# Patient Record
Sex: FEMALE | Race: WHITE | NOT HISPANIC OR LATINO | Employment: UNEMPLOYED | URBAN - NONMETROPOLITAN AREA
[De-identification: names, ages, dates, MRNs, and addresses within clinical notes are randomized per-mention and may not be internally consistent; named-entity substitution may affect disease eponyms.]

---

## 2017-01-04 ENCOUNTER — OFFICE VISIT (OUTPATIENT)
Dept: PSYCHIATRY | Facility: CLINIC | Age: 47
End: 2017-01-04

## 2017-01-04 VITALS
WEIGHT: 168.6 LBS | DIASTOLIC BLOOD PRESSURE: 90 MMHG | HEART RATE: 95 BPM | BODY MASS INDEX: 33.99 KG/M2 | SYSTOLIC BLOOD PRESSURE: 146 MMHG | HEIGHT: 59 IN

## 2017-01-04 DIAGNOSIS — I10 ESSENTIAL HYPERTENSION: ICD-10-CM

## 2017-01-04 DIAGNOSIS — E07.9 DISEASE OF THYROID GLAND: ICD-10-CM

## 2017-01-04 DIAGNOSIS — I25.10 ASCVD (ARTERIOSCLEROTIC CARDIOVASCULAR DISEASE): ICD-10-CM

## 2017-01-04 DIAGNOSIS — I25.10 ARTERIOSCLEROTIC CARDIOVASCULAR DISEASE (ASCVD): ICD-10-CM

## 2017-01-04 DIAGNOSIS — F31.81 BIPOLAR II DISORDER (HCC): Primary | ICD-10-CM

## 2017-01-04 PROCEDURE — 99213 OFFICE O/P EST LOW 20 MIN: CPT | Performed by: NURSE PRACTITIONER

## 2017-01-04 RX ORDER — SERTRALINE HYDROCHLORIDE 25 MG/1
25 TABLET, FILM COATED ORAL DAILY
Qty: 30 TABLET | Refills: 0 | Status: SHIPPED | OUTPATIENT
Start: 2017-01-04 | End: 2017-03-06 | Stop reason: SDUPTHER

## 2017-01-04 RX ORDER — LITHIUM CARBONATE 300 MG/1
300 CAPSULE ORAL 2 TIMES DAILY WITH MEALS
Qty: 60 CAPSULE | Refills: 1 | Status: SHIPPED | OUTPATIENT
Start: 2017-01-04 | End: 2017-03-06 | Stop reason: SDUPTHER

## 2017-01-04 NOTE — PROGRESS NOTES
"Subjective   Tammi Levine is a 46 y.o. female who is here today for medication management follow up.    Chief Complaint::  It's been a bad couple weeks.    HPI:  History of Present Illness   patient presents today for follow-up.  Patient reports that her mood has been improved her irritability and depression have improved.  She adamantly denies any hopelessness or thoughts of self-harm.  She continues to struggle with interpersonal conflict with her father who is demeaning and dependent.  Patient denies any medication related side effects she denies any signs and symptoms of flaquito or hypomania.The patient reports depressive symptoms including depressed mood, crying spells, hypersomnia, low energy, difficulty concentrating and psychomotor agitation, and have caused impairment in important areas of functioning.  Patient has reported improved mental health symptoms.  Patient did have un resolving nausea with lexapro and had to stop.  Will trial on zoloft and aviod celexa due to cardiac issues.    The patient reports symptoms stable with treatment for the past 1 month(s).   The following portions of the patient's history were reviewed and updated as appropriate: allergies, current medications, past family history, past medical history, past social history, past surgical history and problem list.    Review of Systems  Patient denies any recent medical illnesses.  No acute cardiopulmonary symptoms evident.  No acute gastrointestinal complaints.  Patient's appetite and intake are adequate.  Patient's current weight compared with last weight.    Objective   Physical Exam  Blood pressure 146/90, pulse 95, height 59\" (149.9 cm), weight 168 lb 9.6 oz (76.5 kg).    No Known Allergies    Current Medications:   Current Outpatient Prescriptions   Medication Sig Dispense Refill   • ACCU-CHEK ONESIMO PLUS test strip      • ACCU-CHEK SOFTCLIX LANCETS lancets      • aspirin 81 MG EC tablet Take 81 mg by mouth daily.     • Blood " Glucose Monitoring Suppl (ACCU-CHEK ONESIMO PLUS) W/DEVICE kit      • Empagliflozin (JARDIANCE) 25 MG tablet Take 25 mg by mouth daily.     • escitalopram (LEXAPRO) 10 MG tablet Take 1 tablet by mouth Daily. Take 1/2 tablet po daily for 1 week then increase to 1 tablet daily 30 tablet 1   • Exenatide ER (BYDUREON) 2 MG pen-injector Inject 2 mg under the skin 1 (one) time per week.     • insulin glargine (LANTUS) 100 UNIT/ML injection Inject 80 Units under the skin daily.     • levothyroxine (SYNTHROID, LEVOTHROID) 50 MCG tablet Take 50 mcg by mouth daily.     • lisinopril (PRINIVIL,ZESTRIL) 20 MG tablet Take 20 mg by mouth daily.     • lisinopril (PRINIVIL,ZESTRIL) 40 MG tablet      • lithium carbonate 300 MG capsule Take 1 capsule by mouth 2 (Two) Times a Day With Meals. Patient to begin dose at 300mg po daily for 3 days and then increase to twice daily 30 capsule 0   • simvastatin (ZOCOR) 80 MG tablet Take 80 mg by mouth every night.       No current facility-administered medications for this visit.        Mental Status Exam:   Hygiene:   fair  Cooperation:  Cooperative  Eye Contact:  Good  Psychomotor Behavior:  Appropriate  Affect:  Full range  Hopelessness: Denies  Speech:  Normal  Thought Process:  Goal directed and Linear  Thought Content:  Normal  Suicidal:  None  Homicidal:  None  Hallucinations:  None  Delusion:  None  Memory:  Intact  Orientation:  Person, Place, Time and Situation  Reliability:  good  Insight:  Good  Judgement:  Good  Impulse Control:  Good  Physical/Medical Issues:  Yes dm    Assessment/Plan   Diagnoses and all orders for this visit:    Bipolar II disorder    Arteriosclerotic cardiovascular disease (ASCVD), s/p MI in 2012, clinically stable.     ASCVD (arteriosclerotic cardiovascular disease)    Disease of thyroid gland    Essential hypertension  patient will be continued on lithium and trailed on Zoloft low dose.  She was cautioned and counseled regarding signs and symptoms of flaquito or  hypomania potentially inherent to anti-depressant use.  Patient was praised for positive choices.  Patient was to continue psychotherapy and self-care activities she has been utilizing the CALM for assistance with mindfulness and meditation.  We discussed risks, benefits, and side effects of the above medication and the patient was agreeable with the plan.    Return in about 2 months (around 3/4/2017) for Recheck.  The patient was instructed to call clinic as needed or go to ER if in crisis.  Patient was instructed to immediately call 911 or come to the nearest emergency room for thoughts to harm self or others.

## 2017-01-04 NOTE — MR AVS SNAPSHOT
Tammi ZEHRA Abner   1/4/2017 5:00 PM   Office Visit    Dept Phone:  [phone]   Encounter #:  [id number]    Provider:  MARISELA Escobar   Department:  [facility]AVIOR[facility]                Your Full Care Plan              Your Updated Medication List          This list is accurate as of: 1/4/17  5:20 PM.  Always use your most recent med list.                ACCU-CHEK ONESIMO PLUS test strip   Generic drug:  glucose blood       ACCU-CHEK ONESIMO PLUS W/DEVICE kit       ACCU-CHEK SOFTCLIX LANCETS lancets       aspirin 81 MG EC tablet       BYDUREON 2 MG pen-injector   Generic drug:  Exenatide ER       escitalopram 10 MG tablet   Commonly known as:  LEXAPRO   Take 1 tablet by mouth Daily. Take 1/2 tablet po daily for 1 week then increase to 1 tablet daily       insulin glargine 100 UNIT/ML injection   Commonly known as:  LANTUS       JARDIANCE 25 MG tablet   Generic drug:  Empagliflozin       levothyroxine 50 MCG tablet   Commonly known as:  SYNTHROID, LEVOTHROID       * lisinopril 20 MG tablet   Commonly known as:  PRINIVIL,ZESTRIL       * lisinopril 40 MG tablet   Commonly known as:  PRINIVIL,ZESTRIL       lithium carbonate 300 MG capsule   Take 1 capsule by mouth 2 (Two) Times a Day With Meals. Patient to begin dose at 300mg po daily for 3 days and then increase to twice daily       simvastatin 80 MG tablet   Commonly known as:  ZOCOR       * Notice:  This list has 2 medication(s) that are the same as other medications prescribed for you. Read the directions carefully, and ask your doctor or other care provider to review them with you.            You Were Diagnosed With        Codes Comments    Bipolar II disorder    -  Primary ICD-10-CM: F31.81  ICD-9-CM: 296.89     Arteriosclerotic cardiovascular disease (ASCVD)     ICD-10-CM: I25.10  ICD-9-CM: 429.2, 440.9     ASCVD (arteriosclerotic cardiovascular disease)     ICD-10-CM: I25.10  ICD-9-CM: 429.2, 440.9     "Disease of thyroid gland     ICD-10-CM: E07.9  ICD-9-CM: 246.9     Essential hypertension     ICD-10-CM: I10  ICD-9-CM: 401.9       Instructions     None    Patient Instructions History      Upcoming Appointments     Visit Type Date Time Department    MEDICINE CHECK 1/4/2017  5:00 PM [facility]    FOLLOW UP 1/13/2017 11:30 AM [facility]    FOLLOW UP 2/7/2017  1:30 PM [facility]    MEDICINE CHECK 3/6/2017  1:30 PM [facility]    FOLLOW UP 3/27/2017 12:40 PM [facility]      MyChart Signup     Our records indicate that you have declined [facility] [employer] signup. If you would like to sign up for [employer], please email [email or web address] or call [phone] to obtain an activation code.             Other Info from Your Visit           Your Appointments     Jan 13, 2017 11:30 AM EST   Follow Up with Vidal Ortiz LCSW   [facility] BEHAVIORAL HEALTH (--)    [address] [id number] [phone]           Arrive 15 minutes prior to appointment.            Feb 07, 2017  1:30 PM EST   Follow Up with Uday Killian MD   [facility] UROLOGY (--)    [address]  [facility] [address] [phone]           Arrive 15 minutes prior to appointment.            Mar 06, 2017  1:30 PM EST   Medicine Check with MARISELA Escobar   [facility] (--)    [address] [address]   [phone]            Mar 27, 2017 12:40 PM EDT   Follow Up with Jhonny De La Rosa MD   [facility] CARDIOLOGY (--)    [address]  [facility] [address] [phone]           Arrive 15 minutes prior to appointment.              Allergies     No Known Allergies      Vital Signs     Blood Pressure Pulse Height Weight Body Mass Index Smoking Status    146/90 95 59\" (149.9 cm) 168 lb 9.6 oz (76.5 kg) 34.05 kg/m2 Never Smoker      Problems and Diagnoses Noted     " Arteriosclerotic cardiovascular disease (ASCVD)    ASCVD (arteriosclerotic cardiovascular disease)    Disease of thyroid gland    High blood pressure    Bipolar II disorder    -  Primary

## 2017-01-23 DIAGNOSIS — D35.02 ADRENAL ADENOMA, LEFT: Primary | ICD-10-CM

## 2017-02-07 ENCOUNTER — APPOINTMENT (OUTPATIENT)
Dept: CT IMAGING | Facility: HOSPITAL | Age: 47
End: 2017-02-07
Attending: UROLOGY

## 2017-02-24 ENCOUNTER — OFFICE VISIT (OUTPATIENT)
Dept: PSYCHIATRY | Facility: CLINIC | Age: 47
End: 2017-02-24

## 2017-02-24 DIAGNOSIS — F43.21 GRIEF REACTION: ICD-10-CM

## 2017-02-24 DIAGNOSIS — F31.81 BIPOLAR II DISORDER (HCC): Primary | ICD-10-CM

## 2017-02-24 PROCEDURE — 90832 PSYTX W PT 30 MINUTES: CPT | Performed by: SOCIAL WORKER

## 2017-02-27 NOTE — PROGRESS NOTES
Tammi Levine : 1970  MULTIDISCIPLINARY PROGRESS NOTE      Date of Service: 2017   Time In: 12:02 PM  Time Out: 12:30 PM    DATA: Patient met 1:1 with Vidal Ortiz LCSW, LCADC for scheduled individual outpatient psychotherapy session at [facility].  The patient reports her father passed away only a few weeks ago and states she and her mother are struggling with his death.  Patient states although she and her father often had a contentious relationship she is really struggling with his passing and feels that living in the home she shared with him is a constant memory which causes a consistent reminder of his death.  Patient also discusses her feelings of resentment toward some extended family members and members of the community who did not attend her father's  which she feels was disrespectful to him.  Patient reports she is worried about her mother and feels she will struggle to adjust to life without her father.    HPI: Patient reports she continues to struggle with depressed mood, anhedonia, periods of hopelessness, low self-esteem, difficulty concentrating, feeling on edge, feeling overwhelmed, increased heart rate, avoidance of social situations, and sense of impending doom. She rates current symptoms of depression at 5 on a scale of 1-10 with 10 being the most severe.  She also reports she is struggling with grief as related to the death of her father and rates current symptoms of grief at 6 on a scale of 1-10 with 10 being the most severe.  She reports she continues to adhere to medication regimen as prescribed with no side effects.  Patient adamantly and convincingly denies suicidal ideation or perceptual disturbance.  She vehemently denies any substance use.    CLNICAL MANEUVERING/INTERVENTION: Assisted patient in processing above session content; acknowledged and normalized patient’s thoughts, feelings, and concerns.  Allow patient to discuss/process her  feelings concerning the recent death of her father and validated her feelings.  Discussed the stages of grief and provided the patient with a handout describing the stages.  Also assisted the patient in identifying specific steps she can take to decrease her feelings of distress including her and her mother planning a short trip to get away from everything and avoid constant reminders of her father and to put themselves in a place where they can feel luis carlos and happiness.  Reminded the patient it is normal to feel these emotions following the loss of a loved one and encouraged her to remind herself this will eventually facilitate her processing and working through her emotions.  Provided unconditional positive regard in a safe, supportive environment.    Allowed patient to freely discuss issues without interruption or judgment. Provided safe, confidential environment to facilitate the development of positive therapeutic relationship and encourage open, honest communication. Assisted patient in identifying increased risk factors which would indicate the need for higher level of care including thoughts to harm self or others, self-harming behavior, and encouraged patient to contact this office, call 911, or present to the nearest emergency room should any of these events occur. Discussed crisis intervention services and means to access.     ASSESSMENT: Patient presents for session on time, clean and casually dressed with depressed mood and sad affect.  Patient presents walking on a cane.  The patient continues to present with tremulousness in her hands throughout the session.  No evidence of intoxication, withdrawal, or perceptual disturbance. Thought process is linear and logical.  Thought content normal.  Speech is clear and coherent. Patient is oriented to person, place, and time. Attention and concentration fair. Insight and judgment fair. Patient reports no current suicidal or homicidal ideation.  Patient appears  cooperative and agreeable to treatment and appears to continue to develop rapport and maintain therapeutic alliance. No evidence of symptoms of flaquito or hypomania. Patient does not appear to be malingering.  Patient continues to struggle with depression which is currently exacerbated by the recent passing of her father.  As a result, she would likely be at increased risk for decompensation without ongoing treatment.    Diagnosis:   F31.81 Bipolar II Disorder  F43.20 Grief Reaction    PLAN: Patient will continue in individual outpatient psychotherapy sessions every 3-4 weeks at [facility] in [place], [place]. She will continue in pharmacotherapy with her primary care physician and will adhere to medication regimen as prescribed and report any side effects. Patient will contact this office, call 911 or present to the nearest emergency room should suicidal ideations occur. Provide Cognitive Behavioral Therapy and Solution Focused Therapy to improve functioning, maintain stability, and avoid decompensation and the need for higher level of care.                                                                                        Vidal Ortiz, KAYLEIGH, [facility]

## 2017-03-06 ENCOUNTER — LAB (OUTPATIENT)
Dept: LAB | Facility: HOSPITAL | Age: 47
End: 2017-03-06

## 2017-03-06 ENCOUNTER — OFFICE VISIT (OUTPATIENT)
Dept: PSYCHIATRY | Facility: CLINIC | Age: 47
End: 2017-03-06

## 2017-03-06 VITALS
DIASTOLIC BLOOD PRESSURE: 73 MMHG | SYSTOLIC BLOOD PRESSURE: 128 MMHG | BODY MASS INDEX: 33.06 KG/M2 | HEIGHT: 59 IN | WEIGHT: 164 LBS | HEART RATE: 81 BPM

## 2017-03-06 DIAGNOSIS — I10 ESSENTIAL HYPERTENSION: ICD-10-CM

## 2017-03-06 DIAGNOSIS — F31.81 BIPOLAR II DISORDER (HCC): Primary | ICD-10-CM

## 2017-03-06 DIAGNOSIS — I25.10 ARTERIOSCLEROTIC CARDIOVASCULAR DISEASE (ASCVD): ICD-10-CM

## 2017-03-06 DIAGNOSIS — F31.81 BIPOLAR II DISORDER (HCC): ICD-10-CM

## 2017-03-06 DIAGNOSIS — E07.9 DISEASE OF THYROID GLAND: ICD-10-CM

## 2017-03-06 LAB
ANION GAP SERPL CALCULATED.3IONS-SCNC: 3.9 MMOL/L (ref 3.6–11.2)
BASOPHILS # BLD AUTO: 0.03 10*3/MM3 (ref 0–0.3)
BASOPHILS NFR BLD AUTO: 0.3 % (ref 0–2)
BUN BLD-MCNC: 20 MG/DL (ref 7–21)
BUN/CREAT SERPL: 23.3 (ref 7–25)
CALCIUM SPEC-SCNC: 9.7 MG/DL (ref 7.7–10)
CHLORIDE SERPL-SCNC: 106 MMOL/L (ref 99–112)
CO2 SERPL-SCNC: 23.1 MMOL/L (ref 24.3–31.9)
CREAT BLD-MCNC: 0.86 MG/DL (ref 0.43–1.29)
DEPRECATED RDW RBC AUTO: 39.4 FL (ref 37–54)
EOSINOPHIL # BLD AUTO: 0.12 10*3/MM3 (ref 0–0.7)
EOSINOPHIL NFR BLD AUTO: 1.3 % (ref 0–5)
ERYTHROCYTE [DISTWIDTH] IN BLOOD BY AUTOMATED COUNT: 12.8 % (ref 11.5–14.5)
GFR SERPL CREATININE-BSD FRML MDRD: 71 ML/MIN/1.73
GLUCOSE BLD-MCNC: 226 MG/DL (ref 70–110)
HCT VFR BLD AUTO: 31.1 % (ref 37–47)
HGB BLD-MCNC: 10.5 G/DL (ref 12–16)
IMM GRANULOCYTES # BLD: 0.02 10*3/MM3 (ref 0–0.03)
IMM GRANULOCYTES NFR BLD: 0.2 % (ref 0–0.5)
LITHIUM SERPL-SCNC: 0.9 MMOL/L (ref 0.6–1.2)
LYMPHOCYTES # BLD AUTO: 2.63 10*3/MM3 (ref 1–3)
LYMPHOCYTES NFR BLD AUTO: 28.4 % (ref 21–51)
MCH RBC QN AUTO: 29.3 PG (ref 27–33)
MCHC RBC AUTO-ENTMCNC: 33.8 G/DL (ref 33–37)
MCV RBC AUTO: 86.9 FL (ref 80–94)
MONOCYTES # BLD AUTO: 0.49 10*3/MM3 (ref 0.1–0.9)
MONOCYTES NFR BLD AUTO: 5.3 % (ref 0–10)
NEUTROPHILS # BLD AUTO: 5.96 10*3/MM3 (ref 1.4–6.5)
NEUTROPHILS NFR BLD AUTO: 64.5 % (ref 30–70)
OSMOLALITY SERPL CALC.SUM OF ELEC: 276.1 MOSM/KG (ref 273–305)
PLATELET # BLD AUTO: 306 10*3/MM3 (ref 130–400)
PMV BLD AUTO: 9.8 FL (ref 6–10)
POTASSIUM BLD-SCNC: 5.2 MMOL/L (ref 3.5–5.3)
RBC # BLD AUTO: 3.58 10*6/MM3 (ref 4.2–5.4)
SODIUM BLD-SCNC: 133 MMOL/L (ref 135–153)
T4 FREE SERPL-MCNC: 1.09 NG/DL (ref 0.89–1.76)
TSH SERPL DL<=0.05 MIU/L-ACNC: 1.22 MIU/ML (ref 0.55–4.78)
WBC NRBC COR # BLD: 9.25 10*3/MM3 (ref 4.5–12.5)

## 2017-03-06 PROCEDURE — 84443 ASSAY THYROID STIM HORMONE: CPT | Performed by: NURSE PRACTITIONER

## 2017-03-06 PROCEDURE — 84439 ASSAY OF FREE THYROXINE: CPT | Performed by: NURSE PRACTITIONER

## 2017-03-06 PROCEDURE — 99213 OFFICE O/P EST LOW 20 MIN: CPT | Performed by: NURSE PRACTITIONER

## 2017-03-06 PROCEDURE — 80178 ASSAY OF LITHIUM: CPT | Performed by: NURSE PRACTITIONER

## 2017-03-06 PROCEDURE — 85025 COMPLETE CBC W/AUTO DIFF WBC: CPT | Performed by: NURSE PRACTITIONER

## 2017-03-06 PROCEDURE — 36415 COLL VENOUS BLD VENIPUNCTURE: CPT | Performed by: NURSE PRACTITIONER

## 2017-03-06 PROCEDURE — 80048 BASIC METABOLIC PNL TOTAL CA: CPT | Performed by: NURSE PRACTITIONER

## 2017-03-06 RX ORDER — LITHIUM CARBONATE 300 MG/1
300 CAPSULE ORAL 2 TIMES DAILY WITH MEALS
Qty: 60 CAPSULE | Refills: 1 | Status: SHIPPED | OUTPATIENT
Start: 2017-03-06 | End: 2017-06-05 | Stop reason: SDUPTHER

## 2017-03-06 RX ORDER — SERTRALINE HYDROCHLORIDE 25 MG/1
25 TABLET, FILM COATED ORAL DAILY
Qty: 30 TABLET | Refills: 1 | Status: SHIPPED | OUTPATIENT
Start: 2017-03-06 | End: 2017-06-05 | Stop reason: SDUPTHER

## 2017-03-06 NOTE — PROGRESS NOTES
"Subjective   Tammi Levine is a 46 y.o. female who is here today for medication management follow up.    Chief Complaint: 'we lost dad last month'.    HPI: History of Present Illness Patient presents for follow up regarding depression anxiety.  She has lost her father.  She is dealing with it'.  She is continuing to grieve his loss.  She reports some difficulty with sleep initial and intermittent insomnia.  She is continuing to pursue disability due to medical psychiatric issues.  She has recently obtained an .  Patient was provided ongoing encouragement to continue to try.  She does appear to be unable to complete requirements of job.  She has severe difficulty with neuropathy secondary to diabetes.  She attempts to manage her dm.  She denies any medication side effects.  She rates her depression at 'not really' also is managing her anxiety fairly well.      She denies any severe mood liability, denies any thoughts to harm self or others.  She denies any difficulty with psychotic symptoms.      The following portions of the patient's history were reviewed and updated as appropriate: allergies, current medications, past family history, past medical history, past social history, past surgical history and problem list.    Review of Systems  Patient denies any recent medical illnesses.  No acute cardiopulmonary symptoms evident.  No acute gastrointestinal complaints.  Patient's appetite and intake are adequate.  Patient's current weight compared with last weight.    Objective   Physical Exam  Blood pressure 128/73, pulse 81, height 59\" (149.9 cm), weight 164 lb (74.4 kg).    No Known Allergies    Current Medications:   Current Outpatient Prescriptions   Medication Sig Dispense Refill   • ACCU-CHEK ONESIMO PLUS test strip      • ACCU-CHEK SOFTCLIX LANCETS lancets      • aspirin 81 MG EC tablet Take 81 mg by mouth daily.     • Blood Glucose Monitoring Suppl (ACCU-CHEK ONESIMO PLUS) W/DEVICE kit      • " Canagliflozin (INVOKANA) 100 MG tablet Take 100 mg by mouth Daily.     • Exenatide ER (BYDUREON) 2 MG pen-injector Inject 2 mg under the skin 1 (one) time per week.     • insulin glargine (LANTUS) 100 UNIT/ML injection Inject 80 Units under the skin daily.     • levothyroxine (SYNTHROID, LEVOTHROID) 50 MCG tablet Take 50 mcg by mouth daily.     • lisinopril (PRINIVIL,ZESTRIL) 40 MG tablet      • lithium carbonate 300 MG capsule Take 1 capsule by mouth 2 (Two) Times a Day With Meals. 60 capsule 1   • sertraline (ZOLOFT) 25 MG tablet Take 1 tablet by mouth Daily. 30 tablet 0   • simvastatin (ZOCOR) 80 MG tablet Take 80 mg by mouth every night.       No current facility-administered medications for this visit.        Mental Status Exam:   Hygiene:   fair  Cooperation:  Cooperative  Eye Contact:  Good  Psychomotor Behavior:  Appropriate  Affect:  Full range  Hopelessness: Denies  Speech:  Normal  Thought Process:  Goal directed and Linear  Thought Content:  Mood congurent  Suicidal:  None  Homicidal:  None  Hallucinations:  None  Delusion:  None  Memory:  Intact  Orientation:  Person, Place, Time and Situation  Reliability:  fair  Insight:  Fair  Judgement:  Fair  Impulse Control:  Fair  Physical/Medical Issues:  Yes DM, hypothryoid,heart -CAD.     Assessment/Plan   Diagnoses and all orders for this visit:    Bipolar II disorder  -     Lithium Level; Future  -     CBC & Differential; Future  -     Basic Metabolic Panel; Future  -     TSH; Future  -     T4, Free; Future    Arteriosclerotic cardiovascular disease (ASCVD), s/p MI in 2012, clinically stable.     Essential hypertension    Disease of thyroid gland    Other orders  -     lithium carbonate 300 MG capsule; Take 1 capsule by mouth 2 (Two) Times a Day With Meals.  -     sertraline (ZOLOFT) 25 MG tablet; Take 1 tablet by mouth Daily.        ·   We will continue medications and therapy.  She was provided encouragement and support.        We discussed risks,  benefits, and side effects of the above medication and the patient was agreeable with the plan.    Return in about 2 months (around 5/6/2017).  The patient was instructed to call clinic as needed or go to ER if in crisis.  Patient was instructed to immediately call 911 or come to the nearest emergency room for thoughts to harm self or others.

## 2017-03-07 ENCOUNTER — TELEPHONE (OUTPATIENT)
Dept: PSYCHIATRY | Facility: CLINIC | Age: 47
End: 2017-03-07

## 2017-03-07 NOTE — TELEPHONE ENCOUNTER
"----- Message from Wen Strauss sent at 3/6/2017  2:43 PM EST -----  Regarding: Cancellation of Order # [id number]  Order number [id number] for the procedure CREATINE [[id number]] has   been canceled by Wen Strauss [[id number]]. This procedure was   ordered by you on Nov 17, 2016 for the patient Tammi Levine   [[id number]]. The reason for cancellation was \"Wrong Test\".  "

## 2017-03-07 NOTE — TELEPHONE ENCOUNTER
Patient was notified of result.  She was instructed to call her PCP.  She states she would do that today.

## 2017-03-13 RX ORDER — LISINOPRIL 40 MG/1
TABLET ORAL
Qty: 30 TABLET | Refills: 0 | Status: SHIPPED | OUTPATIENT
Start: 2017-03-13 | End: 2017-03-27 | Stop reason: SDUPTHER

## 2017-03-17 ENCOUNTER — OFFICE VISIT (OUTPATIENT)
Dept: PSYCHIATRY | Facility: CLINIC | Age: 47
End: 2017-03-17

## 2017-03-17 DIAGNOSIS — F43.21 GRIEF REACTION: ICD-10-CM

## 2017-03-17 DIAGNOSIS — F31.81 BIPOLAR II DISORDER (HCC): Primary | ICD-10-CM

## 2017-03-17 PROCEDURE — 90832 PSYTX W PT 30 MINUTES: CPT | Performed by: SOCIAL WORKER

## 2017-03-17 NOTE — PROGRESS NOTES
Tammi Levine : 1970  MULTIDISCIPLINARY PROGRESS NOTE      Date of Service: 3/17/2017   Time In: 11:25 AM  Time Out: 12:00 PM     DATA: Patient met 1:1 with Vidal Ortiz LCSW, LCADC for scheduled individual outpatient psychotherapy session at [facility] or follow-up.  The patient reports she continues to live at home with her mother and states although they both continue to miss the patient's father very much, she feels they are both beginning to come to terms with his passing.  The patient reports her and her mother are planning a trip to [place] in the coming weeks to stay in a condominium owned by a family member.  She reports she is looking forward to this trip and states she plans to spend a great deal of time by the pool.    HPI: Patient reports she feels she is coping with her father's death more appropriately but states she continues to struggle with depressed mood, anhedonia, periods of hopelessness, low self-esteem, difficulty concentrating, irritability, tendency to lash out at others, feeling on edge, feeling overwhelmed, increased heart rate, avoidance of social situations, and sense of impending doom. She rates current symptoms of depression at 5 on a scale of 1-10 with 10 being the most severe.  She reports she continues to miss her father but states she has been unable to stop obsessively looking at pictures and other things that remind her of him.  She reports she continues to adhere to medication regimen as prescribed with no side effects.  Patient adamantly and convincingly denies suicidal ideation or perceptual disturbance.  She vehemently denies any substance use.    CLNICAL MANEUVERING/INTERVENTION: Assisted patient in processing above session content; acknowledged and normalized patient’s thoughts, feelings, and concerns.  Allow the patient to continue to discuss her feelings concerning her father's recent passing and validated her feelings.   Also praised and acknowledge the patient's significant process in moving through the grieving process.  Acknowledged the patient's plans for hurting her mother to get away for a few days and encouraged her to follow through.  Assisted the patient in identifying and verbalizing coping skill she is found to be effective in reducing the severity and frequency of symptoms of mood instability including positive self talk, relaxation techniques including deep breathing exercises, and finding enjoyable activities she can engage in on a regular basis to feel her idle time and reduce social isolation.  Also encourage the patient to continue to focus on healthy skills of daily living including eating a healthy diet, getting regular physical activity as tolerated, and getting adequate sleep.  Provided unconditional positive regard in a safe, supportive environment.    Allowed patient to freely discuss issues without interruption or judgment. Provided safe, confidential environment to facilitate the development of positive therapeutic relationship and encourage open, honest communication. Assisted patient in identifying increased risk factors which would indicate the need for higher level of care including thoughts to harm self or others, self-harming behavior, and encouraged patient to contact this office, call 911, or present to the nearest emergency room should any of these events occur. Discussed crisis intervention services and means to access.     ASSESSMENT: Patient presents for session on time, clean and casually dressed with depressed mood and sad affect.  Patient presents walking on a cane.  The patient continues to present with tremulousness in her hands throughout the session.  No evidence of intoxication, withdrawal, or perceptual disturbance. Thought process is linear and logical.  Thought content normal.  Speech is clear and coherent. Patient is oriented to person, place, and time. Attention and concentration  fair. Insight and judgment fair. Patient reports no current suicidal or homicidal ideation.  Patient appears cooperative and agreeable to treatment and appears to continue to develop rapport and maintain therapeutic alliance. No evidence of symptoms of flaquito or hypomania. Patient does not appear to be malingering.  Patient appears to be moving through the grieving process appropriately concerning her father's recent passing but continues to struggle with significant mood instability which causes significant impairment in important areas of functioning.  As a result, she would likely be at increased risk for decompensation without ongoing treatment.    PLAN: Patient will continue in individual outpatient psychotherapy sessions every 3-4 weeks at [facility] in [place], [place]. She will continue in pharmacotherapy with her primary care physician and will adhere to medication regimen as prescribed and report any side effects. Patient will contact this office, call 911 or present to the nearest emergency room should suicidal ideations occur. Provide Cognitive Behavioral Therapy and Solution Focused Therapy to improve functioning, maintain stability, and avoid decompensation and the need for higher level of care.                                                                                        Vidal Ortiz, KAYLEIGH, [facility] Hospital[facility]

## 2017-03-27 ENCOUNTER — OFFICE VISIT (OUTPATIENT)
Dept: CARDIOLOGY | Facility: CLINIC | Age: 47
End: 2017-03-27

## 2017-03-27 VITALS
HEART RATE: 80 BPM | WEIGHT: 167.4 LBS | SYSTOLIC BLOOD PRESSURE: 108 MMHG | BODY MASS INDEX: 33.75 KG/M2 | DIASTOLIC BLOOD PRESSURE: 62 MMHG | HEIGHT: 59 IN

## 2017-03-27 DIAGNOSIS — I10 ESSENTIAL HYPERTENSION: ICD-10-CM

## 2017-03-27 DIAGNOSIS — I25.10 ARTERIOSCLEROTIC CARDIOVASCULAR DISEASE (ASCVD): Primary | ICD-10-CM

## 2017-03-27 PROCEDURE — 99213 OFFICE O/P EST LOW 20 MIN: CPT | Performed by: INTERNAL MEDICINE

## 2017-03-27 PROCEDURE — 93000 ELECTROCARDIOGRAM COMPLETE: CPT | Performed by: INTERNAL MEDICINE

## 2017-03-27 RX ORDER — INSULIN GLARGINE 100 [IU]/ML
20 INJECTION, SOLUTION SUBCUTANEOUS 2 TIMES DAILY
COMMUNITY
End: 2019-07-05 | Stop reason: ALTCHOICE

## 2017-03-27 RX ORDER — LISINOPRIL 40 MG/1
40 TABLET ORAL DAILY
Qty: 30 TABLET | Refills: 6 | Status: SHIPPED | OUTPATIENT
Start: 2017-03-27 | End: 2018-03-13 | Stop reason: SDUPTHER

## 2017-03-27 NOTE — PROGRESS NOTES
Jess Hanley, MARISELA  Tammi Levine  1970  03/27/2017    Patient Active Problem List   Diagnosis   • Arteriosclerotic cardiovascular disease (ASCVD), s/p MI in 2012, clinically stable.    • Type 2 diabetes mellitus   • Essential hypertension   • Dyslipidemia   • Sleep apnea   • Myocardial infarction   • Migraine headache   • Abnormal CT scan   • ASCVD (arteriosclerotic cardiovascular disease)   • Hypertension, well controlled   • Disease of thyroid gland       Dear Jess Hanley, APRN:    Subjective     Tammi Levine is a 47 y.o. female with the problems as listed above, presents for follow up of ASCVD.      History of Present Illness: Ms. Levine is a 47-year-old  female with history of known ASCVD, status post myocardial infarction 2012, type 2 diabetes mellitus and hypertension, is here for regular cardiology follow-up.  Patient states she is doing good.  She denies chest pain, palpitations, dizziness or syncope.  She denies any significant dyspnea, PND or orthopnea.    No Known Allergies:      Current Outpatient Prescriptions:   •  aspirin 81 MG EC tablet, Take 81 mg by mouth daily., Disp: , Rfl:   •  Canagliflozin (INVOKANA) 100 MG tablet, Take 100 mg by mouth Daily., Disp: , Rfl:   •  Insulin Glargine (BASAGLAR KWIKPEN) 100 UNIT/ML injection pen, Inject  under the skin., Disp: , Rfl:   •  levothyroxine (SYNTHROID, LEVOTHROID) 50 MCG tablet, Take 50 mcg by mouth daily., Disp: , Rfl:   •  lisinopril (PRINIVIL,ZESTRIL) 40 MG tablet, TAKE 1 TABLET BY MOUTH DAILY FOR BLOOD PRESSURE & TO PROTECT YOURKIDNEYS, Disp: 30 tablet, Rfl: 0  •  lithium carbonate 300 MG capsule, Take 1 capsule by mouth 2 (Two) Times a Day With Meals., Disp: 60 capsule, Rfl: 1  •  sertraline (ZOLOFT) 25 MG tablet, Take 1 tablet by mouth Daily., Disp: 30 tablet, Rfl: 1  •  simvastatin (ZOCOR) 80 MG tablet, Take 80 mg by mouth every night., Disp: , Rfl:   •  ACCU-CHEK ONESIMO PLUS test strip, , Disp: , Rfl:   •  ACCU-CHEK  "SOFTCLIX LANCETS lancets, , Disp: , Rfl:   •  Blood Glucose Monitoring Suppl (ACCU-CHEK ONESIMO PLUS) W/DEVICE kit, , Disp: , Rfl:   •  Exenatide ER (BYDUREON) 2 MG pen-injector, Inject 2 mg under the skin 1 (one) time per week., Disp: , Rfl:   •  insulin glargine (LANTUS) 100 UNIT/ML injection, Inject 80 Units under the skin daily., Disp: , Rfl:       The following portions of the patient's history were reviewed and updated as appropriate: allergies, current medications, past family history, past medical history, past social history, past surgical history and problem list.    Social History   Substance Use Topics   • Smoking status: Never Smoker   • Smokeless tobacco: Never Used   • Alcohol use No       Review of Systems   Cardiovascular: Negative for chest pain, leg swelling, palpitations and syncope.   Respiratory: Negative for shortness of breath.    Neurological: Negative for dizziness.       Objective   Vitals:    03/27/17 1256   BP: 108/62   BP Location: Right arm   Patient Position: Sitting   Cuff Size: Adult   Pulse: 80   Weight: 167 lb 6.4 oz (75.9 kg)   Height: 59\" (149.9 cm)     Body mass index is 33.81 kg/(m^2).        Physical Exam   Constitutional: She is oriented to person, place, and time. She appears well-developed and well-nourished.   HENT:   Head: Normocephalic.   Eyes: Conjunctivae and EOM are normal.   Neck: Normal range of motion. Neck supple. No JVD present. No tracheal deviation present. No thyromegaly present.   Cardiovascular: Normal rate and regular rhythm.  Exam reveals no gallop and no friction rub.    No murmur heard.  Pulmonary/Chest: Breath sounds normal. No respiratory distress. She has no wheezes. She has no rales.   Abdominal: Soft. Bowel sounds are normal. She exhibits no mass. There is no tenderness.   Musculoskeletal: She exhibits no edema.   Neurological: She is alert and oriented to person, place, and time. No cranial nerve deficit.   Skin: Skin is warm and dry.   Psychiatric: " She has a normal mood and affect.       Lab Results   Component Value Date     (L) 03/06/2017    K 5.2 03/06/2017     03/06/2017    CO2 23.1 (L) 03/06/2017    BUN 20 03/06/2017    CREATININE 0.86 03/06/2017    GLUCOSE 226 (H) 03/06/2017    CALCIUM 9.7 03/06/2017    AST 18 10/31/2016    ALT 18 10/31/2016    ALKPHOS 94 10/31/2016    LABIL2 1.4 (L) 10/31/2016     No results found for: CKTOTAL  Lab Results   Component Value Date    WBC 9.25 03/06/2017    HGB 10.5 (L) 03/06/2017    HCT 31.1 (L) 03/06/2017     03/06/2017     No results found for: INR  No results found for: MG  Lab Results   Component Value Date    TSH 1.223 03/06/2017      Lab Results   Component Value Date    BNP 36 09/16/2015       ECG 12 Lead  Date/Time: 3/27/2017 12:48 PM  Performed by: JHONNY RODRIGUEZ  Authorized by: JHONNY RODRIGUEZ   Rhythm: sinus rhythm  Conduction: conduction normal  ST Segments: ST segments normal  T Waves: T waves normal  Other: no other findings  Clinical impression: normal ECG              Assessment/Plan      1. Arteriosclerotic cardiovascular disease (ASCVD), s/p MI in 2012, clinically stable.      2. Essential hypertension, controlled.           Recommendations:     Continue current medications.  Return in about 6 months (around 9/27/2017).    As always, I appreciate very much the opportunity to participate in the cardiovascular care of your patients.      With Best Regards,    Jhonny Rodriguez MD, [facility]    Dragon disclaimer:  Much of this encounter note is an electronic transcription/translation of spoken language to printed text. The electronic translation of spoken language may permit erroneous, or at times, nonsensical words or phrases to be inadvertently transcribed; Although I have reviewed the note for such errors, some may still        Scribed for Jhonny Rodriguez MD by Mere Santos CMA 3/27/2017  1:38 PM     I, Jhonny Rodriguez MD, personally performed the services described in this  documentation as scribed by the above named individual in my presence, and it is both accurate and complete.  3/27/2017  1:38 PM

## 2017-04-14 ENCOUNTER — OFFICE VISIT (OUTPATIENT)
Dept: PSYCHIATRY | Facility: CLINIC | Age: 47
End: 2017-04-14

## 2017-04-14 DIAGNOSIS — F43.21 GRIEF REACTION: ICD-10-CM

## 2017-04-14 DIAGNOSIS — F31.81 BIPOLAR II DISORDER (HCC): Primary | ICD-10-CM

## 2017-04-14 PROCEDURE — 90832 PSYTX W PT 30 MINUTES: CPT | Performed by: SOCIAL WORKER

## 2017-04-14 NOTE — PROGRESS NOTES
Tammi Levine : 1970  MULTIDISCIPLINARY PROGRESS NOTE      Date of Service: 2017   Time In: 10:57 AM  Time Out: 11:30 AM     DATA: Patient met 1:1 with Vidal Ortiz LCSW, LCADC for scheduled individual outpatient psychotherapy session at [facility] or follow-up.  Patient reports she, her mother, and 2 other family members went on a brief getaway to [place] and states she feels it was a good relief from being home where they primarily think about the recent death of the patient's father.  The patient states she continues to think about her father on a regular basis but states she has been crying less over the past week.    HPI: Patient reports  she continues to struggle with depressed mood, anhedonia, periods of hopelessness, low self-esteem, difficulty concentrating, irritability, tendency to lash out at others, feeling on edge, feeling overwhelmed, increased heart rate, avoidance of social situations, and sense of impending doom. She rates current symptoms of depression at 5 on a scale of 1-10 with 10 being the most severe.  The patient states she continues to miss her father but reports she is crying less frequently and has been unable to reduce the amount of time she spends looking at pictures of him.  She reports she continues to adhere to medication regimen as prescribed with no side effects.  Patient adamantly and convincingly denies suicidal ideation or perceptual disturbance.  She vehemently denies any substance use.    CLNICAL MANEUVERING/INTERVENTION: Assisted patient in processing above session content; acknowledged and normalized patient’s thoughts, feelings, and concerns.  Allowed the patient to continue to discuss/vent her feelings concerning the recent death of her father and validated her feelings.  Continue to provide education concerning the grieving process and encouraged the patient to consider there is no way to move through this process without feeling  the pain.  Assisted patient in identifying and verbalizing progress she has made over the past few weeks including spending less time obsessively looking at pictures of her father and his belongings, spending progressively more time focused on other things, and decreased crying spells.  Utilize motivational interviewing techniques to assist the patient in recognizing and acknowledging her resiliency and reassured her she will continue to move through the grieving process.  Also encouraged the patient to continue to look for enjoyable activities she can engage in a regular basis.  Provided unconditional positive regard in a safe, supportive environment.    Allowed patient to freely discuss issues without interruption or judgment. Provided safe, confidential environment to facilitate the development of positive therapeutic relationship and encourage open, honest communication. Assisted patient in identifying increased risk factors which would indicate the need for higher level of care including thoughts to harm self or others, self-harming behavior, and encouraged patient to contact this office, call 911, or present to the nearest emergency room should any of these events occur. Discussed crisis intervention services and means to access.     ASSESSMENT: Patient presents for session on time, clean and casually dressed with depressed mood and sad affect.  Patient presents walking on a cane.  The patient continues to present with tremulousness in her hands throughout the session and struggles to drink from a water bottle.  No evidence of intoxication, withdrawal, or perceptual disturbance. Thought process is linear and logical.  Thought content normal.  Speech is clear and coherent. Patient is oriented to person, place, and time. Attention and concentration fair. Insight and judgment fair. Patient reports no current suicidal or homicidal ideation.  Patient appears cooperative and agreeable to treatment and appears to  continue to develop rapport and maintain therapeutic relationship. No evidence of symptoms of flaquito or hypomania. Patient does not appear to be malingering.  The patient continues to struggle with recent death of her father but appears to be appropriately moving through the grieving process.  However, she continues to struggle with significant depression which was likely exacerbated by the sense of uncertainty left by the passing of her father.  As a result, she would likely be at increased risk for decompensation without ongoing treatment.    PLAN: Patient will continue in individual outpatient psychotherapy sessions every 3-4 weeks at [facility] in [place], [place]. She will continue in pharmacotherapy with her primary care physician and will adhere to medication regimen as prescribed and report any side effects. Patient will contact this office, call 911 or present to the nearest emergency room should suicidal ideations occur. Provide Cognitive Behavioral Therapy and Solution Focused Therapy to improve functioning, maintain stability, and avoid decompensation and the need for higher level of care.                                                                                        Vidal Ortiz, KAYLEIGH, [facility] AdministrationDC

## 2017-05-05 ENCOUNTER — OFFICE VISIT (OUTPATIENT)
Dept: PSYCHIATRY | Facility: CLINIC | Age: 47
End: 2017-05-05

## 2017-05-05 DIAGNOSIS — F31.81 BIPOLAR II DISORDER (HCC): Primary | ICD-10-CM

## 2017-05-05 PROCEDURE — 90832 PSYTX W PT 30 MINUTES: CPT | Performed by: SOCIAL WORKER

## 2017-06-05 ENCOUNTER — APPOINTMENT (OUTPATIENT)
Dept: LAB | Facility: HOSPITAL | Age: 47
End: 2017-06-05

## 2017-06-05 ENCOUNTER — TRANSCRIBE ORDERS (OUTPATIENT)
Dept: ADMINISTRATIVE | Facility: HOSPITAL | Age: 47
End: 2017-06-05

## 2017-06-05 ENCOUNTER — OFFICE VISIT (OUTPATIENT)
Dept: PSYCHIATRY | Facility: CLINIC | Age: 47
End: 2017-06-05

## 2017-06-05 ENCOUNTER — LAB (OUTPATIENT)
Dept: LAB | Facility: HOSPITAL | Age: 47
End: 2017-06-05

## 2017-06-05 VITALS
SYSTOLIC BLOOD PRESSURE: 120 MMHG | WEIGHT: 166 LBS | DIASTOLIC BLOOD PRESSURE: 78 MMHG | HEIGHT: 59 IN | BODY MASS INDEX: 33.47 KG/M2 | HEART RATE: 91 BPM

## 2017-06-05 DIAGNOSIS — F31.81 BIPOLAR II DISORDER (HCC): Primary | ICD-10-CM

## 2017-06-05 DIAGNOSIS — E11.9 DIABETES MELLITUS WITHOUT COMPLICATION (HCC): ICD-10-CM

## 2017-06-05 DIAGNOSIS — E11.9 DIABETES MELLITUS WITHOUT COMPLICATION (HCC): Primary | ICD-10-CM

## 2017-06-05 DIAGNOSIS — I25.10 ARTERIOSCLEROTIC CARDIOVASCULAR DISEASE (ASCVD): ICD-10-CM

## 2017-06-05 DIAGNOSIS — F43.21 GRIEF REACTION: ICD-10-CM

## 2017-06-05 LAB
ALBUMIN UR-MCNC: 29.1 MG/L
ANION GAP SERPL CALCULATED.3IONS-SCNC: 3.6 MMOL/L (ref 3.6–11.2)
BUN BLD-MCNC: 24 MG/DL (ref 7–21)
BUN/CREAT SERPL: 23.5 (ref 7–25)
CALCIUM SPEC-SCNC: 10 MG/DL (ref 7.7–10)
CHLORIDE SERPL-SCNC: 108 MMOL/L (ref 99–112)
CHOLEST SERPL-MCNC: 196 MG/DL (ref 0–200)
CO2 SERPL-SCNC: 24.4 MMOL/L (ref 24.3–31.9)
CREAT BLD-MCNC: 1.02 MG/DL (ref 0.43–1.29)
CREAT UR-MCNC: 155.7 MG/DL
GFR SERPL CREATININE-BSD FRML MDRD: 58 ML/MIN/1.73
GLUCOSE BLD-MCNC: 208 MG/DL (ref 70–110)
HDLC SERPL-MCNC: 42 MG/DL (ref 60–100)
LDLC SERPL CALC-MCNC: 117 MG/DL (ref 0–100)
LDLC/HDLC SERPL: 2.78 {RATIO}
LITHIUM SERPL-SCNC: 0.5 MMOL/L (ref 0.6–1.2)
MICROALBUMIN/CREAT UR: 18.7 MG/G
OSMOLALITY SERPL CALC.SUM OF ELEC: 282.1 MOSM/KG (ref 273–305)
POTASSIUM BLD-SCNC: 5 MMOL/L (ref 3.5–5.3)
SODIUM BLD-SCNC: 136 MMOL/L (ref 135–153)
TRIGL SERPL-MCNC: 186 MG/DL (ref 0–150)
TSH SERPL DL<=0.05 MIU/L-ACNC: 1.41 MIU/ML (ref 0.55–4.78)
VLDLC SERPL-MCNC: 37.2 MG/DL

## 2017-06-05 PROCEDURE — 82570 ASSAY OF URINE CREATININE: CPT | Performed by: INTERNAL MEDICINE

## 2017-06-05 PROCEDURE — 84443 ASSAY THYROID STIM HORMONE: CPT | Performed by: INTERNAL MEDICINE

## 2017-06-05 PROCEDURE — 36415 COLL VENOUS BLD VENIPUNCTURE: CPT

## 2017-06-05 PROCEDURE — 80061 LIPID PANEL: CPT | Performed by: NURSE PRACTITIONER

## 2017-06-05 PROCEDURE — 99213 OFFICE O/P EST LOW 20 MIN: CPT | Performed by: NURSE PRACTITIONER

## 2017-06-05 PROCEDURE — 80048 BASIC METABOLIC PNL TOTAL CA: CPT | Performed by: NURSE PRACTITIONER

## 2017-06-05 PROCEDURE — 82043 UR ALBUMIN QUANTITATIVE: CPT | Performed by: INTERNAL MEDICINE

## 2017-06-05 PROCEDURE — 80178 ASSAY OF LITHIUM: CPT | Performed by: NURSE PRACTITIONER

## 2017-06-05 RX ORDER — LITHIUM CARBONATE 300 MG/1
300 CAPSULE ORAL 2 TIMES DAILY WITH MEALS
Qty: 60 CAPSULE | Refills: 1 | Status: SHIPPED | OUTPATIENT
Start: 2017-06-05 | End: 2017-10-02 | Stop reason: SDUPTHER

## 2017-06-05 RX ORDER — SERTRALINE HYDROCHLORIDE 25 MG/1
25 TABLET, FILM COATED ORAL DAILY
Qty: 30 TABLET | Refills: 1 | Status: SHIPPED | OUTPATIENT
Start: 2017-06-05 | End: 2017-09-25 | Stop reason: ALTCHOICE

## 2017-06-05 NOTE — PROGRESS NOTES
"Subjective   Tammi Levine is a 47 y.o. female who is here today for medication management follow up.    Chief Complaint: 'we lost dad last month'.    HPI: History of Present Illness Patient presents for follow up regarding depression anxiety.  She has lost her father.  She is dealing with it'.  She is continuing to grieve his loss.  She reports some difficulty with sleep initial and intermittent insomnia.  She is having some difficulty with mental exam for social security.   She does appear to be unable to complete requirements of job.  She denies any medication side effects.  She rates her depression 5/10 with 10 being worst.  She also is managing her anxiety fairly well.      She denies any severe mood liability, denies any thoughts to harm self or others.  She denies any difficulty with psychotic symptoms.      The following portions of the patient's history were reviewed and updated as appropriate: allergies, current medications, past family history, past medical history, past social history, past surgical history and problem list.    Review of Systems  Patient denies any recent medical illnesses.  No acute cardiopulmonary symptoms evident.  No acute gastrointestinal complaints.  Patient's appetite and intake are adequate.  Patient's current weight compared with last weight.    Objective   Physical Exam  Blood pressure 120/78, pulse 91, height 59\" (149.9 cm), weight 166 lb (75.3 kg).    No Known Allergies    Current Medications:   Current Outpatient Prescriptions   Medication Sig Dispense Refill   • ACCU-CHEK ONESIMO PLUS test strip      • ACCU-CHEK SOFTCLIX LANCETS lancets      • aspirin 81 MG EC tablet Take 81 mg by mouth daily.     • Blood Glucose Monitoring Suppl (ACCU-CHEK ONESIMO PLUS) W/DEVICE kit      • Canagliflozin (INVOKANA) 100 MG tablet Take 100 mg by mouth Daily.     • Exenatide ER (BYDUREON) 2 MG pen-injector Inject 2 mg under the skin 1 (one) time per week.     • Insulin Glargine (BASAGLAR " KWIKPEN) 100 UNIT/ML injection pen Inject  under the skin.     • insulin glargine (LANTUS) 100 UNIT/ML injection Inject 80 Units under the skin daily.     • levothyroxine (SYNTHROID, LEVOTHROID) 50 MCG tablet Take 50 mcg by mouth daily.     • lisinopril (PRINIVIL,ZESTRIL) 40 MG tablet Take 1 tablet by mouth Daily. 30 tablet 6   • lithium carbonate 300 MG capsule Take 1 capsule by mouth 2 (Two) Times a Day With Meals. 60 capsule 1   • sertraline (ZOLOFT) 25 MG tablet Take 1 tablet by mouth Daily. 30 tablet 1   • simvastatin (ZOCOR) 80 MG tablet Take 80 mg by mouth every night.       No current facility-administered medications for this visit.        Mental Status Exam:   Hygiene:   fair  Cooperation:  Cooperative  Eye Contact:  Good  Psychomotor Behavior:  Appropriate  Affect:  Full range  Hopelessness: Denies  Speech:  Normal  Thought Process:  Goal directed and Linear  Thought Content:  Mood congurent  Suicidal:  None  Homicidal:  None  Hallucinations:  None  Delusion:  None  Memory:  Intact  Orientation:  Person, Place, Time and Situation  Reliability:  fair  Insight:  Fair  Judgement:  Fair  Impulse Control:  Fair  Physical/Medical Issues:  Yes DM, hypothryoid,heart -CAD.     Assessment/Plan   Diagnoses and all orders for this visit:    Bipolar II disorder  -     Basic Metabolic Panel  -     Lithium Level    Grief reaction  -     Basic Metabolic Panel  -     Lithium Level    Arteriosclerotic cardiovascular disease (ASCVD), s/p MI in 2012, clinically stable.   -     Basic Metabolic Panel  -     Lithium Level    Other orders  -     lithium carbonate 300 MG capsule; Take 1 capsule by mouth 2 (Two) Times a Day With Meals.  -     sertraline (ZOLOFT) 25 MG tablet; Take 1 tablet by mouth Daily.      We will continue medications and therapy.  She was provided encouragement and support.        We discussed risks, benefits, and side effects of the above medication and the patient was agreeable with the plan.    Return in  about 3 months (around 9/5/2017).  The patient was instructed to call clinic as needed or go to ER if in crisis.  Patient was instructed to immediately call 911 or come to the nearest emergency room for thoughts to harm self or others.

## 2017-06-16 ENCOUNTER — OFFICE VISIT (OUTPATIENT)
Dept: PSYCHIATRY | Facility: CLINIC | Age: 47
End: 2017-06-16

## 2017-06-16 DIAGNOSIS — Z63.4 BEREAVEMENT: ICD-10-CM

## 2017-06-16 DIAGNOSIS — F31.81 BIPOLAR II DISORDER (HCC): Primary | ICD-10-CM

## 2017-06-16 PROCEDURE — 90832 PSYTX W PT 30 MINUTES: CPT | Performed by: SOCIAL WORKER

## 2017-06-16 SDOH — SOCIAL STABILITY - SOCIAL INSECURITY: DISSAPEARANCE AND DEATH OF FAMILY MEMBER: Z63.4

## 2017-06-16 NOTE — PROGRESS NOTES
"Tammi Levine : 1970  MULTIDISCIPLINARY PROGRESS NOTE      Date of Service: 2017  Time In: 9:45 AM  Time Out: 10:15 AM     DATA: Patient met 1:1 with Vidal Ortiz LCSW, LCADC for scheduled individual outpatient psychotherapy session at [facility] or follow-up.  Patient reports she continues to live in the family home with her mother and states she spends most of her time at home.  She also discusses an experience with a psychiatric appointment which was arranged through the [employer] and states she felt very belittled and states it was almost a hostile environment.  The patient continues to discuss her resentment that she has not been able to get her disability benefits although she has severe physical and psychiatric limitations.    HPI: Patient  continues to struggle with depressed mood, anhedonia, periods of hopelessness, low self-esteem, difficulty concentrating, irritability, decreased appetite, tendency to lash out at others, feeling on edge, feeling overwhelmed, increased heart rate, avoidance of social situations, and sense of impending doom. She rates current symptoms of depression at 5 on a scale of 1-10 with 10 being the most severe.  However, she states for the past several days she has struggled with elevated mood, irritability, tendency to lash out at others, hypersensitivity, and racing thoughts.  Patient also reports she continues to struggle with grief concerning the death of her father and states \"some days are better than others\".  Patient states she seems to struggle more at night once it's dark and everything is quiet and states she cries many nights.  She reports she continues to adhere to medication regimen as prescribed with no side effects.  Patient adamantly and convincingly denies suicidal ideation or perceptual disturbance.  She vehemently denies any substance use.    CLNICAL MANEUVERING/INTERVENTION: Assisted patient in " processing above session content; acknowledged and normalized patient’s thoughts, feelings, and concerns.  Allowed the patient to discuss/vent her feelings and frustrations concerning her recent appointment and encouraged her to consider this clinician's goal was not to develop a therapeutic relationship.  Also continued to assist the patient in processing her feelings of grief concerning the death of her father and reminded her this is a normal reaction to such a loss.  Assisted the patient in identifying and acknowledging positive coping skills she can utilize to decrease the severity and frequency of symptoms including positive self talk, relaxation techniques, deep breathing exercises, and finding enjoyable activities she can engage in regular basis to decrease her idle time and social isolation.  Also encouraged the patient to continue to find activities she can participate in with her mother which will be beneficial for both of their grieving processes.  Provided unconditional positive regard in a safe, supportive environment.    Allowed patient to freely discuss issues without interruption or judgment. Provided safe, confidential environment to facilitate the development of positive therapeutic relationship and encourage open, honest communication. Assisted patient in identifying increased risk factors which would indicate the need for higher level of care including thoughts to harm self or others, self-harming behavior, and encouraged patient to contact this office, call 911, or present to the nearest emergency room should any of these events occur. Discussed crisis intervention services and means to access.     ASSESSMENT: Patient presents for session on time, clean and casually dressed with depressed mood and tearful affect.  Patient presents walking on a cane.  The patient continues to present with visible tremulousness in her hands throughout the session.  No evidence of intoxication, withdrawal, or  perceptual disturbance. Thought process is linear and logical.  Thought content normal.  Speech is clear and coherent. Patient is oriented to person, place, and time. Attention and concentration fair. Insight and judgment fair. Patient reports no current suicidal or homicidal ideation.  Patient appears cooperative and agreeable to treatment and appears to continue to develop rapport and maintain therapeutic alliance. No evidence of symptoms of flaquito or hypomania. Patient does not appear to be malingering.  Patient continues to struggle with mood instability and appears to be in the depressive phase at this time.  Patient also continues to struggle with navigating the early stages of grief which continues to encompass a great deal of her life.  As a result, the patient would likely be at increased risk for decompensation without ongoing treatment.    PLAN: Patient will continue in individual outpatient psychotherapy sessions every 3-4 weeks at [facility] in [place], [place]. She will continue in pharmacotherapy with her primary care physician and will adhere to medication regimen as prescribed and report any side effects. Patient will contact this office, call 911 or present to the nearest emergency room should suicidal ideations occur. Provide Cognitive Behavioral Therapy and Solution Focused Therapy to improve functioning, maintain stability, and avoid decompensation and the need for higher level of care.                                                                                        Vidal Ortiz, KAYLEIGH, [facility]

## 2017-06-28 ENCOUNTER — TRANSCRIBE ORDERS (OUTPATIENT)
Dept: ADMINISTRATIVE | Facility: HOSPITAL | Age: 47
End: 2017-06-28

## 2017-06-28 DIAGNOSIS — Z12.31 VISIT FOR SCREENING MAMMOGRAM: Primary | ICD-10-CM

## 2017-07-07 ENCOUNTER — APPOINTMENT (OUTPATIENT)
Dept: MAMMOGRAPHY | Facility: HOSPITAL | Age: 47
End: 2017-07-07

## 2017-07-07 ENCOUNTER — OFFICE VISIT (OUTPATIENT)
Dept: PSYCHIATRY | Facility: CLINIC | Age: 47
End: 2017-07-07

## 2017-07-07 DIAGNOSIS — F31.81 BIPOLAR II DISORDER (HCC): Primary | ICD-10-CM

## 2017-07-07 DIAGNOSIS — Z63.4 BEREAVEMENT: ICD-10-CM

## 2017-07-07 PROCEDURE — 90834 PSYTX W PT 45 MINUTES: CPT | Performed by: SOCIAL WORKER

## 2017-07-07 SDOH — SOCIAL STABILITY - SOCIAL INSECURITY: DISSAPEARANCE AND DEATH OF FAMILY MEMBER: Z63.4

## 2017-07-10 NOTE — PROGRESS NOTES
Tammi Levine : 1970  MULTIDISCIPLINARY PROGRESS NOTE      Date of Service: 2017  Time In: 9:10 AM  Time Out: 9:50 AM    DATA: Patient met 1:1 with Vidal Ortiz LCSW, LCADC for scheduled individual outpatient psychotherapy session at [facility] or follow-up.  Patient reports she continues to live with her mother in their family home in [facility] [place] [facility].  Patient reports she and her brother continues to struggle with recent passing of the patient's father and states they continue to rely on each other for support.     HPI:  Patient reports an incident where she encountered a friend who had recently lost their child and states she appeared to be much more emotional concerning the loss than the patient and her mother.  As a result, the patient reports she ask her mother if they were not grieving enough for her father.  The patient reports this caused her to feel somewhat guilty and states she has struggled with this since the encounter.  Patient reports she continues to struggle with depressed mood, anhedonia, periods of hopelessness, low self-esteem, difficulty concentrating, irritability, tendency to lash out at others, feeling on edge, feeling overwhelmed, increased heart rate, avoidance of social situations, and sense of impending doom.  She also reports she continues to struggle with attempting to get disability benefits and states various family members routinely make admits she shouldn't have a job.  She rates current symptoms of depression at 5/6 on a scale of 1-10 with 10 being the most severe.  Patient rates current grief at a 5 on a scale of 1-10 with 10 being the most severe.   She reports she continues to adhere to medication regimen as prescribed with no side effects.  Patient adamantly and convincingly denies suicidal ideation or perceptual disturbance.  She vehemently denies any substance use.    CLNICAL MANEUVERING/INTERVENTION: Assisted patient in processing above  session content; acknowledged and normalized patient’s thoughts, feelings, and concerns.  Allowed the patient to discuss/vent her feelings concerning the incident with the family friend and her resulting feelings of guilt concerning grieving over the death of her father and validated her feelings.  However, challenged the patient to consider everyone grieves in their own way and each relationship carries a specific level of attachment which results in varied levels of grief reaction.  Encouraged the patient to remind herself that grieving appropriately and moving on does not somehow dishonor her father's memory.  Also discussed the importance of actively seeking enjoyable activities the patient can engage in a regular basis to decrease idle time, social isolation, and rumination on her father's death.  Also challenged the patient to continue to decrease the time she spends looking at pictures from various sources including social media.  Provided unconditional positive regard in a safe, supportive environment.    Allowed patient to freely discuss issues without interruption or judgment. Provided safe, confidential environment to facilitate the development of positive therapeutic relationship and encourage open, honest communication. Assisted patient in identifying increased risk factors which would indicate the need for higher level of care including thoughts to harm self or others, self-harming behavior, and encouraged patient to contact this office, call 911, or present to the nearest emergency room should any of these events occur. Discussed crisis intervention services and means to access.     ASSESSMENT: Patient presents for session on time, clean and casually dressed with depressed mood and sad affect.  Patient presents walking on a cane.  The patient continues to present with tremulousness in her hands throughout the session.  No evidence of intoxication, withdrawal, or perceptual disturbance. Thought process  is linear and logical.  Thought content normal.  Speech is clear and coherent. Patient is oriented to person, place, and time. Attention and concentration fair. Insight and judgment fair. Patient reports no current suicidal or homicidal ideation.  Patient appears cooperative and agreeable to treatment and appears to continue to develop rapport and maintain therapeutic alliance. No evidence of symptoms of flaquito or hypomania. Patient does not appear to be malingering.  The patient continues to struggle with grief concerning her father's death which appears to be exacerbated by her ongoing mood instability.  The totality of the patient's symptoms continue to cause significant impairment important areas of functioning.  As a result, she would likely be at increased risk for decompensation without ongoing treatment.    PLAN: Patient will continue in individual outpatient psychotherapy sessions every 3-4 weeks at [facility] in [place], [place]. She will continue in pharmacotherapy with her primary care physician and will adhere to medication regimen as prescribed and report any side effects. Patient will contact this office, call 911 or present to the nearest emergency room should suicidal ideations occur. Provide Cognitive Behavioral Therapy and Solution Focused Therapy to improve functioning, maintain stability, and avoid decompensation and the need for higher level of care.                                                                                        Vidal Ortiz, KAYLEIGH, [facility]

## 2017-07-14 ENCOUNTER — HOSPITAL ENCOUNTER (OUTPATIENT)
Dept: MAMMOGRAPHY | Facility: HOSPITAL | Age: 47
Discharge: HOME | End: 2017-07-14
Admitting: NURSE PRACTITIONER

## 2017-07-14 DIAGNOSIS — Z12.31 VISIT FOR SCREENING MAMMOGRAM: ICD-10-CM

## 2017-07-14 PROCEDURE — 77063 BREAST TOMOSYNTHESIS BI: CPT

## 2017-07-14 PROCEDURE — 77067 SCR MAMMO BI INCL CAD: CPT | Performed by: RADIOLOGY

## 2017-07-14 PROCEDURE — G0202 SCR MAMMO BI INCL CAD: HCPCS

## 2017-07-14 PROCEDURE — 77063 BREAST TOMOSYNTHESIS BI: CPT | Performed by: RADIOLOGY

## 2017-08-18 ENCOUNTER — OFFICE VISIT (OUTPATIENT)
Dept: PSYCHIATRY | Facility: CLINIC | Age: 47
End: 2017-08-18

## 2017-08-18 DIAGNOSIS — F31.81 BIPOLAR II DISORDER (HCC): Primary | ICD-10-CM

## 2017-08-18 PROCEDURE — 90834 PSYTX W PT 45 MINUTES: CPT | Performed by: SOCIAL WORKER

## 2017-08-18 NOTE — TREATMENT PLAN
Multi-Disciplinary Problems (from Behavioral Health Treatment Plan)    Active Problems     Problem: Depression (Priority: --)  (Start Date: 08/18/17) (Resolve Date: --)    Problem Details:  The patient self-scales this problem as a 5 with 10 being the worst.         Goal Start Date End Date    Patient will demonstrate the ability to initiate new constructive life skills outside of sessions on a consistent basis. 08/18/17 --    Goal Details:  Progress toward goal:  The patient self-scales their progress related to this goal as a 6 with 10 being the worst.         Goal Intervention Frequency Start Date End Date    Assist patient in setting attainable activities of daily living goals. Q Month 08/18/17 08/18/18    Intervention Details:  Will keep all appointments and follow recommendations.  Patient will continue to see activity she can engage in regular basis to decrease idle time and social isolation.  Patient also focus on medication compliance.  The patient will eat a healthy diet, get regular physical activity, and get adequate sleep.  The patient will also moderate the time she spends following the news as it tends to cause increased stress and worry.       Goal Intervention Frequency Start Date End Date    Provide education about depression Q Month 08/18/17 08/18/18    Intervention Details:  Duration of treatment until until remission of symptoms.         Goal Intervention Frequency Start Date End Date    Assist patient in developing healthy coping strategies. Q Month 08/18/17 08/18/18    Intervention Details:  Duration of treatment until remission of symptoms.               Problem: Ineffective Coping (Priority: --)  (Start Date: 08/18/17) (Resolve Date: --)    Problem Details:  The patient self-scales this problem as a 4 with 10 being the worst.          Goal Start Date End Date    Patient will demonstrate the ability to initiate new constructive life skills consistently. 08/18/17 --    Goal Details:  Progress  toward goal:  The patient self-scales their progress related to this goal as a 5 with 10 being the worst.            Goal Intervention Frequency Start Date End Date    Assist patient in identifying healthy coping behaviors. Q Month 08/18/17 08/18/18    Intervention Details:  Duration of treatment until remission of symptoms.                             I have discussed and reviewed this treatment plan with the patient.  It has been printed for signatures.

## 2017-08-18 NOTE — PROGRESS NOTES
PROGRESS NOTE  Data:  Tammi Levine is a 47 y.o. female who met 1:1 with Vidal Ortiz, KAYLEIGH,SATISH for regularly scheduled psychotherapy session at [facility] for follow up of bipolar II disorder with depression being primary on 8/18/2017 from 9:00 AM to 9:40 AM.  The patient presents for session walking on a cane and require significant time to walk back to the office.  Patient also continues to struggle with tremulousness which is evident as she takes a drink of water from a water bottle.  Patient reports she continues to live in the home with her mother and states they are both doing better with the recent death of the patient's father.  Patient does reports she became very upset recently when her uncle, her father's brother, told her and her mother her father did not go to [facility].  The patient states she became very angry and states she does not plan to maintain contact with this and several other family members.  Patient also reports she is upset at the events of recent days including the President's apparent support for white supremacists.     HPI: The patient appears to continue to maintain her baseline.  However, she continues to struggle with periods of mood lability including depressed mood, irritability, anhedonia, anergia, feeling hopeless, periods of becoming easily agitated, tendency to lash out at others, feeling on edge, and ongoing periods of social isolation.  The patient rates current symptoms of mood instability at a 5 on a scale of 1-10 with 10 being most severe.  The patient also continues to struggle with effective coping mechanisms and rates ineffective coping at 4 on a scale of 1-10 with 10 being most severe.  Patient reports she feels she is processing her father's death relatively well and states that time she spends thinking about him has decreased significantly.  She also reports fewer crying spells.  The patient reports she continues to adhere to medication  regimen as prescribed.  The patient adamantly convincingly denies suicidal ideation and vehemently denies any substance use.      Clinical Maneuvering/Intervention:  Assisted patient in processing above session content; acknowledged and normalized patient’s thoughts, feelings, and concerns.  Allowed the patient to discuss/vent her feelings and frustrations concerning her uncle statements and validated her feelings.  Also discussed the concept of things we can control and things we cannot control and encourage the patient to remind herself just because someone says something doesn't make a trigger.  Also utilized cognitive behavioral therapy along with motivational interviewing techniques to assist the patient in recognizing her tendency to become agitated easily.  Also discussed and internal versus an external locus of control and challenged the patient to remind herself she has the ability to choose how much weight she gives the statements of others.  So discussed healthy skills of daily living and encourage the patient to eat healthy diet, regular physical activity, getting adequate sleep, and actively seek enjoyed activities she can engage in a regular basis to decrease idle time and social isolation.  Also encouraged the patient to continue to keep appointments with other providers and follow recommendations.    Allowed patient to freely discuss issues without interruption or judgment. Provided safe, confidential environment to facilitate the development of positive therapeutic relationship and encourage open, honest communication. Assisted patient in identifying risk factors which would indicate the need for higher level of care including thoughts to harm self or others and/or self-harming behavior and encouraged patient to contact this office, call 911, or present to the nearest emergency room should any of these events occur. Discussed crisis intervention services and means to access.  Patient adamantly and  convincingly denies current suicidal or homicidal ideation or perceptual disturbance.    Assessment     Diagnoses and all orders for this visit:    Bipolar II disorder               Mental Status Exam  Hygiene:  good  Dress:  casual  Attitude:  Cooperative  Motor Activity:  Slow  Speech:  Normal  Mood:  dysthymic  Affect:  anxious  Thought Processes:  Goal directed and Linear  Thought Content:  normal  Suicidal Thoughts:  denies  Homicidal Thoughts:  denies  Crisis Safety Plan: yes, to come to the emergency room.  Hallucinations:  denies    Patient's Support Network Includes:  mother and [employer] family    Progress toward goal: Not at goal    Functional assessment: The patient's symptomology continues to cause significant impairment in important areas of functioning as evidenced by ongoing difficulty in interpersonal and social domains.  As a result, the patient can be really cathected to continue to benefit from treatment and would likely be at increased risk for decompensation otherwise.    Prognosis: Fair with ongoing treatment    Plan         Patient will continue in individual outpatient psychotherapy session at [facility] every 3-4 weeks.  Patient will also continue and pharmacotherapy as scheduled at the [facility] and adhere to medication regimen as prescribed and report any side effects.  Patient will contact this office, call 911 or present to the nearest emergency room should suicidal or homicidal ideations occur. Provide Cognitive Behavioral Therapy and Solution Focused Therapy along with motivational interviewing techniques to improve functioning, maintain stability, and avoid decompensation and the need for higher level of care.          Return in about 4 weeks (around 09/15/2017) for Next scheduled follow up.    Vidal Ortiz, KAYLEIGH,[facility]

## 2017-09-22 ENCOUNTER — OFFICE VISIT (OUTPATIENT)
Dept: PSYCHIATRY | Facility: CLINIC | Age: 47
End: 2017-09-22

## 2017-09-22 DIAGNOSIS — F31.81 BIPOLAR II DISORDER (HCC): Primary | ICD-10-CM

## 2017-09-22 PROCEDURE — 90834 PSYTX W PT 45 MINUTES: CPT | Performed by: SOCIAL WORKER

## 2017-09-22 NOTE — PROGRESS NOTES
"Date of Service: September 22, 2017  Time In: 11:30 AM  Time Out: 12:10 PM      PROGRESS NOTE  Data:  Tammi Levine is a 47 y.o. female who met 1:1 with Vidal Ortiz LCSW, LCADC  for regularly scheduled individual outpatient psychotherapy session at [facility] for follow-up of bipolar IIdisorder.  Patient reports she continues to live in the home with her mother and states she feels she is doing significantly better with processing the recent death of her father.  However, the patient reports she continues to struggle with interpersonal relationships and states she became very angry when someone makes derogatory remarks about her medical issues.  In fact, she states one of her cousins insinuated she was \"faking\" her issues.  The patient also reports she is dreading the upcoming Thanksgiving holiday if she is forced to be around people who she does not like and who she feels are braggards.     HPI: Patient reports she feels she is relatively stable.  However, she reports ongoing periods of depressed mood, irritability, tendency to lash out at others, anhedonia, anergia, area of hopelessness, and ongoing social isolation.  Patient also reports she continues to struggle with interpersonal relationships.  The patient rates current symptoms at 4 on a scale of 1-10 with 10 being most severe.  Patient reports she continues to adhere to medication regimen as prescribed.  The patient adamantly and convincingly denies suicidal ideation and vehemently denies any substance use.      Clinical Maneuvering/Intervention:  Assisted patient in processing above session content; acknowledged and normalized patient’s thoughts, feelings, and concerns.  Utilize complex reflections to assist the patient in recognizing she has successfully navigated stressful interpersonal relationships in the past, specifically at family gatherings.  Also validated the patient's belief that she should avoid situations that " cause increased stress wherever possible.  Utilize cognitive behavioral therapy to assist the patient in recognizing she will likely not agree with everyone must work toward being able to not allow it to cause her to be upset.  Also challenged the patient to remind herself just because someone says something doesn't make it occur.  Continue to encourage patient to actively seek enjoyable activities she can engage in regular basis to decrease idle time and social isolation.  It unconditional positive regard in a safe, supportive environment.    Allowed patient to freely discuss issues without interruption or judgment. Provided safe, confidential environment to facilitate the development of positive therapeutic relationship and encourage open, honest communication. Assisted patient in identifying risk factors which would indicate the need for higher level of care including thoughts to harm self or others and/or self-harming behavior and encouraged patient to contact this office, call 911, or present to the nearest emergency room should any of these events occur. Discussed crisis intervention services and means to access.  Patient adamantly and convincingly denies current suicidal or homicidal ideation or perceptual disturbance.    Assessment    Patient appears to be maintaining relative stability as compared to her baseline.  However, she continues to struggle with mood instability which continues to cause significant impairment in her life.  As a result, she can be reasonably expected to continue to benefit from treatment and would likely be at increased risk for decompensation otherwise.    Diagnoses and all orders for this visit:    Bipolar II disorder               Mental Status Exam  Hygiene:  good  Dress:  casual  Attitude:  Cooperative  Motor Activity:  Appropriate  Speech:  Normal  Mood:  anxious  Affect:  calm and pleasant  Thought Processes:  Goal directed  Thought Content:  normal  Suicidal Thoughts:   denies  Homicidal Thoughts:  denies  Crisis Safety Plan: yes, to come to the emergency room.  Hallucinations:  denies    Patient's Support Network Includes:  mother    Progress toward goal: Not at goal    Functional Status: Moderate impairment     Prognosis: Fair with Ongoing Treatment     Plan         Patient will continue in individual outpatient psychotherapy sessions at [facility] every 3-4 weeks and pharmacotherapy as scheduled with MARISELA Madsen at the [facility].  Patient will adhere to medication regimen as prescribed and report any side effects. Patient will contact this office, call 911 or present to the nearest emergency room should suicidal or homicidal ideations occur. Provide Cognitive Behavioral Therapy and Solution Focused Therapy to improve functioning, maintain stability, and avoid decompensation and the need for higher level of care.          Return in about 4 weeks (around 10/20/2017) for Next scheduled follow up.    Vidal Ortiz LCSW, SATISH     This document signed by Vidal Ortiz LCSW, SATISH September 22, 2017 1:42 PM

## 2017-09-25 ENCOUNTER — OFFICE VISIT (OUTPATIENT)
Dept: CARDIOLOGY | Facility: CLINIC | Age: 47
End: 2017-09-25

## 2017-09-25 VITALS
WEIGHT: 173.8 LBS | BODY MASS INDEX: 35.04 KG/M2 | SYSTOLIC BLOOD PRESSURE: 121 MMHG | HEART RATE: 66 BPM | HEIGHT: 59 IN | DIASTOLIC BLOOD PRESSURE: 70 MMHG

## 2017-09-25 DIAGNOSIS — I25.10 ARTERIOSCLEROTIC CARDIOVASCULAR DISEASE (ASCVD): Primary | ICD-10-CM

## 2017-09-25 DIAGNOSIS — I10 ESSENTIAL HYPERTENSION: ICD-10-CM

## 2017-09-25 DIAGNOSIS — Z79.4 TYPE 2 DIABETES MELLITUS WITH DIABETIC PERIPHERAL ANGIOPATHY WITHOUT GANGRENE, WITH LONG-TERM CURRENT USE OF INSULIN (HCC): ICD-10-CM

## 2017-09-25 DIAGNOSIS — E78.5 DYSLIPIDEMIA: ICD-10-CM

## 2017-09-25 DIAGNOSIS — E11.51 TYPE 2 DIABETES MELLITUS WITH DIABETIC PERIPHERAL ANGIOPATHY WITHOUT GANGRENE, WITH LONG-TERM CURRENT USE OF INSULIN (HCC): ICD-10-CM

## 2017-09-25 PROCEDURE — 93000 ELECTROCARDIOGRAM COMPLETE: CPT | Performed by: INTERNAL MEDICINE

## 2017-09-25 PROCEDURE — 99213 OFFICE O/P EST LOW 20 MIN: CPT | Performed by: INTERNAL MEDICINE

## 2017-09-25 RX ORDER — ATORVASTATIN CALCIUM 80 MG/1
80 TABLET, FILM COATED ORAL NIGHTLY
COMMUNITY
End: 2018-03-22 | Stop reason: SDUPTHER

## 2017-09-25 NOTE — PROGRESS NOTES
Jess Hanley, MARISELA  Tammi Levine  1970  09/25/2017    Patient Active Problem List   Diagnosis   • Arteriosclerotic cardiovascular disease (ASCVD), s/p MI in 2012, clinically stable.    • Type 2 diabetes mellitus   • Essential hypertension   • Dyslipidemia   • Sleep apnea   • Myocardial infarction   • Migraine headache   • Abnormal CT scan   • ASCVD (arteriosclerotic cardiovascular disease)   • Hypertension, well controlled   • Disease of thyroid gland       Dear Jess Hnaley, APRN:    Subjective     Tammi Levine is a 47 y.o. female with the problems as listed above, presents for follow-up of ASCVD      History of Present Illness:Ms. Levine is a 47-year-old  female with history of known ASCVD, status post myocardial infarction 2012, type 2 diabetes mellitus and hypertension, is here for regular cardiology follow-up.  Patient states she is doing good.  She denies chest pain, palpitations, dizziness or syncope.  She denies any significant dyspnea, PND or orthopnea.  She complains of pain in her left foot with numbness for which she sees Dr. Watts from [place].    No Known Allergies:      Current Outpatient Prescriptions:   •  ACCU-CHEK ONESIMO PLUS test strip, , Disp: , Rfl:   •  ACCU-CHEK SOFTCLIX LANCETS lancets, , Disp: , Rfl:   •  aspirin 81 MG EC tablet, Take 81 mg by mouth daily., Disp: , Rfl:   •  atorvastatin (LIPITOR) 80 MG tablet, Take 80 mg by mouth Every Night., Disp: , Rfl:   •  Blood Glucose Monitoring Suppl (ACCU-CHEK ONESIMO PLUS) W/DEVICE kit, , Disp: , Rfl:   •  Canagliflozin (INVOKANA) 100 MG tablet, Take 100 mg by mouth Daily., Disp: , Rfl:   •  Insulin Glargine (BASAGLAR KWIKPEN) 100 UNIT/ML injection pen, Inject  under the skin., Disp: , Rfl:   •  insulin glargine (LANTUS) 100 UNIT/ML injection, Inject 80 Units under the skin daily., Disp: , Rfl:   •  levothyroxine (SYNTHROID, LEVOTHROID) 50 MCG tablet, Take 50 mcg by mouth daily., Disp: , Rfl:   •  lisinopril  "(PRINIVIL,ZESTRIL) 40 MG tablet, Take 1 tablet by mouth Daily., Disp: 30 tablet, Rfl: 6  •  lithium carbonate 300 MG capsule, Take 1 capsule by mouth 2 (Two) Times a Day With Meals., Disp: 60 capsule, Rfl: 1  •  Exenatide ER (BYDUREON) 2 MG pen-injector, Inject 2 mg under the skin 1 (one) time per week., Disp: , Rfl:       The following portions of the patient's history were reviewed and updated as appropriate: allergies, current medications, past family history, past medical history, past social history, past surgical history and problem list.    Social History   Substance Use Topics   • Smoking status: Never Smoker   • Smokeless tobacco: Never Used   • Alcohol use No       Review of Systems   Constitution: Negative for chills and fever.   HENT: Negative for nosebleeds and sore throat.    Cardiovascular: Positive for leg swelling. Negative for chest pain, palpitations and syncope.   Respiratory: Positive for shortness of breath. Negative for cough, hemoptysis and wheezing.    Gastrointestinal: Negative for abdominal pain, hematemesis, hematochezia, melena, nausea and vomiting.   Genitourinary: Negative for dysuria and hematuria.   Neurological: Negative for dizziness and headaches.       Objective   Vitals:    09/25/17 1326   BP: 121/70   BP Location: Right arm   Patient Position: Sitting   Cuff Size: Adult   Pulse: 66   Weight: 173 lb 12.8 oz (78.8 kg)   Height: 59\" (149.9 cm)     Body mass index is 35.1 kg/(m^2).        Physical Exam   Constitutional: She is oriented to person, place, and time. She appears well-developed and well-nourished.   HENT:   Head: Normocephalic.   Eyes: Conjunctivae and EOM are normal.   Neck: Normal range of motion. Neck supple. No JVD present. No tracheal deviation present. No thyromegaly present.   Cardiovascular: Normal rate, regular rhythm, S1 normal and S2 normal.  Exam reveals no gallop, no S3, no S4 and no friction rub.    No murmur heard.  Pulmonary/Chest: Breath sounds normal. " No respiratory distress. She has no wheezes. She has no rales.   Abdominal: Soft. Bowel sounds are normal. She exhibits no mass. There is no tenderness.   Musculoskeletal: She exhibits no edema.   Has a foot brace on the left foot and ankle.   Neurological: She is alert and oriented to person, place, and time. No cranial nerve deficit.   Skin: Skin is warm and dry.   Psychiatric: She has a normal mood and affect.       Lab Results   Component Value Date     06/05/2017    K 5.0 06/05/2017     06/05/2017    CO2 24.4 06/05/2017    BUN 24 (H) 06/05/2017    CREATININE 1.02 06/05/2017    GLUCOSE 208 (H) 06/05/2017    CALCIUM 10.0 06/05/2017    AST 18 10/31/2016    ALT 18 10/31/2016    ALKPHOS 94 10/31/2016    LABIL2 1.4 (L) 10/31/2016     No results found for: CKTOTAL  Lab Results   Component Value Date    WBC 9.25 03/06/2017    HGB 10.5 (L) 03/06/2017    HCT 31.1 (L) 03/06/2017     03/06/2017     No results found for: INR  No results found for: MG  Lab Results   Component Value Date    TSH 1.413 06/05/2017    TRIG 186 (H) 06/05/2017    HDL 42 (L) 06/05/2017      Lab Results   Component Value Date    BNP 36 09/16/2015     Echo   No results found for: ECHOEFEST    ECG 12 Lead  Date/Time: 9/25/2017 1:28 PM  Performed by: LEXII RODRIGUEZ  Authorized by: LEXII RODRIGUEZ   Rhythm: sinus rhythm  Conduction: conduction normal  ST Segments: ST segments normal  T Waves: T waves normal  Other: no other findings  Clinical impression: normal ECG            Assessment/Plan    Diagnosis Plan   1. Arteriosclerotic cardiovascular disease (ASCVD), s/p MI in 2012, clinically stable.      2. Essential hypertension     3. Type 2 diabetes mellitus with diabetic peripheral angiopathy without gangrene, with long-term current use of insulin     4. Dyslipidemia          Recommendations:    1. Continue with aspirin, atorvastatin and lisinopril.  2. Follow-up in 6-7 months.    Return in about 6 months (around 3/25/2018) for or  sooner if needed.    As always, I appreciate very much the opportunity to participate in the cardiovascular care of your patients.      With Best Regards,    Jhonny De La Rosa MD, FACC    Dragon disclaimer:  Much of this encounter note is an electronic transcription/translation of spoken language to printed text. The electronic translation of spoken language may permit erroneous, or at times, nonsensical words or phrases to be inadvertently transcribed; Although I have reviewed the note for such errors, some may still exist.

## 2017-10-02 ENCOUNTER — OFFICE VISIT (OUTPATIENT)
Dept: PSYCHIATRY | Facility: CLINIC | Age: 47
End: 2017-10-02

## 2017-10-02 ENCOUNTER — LAB (OUTPATIENT)
Dept: LAB | Facility: HOSPITAL | Age: 47
End: 2017-10-02

## 2017-10-02 VITALS
SYSTOLIC BLOOD PRESSURE: 107 MMHG | HEART RATE: 58 BPM | HEIGHT: 59 IN | BODY MASS INDEX: 34.88 KG/M2 | DIASTOLIC BLOOD PRESSURE: 62 MMHG | WEIGHT: 173 LBS

## 2017-10-02 DIAGNOSIS — I25.10 ASCVD (ARTERIOSCLEROTIC CARDIOVASCULAR DISEASE): ICD-10-CM

## 2017-10-02 DIAGNOSIS — I25.10 ARTERIOSCLEROTIC CARDIOVASCULAR DISEASE (ASCVD): ICD-10-CM

## 2017-10-02 DIAGNOSIS — I10 ESSENTIAL HYPERTENSION: ICD-10-CM

## 2017-10-02 DIAGNOSIS — F31.81 BIPOLAR II DISORDER (HCC): Primary | ICD-10-CM

## 2017-10-02 DIAGNOSIS — F31.81 BIPOLAR II DISORDER (HCC): ICD-10-CM

## 2017-10-02 DIAGNOSIS — E07.9 DISEASE OF THYROID GLAND: ICD-10-CM

## 2017-10-02 LAB
ANION GAP SERPL CALCULATED.3IONS-SCNC: 4.5 MMOL/L (ref 3.6–11.2)
BUN BLD-MCNC: 19 MG/DL (ref 7–21)
BUN/CREAT SERPL: 21.3 (ref 7–25)
CALCIUM SPEC-SCNC: 9.5 MG/DL (ref 7.7–10)
CHLORIDE SERPL-SCNC: 107 MMOL/L (ref 99–112)
CO2 SERPL-SCNC: 25.5 MMOL/L (ref 24.3–31.9)
CREAT BLD-MCNC: 0.89 MG/DL (ref 0.43–1.29)
GFR SERPL CREATININE-BSD FRML MDRD: 68 ML/MIN/1.73
GLUCOSE BLD-MCNC: 132 MG/DL (ref 70–110)
LITHIUM SERPL-SCNC: 1.1 MMOL/L (ref 0.6–1.2)
OSMOLALITY SERPL CALC.SUM OF ELEC: 277.9 MOSM/KG (ref 273–305)
POTASSIUM BLD-SCNC: 5 MMOL/L (ref 3.5–5.3)
SODIUM BLD-SCNC: 137 MMOL/L (ref 135–153)
TSH SERPL DL<=0.05 MIU/L-ACNC: 1.16 MIU/ML (ref 0.55–4.78)

## 2017-10-02 PROCEDURE — 84443 ASSAY THYROID STIM HORMONE: CPT | Performed by: NURSE PRACTITIONER

## 2017-10-02 PROCEDURE — 80048 BASIC METABOLIC PNL TOTAL CA: CPT | Performed by: NURSE PRACTITIONER

## 2017-10-02 PROCEDURE — 99213 OFFICE O/P EST LOW 20 MIN: CPT | Performed by: NURSE PRACTITIONER

## 2017-10-02 PROCEDURE — 80178 ASSAY OF LITHIUM: CPT | Performed by: NURSE PRACTITIONER

## 2017-10-02 PROCEDURE — 36415 COLL VENOUS BLD VENIPUNCTURE: CPT

## 2017-10-02 RX ORDER — INSULIN LISPRO 100 [IU]/ML
INJECTION, SUSPENSION SUBCUTANEOUS
COMMUNITY
Start: 2017-08-24 | End: 2018-03-13 | Stop reason: ALTCHOICE

## 2017-10-02 RX ORDER — LITHIUM CARBONATE 300 MG/1
300 CAPSULE ORAL 2 TIMES DAILY WITH MEALS
Qty: 60 CAPSULE | Refills: 1 | Status: SHIPPED | OUTPATIENT
Start: 2017-10-02 | End: 2017-12-26 | Stop reason: SDUPTHER

## 2017-10-02 NOTE — PROGRESS NOTES
"Subjective   Tammi Levine is a 47 y.o. female who is here today for medication management follow up.    Chief Complaint: 'I'm doing ok\"..    HPI: History of Present Illness Patient presents for follow up regarding depression anxiety.   She reports some difficulty with sleep initial and intermittent insomnia. She has some difficulty with poor sleep and hearing things.  She does appear to be unable to complete requirements of job.  She denies any medication side effects.  She rates her depression 4/10 with 10 being worst.  She also is having panic attacks especially.   She denies any severe mood liability, denies any thoughts to harm self or others.  She denies any difficulty with psychotic symptoms.  Has gone off zoloft but wishes to resume.      The following portions of the patient's history were reviewed and updated as appropriate: allergies, current medications, past family history, past medical history, past social history, past surgical history and problem list.    Review of Systems  Patient denies any recent medical illnesses.  No acute cardiopulmonary symptoms evident.  No acute gastrointestinal complaints.  Patient's appetite and intake are adequate.  Patient's current weight compared with last weight.    Objective   Physical Exam  Blood pressure 107/62, pulse 58, height 59\" (149.9 cm), weight 173 lb (78.5 kg).    No Known Allergies    Current Medications:   Current Outpatient Prescriptions   Medication Sig Dispense Refill   • ACCU-CHEK ONESIMO PLUS test strip      • ACCU-CHEK SOFTCLIX LANCETS lancets      • aspirin 81 MG EC tablet Take 81 mg by mouth daily.     • atorvastatin (LIPITOR) 80 MG tablet Take 80 mg by mouth Every Night.     • Blood Glucose Monitoring Suppl (ACCU-CHEK ONESIMO PLUS) W/DEVICE kit      • Canagliflozin (INVOKANA) 100 MG tablet Take 100 mg by mouth Daily.     • Exenatide ER (BYDUREON) 2 MG pen-injector Inject 2 mg under the skin 1 (one) time per week.     • HUMALOG MIX 75/25 KWIKPEN " (75-25) 100 UNIT/ML suspension pen-injector      • Insulin Glargine (BASAGLAR KWIKPEN) 100 UNIT/ML injection pen Inject  under the skin.     • levothyroxine (SYNTHROID, LEVOTHROID) 50 MCG tablet Take 50 mcg by mouth daily.     • lisinopril (PRINIVIL,ZESTRIL) 40 MG tablet Take 1 tablet by mouth Daily. 30 tablet 6   • lithium carbonate 300 MG capsule Take 1 capsule by mouth 2 (Two) Times a Day With Meals. 60 capsule 1     No current facility-administered medications for this visit.        Mental Status Exam:   Hygiene:   fair  Cooperation:  Cooperative  Eye Contact:  Good  Psychomotor Behavior:  Appropriate  Affect:  Full range  Hopelessness: Denies  Speech:  Normal  Thought Process:  Goal directed and Linear  Thought Content:  Mood congurent  Suicidal:  None  Homicidal:  None  Hallucinations:  None  Delusion:  None  Memory:  Intact  Orientation:  Person, Place, Time and Situation  Reliability:  fair  Insight:  Fair  Judgement:  Fair  Impulse Control:  Fair  Physical/Medical Issues:  Yes DM, hypothryoid,heart -CAD.     Assessment/Plan   Diagnoses and all orders for this visit:    Bipolar II disorder  -     Lithium Level; Future  -     Basic Metabolic Panel; Future  -     TSH; Future    Arteriosclerotic cardiovascular disease (ASCVD), s/p MI in 2012, clinically stable.     Essential hypertension    Disease of thyroid gland    ASCVD (arteriosclerotic cardiovascular disease)    Other orders  -     lithium carbonate 300 MG capsule; Take 1 capsule by mouth 2 (Two) Times a Day With Meals.  -     sertraline (ZOLOFT) 50 MG tablet; Take 1 tablet by mouth Daily.        We will continue medications and therapy.  She was provided encouragement and support.        We discussed risks, benefits, and side effects of the above medication and the patient was agreeable with the plan.    Return in about 2 months (around 12/2/2017) for continue therapy.  The patient was instructed to call clinic as needed or go to ER if in crisis.   Patient was instructed to immediately call 911 or come to the nearest emergency room for thoughts to harm self or others.

## 2017-12-01 ENCOUNTER — OFFICE VISIT (OUTPATIENT)
Dept: PSYCHIATRY | Facility: CLINIC | Age: 47
End: 2017-12-01

## 2017-12-01 DIAGNOSIS — F31.81 BIPOLAR II DISORDER (HCC): Primary | ICD-10-CM

## 2017-12-01 PROCEDURE — 90834 PSYTX W PT 45 MINUTES: CPT | Performed by: SOCIAL WORKER

## 2017-12-01 NOTE — PROGRESS NOTES
"Date of Service: December 1, 2017  Time In: 11:40 AM  Time Out: 12:25 PM      PROGRESS NOTE  Data:  Tammi Levine is a 47 y.o. female who met with the undersigned for a regularly scheduled individual outpatient psychotherapy session at  [facility] for follow-up mood instability.  Patient reports she continues to live in the home with her mother and states family had an enjoyable Thanksgiving.  Patient reports she is looking forward to [place] but states the holidays have been difficult as these are the first holidays they have spent without her father.  Patient reports she is becoming increasingly frustrated with the disability application program.     HPI: Patient reports she feels she is doing \"okay\" but reports she continues to struggle with periods of depressed mood, anhedonia, anergia, irritability, difficulty concentrating, feelings of hopelessness, and ongoing social isolation.  Patient also reports she has periods of feeling agitated with a tendency to lash out at others.  Patient rates current symptoms at 5 on a scale of 1-10 with 10 being most severe.  Patient reports she continues to adhere to medication regimen as prescribed.  The patient adamantly and convincingly denies suicidal ideation and vehemently denies any substance use.      Clinical Maneuvering/Intervention:  Assisted patient in processing above session content; acknowledged and normalized patient’s thoughts, feelings, and concerns.  Utilized motivational interviewing techniques including complex reflexes to assist the patient in recognizing and acknowledging she coped with the recent Thanksgiving holiday relatively well.  Also utilize cognitive behavioral therapy to assist the patient in developing appropriate coping mechanisms to decrease severity and frequency of symptoms including positive self talk, relaxation techniques, and strongly urged her to continue to actively seek enjoyable activities she can engage " in a regular basis to decrease idle time and social isolation and serve as a thought blocking technique.  Provided unconditional positive regard in a safe, supportive environment.    Completed quarterly treatment reviewed on this date.    Allowed patient to freely discuss issues without interruption or judgment. Provided safe, confidential environment to facilitate the development of positive therapeutic relationship and encourage open, honest communication. Assisted patient in identifying risk factors which would indicate the need for higher level of care including thoughts to harm self or others and/or self-harming behavior and encouraged patient to contact this office, call 911, or present to the nearest emergency room should any of these events occur. Discussed crisis intervention services and means to access.  Patient adamantly and convincingly denies current suicidal or homicidal ideation or perceptual disturbance.    Assessment    Patient appears to maintain relative stability as compared to her baseline.  However, she continues to struggle with mood instability which continues to cause impairment in important areas of functioning including social, interpersonal, and vocational domains.  As a result, she can be reasonably expected to continue to benefit from treatment and would likely be at increased risk for decompensation otherwise.    Diagnoses and all orders for this visit:    Bipolar II disorder               Mental Status Exam  Hygiene:  good  Dress:  casual  Attitude:  Cooperative  Motor Activity:  Slow  Speech:  Normal  Mood:  dysthymic  Affect:  calm and pleasant  Thought Processes:  Goal directed  Thought Content:  normal  Suicidal Thoughts:  denies  Homicidal Thoughts:  denies  Crisis Safety Plan: yes, to come to the emergency room.  Hallucinations:  denies    Patient's Support Network Includes:  mother and extended family    Progress toward goal: Not at goal    Functional Status: Moderate  impairment     Prognosis: Fair with Ongoing Treatment     Plan         Patient will continue in individual outpatient psychotherapy sessions every 3-4 weeks [facility] and pharmacotherapy as scheduled with MARISELA Madsen.  Patient will adhere to medication regimen as prescribed and report any side effects. Patient will contact this office, call 911 or present to the nearest emergency room should suicidal or homicidal ideations occur. Provide Cognitive Behavioral Therapy and Integrative Therapy to improve functioning, maintain stability, and avoid decompensation and the need for higher level of care.          Return in about 4 weeks (around 12/29/2017) for Next scheduled follow up.      This document signed by Vidal Ortiz LCSW, December 1, 2017 1:52 PM

## 2017-12-01 NOTE — TREATMENT PLAN
Multi-Disciplinary Problems (from Behavioral Health Treatment Plan)    Active Problems     Problem:  Patient Care Overview (Adult) (Priority: --)  (Start Date: 12/01/17) (Resolve Date: --)    Problem Details:  The patient continues to struggle with mood instability with depression meeting primary including depressed mood, anhedonia, anergia, irritability, periodic feelings of hopelessness, and ongoing social isolation.  Patient also continues to struggle with periods of agitation and a tendency to lash out at others.  Patient rates current symptoms at a 5 on a scale of 1-10 with 10 being most severe.       Goal Start Date End Date    Plan of Care Review 12/01/17 --    Goal Details:  Patient will continue in individual outpatient psychotherapy sessions every 3-4 weeks [facility] and pharmacotherapy as scheduled at the [facility].  The patient will adhere to medication regimen as prescribed.  We will continue to utilize cognitive behavioral therapy, solution focused therapy, and motivational interviewing techniques to assist the patient in developing and utilizing appropriate coping mechanisms to decrease severity and frequency of symptoms.  We will continue to provide safe, confidential environment to facilitate the maintenance of positive therapeutic relationship and encourage open, honest communication.  All this to improve functioning, maintain stability, and avoid higher level of care.                          I have discussed and reviewed this treatment plan with the patient.  It has been printed for signatures.       This document signed by Vidal Ortiz LCSW, SATISH December 1, 2017 1:51 PM

## 2017-12-26 ENCOUNTER — OFFICE VISIT (OUTPATIENT)
Dept: PSYCHIATRY | Facility: CLINIC | Age: 47
End: 2017-12-26

## 2017-12-26 VITALS
BODY MASS INDEX: 35.28 KG/M2 | HEIGHT: 59 IN | DIASTOLIC BLOOD PRESSURE: 69 MMHG | SYSTOLIC BLOOD PRESSURE: 123 MMHG | HEART RATE: 82 BPM | WEIGHT: 175 LBS

## 2017-12-26 DIAGNOSIS — I25.10 ARTERIOSCLEROTIC CARDIOVASCULAR DISEASE (ASCVD): ICD-10-CM

## 2017-12-26 DIAGNOSIS — I10 ESSENTIAL HYPERTENSION: ICD-10-CM

## 2017-12-26 DIAGNOSIS — F31.81 BIPOLAR II DISORDER (HCC): Primary | ICD-10-CM

## 2017-12-26 PROCEDURE — 99213 OFFICE O/P EST LOW 20 MIN: CPT | Performed by: NURSE PRACTITIONER

## 2017-12-26 RX ORDER — LITHIUM CARBONATE 300 MG/1
300 CAPSULE ORAL 2 TIMES DAILY WITH MEALS
Qty: 60 CAPSULE | Refills: 1 | Status: SHIPPED | OUTPATIENT
Start: 2017-12-26 | End: 2018-03-06 | Stop reason: SDUPTHER

## 2017-12-26 NOTE — PROGRESS NOTES
"Subjective   Tammi Levine is a 47 y.o. female who is here today for medication management follow up.    Chief Complaint: 'I'm doing ok\"..    HPI: History of Present Illness Patient presents for follow up regarding depression anxiety.   She reports some wosening of nightmares.  She continues to have issues with grief and increasing problems with grief.  She is having particular family members ignore her and have abndoning her.   She has some difficulty with poor sleep and hearing things.   She rates her depression 4/10 with 10 being worst.  She also is having panic attacks especially.   She denies any severe mood liability, denies any thoughts to harm self or others.  She denies any difficulty with psychotic symptoms.  Has gone off zoloft but wishes to resume.      The following portions of the patient's history were reviewed and updated as appropriate: allergies, current medications, past family history, past medical history, past social history, past surgical history and problem list.    Review of Systems  Patient denies any recent medical illnesses.  No acute cardiopulmonary symptoms evident.  No acute gastrointestinal complaints.  Patient's appetite and intake are adequate.  Patient's current weight compared with last weight.    Objective   Physical Exam  Blood pressure 123/69, pulse 82, height 149.9 cm (59\"), weight 79.4 kg (175 lb).    No Known Allergies    Current Medications:   Current Outpatient Prescriptions   Medication Sig Dispense Refill   • ACCU-CHEK ONESIMO PLUS test strip      • ACCU-CHEK SOFTCLIX LANCETS lancets      • aspirin 81 MG EC tablet Take 81 mg by mouth daily.     • atorvastatin (LIPITOR) 80 MG tablet Take 80 mg by mouth Every Night.     • Blood Glucose Monitoring Suppl (ACCU-CHEK OENSIMO PLUS) W/DEVICE kit      • Canagliflozin (INVOKANA) 100 MG tablet Take 100 mg by mouth Daily.     • Exenatide ER (BYDUREON) 2 MG pen-injector Inject 2 mg under the skin 1 (one) time per week.     • HUMALOG " MIX 75/25 KWIKPEN (75-25) 100 UNIT/ML suspension pen-injector      • Insulin Glargine (BASAGLAR KWIKPEN) 100 UNIT/ML injection pen Inject  under the skin.     • levothyroxine (SYNTHROID, LEVOTHROID) 50 MCG tablet Take 50 mcg by mouth daily.     • lisinopril (PRINIVIL,ZESTRIL) 40 MG tablet Take 1 tablet by mouth Daily. 30 tablet 6   • lithium carbonate 300 MG capsule Take 1 capsule by mouth 2 (Two) Times a Day With Meals. 60 capsule 1   • sertraline (ZOLOFT) 50 MG tablet Take 1 tablet by mouth Daily. 30 tablet 1     No current facility-administered medications for this visit.        Mental Status Exam:   Hygiene:   fair  Cooperation:  Cooperative  Eye Contact:  Good  Psychomotor Behavior:  Appropriate  Affect:  Full range  Hopelessness: Denies  Speech:  Normal  Thought Process:  Goal directed and Linear  Thought Content:  Mood congurent  Suicidal:  None  Homicidal:  None  Hallucinations:  None  Delusion:  None  Memory:  Intact  Orientation:  Person, Place, Time and Situation  Reliability:  fair  Insight:  Fair  Judgement:  Fair  Impulse Control:  Fair  Physical/Medical Issues:  Yes DM, hypothryoid,heart -CAD.     Assessment/Plan   Diagnoses and all orders for this visit:    Bipolar II disorder  -     TSH  -     T4, Free  -     Basic Metabolic Panel  -     Lithium Level    Arteriosclerotic cardiovascular disease (ASCVD), s/p MI in 2012, clinically stable.     Essential hypertension    Other orders  -     lithium carbonate 300 MG capsule; Take 1 capsule by mouth 2 (Two) Times a Day With Meals.  -     sertraline (ZOLOFT) 50 MG tablet; Take 1 tablet by mouth Daily.        We will continue medications and therapy.  She was provided encouragement and support.        We discussed risks, benefits, and side effects of the above medication and the patient was agreeable with the plan.    Return in about 2 months (around 2/26/2018).  The patient was instructed to call clinic as needed or go to ER if in crisis.  Patient was  instructed to immediately call 911 or come to the nearest emergency room for thoughts to harm self or others.

## 2018-01-05 ENCOUNTER — OFFICE VISIT (OUTPATIENT)
Dept: PSYCHIATRY | Facility: CLINIC | Age: 48
End: 2018-01-05

## 2018-01-05 DIAGNOSIS — F43.21 GRIEF REACTION: ICD-10-CM

## 2018-01-05 DIAGNOSIS — F31.81 BIPOLAR II DISORDER (HCC): Primary | ICD-10-CM

## 2018-01-05 PROCEDURE — 90837 PSYTX W PT 60 MINUTES: CPT | Performed by: SOCIAL WORKER

## 2018-01-08 ENCOUNTER — DOCUMENTATION (OUTPATIENT)
Dept: PSYCHIATRY | Facility: CLINIC | Age: 48
End: 2018-01-08

## 2018-01-26 ENCOUNTER — OFFICE VISIT (OUTPATIENT)
Dept: PSYCHIATRY | Facility: CLINIC | Age: 48
End: 2018-01-26

## 2018-01-26 ENCOUNTER — APPOINTMENT (OUTPATIENT)
Dept: LAB | Facility: HOSPITAL | Age: 48
End: 2018-01-26

## 2018-01-26 DIAGNOSIS — F31.81 BIPOLAR II DISORDER (HCC): Primary | ICD-10-CM

## 2018-01-26 DIAGNOSIS — F43.21 GRIEF REACTION: ICD-10-CM

## 2018-01-26 LAB
ANION GAP SERPL CALCULATED.3IONS-SCNC: 3.4 MMOL/L (ref 3.6–11.2)
BUN BLD-MCNC: 27 MG/DL (ref 7–21)
BUN/CREAT SERPL: 25.2 (ref 7–25)
CALCIUM SPEC-SCNC: 9.4 MG/DL (ref 7.7–10)
CHLORIDE SERPL-SCNC: 107 MMOL/L (ref 99–112)
CO2 SERPL-SCNC: 25.6 MMOL/L (ref 24.3–31.9)
CREAT BLD-MCNC: 1.07 MG/DL (ref 0.43–1.29)
GFR SERPL CREATININE-BSD FRML MDRD: 55 ML/MIN/1.73
GLUCOSE BLD-MCNC: 184 MG/DL (ref 70–110)
LITHIUM SERPL-SCNC: 0.6 MMOL/L (ref 0.6–1.2)
OSMOLALITY SERPL CALC.SUM OF ELEC: 281.8 MOSM/KG (ref 273–305)
POTASSIUM BLD-SCNC: 5.2 MMOL/L (ref 3.5–5.3)
SODIUM BLD-SCNC: 136 MMOL/L (ref 135–153)
T4 FREE SERPL-MCNC: 1.13 NG/DL (ref 0.89–1.76)
TSH SERPL DL<=0.05 MIU/L-ACNC: 1.53 MIU/ML (ref 0.55–4.78)

## 2018-01-26 PROCEDURE — 80178 ASSAY OF LITHIUM: CPT | Performed by: NURSE PRACTITIONER

## 2018-01-26 PROCEDURE — 84439 ASSAY OF FREE THYROXINE: CPT | Performed by: NURSE PRACTITIONER

## 2018-01-26 PROCEDURE — 90832 PSYTX W PT 30 MINUTES: CPT | Performed by: SOCIAL WORKER

## 2018-01-26 PROCEDURE — 36415 COLL VENOUS BLD VENIPUNCTURE: CPT | Performed by: NURSE PRACTITIONER

## 2018-01-26 PROCEDURE — 80048 BASIC METABOLIC PNL TOTAL CA: CPT | Performed by: NURSE PRACTITIONER

## 2018-01-26 PROCEDURE — 84443 ASSAY THYROID STIM HORMONE: CPT | Performed by: NURSE PRACTITIONER

## 2018-01-26 NOTE — PROGRESS NOTES
Date of Service: January 26, 2018  Time In: 11:30 AM  Time Out: 12:00 PM      PROGRESS NOTE  Data:  Tammi Levine is a 47 y.o. female who met with the undersigned for a regularly scheduled individual outpatient psychotherapy session at  [facility] for follow-up of bipolar and grief reaction.     HPI: Patient reports past few days been difficult as her father's birthday was last week.  She reports this is the first birthday they have spent without him.  She reports she continues to struggle with periods of rumination about her father, along in to see him, and distinct sense of grief.  She also reports she continues to struggle with periods of mood instability including depression and hypomania.  Patient reports she is frustrated at this time as she and her mother got into an argument yesterday.  Patient continues to struggle with depressed mood, anhedonia, anergia, feeling hopeless, social isolation, irritability, tendency to lash out at others, and ongoing mood swings.  Patient rates current symptoms at 5 on a scale of 1-10 with 10 being most severe.  Patient reports she continues to adhere to medication regimen as prescribed.  Patient adamantly and convincingly denies suicidal ideation vehemently denies any substance use.      Clinical Maneuvering/Intervention:  Assisted patient in processing above session content; acknowledged and normalized patient’s thoughts, feelings, and concerns.  Allowed the patient to discuss/process her feelings concerning her father's death in a safe, supportive environment.  Also encouraged the patient to remind herself this is the normal grieving process.  Assisted her in identifying steps she can take to celebrate her father's life and to focus on the positive.  Also utilized cognitive behavioral therapy to assist patient in developing and utilizing appropriate coping mechanisms to decrease mood instability including positive self talk, FROM anger provoking  situations, reminding herself of the negative ramifications of lashing out at others, and strongly encouraged her to continue to seek enjoyable activities she can engage in a regular basis.  Provided unconditional positive regard in a safe, supportive environment.    Allowed patient to freely discuss issues without interruption or judgment. Provided safe, confidential environment to facilitate the development of positive therapeutic relationship and encourage open, honest communication. Assisted patient in identifying risk factors which would indicate the need for higher level of care including thoughts to harm self or others and/or self-harming behavior and encouraged patient to contact this office, call 911, or present to the nearest emergency room should any of these events occur. Discussed crisis intervention services and means to access.  Patient adamantly and convincingly denies current suicidal or homicidal ideation or perceptual disturbance.    Assessment    Patient continues to struggle with grief concerning the death of her father.  She also continues to struggle with mood instability which continues to cause permanent important areas of functioning.  As result, the patient to be reasonably expected to continue to benefit from treatment and likely be at increased risk for decompensation of her life.    Diagnoses and all orders for this visit:    Bipolar II disorder    Grief reaction               Mental Status Exam  Hygiene:  good  Dress:  casual  Attitude:  Cooperative  Motor Activity:  Restless  Speech:  Rapid  Mood:  labile  Affect:  calm and pleasant  Thought Processes:  Goal directed  Thought Content:  normal  Suicidal Thoughts:  denies  Homicidal Thoughts:  denies  Crisis Safety Plan: yes, to come to the emergency room.  Hallucinations:  denies    Patient's Support Network Includes:  mother    Progress toward goal: Not at goal    Functional Status: Moderate impairment     Prognosis: Fair with Ongoing  Treatment     Plan         Patient will continue in individual outpatient psychotherapy sessions every 3 weeks at [facility] and pharmacotherapy as scheduled at the [facility].  Patient will adhere to medication regimen as prescribed and report any side effects. Patient will contact this office, call 911 or present to the nearest emergency room should suicidal or homicidal ideations occur. Provide Cognitive Behavioral Therapy and Integrative Therapy to improve functioning, maintain stability, and avoid decompensation and the need for higher level of care.          Return in about 3 weeks (around 02/16/2018) for Next scheduled follow up.      This document signed by Vidal Ortiz LCSW, SATISH January 26, 2018 1:15 PM

## 2018-03-06 ENCOUNTER — OFFICE VISIT (OUTPATIENT)
Dept: PSYCHIATRY | Facility: CLINIC | Age: 48
End: 2018-03-06

## 2018-03-06 VITALS
HEART RATE: 86 BPM | SYSTOLIC BLOOD PRESSURE: 128 MMHG | HEIGHT: 59 IN | BODY MASS INDEX: 35.28 KG/M2 | DIASTOLIC BLOOD PRESSURE: 77 MMHG | WEIGHT: 175 LBS

## 2018-03-06 DIAGNOSIS — I25.10 ARTERIOSCLEROTIC CARDIOVASCULAR DISEASE (ASCVD): ICD-10-CM

## 2018-03-06 DIAGNOSIS — F31.81 BIPOLAR II DISORDER (HCC): Primary | ICD-10-CM

## 2018-03-06 DIAGNOSIS — F43.21 GRIEF REACTION: ICD-10-CM

## 2018-03-06 PROCEDURE — 99214 OFFICE O/P EST MOD 30 MIN: CPT | Performed by: NURSE PRACTITIONER

## 2018-03-06 RX ORDER — LITHIUM CARBONATE 300 MG/1
300 CAPSULE ORAL 2 TIMES DAILY WITH MEALS
Qty: 60 CAPSULE | Refills: 1 | Status: SHIPPED | OUTPATIENT
Start: 2018-03-06 | End: 2018-05-14 | Stop reason: SDUPTHER

## 2018-03-06 RX ORDER — SERTRALINE HYDROCHLORIDE 100 MG/1
100 TABLET, FILM COATED ORAL DAILY
Qty: 30 TABLET | Refills: 1 | Status: SHIPPED | OUTPATIENT
Start: 2018-03-06 | End: 2018-03-13 | Stop reason: ALTCHOICE

## 2018-03-06 RX ORDER — AMOXICILLIN AND CLAVULANATE POTASSIUM 875; 125 MG/1; MG/1
TABLET, FILM COATED ORAL
COMMUNITY
Start: 2018-02-26 | End: 2018-03-13

## 2018-03-06 NOTE — PROGRESS NOTES
"Subjective   Tammi Levine is a 47 y.o. female who is here today for medication management follow up.    Chief Complaint: 'I'm doing ok\"..    HPI: History of Present Illness Patient presents for follow up regarding depression anxiety.   She reports some wosening of nightmares.  She continues to have issues with grief and increasing problems with grief.  She is having withdrawal and isolation.  Her friends and family members ignore her and have abndoning her.   She has some difficulty with poor sleep and hearing things.   She rates her depression 8/10 with 10 being worst.  She reports that she is still involved with [employer], denies any thoughts to harm self or others.  She denies any difficulty with psychotic symptoms.   She has multiple symptoms of bipolar inlcuding periods of hypomania and depression.   Patient has had distinct periods of elevated mood, increased activity and energy lasting up to 2 weeks.  She has had periods of  talkative, decreased need for sleep, and increased self esteem.She reports sleep is adequate.  She reports appetite has been less.     The following portions of the patient's history were reviewed and updated as appropriate: allergies, current medications, past family history, past medical history, past social history, past surgical history and problem list.    Review of Systems  Patient denies any recent medical illnesses.  No acute cardiopulmonary symptoms evident.  No acute gastrointestinal complaints.  Patient's appetite and intake are adequate.  Patient's current weight compared with last weight.    Objective   Physical Exam  Blood pressure 128/77, pulse 86, height 149.9 cm (59\"), weight 79.4 kg (175 lb).    No Known Allergies    Current Medications:   Current Outpatient Prescriptions   Medication Sig Dispense Refill   • ACCU-CHEK ONESIMO PLUS test strip      • ACCU-CHEK SOFTCLIX LANCETS lancets      • aspirin 81 MG EC tablet Take 81 mg by mouth daily.     • atorvastatin (LIPITOR) 80 " MG tablet Take 80 mg by mouth Every Night.     • Blood Glucose Monitoring Suppl (ACCU-CHEK ONESIMO PLUS) W/DEVICE kit      • Canagliflozin (INVOKANA) 100 MG tablet Take 100 mg by mouth Daily.     • Exenatide ER (BYDUREON) 2 MG pen-injector Inject 2 mg under the skin 1 (one) time per week.     • HUMALOG MIX 75/25 KWIKPEN (75-25) 100 UNIT/ML suspension pen-injector      • Insulin Glargine (BASAGLAR KWIKPEN) 100 UNIT/ML injection pen Inject  under the skin.     • levothyroxine (SYNTHROID, LEVOTHROID) 50 MCG tablet Take 50 mcg by mouth daily.     • lisinopril (PRINIVIL,ZESTRIL) 40 MG tablet Take 1 tablet by mouth Daily. 30 tablet 6   • lithium carbonate 300 MG capsule Take 1 capsule by mouth 2 (Two) Times a Day With Meals. 60 capsule 1   • sertraline (ZOLOFT) 50 MG tablet Take 1 tablet by mouth Daily. 30 tablet 1     No current facility-administered medications for this visit.        Mental Status Exam:   Hygiene:   fair  Cooperation:  Cooperative  Eye Contact:  Good  Psychomotor Behavior:  Appropriate  Affect:  Full range  Hopelessness: Denies  Speech:  Normal  Thought Process:  Goal directed and Linear  Thought Content:  Mood congurent  Suicidal:  None  Homicidal:  None  Hallucinations:  None  Delusion:  None  Memory:  Intact  Orientation:  Person, Place, Time and Situation  Reliability:  fair  Insight:  Fair  Judgement:  Fair  Impulse Control:  Fair  Physical/Medical Issues:  Yes DM, hypothryoid,heart -CAD.     Assessment/Plan   Diagnoses and all orders for this visit:    Bipolar II disorder    Grief reaction    Arteriosclerotic cardiovascular disease (ASCVD), s/p MI in 2012, clinically stable.     Other orders  -     sertraline (ZOLOFT) 100 MG tablet; Take 1 tablet by mouth Daily.  -     lithium carbonate 300 MG capsule; Take 1 capsule by mouth 2 (Two) Times a Day With Meals.      We will continue medications and therapy.  She was provided encouragement and support.      Patient had an extended session with  psychotherapy to encourage positive coping skills patient was instructed to get out of the house and interact with friends or [employer] members at least twice a week.  She was encouraged to begin a singles group at her [employer] to increase her socialization.  We discussed risks, benefits, and side effects of the above medication and the patient was agreeable with the plan.    Return in about 6 weeks (around 4/17/2018).  The patient was instructed to call clinic as needed or go to ER if in crisis.  Patient was instructed to immediately call 911 or come to the nearest emergency room for thoughts to harm self or others.

## 2018-03-09 ENCOUNTER — OFFICE VISIT (OUTPATIENT)
Dept: PSYCHIATRY | Facility: CLINIC | Age: 48
End: 2018-03-09

## 2018-03-09 DIAGNOSIS — F31.81 BIPOLAR II DISORDER (HCC): Primary | ICD-10-CM

## 2018-03-09 PROCEDURE — 90834 PSYTX W PT 45 MINUTES: CPT | Performed by: SOCIAL WORKER

## 2018-03-09 NOTE — PROGRESS NOTES
"Date of Service: March 9, 2018  Time In: 11:40 AM  Time Out: 12:20 PM      PROGRESS NOTE  Data:  Tammi Levine is a 47 y.o. female who met with the undersigned for a regularly scheduled individual outpatient psychotherapy session at  [facility] for follow-up of bipolar IIdisorder.     HPI: Patient reports she continues to spend the vast majority of her time at home and states she continues to struggle with \"being afraid of people\".  Patient reports her mood continues to be \"up and down\" and reports she has periods of depression interspersed with periods of hypomania as evidence by racing thoughts, becoming easily irritated, being more talkative, increased energy, and lashing out at others.  Patient also reports she continues to be sad concerning the passing of her father but states she feels she is coping appropriately.  Patient rates current symptoms of mood instability at a 4 on a scale of 1-10 with 10 being most severe.  Patient reports symptoms of hypomania are not as frequent as symptoms of depression.  Patient reports she struggles with depressed mood, anhedonia, anergia, feeling hopeless, and ongoing social isolation.  Patient reports she continues to adhere to medication regimen as prescribed.  Patient adamantly and convincingly denies suicidal ideation vehemently denies any substance use.  Patient also denies any perceptual disturbance or psychosis.      Clinical Maneuvering/Intervention:  Assisted patient in processing above session content; acknowledged and normalized patient’s thoughts, feelings, and concerns.  Utilized motivational interviewing techniques including complex reflections to discuss social skills as any other skill which requires practice.  Assisted the patient in identifying her fear of others and was able to roland agreement from the patient her fears and concerns are largely faulty and irrational.  Discussed and demonstrated challenging faulty, irrational " "thoughts with factual counter arguments.  Strongly urged the patient accurately see enjoyable activities she can engage in a regular basis.  Also validated the patient's frustration with the fact her mother appears to discourage her from being socially active and encouraged her to have a jade conversation with her mother.  Also discussed the importance of communication and any didactic relationship and discussed the use of \"I\" statements reflective listening.  Provided unconditional positive regard in a safe, supportive environment.    Allowed patient to freely discuss issues without interruption or judgment. Provided safe, confidential environment to facilitate the development of positive therapeutic relationship and encourage open, honest communication. Assisted patient in identifying risk factors which would indicate the need for higher level of care including thoughts to harm self or others and/or self-harming behavior and encouraged patient to contact this office, call 911, or present to the nearest emergency room should any of these events occur. Discussed crisis intervention services and means to access.  Patient adamantly and convincingly denies current suicidal or homicidal ideation or perceptual disturbance.    Assessment    Patient continues to struggle with mood instability and appears to primarily struggle with depression but also has more infrequent periods of hypomania.  The patient's symptoms continue to be exacerbated by ongoing significant social isolation.  The patient's symptoms continue to cause impairment in important areas of functioning.  As a result, the patient to be reasonably expected to continue to benefit from treatment and would likely be at increased risk for decompensation otherwise.    Diagnoses and all orders for this visit:    Bipolar II disorder               Mental Status Exam  Hygiene:  good  Dress:  casual  Attitude:  Cooperative  Motor Activity:  Appropriate  Speech:  " Normal  Mood:  depressed  Affect:  calm and pleasant  Thought Processes:  Goal directed  Thought Content:  normal  Suicidal Thoughts:  denies  Homicidal Thoughts:  denies  Crisis Safety Plan: yes, to come to the emergency room.  Hallucinations:  denies    Patient's Support Network Includes:  mother and extended family    Progress toward goal: Not at goal    Functional Status: Moderate impairment     Prognosis: Fair with Ongoing Treatment     Plan         Patient will continue in individual outpatient psychotherapy session in 3 weeks at [facility] and pharmacotherapy as scheduled with MARISELA Madsen at the [facility].  Patient will adhere to medication regimen as prescribed and report any side effects. Patient will contact this office, call 911 or present to the nearest emergency room should suicidal or homicidal ideations occur. Provide Cognitive Behavioral Therapy and Integrative Therapy to improve functioning, maintain stability, and avoid decompensation and the need for higher level of care.          Return in about 3 weeks (around 03/30/2018) for Next scheduled follow up.      This document signed by Vidal Ortiz LCSW, SATISH March 9, 2018 2:01 PM

## 2018-03-13 ENCOUNTER — OFFICE VISIT (OUTPATIENT)
Dept: CARDIOLOGY | Facility: CLINIC | Age: 48
End: 2018-03-13

## 2018-03-13 VITALS
BODY MASS INDEX: 32.47 KG/M2 | RESPIRATION RATE: 16 BRPM | DIASTOLIC BLOOD PRESSURE: 71 MMHG | HEART RATE: 83 BPM | HEIGHT: 61 IN | WEIGHT: 172 LBS | SYSTOLIC BLOOD PRESSURE: 111 MMHG

## 2018-03-13 DIAGNOSIS — E78.5 DYSLIPIDEMIA: ICD-10-CM

## 2018-03-13 DIAGNOSIS — I10 ESSENTIAL HYPERTENSION: ICD-10-CM

## 2018-03-13 DIAGNOSIS — I25.10 ARTERIOSCLEROTIC CARDIOVASCULAR DISEASE (ASCVD): Primary | ICD-10-CM

## 2018-03-13 PROCEDURE — 93000 ELECTROCARDIOGRAM COMPLETE: CPT | Performed by: NURSE PRACTITIONER

## 2018-03-13 PROCEDURE — 99213 OFFICE O/P EST LOW 20 MIN: CPT | Performed by: NURSE PRACTITIONER

## 2018-03-13 RX ORDER — LISINOPRIL 40 MG/1
40 TABLET ORAL DAILY
Qty: 30 TABLET | Refills: 6 | Status: SHIPPED | OUTPATIENT
Start: 2018-03-13 | End: 2020-07-21 | Stop reason: DRUGHIGH

## 2018-03-13 NOTE — PROGRESS NOTES
"Subjective     Chief Complaint: Coronary Artery Disease and Hypertension    History of Present Illness   Tammi Levine is a 47 y.o. female who presents for follow-up of arteriosclerotic cardiovascular disease, status post MI in 2012, essential hypertension, dyslipidemia, and type 2 diabetes.  At today's visit Mrs. Levine denies complaint of chest pain, palpitations and shortness of breath.  She denies lower extremity swelling, syncope or near syncope.  She reports that she is compliant with medications.      Current Outpatient Prescriptions:   •  aspirin 81 MG EC tablet, Take 81 mg by mouth daily., Disp: , Rfl:   •  atorvastatin (LIPITOR) 80 MG tablet, Take 80 mg by mouth Every Night., Disp: , Rfl:   •  Canagliflozin (INVOKANA) 100 MG tablet, Take 100 mg by mouth Daily., Disp: , Rfl:   •  Insulin Glargine (BASAGLAR KWIKPEN) 100 UNIT/ML injection pen, Inject 20 Units under the skin 2 (Two) Times a Day., Disp: , Rfl:   •  levothyroxine (SYNTHROID, LEVOTHROID) 50 MCG tablet, Take 50 mcg by mouth daily., Disp: , Rfl:   •  lisinopril (PRINIVIL,ZESTRIL) 40 MG tablet, Take 1 tablet by mouth Daily., Disp: 30 tablet, Rfl: 6  •  lithium carbonate 300 MG capsule, Take 1 capsule by mouth 2 (Two) Times a Day With Meals., Disp: 60 capsule, Rfl: 1     The following portions of the patient's history were reviewed and updated as appropriate: allergies, current medications, past family history, past medical history, past social history, past surgical history and problem list.    Review of Systems   Constitutional: Negative for fatigue.   Respiratory: Negative for shortness of breath.    Cardiovascular: Negative for chest pain, palpitations and leg swelling.   Gastrointestinal: Negative for blood in stool.   Neurological: Negative for dizziness, syncope, weakness and light-headedness.   Hematological: Does not bruise/bleed easily.       Objective     /71   Pulse 83   Resp 16   Ht 154.9 cm (61\")   Wt 78 kg (172 lb)   BMI " 32.50 kg/m²     Physical Exam   Constitutional: She appears well-developed and well-nourished.   HENT:   Head: Normocephalic and atraumatic.   Eyes: Pupils are equal, round, and reactive to light.   Neck: No JVD present.   Cardiovascular: Normal rate, regular rhythm and intact distal pulses.  Exam reveals no gallop and no friction rub.    No murmur heard.  Pt has brace on left foot, pulses were not assessed   Pulmonary/Chest: Effort normal and breath sounds normal. No respiratory distress. She has no wheezes. She has no rales.   Abdominal: Soft. She exhibits no mass. There is no tenderness. No hernia.   Skin: Skin is warm and dry.   Psychiatric: She has a normal mood and affect.   Vitals reviewed.        ECG 12 Lead  Date/Time: 3/13/2018 10:36 AM  Performed by: JUSTINE HALL  Authorized by: JUSTINE HALL   Comments: ECG 12 Lead with sinus rhythm with normal intervals and durations.  Ventricular rate of 80 bpm.  Negative deflection of P wave in V1 consider possible left ventricular hypertrophy.  QTc 404              Assessment/Plan       Tammi was seen today for coronary artery disease and hypertension.    Diagnoses and all orders for this visit:    Arteriosclerotic cardiovascular disease (ASCVD), s/p MI in 2012, clinically stable.      Stable   Antiplatelets: Aspirin   Ace: Lisinopril 40 mg daily   Statin: Atorvastatin 80 mg daily    Essential hypertension     Stable   Today's blood pressure shows good control   Continue lisinopril as prescribed    Dyslipidemia     Patient does not recall having labs drawn at PCP office   Order placed for lipid panel and comprehensive metabolic panel          Return to clinic in 6 months      MARISELA Vila

## 2018-03-20 ENCOUNTER — LAB (OUTPATIENT)
Dept: LAB | Facility: HOSPITAL | Age: 48
End: 2018-03-20

## 2018-03-20 DIAGNOSIS — F31.81 BIPOLAR II DISORDER (HCC): ICD-10-CM

## 2018-03-20 DIAGNOSIS — I25.10 ARTERIOSCLEROTIC CARDIOVASCULAR DISEASE (ASCVD): ICD-10-CM

## 2018-03-20 DIAGNOSIS — E78.5 DYSLIPIDEMIA: ICD-10-CM

## 2018-03-20 LAB
ALBUMIN SERPL-MCNC: 4.4 G/DL (ref 3.5–5)
ALBUMIN/GLOB SERPL: 1.4 G/DL (ref 1.5–2.5)
ALP SERPL-CCNC: 79 U/L (ref 35–104)
ALT SERPL W P-5'-P-CCNC: 14 U/L (ref 10–36)
ANION GAP SERPL CALCULATED.3IONS-SCNC: 6.3 MMOL/L (ref 3.6–11.2)
AST SERPL-CCNC: 17 U/L (ref 10–30)
BILIRUB SERPL-MCNC: 0.4 MG/DL (ref 0.2–1.8)
BUN BLD-MCNC: 22 MG/DL (ref 7–21)
BUN/CREAT SERPL: 22.2 (ref 7–25)
CALCIUM SPEC-SCNC: 9.5 MG/DL (ref 7.7–10)
CHLORIDE SERPL-SCNC: 105 MMOL/L (ref 99–112)
CHOLEST SERPL-MCNC: 201 MG/DL (ref 0–200)
CO2 SERPL-SCNC: 23.7 MMOL/L (ref 24.3–31.9)
CREAT BLD-MCNC: 0.99 MG/DL (ref 0.43–1.29)
GFR SERPL CREATININE-BSD FRML MDRD: 60 ML/MIN/1.73
GLOBULIN UR ELPH-MCNC: 3.1 GM/DL
GLUCOSE BLD-MCNC: 157 MG/DL (ref 70–110)
HDLC SERPL-MCNC: 44 MG/DL (ref 60–100)
LDLC SERPL CALC-MCNC: 124 MG/DL (ref 0–100)
LDLC/HDLC SERPL: 2.83 {RATIO}
LITHIUM SERPL-SCNC: 0.7 MMOL/L (ref 0.6–1.2)
OSMOLALITY SERPL CALC.SUM OF ELEC: 276.7 MOSM/KG (ref 273–305)
POTASSIUM BLD-SCNC: 4.7 MMOL/L (ref 3.5–5.3)
PROT SERPL-MCNC: 7.5 G/DL (ref 6–8)
SODIUM BLD-SCNC: 135 MMOL/L (ref 135–153)
TRIGL SERPL-MCNC: 163 MG/DL (ref 0–150)
VLDLC SERPL-MCNC: 32.6 MG/DL

## 2018-03-20 PROCEDURE — 80178 ASSAY OF LITHIUM: CPT

## 2018-03-20 PROCEDURE — 36415 COLL VENOUS BLD VENIPUNCTURE: CPT

## 2018-03-20 PROCEDURE — 80053 COMPREHEN METABOLIC PANEL: CPT

## 2018-03-20 PROCEDURE — 80061 LIPID PANEL: CPT

## 2018-03-21 ENCOUNTER — TELEPHONE (OUTPATIENT)
Dept: CARDIOLOGY | Facility: CLINIC | Age: 48
End: 2018-03-21

## 2018-03-21 NOTE — TELEPHONE ENCOUNTER
----- Message from MARISELA Martinez sent at 3/20/2018  5:27 PM EDT -----  Can you please call Ms Levine and verify that she is taking her atorvastatin 80 mg daily?  If she is not, please advise her that her cholesterol is very high and she needs to restart this medication.    If she is taking it daily, please advise her that she needs to add a new medication Zetia 10 mg.  I will write the medication for her, just verify her pharmacy and let me know.    Thanks, Glenis

## 2018-03-21 NOTE — TELEPHONE ENCOUNTER
PATIENT IS TAKING ATORVASTATIN 80 MG DAILY SHE NEEDS THIS REFILLED HER PHARMACY IS [facility]     SHE IS AWARE OF THE NEW MEDICATION ALSO FABI

## 2018-03-22 ENCOUNTER — TELEPHONE (OUTPATIENT)
Dept: CARDIOLOGY | Facility: CLINIC | Age: 48
End: 2018-03-22

## 2018-03-22 RX ORDER — ATORVASTATIN CALCIUM 80 MG/1
80 TABLET, FILM COATED ORAL NIGHTLY
Qty: 30 TABLET | Refills: 3 | Status: SHIPPED | OUTPATIENT
Start: 2018-03-22 | End: 2022-09-21

## 2018-03-22 RX ORDER — EZETIMIBE 10 MG/1
10 TABLET ORAL DAILY
Qty: 30 TABLET | Refills: 3 | Status: SHIPPED | OUTPATIENT
Start: 2018-03-22 | End: 2019-11-26

## 2018-03-30 ENCOUNTER — OFFICE VISIT (OUTPATIENT)
Dept: PSYCHIATRY | Facility: CLINIC | Age: 48
End: 2018-03-30

## 2018-03-30 DIAGNOSIS — F31.81 BIPOLAR II DISORDER (HCC): Primary | ICD-10-CM

## 2018-03-30 PROCEDURE — 90832 PSYTX W PT 30 MINUTES: CPT | Performed by: SOCIAL WORKER

## 2018-04-02 NOTE — PROGRESS NOTES
Date of Service: March 30, 2018  Time In: 11:30 AM  Time Out: 12:00 PM      PROGRESS NOTE  Data:  Tammi Levine is a 48 y.o. female who met with the undersigned for a regularly scheduled individual outpatient psychotherapy session at  [facility] for follow-up of bipolar II disorder.     HPI: Patient reports she is looking forward to going with extended family members for the children to ride the [employer] train.  Patient reports she continues to spend the vast majority of her time at home and continues to struggle with periods of depression including depressed mood, anhedonia, anergia, irritability, difficulty concentrating, tendency to lash out at others, and ongoing social isolation.  Patient rates current symptoms at 4 on a scale of 1-10 with 10 being most severe.  Patient reports she continues to adhere to medication regimen as prescribed.  Patient adamantly convincingly denies suicidal ideation vehemently denies any substance use.      Clinical Maneuvering/Intervention:  Assisted patient in processing above session content; acknowledged and normalized patient’s thoughts, feelings, and concerns.  Praised the patient for her effort and becoming more socially active.  So allowed the patient to discuss/vent her ongoing feelings and frustrations pertaining to her difficulty being approved for disability benefits.  Utilized cognitive behavioral therapy to assist the patient in developing appropriate coping mechanisms including positive self talk, relaxation techniques, walking away from anger provoking situations, and strongly urged her to continue to seek enjoyable activities she can engage in a regular basis to reduce idle time.  Provided unconditional positive regard in a safe, supportive environment.    Allowed patient to freely discuss issues without interruption or judgment. Provided safe, confidential environment to facilitate the development of positive therapeutic relationship and encourage  open, honest communication. Assisted patient in identifying risk factors which would indicate the need for higher level of care including thoughts to harm self or others and/or self-harming behavior and encouraged patient to contact this office, call 911, or present to the nearest emergency room should any of these events occur. Discussed crisis intervention services and means to access.  Patient adamantly and convincingly denies current suicidal or homicidal ideation or perceptual disturbance.    Assessment    Patient continues to struggle with mood instability with depression being primary.  The patient's symptomology continues to cause impairment in important areas of functioning.  As a result, she can be reasonably expected to continue to benefit treatment and would likely be at increased risk for decompensation otherwise.    Diagnoses and all orders for this visit:    Bipolar II disorder               Mental Status Exam  Hygiene:  good  Dress:  casual  Attitude:  Cooperative  Motor Activity:  Appropriate  Speech:  Rapid  Mood:  within normal limits  Affect:  calm and pleasant  Thought Processes:  Goal directed  Thought Content:  normal  Suicidal Thoughts:  denies  Homicidal Thoughts:  denies  Crisis Safety Plan: yes, to come to the emergency room.  Hallucinations:  denies    Patient's Support Network Includes:  mother and extended family    Progress toward goal: Not at goal    Functional Status: Moderate impairment     Prognosis: Fair with Ongoing Treatment     Plan         Patient will continue in individual outpatient psychotherapy sessions at [facility] every 3 weeks and pharmacotherapy as scheduled with MARISELA Madsen.  Patient will adhere to medication regimen as prescribed and report any side effects. Patient will contact this office, call 911 or present to the nearest emergency room should suicidal or homicidal ideations occur. Provide Cognitive Behavioral Therapy and  Integrative Therapy to improve functioning, maintain stability, and avoid decompensation and the need for higher level of care.          Return in about 3 weeks (around 4/20/2018) for Next scheduled follow up.      This document signed by Vidal Ortiz, KAYLEIGH, SATISH April 2, 2018 11:10 AM

## 2018-04-18 NOTE — TELEPHONE ENCOUNTER
Sue Townsend   to [facility]           3/22/18 1:35 PM   Please call patient regarding medication that was refilled yesterday.  Patient states it is not at the pharmacy.

## 2018-04-20 ENCOUNTER — OFFICE VISIT (OUTPATIENT)
Dept: PSYCHIATRY | Facility: CLINIC | Age: 48
End: 2018-04-20

## 2018-04-20 DIAGNOSIS — F31.81 BIPOLAR II DISORDER (HCC): Primary | ICD-10-CM

## 2018-04-20 PROCEDURE — 90832 PSYTX W PT 30 MINUTES: CPT | Performed by: SOCIAL WORKER

## 2018-04-20 NOTE — TREATMENT PLAN
Multi-Disciplinary Problems (from Behavioral Health Treatment Plan)    Active Problems     Problem:  Patient Care Overview (Adult)  Start Date: 03/30/18    Problem Details:  The patient continues to struggle with mood instability with depression meeting primary including depressed mood, anhedonia, anergia, irritability, periodic feelings of hopelessness, and ongoing social isolation.  Patient also continues to struggle with  periods of agitation and a tendency to lash out at others.  Patient rates current symptoms at a 4 on a scale of 1-10 with 10 being most severe.     Goal Priority Start Date Expected End Date End Date    Plan of Care Review High 03/30/18 06/30/18 --    Goal Details:  Patient will continue in individual outpatient psychotherapy sessions every 3-4 weeks [facility] [place] and pharmacotherapy as scheduled at the [facility].  The patient will adhere to medication regimen as prescribed.  We will cont inue to utilize cognitive behavioral therapy, solution focused therapy, and motivational interviewing techniques to assist the patient in developing and utilizing appropriate coping mechanisms to decrease severity and frequency of symptoms.  We will cont inue to provide safe, confidential environment to facilitate the maintenance of positive therapeutic relationship and encourage open, honest communication.  All this to improve functioning, maintain stability, and avoid higher level of care.                        I have discussed and reviewed this treatment plan with the patient.  It has been printed for signatures.

## 2018-04-20 NOTE — PROGRESS NOTES
"Date of Service: April 20, 2018  Time In: 12:00 PM  Time Out: 12:30 PM      PROGRESS NOTE  Data:  Tammi Levine is a 48 y.o. female who met with the undersigned for a regularly scheduled individual outpatient psychotherapy session at  [facility] for follow-up of bipolar disorder.     HPI: Patient reports she is doing \"about the same\".  She reports she continues to have periods of depressed mood, anhedonia, anergia, decreased motivation, and social isolation.  Patient also reports periods of  hypomania as evidence by racing thoughts, becoming easily irritated, being more talkative, increased energy, and lashing out at others.  Patient reports she feels she is coping relatively well with her father's death and states she spends significantly less time ruminating.  Patient reports she continues to adhere to medication regimen as prescribed.  Patient adamantly convincingly denies suicidal ideation vehemently denies any subacute.    Clinical Maneuvering/Intervention:  Assisted patient in processing above session content; acknowledged and normalized patient’s thoughts, feelings, and concerns.  Utilized motivational interviewing techniques including complex affect is to assist patient in recognizing her mood state has a great deal to do with her interactions with others including her tendency to lash out.  Also confronted the patient with state she has the ability to choose how she reacts in various situations.  Also utilized cognitive behavioral therapy to assist the patient in developing appropriate coping mechanisms to decrease severity and frequency of symptoms including positive self talk, relaxation techniques, and strongly encouraged her to continue to seek enjoyable activities she can engage in regular basis.  Chronic unconditional positive regard in a safe, supportive environment.    Allowed patient to freely discuss issues without interruption or judgment. Provided safe, confidential " environment to facilitate the development of positive therapeutic relationship and encourage open, honest communication. Assisted patient in identifying risk factors which would indicate the need for higher level of care including thoughts to harm self or others and/or self-harming behavior and encouraged patient to contact this office, call 911, or present to the nearest emergency room should any of these events occur. Discussed crisis intervention services and means to access.  Patient adamantly and convincingly denies current suicidal or homicidal ideation or perceptual disturbance.    Assessment    Patient appears to maintain the status quo and continues to struggle with mood instability.  The patient's symptomology continues to cause impairment important areas of functioning.  As result, the patient immediately recently expected to continue to benefit from treatment and would likely be at increased risk for decompensation of her life.    Diagnoses and all orders for this visit:    Bipolar II disorder               Mental Status Exam  Hygiene:  good  Dress:  casual  Attitude:  Cooperative  Motor Activity:  Appropriate  Speech:  Rapid  Mood:  within normal limits  Affect:  calm and pleasant  Thought Processes:  Linear  Thought Content:  normal  Suicidal Thoughts:  denies  Homicidal Thoughts:  denies  Crisis Safety Plan: yes, to come to the emergency room.  Hallucinations:  denies    Patient's Support Network Includes:  mother and extended family    Progress toward goal: Not at goal    Functional Status: Moderate impairment     Prognosis: Fair with Ongoing Treatment     Plan         Patient will continue in individual outpatient psychotherapy session at [facility] in 3 weeks and pharmacotherapy as scheduled sitting MARISELA Conklin.  Patient will adhere to medication regimen as prescribed and report any side effects. Patient will contact this office, call 911 or present to the nearest emergency  room should suicidal or homicidal ideations occur. Provide Cognitive Behavioral Therapy and Integrative Therapy to improve functioning, maintain stability, and avoid decompensation and the need for higher level of care.          Return in about 3 weeks (around 5/11/2018) for Next scheduled follow up.      This document signed by Vidal Ortiz, KAYLEIGH, [facility] April 20, 2018 1:59 PM

## 2018-05-11 ENCOUNTER — OFFICE VISIT (OUTPATIENT)
Dept: PSYCHIATRY | Facility: CLINIC | Age: 48
End: 2018-05-11

## 2018-05-11 DIAGNOSIS — F43.21 GRIEF REACTION: ICD-10-CM

## 2018-05-11 DIAGNOSIS — F31.81 BIPOLAR II DISORDER (HCC): Primary | ICD-10-CM

## 2018-05-11 PROCEDURE — 90834 PSYTX W PT 45 MINUTES: CPT | Performed by: SOCIAL WORKER

## 2018-05-14 ENCOUNTER — APPOINTMENT (OUTPATIENT)
Dept: LAB | Facility: HOSPITAL | Age: 48
End: 2018-05-14

## 2018-05-14 ENCOUNTER — OFFICE VISIT (OUTPATIENT)
Dept: PSYCHIATRY | Facility: CLINIC | Age: 48
End: 2018-05-14

## 2018-05-14 VITALS
BODY MASS INDEX: 33.61 KG/M2 | HEIGHT: 61 IN | WEIGHT: 178 LBS | HEART RATE: 80 BPM | SYSTOLIC BLOOD PRESSURE: 136 MMHG | DIASTOLIC BLOOD PRESSURE: 76 MMHG

## 2018-05-14 DIAGNOSIS — F31.81 BIPOLAR II DISORDER (HCC): Primary | ICD-10-CM

## 2018-05-14 LAB
ANION GAP SERPL CALCULATED.3IONS-SCNC: 4.1 MMOL/L (ref 3.6–11.2)
BUN BLD-MCNC: 26 MG/DL (ref 7–21)
BUN/CREAT SERPL: 25.2 (ref 7–25)
CALCIUM SPEC-SCNC: 9.1 MG/DL (ref 7.7–10)
CHLORIDE SERPL-SCNC: 106 MMOL/L (ref 99–112)
CO2 SERPL-SCNC: 22.9 MMOL/L (ref 24.3–31.9)
CREAT BLD-MCNC: 1.03 MG/DL (ref 0.43–1.29)
GFR SERPL CREATININE-BSD FRML MDRD: 57 ML/MIN/1.73
GLUCOSE BLD-MCNC: 205 MG/DL (ref 70–110)
LITHIUM SERPL-SCNC: 0.5 MMOL/L (ref 0.6–1.2)
OSMOLALITY SERPL CALC.SUM OF ELEC: 277.1 MOSM/KG (ref 273–305)
POTASSIUM BLD-SCNC: 5.3 MMOL/L (ref 3.5–5.3)
SODIUM BLD-SCNC: 133 MMOL/L (ref 135–153)
T4 FREE SERPL-MCNC: 1.09 NG/DL (ref 0.89–1.76)
TSH SERPL DL<=0.05 MIU/L-ACNC: 0.89 MIU/ML (ref 0.55–4.78)

## 2018-05-14 PROCEDURE — 84443 ASSAY THYROID STIM HORMONE: CPT | Performed by: NURSE PRACTITIONER

## 2018-05-14 PROCEDURE — 80178 ASSAY OF LITHIUM: CPT | Performed by: NURSE PRACTITIONER

## 2018-05-14 PROCEDURE — 80048 BASIC METABOLIC PNL TOTAL CA: CPT | Performed by: NURSE PRACTITIONER

## 2018-05-14 PROCEDURE — 84439 ASSAY OF FREE THYROXINE: CPT | Performed by: NURSE PRACTITIONER

## 2018-05-14 PROCEDURE — 36415 COLL VENOUS BLD VENIPUNCTURE: CPT | Performed by: NURSE PRACTITIONER

## 2018-05-14 PROCEDURE — 99214 OFFICE O/P EST MOD 30 MIN: CPT | Performed by: NURSE PRACTITIONER

## 2018-05-14 RX ORDER — LITHIUM CARBONATE 300 MG/1
300 CAPSULE ORAL 2 TIMES DAILY WITH MEALS
Qty: 60 CAPSULE | Refills: 1 | Status: SHIPPED | OUTPATIENT
Start: 2018-05-14 | End: 2018-07-16 | Stop reason: SDUPTHER

## 2018-05-14 RX ORDER — CITALOPRAM 10 MG/1
10 TABLET ORAL DAILY
Qty: 30 TABLET | Refills: 1 | Status: SHIPPED | OUTPATIENT
Start: 2018-05-14 | End: 2018-07-16 | Stop reason: SDUPTHER

## 2018-05-14 RX ORDER — EXENATIDE 2 MG/.65ML
INJECTION, SUSPENSION, EXTENDED RELEASE SUBCUTANEOUS
Status: ON HOLD | COMMUNITY
Start: 2018-04-25 | End: 2020-01-12

## 2018-05-14 RX ORDER — INSULIN LISPRO 100 [IU]/ML
INJECTION, SUSPENSION SUBCUTANEOUS
Status: ON HOLD | COMMUNITY
Start: 2018-04-25 | End: 2020-01-12

## 2018-05-14 RX ORDER — BLOOD SUGAR DIAGNOSTIC
STRIP MISCELLANEOUS
COMMUNITY
Start: 2018-04-25 | End: 2020-01-12

## 2018-05-14 NOTE — PROGRESS NOTES
"Subjective   Tammi Levine is a 48 y.o. female who is here today for medication management follow up.    Chief Complaint: 'I'm doing ok\"..    HPI: History of Present Illness Patient presents for follow up regarding depression anxiety.   She reports some wosening of depression. She continues to have issues with grief   She is having withdrawal and isolation.    She has some difficulty with poor sleep.   She rates her depression 8/10 with 10 being worst.  She reports that she is still involved with Religious, denies any thoughts to harm self or others.  She denies any difficulty with psychotic symptoms.   She has multiple symptoms of bipolar inlcuding periods of hypomania and depression.   Patient has had distinct periods of elevated mood, increased activity and energy lasting up to 2 weeks-but denies any flaquito within last few month.  She has had periods of  talkative, decreased need for sleep, and increased self esteem.  She does deny any recent periods of flaquito she does however report periods of distinct irritability inability to sleep increased goal-directed activity.    The following portions of the patient's history were reviewed and updated as appropriate: allergies, current medications, past family history, past medical history, past social history, past surgical history and problem list.    Review of Systems  Patient denies any recent medical illnesses.  No acute cardiopulmonary symptoms evident.  No acute gastrointestinal complaints.  Patient's appetite and intake are adequate.  Patient's current weight compared with last weight.    Objective   Physical Exam  Blood pressure 136/76, pulse 80, height 154.9 cm (61\"), weight 80.7 kg (178 lb).    Allergies   Allergen Reactions   • Zoloft [Sertraline Hcl] Nausea And Vomiting       Current Medications:   Current Outpatient Prescriptions   Medication Sig Dispense Refill   • aspirin 81 MG EC tablet Take 81 mg by mouth daily.     • atorvastatin (LIPITOR) 80 MG tablet " Take 1 tablet by mouth Every Night. 30 tablet 3   • Canagliflozin (INVOKANA) 100 MG tablet Take 100 mg by mouth Daily.     • ezetimibe (ZETIA) 10 MG tablet Take 1 tablet by mouth Daily. 30 tablet 3   • Insulin Glargine (BASAGLAR KWIKPEN) 100 UNIT/ML injection pen Inject 20 Units under the skin 2 (Two) Times a Day.     • levothyroxine (SYNTHROID, LEVOTHROID) 50 MCG tablet Take 50 mcg by mouth daily.     • lisinopril (PRINIVIL,ZESTRIL) 40 MG tablet Take 1 tablet by mouth Daily. 30 tablet 6   • lithium carbonate 300 MG capsule Take 1 capsule by mouth 2 (Two) Times a Day With Meals. 60 capsule 1     No current facility-administered medications for this visit.        Mental Status Exam:   Hygiene:   fair  Cooperation:  Cooperative  Eye Contact:  Good  Psychomotor Behavior:  Appropriate  Affect:  Full range  Hopelessness: Denies  Speech:  Normal  Thought Process:  Goal directed and Linear  Thought Content:  Mood congurent  Suicidal:  None  Homicidal:  None  Hallucinations:  None  Delusion:  None  Memory:  Intact  Orientation:  Person, Place, Time and Situation  Reliability:  fair  Insight:  Fair  Judgement:  Fair  Impulse Control:  Fair  Physical/Medical Issues:  Yes DM, hypothryoid,heart -CAD.     Assessment/Plan   Diagnoses and all orders for this visit:    Bipolar II disorder  -     TSH  -     T4, Free  -     Basic Metabolic Panel  -     Lithium Level    Other orders  -     lithium carbonate 300 MG capsule; Take 1 capsule by mouth 2 (Two) Times a Day With Meals.  -     citalopram (CELEXA) 10 MG tablet; Take 1 tablet by mouth Daily.      Patient has tolerated Celexa well in the past will add low-dose to assist with recurrent depression patient was cautioned to discontinue should any worsening hypomania or flaquito.  We will discontinue antidepressants as soon as possible.  Will reassess labs labs are reviewed and appeared to be grossly within normal limits.  We will continue medications and therapy.  She was provided  encouragement and support.      Patient had an extended session with psychotherapy to encourage positive coping skills patient was instructed to get out of the house and interact with friends or [employer] members at least twice a week.  She was encouraged to begin a singles group at her [employer] to increase her socialization.  We discussed risks, benefits, and side effects of the above medication and the patient was agreeable with the plan.    Return in about 2 months (around 7/14/2018) for continue therapy.  The patient was instructed to call clinic as needed or go to ER if in crisis.  Patient was instructed to immediately call 911 or come to the nearest emergency room for thoughts to harm self or others.

## 2018-05-14 NOTE — PROGRESS NOTES
"Date of Service: May 11, 2018  Time In: 12:35 PM  Time Out: 1:15 PM      PROGRESS NOTE  Data:  Tammi Levine is a 48 y.o. female who met with the undersigned for a regularly scheduled individual outpatient therapy session [facility] [place] for follow-up of bipolar 2 disorder and ongoing grief reaction.     HPI: Patient reports her mom recently left on a trip to [place] and states she was looking forward to being home alone.  However, she states she is finding she is feeling very lonely.  She also reports she recently wants to move the where a gentleman  from cancer and states it brought back significant thoughts and feelings of grief concerning the death of her father.  The patient states \"it was really upsetting\".  Patient reports she continues to think about her father on a daily basis and feels a great deal of emotional pain connected to his death.  Patient also struggles with periods of depression including sad mood, anhedonia, anergia, feeling hopeless, and periodic social isolation.  Patient rates current symptoms of depression at 5 on a scale of 1-10 with 10 being most severe.  Patient reports she continues to adhere to medication regimen as prescribed.  The patient adamantly and convincingly denies suicidal ideation vehemently denies any substance use.      Clinical Maneuvering/Intervention:  Assisted patient in processing above session content; acknowledged and normalized patient’s thoughts, feelings, and concerns.  Allow the patient to discuss/process ongoing grief concerning her father and validated her feelings.  Also assisted the patient in identifying factual counter arguments for her feelings of being lonely and encouraged her to make specific plans to enjoy this week having the home to herself.  So utilize cognitive behavioral therapy to assist patient in developing appropriate coping mechanisms decrease severity and frequency of symptoms of depression including positive " self talk, daily affirmations, and challenged the patient to continue to seek enjoyable activities she can engage in a regular basis to reduce idle time.  Provided unconditional positive regard in a safe, supportive environment.    Allowed patient to freely discuss issues without interruption or judgment. Provided safe, confidential environment to facilitate the development of positive therapeutic relationship and encourage open, honest communication. Assisted patient in identifying risk factors which would indicate the need for higher level of care including thoughts to harm self or others and/or self-harming behavior and encouraged patient to contact this office, call 911, or present to the nearest emergency room should any of these events occur. Discussed crisis intervention services and means to access.  Patient adamantly and convincingly denies current suicidal or homicidal ideation or perceptual disturbance.    Assessment    Patient appears to be struggling with increased symptoms of depression which appears to have been exacerbated by her mother going on a trip to [place].  The patient appears to be dependent on her mother for emotional stability.  Patient also continues to struggle with grief concerning the death of her father which was recently triggered by a movie.  Patient can be reasonably expected to continue to benefit treatment and would likely be at increased risk for decompensation.    Diagnoses and all orders for this visit:    Bipolar II disorder    Grief reaction               Mental Status Exam  Hygiene:  good  Dress:  casual  Attitude:  Cooperative  Motor Activity:  Appropriate  Speech:  Normal  Mood:  depressed  Affect:  depressed  Thought Processes:  Linear  Thought Content:  normal  Suicidal Thoughts:  denies  Homicidal Thoughts:  denies  Crisis Safety Plan: yes, to come to the emergency room.  Hallucinations:  denies    Patient's Support Network Includes:  mother and extended  family    Progress toward goal: Not at goal    Functional Status: Moderate impairment     Prognosis: Fair with Ongoing Treatment     Plan         Will continue in individual outpatient therapy session [facility] [place] every 4 weeks and will continue and pharmacotherapy as scheduled with MARISELA Madsen.  Patient will adhere to medication regimen as prescribed and report any side effects. Patient will contact this office, call 911 or present to the nearest emergency room should suicidal or homicidal ideations occur. Provide Cognitive Behavioral Therapy and Integrative Therapy to improve functioning, maintain stability, and avoid decompensation and the need for higher level of care.          Return in about 4 weeks (around 6/8/2018) for Next scheduled follow up.      This document signed by Vidal Ortiz LCSW, [facility] May 14, 2018 4:50 PM

## 2018-06-15 ENCOUNTER — OFFICE VISIT (OUTPATIENT)
Dept: PSYCHIATRY | Facility: CLINIC | Age: 48
End: 2018-06-15

## 2018-06-15 DIAGNOSIS — F31.81 BIPOLAR II DISORDER (HCC): Primary | ICD-10-CM

## 2018-06-15 PROCEDURE — 90834 PSYTX W PT 45 MINUTES: CPT | Performed by: SOCIAL WORKER

## 2018-06-15 NOTE — TREATMENT PLAN
Multi-Disciplinary Problems (from Behavioral Health Treatment Plan)    Active Problems     Problem: Depression  Start Date: 06/15/18    Problem Details:  The patient self-scales this problem as a 4 with 10 being the worst.       Goal Priority Start Date Expected End Date End Date    Patient will demonstrate the ability to initiate new constructive life skills outside of sessions on a consistent basis. High 06/15/18 06/15/19 --    Goal Details:  Progress toward goal:  The patient self-scales their progress related to this goal as a 4 with 10 being the worst.       Goal Intervention Frequency Start Date End Date    Assist patient in setting attainable activities of daily living goals. Q4 Weeks 06/15/18 06/15/19    Intervention Details:  Patient will focus on healthy skills of daily living including eating a healthy diet, getting regular physical activity, and getting adequate sleep.      Goal Intervention Frequency Start Date End Date    Provide education about depression Weekly 06/15/18 06/15/19    Intervention Details:  Duration of treatment until remission of symptoms.       Goal Intervention Frequency Start Date End Date    Assist patient in developing healthy coping strategies. Q4 Weeks 06/15/18 06/15/19    Intervention Details:  Duration of treatment until remission of symptoms.             Problem: Ineffective Coping  Start Date: 06/15/18    Problem Details:  The patient self-scales this problem as a 4 with 10 being the worst.       Goal Priority Start Date Expected End Date End Date    Patient will demonstrate the ability to initiate new constructive life skills consistently. High 06/15/18 06/15/19 --    Goal Details:  Progress toward goal:  The patient self-scales their progress related to this goal as a 4 with 10 being the worst.         Goal Intervention Frequency Start Date End Date    Assist patient in identifying healthy coping behaviors. Q4 Weeks 06/15/18 06/15/19    Intervention Details:  Duration of  treatment until remission of symptoms.                          I have discussed and reviewed this treatment plan with the patient.  It has been printed for signatures.  This document signed by Vidal Ortiz LCSW, [facility] B[facility], 2018 12:25 PM

## 2018-06-19 NOTE — PROGRESS NOTES
Date of Service: Bev 15, 2019  Time In: 12:05 PM  Time Out: 12:45 PM      PROGRESS NOTE  Data:  Tammi Levine is a 48 y.o. female who met with the undersigned for a regularly scheduled individual outpatient therapy session [facility] for follow-up of bipolar II disorder.     HPI: Patient reports she continues to be frustrated and feel resentment toward a disability claim process as she feels she has been unfairly denied.  Patient also discusses becoming very upset and agitated at 2 raccoons which continue to eat the food she puts out for her cats.  Patient continues to have a tendency to become irritable and lash out at others.  Patient also struggles with periods of depression including sad mood, anhedonia, anergia, feeling hopeless, and periodic social isolation.  Patient rates current symptoms of depression at 5 on a scale of 1-10 with 10 being most severe.  Patient reports she continues to adhere to medication regimen as prescribed.  The patient adamantly and convincingly denies suicidal ideation vehemently denies any substance use.      Clinical Maneuvering/Intervention:  Assisted patient in processing above session content; acknowledged and normalized patient’s thoughts, feelings, and concerns.  Utilized motivational interviewing techniques including  to assist the patient in asking herself if the recent incident with the recommended serious enough for her to become this upset.  Also utilized solution focused therapy to assist the patient in developing a strategy to address this issue including not putting out of food for her cats which will likely result in the raccoons not returning.  Also allowed the patient to discuss/vent her feelings and frustrations concerning disability claim process and validated her feelings.  Discussed the concept of things we can't change things we cannot change and encouraged the patient to remind herself she does not have control over  this process.  Provided unconditional positive regard in a safe, supportive environment.    Allowed patient to freely discuss issues without interruption or judgment. Provided safe, confidential environment to facilitate the development of positive therapeutic relationship and encourage open, honest communication. Assisted patient in identifying risk factors which would indicate the need for higher level of care including thoughts to harm self or others and/or self-harming behavior and encouraged patient to contact this office, call 911, or present to the nearest emergency room should any of these events occur. Discussed crisis intervention services and means to access.  Patient adamantly and convincingly denies current suicidal or homicidal ideation or perceptual disturbance.    Assessment    Patient continues to struggle with mood instability with depression being primary.  The patient's symptomology continue to cause impairment in important areas of functioning.  Patient can be reasonably expected to continue to benefit treatment and would likely be at increased risk for decompensation.    Diagnoses and all orders for this visit:    Bipolar II disorder               Mental Status Exam  Hygiene:  good  Dress:  casual  Attitude:  Cooperative  Motor Activity:  Appropriate  Speech:  Normal  Mood:  depressed  Affect:  depressed  Thought Processes:  Linear  Thought Content:  normal  Suicidal Thoughts:  denies  Homicidal Thoughts:  denies  Crisis Safety Plan: yes, to come to the emergency room.  Hallucinations:  denies    Patient's Support Network Includes:  mother and extended family    Progress toward goal: Not at goal    Functional Status: Moderate impairment     Prognosis: Fair with Ongoing Treatment     Plan         Patient will continue in individual outpatient therapy session [facility] every 4 weeks and will continue and pharmacotherapy as scheduled with MARISELA Madsen.  Patient  will adhere to medication regimen as prescribed and report any side effects. Patient will contact this office, call 911 or present to the nearest emergency room should suicidal or homicidal ideations occur. Provide Cognitive Behavioral Therapy and Integrative Therapy to improve functioning, maintain stability, and avoid decompensation and the need for higher level of care.          Return in about 4 weeks (around 7/13/2018) for Next scheduled follow up.      This document signed by Vidal Ortiz LCSW, SATISH June 19, 2018 12:06 PM

## 2018-07-06 ENCOUNTER — OFFICE VISIT (OUTPATIENT)
Dept: PSYCHIATRY | Facility: CLINIC | Age: 48
End: 2018-07-06

## 2018-07-06 DIAGNOSIS — F31.81 BIPOLAR II DISORDER (HCC): Primary | ICD-10-CM

## 2018-07-06 PROCEDURE — 90832 PSYTX W PT 30 MINUTES: CPT | Performed by: SOCIAL WORKER

## 2018-07-11 NOTE — PROGRESS NOTES
Date of Service: July 6, 2018  Time In: 12:30 PM  Time Out: 1:00 PM      PROGRESS NOTE  Data:  Tammi Levine is a 48 y.o. female who met with the undersigned for a regularly scheduled individual outpatient therapy session [facility] for follow-up of bipolar II disorder.     HPI: Patient continues to discuss her disappointment with the difficulty she has experienced seeking disability benefits although she believes she is legitimately qualified.  Patient continues to have a tendency to become irritable and lash out at others.  Patient also struggles with periods of depression including sad mood, anhedonia, anergia, feeling hopeless, and periodic social isolation.  Patient rates current symptoms of depression at 5 on a scale of 1-10 with 10 being most severe.  Patient reports she continues to adhere to medication regimen as prescribed.  The patient adamantly and convincingly denies suicidal ideation vehemently denies any substance use.      Clinical Maneuvering/Intervention:  Assisted patient in processing above session content; acknowledged and normalized patient’s thoughts, feelings, and concerns.  Allowed patient to continue to discuss/vent her feelings and frustrations concerning her difficulties is seeking disability and validated her feelings.  Also utilized motivational interviewing techniques including complex questions to assist the patient in recognizing she has very little control over this process.  Utilized cognitive behavioral therapy to assist the patient in developing appropriate coping mechanisms to decrease severity and frequency of symptoms including positive self talk, relaxation techniques, walking away from anger provoking situations, and making an effort to develop a more deliberate about process to avoid becoming irritable and lashing out at others.  Also strongly urged the patient to continue to seek individual activities she can engage in regular basis to reduce  idle time.  Provided unconditional positive regard in a safe, supportive environment.    Allowed patient to freely discuss issues without interruption or judgment. Provided safe, confidential environment to facilitate the development of positive therapeutic relationship and encourage open, honest communication. Assisted patient in identifying risk factors which would indicate the need for higher level of care including thoughts to harm self or others and/or self-harming behavior and encouraged patient to contact this office, call 911, or present to the nearest emergency room should any of these events occur. Discussed crisis intervention services and means to access.  Patient adamantly and convincingly denies current suicidal or homicidal ideation or perceptual disturbance.    Assessment    Patient continues to struggle with mood instability with depression being primary.  The patient's symptomology continue to cause impairment in important areas of functioning.  Patient can be reasonably expected to continue to benefit treatment and would likely be at increased risk for decompensation.    Diagnoses and all orders for this visit:    Bipolar II disorder (CMS/[facility])               Mental Status Exam  Hygiene:  good  Dress:  casual  Attitude:  Cooperative  Motor Activity:  Appropriate  Speech:  Normal  Mood:  depressed  Affect:  depressed  Thought Processes:  Linear  Thought Content:  normal  Suicidal Thoughts:  denies  Homicidal Thoughts:  denies  Crisis Safety Plan: yes, to come to the emergency room.  Hallucinations:  denies    Patient's Support Network Includes:  mother and extended family    Progress toward goal: Not at goal    Functional Status: Moderate impairment     Prognosis: Fair with Ongoing Treatment     Plan         Patient will continue in individual outpatient therapy session [facility] every 4 weeks and will continue and pharmacotherapy as scheduled with MARISELA Madsen.   Patient will adhere to medication regimen as prescribed and report any side effects. Patient will contact this office, call 911 or present to the nearest emergency room should suicidal or homicidal ideations occur. Provide Cognitive Behavioral Therapy and Integrative Therapy to improve functioning, maintain stability, and avoid decompensation and the need for higher level of care.          Return in about 3 weeks (around 7/27/2018) for Next scheduled follow up.      This document signed by Vidal Ortiz LCSW, SATISH July 11, 2018 6:02 PM

## 2018-07-16 ENCOUNTER — OFFICE VISIT (OUTPATIENT)
Dept: PSYCHIATRY | Facility: CLINIC | Age: 48
End: 2018-07-16

## 2018-07-16 VITALS
HEART RATE: 62 BPM | HEIGHT: 61 IN | SYSTOLIC BLOOD PRESSURE: 121 MMHG | DIASTOLIC BLOOD PRESSURE: 67 MMHG | BODY MASS INDEX: 33.42 KG/M2 | WEIGHT: 177 LBS

## 2018-07-16 DIAGNOSIS — Z79.4 TYPE 2 DIABETES MELLITUS WITH DIABETIC PERIPHERAL ANGIOPATHY WITHOUT GANGRENE, WITH LONG-TERM CURRENT USE OF INSULIN (HCC): ICD-10-CM

## 2018-07-16 DIAGNOSIS — F31.81 BIPOLAR II DISORDER (HCC): ICD-10-CM

## 2018-07-16 DIAGNOSIS — E11.51 TYPE 2 DIABETES MELLITUS WITH DIABETIC PERIPHERAL ANGIOPATHY WITHOUT GANGRENE, WITH LONG-TERM CURRENT USE OF INSULIN (HCC): ICD-10-CM

## 2018-07-16 DIAGNOSIS — F43.21 GRIEF REACTION: Primary | ICD-10-CM

## 2018-07-16 PROCEDURE — 99214 OFFICE O/P EST MOD 30 MIN: CPT | Performed by: NURSE PRACTITIONER

## 2018-07-16 RX ORDER — LITHIUM CARBONATE 300 MG/1
300 CAPSULE ORAL 2 TIMES DAILY WITH MEALS
Qty: 60 CAPSULE | Refills: 1 | Status: SHIPPED | OUTPATIENT
Start: 2018-07-16 | End: 2018-09-20 | Stop reason: SDUPTHER

## 2018-07-16 RX ORDER — CITALOPRAM 10 MG/1
10 TABLET ORAL DAILY
Qty: 30 TABLET | Refills: 1 | Status: SHIPPED | OUTPATIENT
Start: 2018-07-16 | End: 2018-09-20 | Stop reason: SDUPTHER

## 2018-07-16 NOTE — PROGRESS NOTES
Subjective   Tammi Levine is a 48 y.o. female who is here today for medication management follow up.    Chief Complaint: 'I'm having a really hard time my cat .     Will need labs at next visit    HPI: History of Present Illness Patient presents for follow up regarding depression anxiety.   Patient has had a recent significant upset she reports that her cat apparently  over the Fourth of July weekend she reports this has brought back all of her loss from her father.  she does deny any signs and symptoms of flaquito or hypomania since initiating low-dose Celexa.She reports some wosening of depression.  She reports difficulty with oversensitivities and feelings of worthlessness, and loss of control.  She continues to have issues with grief   She is having withdrawal and isolation.    She has some difficulty with poor sleep.   She rates her depression 9/10 with 10 being worst.  She reports that she has lost her cat.  She remains involved with Jain, denies any thoughts to harm self or others.  She denies any difficulty with psychotic symptoms.   She has had multiple symptoms of bipolar inlcuding periods of hypomania and depression.   Patient has had distinct periods of elevated mood, increased activity and energy lasting up to 2 weeks-but denies any flaquito within last few month.  She does deny any recent periods of flaquito.  Patient does report that her depression has caused loss of energy and motivation.  The following portions of the patient's history were reviewed and updated as appropriate: allergies, current medications, past family history, past medical history, past social history, past surgical history and problem list.    Review of Systems  Patient denies any recent medical illnesses.  No acute cardiopulmonary symptoms evident.  No acute gastrointestinal complaints.  Patient's appetite and intake are adequate.  Patient's current weight compared with last weight.    Objective   Physical Exam  Blood  "pressure 121/67, pulse 62, height 154.9 cm (61\"), weight 80.3 kg (177 lb).    Allergies   Allergen Reactions   • Zoloft [Sertraline Hcl] Nausea And Vomiting       Current Medications:   Current Outpatient Prescriptions   Medication Sig Dispense Refill   • aspirin 81 MG EC tablet Take 81 mg by mouth daily.     • atorvastatin (LIPITOR) 80 MG tablet Take 1 tablet by mouth Every Night. 30 tablet 3   • BYDUREON 2 MG pen-injector injection      • Canagliflozin (INVOKANA) 100 MG tablet Take 100 mg by mouth Daily.     • citalopram (CELEXA) 10 MG tablet Take 1 tablet by mouth Daily. 30 tablet 1   • ezetimibe (ZETIA) 10 MG tablet Take 1 tablet by mouth Daily. 30 tablet 3   • HUMALOG MIX 75/25 KWIKPEN (75-25) 100 UNIT/ML suspension pen-injector      • Insulin Glargine (BASAGLAR KWIKPEN) 100 UNIT/ML injection pen Inject 20 Units under the skin 2 (Two) Times a Day.     • levothyroxine (SYNTHROID, LEVOTHROID) 50 MCG tablet Take 50 mcg by mouth daily.     • lisinopril (PRINIVIL,ZESTRIL) 40 MG tablet Take 1 tablet by mouth Daily. 30 tablet 6   • lithium carbonate 300 MG capsule Take 1 capsule by mouth 2 (Two) Times a Day With Meals. 60 capsule 1   • ONETOUCH VERIO test strip        No current facility-administered medications for this visit.        Mental Status Exam:   Hygiene:   fair  Cooperation:  Cooperative  Eye Contact:  Good  Psychomotor Behavior:  Appropriate  Affect:  Full range  Hopelessness: Denies  Speech:  Normal  Thought Process:  Goal directed and Linear  Thought Content:  Mood congurent  Suicidal:  None  Homicidal:  None  Hallucinations:  None  Delusion:  None  Memory:  Intact  Orientation:  Person, Place, Time and Situation  Reliability:  fair  Insight:  Fair  Judgement:  Fair  Impulse Control:  Fair  Physical/Medical Issues:  Yes DM, hypothryoid,heart -CAD.     Assessment/Plan   Diagnoses and all orders for this visit:    Grief reaction    Bipolar II disorder (CMS/HCC)  -     lithium carbonate 300 MG capsule; Take " 1 capsule by mouth 2 (Two) Times a Day With Meals.  -     citalopram (CELEXA) 10 MG tablet; Take 1 tablet by mouth Daily.    Type 2 diabetes mellitus with diabetic peripheral angiopathy without gangrene, with long-term current use of insulin (CMS/[facility])    Discussed with patient need to continue steady low-carb low sugar diet.  We will continue to monitor mood and labs as indicated.  Patient has tolerated Celexa well in the past will add low-dose to assist with recurrent depression patient was cautioned to discontinue should any worsening hypomania or flaquito.  We will discontinue antidepressants as soon as possible.  Will reassess labs labs are reviewed and appeared to be grossly within normal limits.  We will continue medications and therapy.  She was provided encouragement and support. Patient was  Recently seen by primary care physician which has been addressing her diabetes and primary other medical problems.  Patient was encouraged to continue exercise and diet.  We'll continue to monitor.  Patient remains depressed and grieving will consider the addition of atypical antipsychotic for improved mood management if symptoms persist or worsen.  Patient is at greater risk with weight gain and diabetes and other medical problems so we'll continue to use psychotherapy and support during grief process.    Patient had an extended session with psychotherapy to encourage positive coping skills patient He was provided positive reinforcement of decision to continue in Hinduism she has recently become an active volunteer.  Patient continues to providepsychological support for her mother.  We discussed risks, benefits, and side effects of the above medication and the patient was agreeable with the plan.    No Follow-up on file.  The patient was instructed to call clinic as needed or go to ER if in crisis.  Patient was instructed to immediately call 911 or come to the nearest emergency room for thoughts to harm self or others.

## 2018-07-17 ENCOUNTER — APPOINTMENT (OUTPATIENT)
Dept: MAMMOGRAPHY | Facility: HOSPITAL | Age: 48
End: 2018-07-17

## 2018-07-24 ENCOUNTER — TELEPHONE (OUTPATIENT)
Dept: PSYCHIATRY | Facility: CLINIC | Age: 48
End: 2018-07-24

## 2018-07-24 NOTE — TELEPHONE ENCOUNTER
Patient called requesting the paperwork that was faxed over last week and put into your mailbox. Please advise

## 2018-07-27 ENCOUNTER — OFFICE VISIT (OUTPATIENT)
Dept: PSYCHIATRY | Facility: CLINIC | Age: 48
End: 2018-07-27

## 2018-07-27 DIAGNOSIS — F31.81 BIPOLAR II DISORDER (HCC): Primary | ICD-10-CM

## 2018-07-27 PROCEDURE — 90832 PSYTX W PT 30 MINUTES: CPT | Performed by: SOCIAL WORKER

## 2018-07-27 NOTE — PROGRESS NOTES
"Date of Service: July 27, 2018  Time In: 12:00 PM  Time Out: 12:30 PM      PROGRESS NOTE  Data:  Tammi Levine is a 48 y.o. female who met with the undersigned for a regularly scheduled individual outpatient therapy session [facility] for follow-up of bipolar II disorder.     HPI: Patient Continues to struggle with periods of depression including sad mood, anhedonia, anergia, feeling hopeless, and periodic social isolation.  Patient rates current symptoms of depression at 5 on a scale of 1-10 with 10 being most severe. Patient also reports she continues to be frustrated with the disability claim process.  Patient also reports she has been struggling with driving as of late and states she has a tendency toward \"what if\" questions.  Patient states she has periods where she feels she becomes paranoid and expects the worst. She reports [place].  Patient also reports she has periods where she feels people look at her and talk about her in social situations such as shopping. Patient reports she continues to adhere to medication regimen as prescribed.  The patient adamantly and convincingly denies suicidal ideation vehemently denies any substance use.      Clinical Maneuvering/Intervention:  Assisted patient in processing above session content; acknowledged and normalized patient’s thoughts, feelings, and concerns.   Utilized motivational interviewing techniques including complex reflections to assist the patient in recognizing her father becoming irrational thoughts and demonstrated challenging with factual counter arguments. Also assisted the patient in developing appropriate coping mechanisms decrease severity and frequency of symptoms including positive self talk, relaxation techniques,and strongly urged her to continue to seek enjoyable activities she can engage in regular basis. Provided unconditional positive regard in a " safe, supportive environment.    Allowed patient to freely discuss issues without interruption or judgment. Provided safe, confidential environment to facilitate the development of positive therapeutic relationship and encourage open, honest communication. Assisted patient in identifying risk factors which would indicate the need for higher level of care including thoughts to harm self or others and/or self-harming behavior and encouraged patient to contact this office, call 911, or present to the nearest emergency room should any of these events occur. Discussed crisis intervention services and means to access.  Patient adamantly and convincingly denies current suicidal or homicidal ideation or perceptual disturbance.    Assessment    Patient continues to struggle with mood instability with depression being primary.  The patient's symptomology continue to cause impairment in important areas of functioning.  Patient can be reasonably expected to continue to benefit treatment and would likely be at increased risk for decompensation.    Diagnoses and all orders for this visit:    Bipolar II disorder (CMS/[facility])               Mental Status Exam  Hygiene:  good  Dress:  casual  Attitude:  Cooperative  Motor Activity:  Appropriate  Speech:  Normal  Mood:  depressed  Affect:  depressed  Thought Processes:  Linear  Thought Content:  normal  Suicidal Thoughts:  denies  Homicidal Thoughts:  denies  Crisis Safety Plan: yes, to come to the emergency room.  Hallucinations:  denies    Patient's Support Network Includes:  mother and extended family    Progress toward goal: Not at goal    Functional Status: Moderate impairment     Prognosis: Fair with Ongoing Treatment     Plan         Patient will continue in individual outpatient therapy session [facility] every 4 weeks and will continue and pharmacotherapy as scheduled with MARISELA Madsen.  Patient will adhere to medication regimen as prescribed and  report any side effects. Patient will contact this office, call 911 or present to the nearest emergency room should suicidal or homicidal ideations occur. Provide Cognitive Behavioral Therapy and Integrative Therapy to improve functioning, maintain stability, and avoid decompensation and the need for higher level of care.          Return in about 3 weeks (around 8/17/2018) for Next scheduled follow up.      This document signed by Vidal Ortiz LCSW, SATISH July 27, 2018 1:24 PM

## 2018-08-17 ENCOUNTER — APPOINTMENT (OUTPATIENT)
Dept: MAMMOGRAPHY | Facility: HOSPITAL | Age: 48
End: 2018-08-17

## 2018-08-31 ENCOUNTER — OFFICE VISIT (OUTPATIENT)
Dept: PSYCHIATRY | Facility: CLINIC | Age: 48
End: 2018-08-31

## 2018-08-31 DIAGNOSIS — F31.81 BIPOLAR II DISORDER (HCC): Primary | ICD-10-CM

## 2018-08-31 PROCEDURE — 90834 PSYTX W PT 45 MINUTES: CPT | Performed by: SOCIAL WORKER

## 2018-09-04 NOTE — PROGRESS NOTES
"Date of Service: August 31, 2018  Time In: 12:00 PM  Time Out: 12:45 PM      PROGRESS NOTE  Data:  Tammi Levine is a 48 y.o. female who met with the undersigned for a regularly scheduled individual outpatient therapy session [facility] for follow-up of bipolar II disorder.     HPI: Patient Continues to struggle with periods of depression including sad mood, anhedonia, anergia, feeling hopeless, and periodic social isolation.  Patient rates current symptoms of depression at 4/5 on a scale of 1-10 with 10 being most severe. Patient also reports she continues to be frustrated with the disability claim process and states she becomes angry at people she perceives to have been granted disability who do not deserve it.   Patient states she has periods where she feels she becomes paranoid and expects the worst.  Patient reports she continues to struggle with driving on the [place] and states she becomes obsessed with \"what if\" questions.  Patient also reports she continues to have periods of irritability and has a tendency to lash out at others.  Patient reports she continues to adhere to medication regimen as prescribed.  The patient adamantly and convincingly denies suicidal ideation vehemently denies any substance use.      Clinical Maneuvering/Intervention:  Assisted patient in processing above session content; acknowledged and normalized patient’s thoughts, feelings, and concerns.   Allowed the patient to discuss/vent her feelings and frustrations with the disability claim process about her feelings.  However, also discussed the concept of things we can control and things we cannot control and encourage the patient to find activities to feel her idle time to decrease her tendency to ruminate on this issue.  Also assisted the patient in developing a strategy to decrease her tendency toward automatic negative cognitions including positive self talk, thought blocking techniques, and " reminding herself she cannot control everything.  Provided unconditional positive regard in a safe, supportive environment.    Allowed patient to freely discuss issues without interruption or judgment. Provided safe, confidential environment to facilitate the development of positive therapeutic relationship and encourage open, honest communication. Assisted patient in identifying risk factors which would indicate the need for higher level of care including thoughts to harm self or others and/or self-harming behavior and encouraged patient to contact this office, call 911, or present to the nearest emergency room should any of these events occur. Discussed crisis intervention services and means to access.  Patient adamantly and convincingly denies current suicidal or homicidal ideation or perceptual disturbance.    Assessment    Patient continues to struggle with mood instability with depression being primary.  The patient's symptomology continue to cause impairment in important areas of functioning.  Patient can be reasonably expected to continue to benefit treatment and would likely be at increased risk for decompensation.    Diagnoses and all orders for this visit:    Bipolar II disorder (CMS/formerly [facility])               Mental Status Exam  Hygiene:  good  Dress:  casual  Attitude:  Cooperative  Motor Activity:  Appropriate  Speech:  Normal  Mood:  depressed  Affect:  depressed  Thought Processes:  Linear  Thought Content:  normal  Suicidal Thoughts:  denies  Homicidal Thoughts:  denies  Crisis Safety Plan: yes, to come to the emergency room.  Hallucinations:  denies    Patient's Support Network Includes:  mother and extended family    Progress toward goal: Not at goal    Functional Status: Moderate impairment     Prognosis: Fair with Ongoing Treatment     Plan         Patient will continue in individual outpatient therapy session [facility] every 4 weeks and will continue and pharmacotherapy as  scheduled with MARISELA Madsen.  Patient will adhere to medication regimen as prescribed and report any side effects. Patient will contact this office, call 911 or present to the nearest emergency room should suicidal or homicidal ideations occur. Provide Cognitive Behavioral Therapy and Integrative Therapy to improve functioning, maintain stability, and avoid decompensation and the need for higher level of care.          Return in about 3 weeks (around 9/21/2018) for Next scheduled follow up.      This document signed by Vidal Ortiz LCSW, [place] September 4, 2018 12:07 PM

## 2018-09-20 ENCOUNTER — OFFICE VISIT (OUTPATIENT)
Dept: PSYCHIATRY | Facility: CLINIC | Age: 48
End: 2018-09-20

## 2018-09-20 VITALS
SYSTOLIC BLOOD PRESSURE: 105 MMHG | DIASTOLIC BLOOD PRESSURE: 64 MMHG | WEIGHT: 171 LBS | BODY MASS INDEX: 32.28 KG/M2 | HEIGHT: 61 IN | HEART RATE: 82 BPM

## 2018-09-20 DIAGNOSIS — F31.81 BIPOLAR II DISORDER (HCC): Primary | ICD-10-CM

## 2018-09-20 PROCEDURE — 99213 OFFICE O/P EST LOW 20 MIN: CPT | Performed by: NURSE PRACTITIONER

## 2018-09-20 RX ORDER — FERROUS SULFATE 325(65) MG
TABLET ORAL
Status: ON HOLD | COMMUNITY
Start: 2018-08-31 | End: 2020-01-12

## 2018-09-20 RX ORDER — LITHIUM CARBONATE 300 MG/1
300 CAPSULE ORAL 2 TIMES DAILY WITH MEALS
Qty: 60 CAPSULE | Refills: 1 | Status: SHIPPED | OUTPATIENT
Start: 2018-09-20 | End: 2018-11-14 | Stop reason: SDUPTHER

## 2018-09-20 RX ORDER — CITALOPRAM 10 MG/1
10 TABLET ORAL DAILY
Qty: 30 TABLET | Refills: 1 | Status: SHIPPED | OUTPATIENT
Start: 2018-09-20 | End: 2018-11-14 | Stop reason: SDUPTHER

## 2018-09-20 RX ORDER — PANTOPRAZOLE SODIUM 40 MG/1
TABLET, DELAYED RELEASE ORAL DAILY
COMMUNITY
Start: 2018-09-05 | End: 2019-07-05 | Stop reason: ALTCHOICE

## 2018-09-20 NOTE — PROGRESS NOTES
"    Subjective   Tammi Levine is a 48 y.o. female is here today for medication management follow-up. This is patient's first visit with the undersigned. Patient's care has been transferred from MARISELA Vasquez.    Chief Complaint: f/u Bipolar II    History of Present Illness   Patient reports she drove herself to her appointment. She reports this is the first time she has driven herself to her appointment in over four months. She reports it was a good drive. She reports she is feeling good. She reports she is doing good and she lost another cat \"Santos\". She reports he wanted to go outside and she let him out and he didn't come back. She reports she is coping well with the loss. She reports she is making herself get out of bed during the day. She reports she is sleeping good. She reports appetite good. She reports she attends [employer] and has friends at [employer]. She reports mood has been stable. She denies any hypomania or flaquito. She denies SI/HI/AH/VH. She reports she has a hearing for disability on 11/20/18. Patient reports compliance with medication and tolerating without side effects.     The following portions of the patient's history were reviewed and updated as appropriate: allergies, current medications, past family history, past medical history, past social history, past surgical history and problem list.    Review of Systems   Constitutional: Negative.    HENT: Negative.    Eyes: Negative.    Respiratory: Negative.    Cardiovascular: Negative.    Gastrointestinal: Negative.    Endocrine: Negative.    Genitourinary: Negative.    Musculoskeletal: Positive for gait problem.        Ambulates with assist of cane.    Skin: Negative.    Allergic/Immunologic: Negative.    Hematological: Negative.    Psychiatric/Behavioral: Negative for agitation, dysphoric mood, hallucinations, self-injury, sleep disturbance and suicidal ideas. The patient is nervous/anxious.        Objective   Physical Exam   Constitutional: She " "appears well-developed and well-nourished. No distress.   Skin: She is not diaphoretic.   Vitals reviewed.    Blood pressure 105/64, pulse 82, height 154.9 cm (60.98\"), weight 77.6 kg (171 lb).    Medication List:   Current Outpatient Prescriptions   Medication Sig Dispense Refill   • aspirin 81 MG EC tablet Take 81 mg by mouth daily.     • atorvastatin (LIPITOR) 80 MG tablet Take 1 tablet by mouth Every Night. 30 tablet 3   • BYDUREON 2 MG pen-injector injection      • Canagliflozin (INVOKANA) 100 MG tablet Take 100 mg by mouth Daily.     • citalopram (CELEXA) 10 MG tablet Take 1 tablet by mouth Daily. 30 tablet 1   • ezetimibe (ZETIA) 10 MG tablet Take 1 tablet by mouth Daily. 30 tablet 3   • ferrous sulfate 325 (65 FE) MG tablet      • HUMALOG MIX 75/25 KWIKPEN (75-25) 100 UNIT/ML suspension pen-injector      • Insulin Glargine (BASAGLAR KWIKPEN) 100 UNIT/ML injection pen Inject 20 Units under the skin 2 (Two) Times a Day.     • levothyroxine (SYNTHROID, LEVOTHROID) 50 MCG tablet Take 50 mcg by mouth daily.     • lisinopril (PRINIVIL,ZESTRIL) 40 MG tablet Take 1 tablet by mouth Daily. 30 tablet 6   • lithium carbonate 300 MG capsule Take 1 capsule by mouth 2 (Two) Times a Day With Meals. 60 capsule 1   • ONETOUCH VERIO test strip      • pantoprazole (PROTONIX) 40 MG EC tablet Daily.       No current facility-administered medications for this visit.        Labs: No results found for this or any previous visit (from the past 2016 hour(s)).      Mental Status Exam:   Hygiene:   good  Cooperation:  Cooperative  Eye Contact:  Good  Psychomotor Behavior:  Appropriate  Affect:  Full range  Hopelessness: Denies  Speech:  Normal  Thought Process:  Goal directed and Linear  Thought Content:  Normal  Suicidal:  None  Homicidal:  None  Hallucinations:  None  Delusion:  None  Memory:  Intact  Orientation:  Person, Place, Time and Situation  Reliability:  good  Insight:  Good  Judgement:  Good  Impulse Control:  " Good  Physical/Medical Issues:  Yes HTN, Diabetes, Hypothyroidism     Assessment/Plan   Diagnoses and all orders for this visit:    Bipolar II disorder (CMS/[facility])  -     citalopram (CELEXA) 10 MG tablet; Take 1 tablet by mouth Daily.  -     lithium carbonate 300 MG capsule; Take 1 capsule by mouth 2 (Two) Times a Day With Meals.      Body mass index is 32.33 kg/m².  Patient was educated on healthier and more balanced diet choices and encouraged exercise within physical limitations.  Prognosis: Good dependent on medication/follow up and treatment plan compliance.  Functionality: pt showing improvements in important areas of daily functioning.      I have reviewed Keesha ANTONIO most recent progress note for visit on 7/16/18.  I have reviewed Vidal Ortiz LCSW most recent psychotherapy note for visit on 8/31/18.  I have reviewed most recent lab results in EPIC done on 5/14/18. Thyroid function WNL, patient followed up with PCP on cholesterol and blood glucose and is monitored by PCP.     Impression: Patient mood stable. Sleep good. Symptoms of Bipolar II disorder currently managed with Lithium and Celexa.    Plan:  1) Continue Lithium 300 mg po BID for Bipolar II  2) Continue Celexa 10 mg po daily for mood and anxiety  3) Continue psychotherapy with Vidal Ortiz LCSW  4) Provided patient with written lab order for Lithium level and renal panel due to patient receiving Lithium to manage Bipolar symptoms.   5) RTC in 8 weeks       Discussed medication options.  Reviewed the risks, benefits, and side effects of the medications; patient acknowledged and verbally consented.  Patient is agreeable to call the [facility].  Patient is aware to call 911 or go to the nearest ER should begin having SI/HI.

## 2018-09-28 ENCOUNTER — OFFICE VISIT (OUTPATIENT)
Dept: PSYCHIATRY | Facility: CLINIC | Age: 48
End: 2018-09-28

## 2018-09-28 DIAGNOSIS — F31.81 BIPOLAR II DISORDER (HCC): Primary | ICD-10-CM

## 2018-09-28 PROCEDURE — 90832 PSYTX W PT 30 MINUTES: CPT | Performed by: SOCIAL WORKER

## 2018-10-01 NOTE — PROGRESS NOTES
"Date of Service: September 28, 2018  Time In: 10:29 AM  Time Out: 11:00 AM      PROGRESS NOTE  Data:  Tammi Levine is a 48 y.o. female who met with the undersigned for a regularly scheduled individual outpatient therapy session [facility] for follow-up of bipolar II disorder.     HPI: Patient Continues to struggle with periods of depression including sad mood, anhedonia, anergia, automatic negative cognition, feeling hopeless, and periodic social isolation.  Patient rates current symptoms of depression at 4 on a scale of 1-10 with 10 being most severe.  Patient also reports she continues to struggle with \"what if\" questions.  Patient also reports she continues to have periods of irritability and has a tendency to lash out at others.  Patient reports she has been struggling as of late with significant social isolation and states she feels this has been precipitated by her mother's unwillingness to get out of the house.  Patient reports she continues to adhere to medication regimen as prescribed.  The patient adamantly and convincingly denies suicidal ideation vehemently denies any substance use.      Clinical Maneuvering/Intervention:  Assisted patient in processing above session content; acknowledged and normalized patient’s thoughts, feelings, and concerns.  Utilized motivational interviewing techniques including complex reflections to assist the patient in recognizing the connection between an increase in her symptoms and significant social isolation.  Also challenged the patient to remind herself her mother is an adult and will make her own decisions.  However, I also reminded her this is does not mean she cannot actively seek enjoyable activities she can engage in on a regular basis.  Also utilized cognitive behavioral therapy to assist the patient in decreasing her likelihood of becoming irritable and lashing out at others including positive self talk, walking away from anger " provoking situations, and reminding herself she does not have a right to expect everyone else to behave in the way she feels appropriate.  Provided unconditional positive regard in a safe, supportive environment.    Allowed patient to freely discuss issues without interruption or judgment. Provided safe, confidential environment to facilitate the development of positive therapeutic relationship and encourage open, honest communication. Assisted patient in identifying risk factors which would indicate the need for higher level of care including thoughts to harm self or others and/or self-harming behavior and encouraged patient to contact this office, call 911, or present to the nearest emergency room should any of these events occur. Discussed crisis intervention services and means to access.  Patient adamantly and convincingly denies current suicidal or homicidal ideation or perceptual disturbance.    Assessment    Patient continues to struggle with mood instability with depression being primary.  The patient's symptomology continue to cause impairment in important areas of functioning.  Patient can be reasonably expected to continue to benefit treatment and would likely be at increased risk for decompensation.    Diagnoses and all orders for this visit:    Bipolar II disorder (CMS/[facility])               Mental Status Exam  Hygiene:  good  Dress:  casual  Attitude:  Cooperative  Motor Activity:  Appropriate  Speech:  Normal  Mood:  depressed  Affect:  depressed  Thought Processes:  Linear  Thought Content:  normal  Suicidal Thoughts:  denies  Homicidal Thoughts:  denies  Crisis Safety Plan: yes, to come to the emergency room.  Hallucinations:  denies    Patient's Support Network Includes:  mother and extended family    Progress toward goal: Not at goal    Functional Status: Moderate impairment     Prognosis: Fair with Ongoing Treatment     Plan         Patient will continue in individual outpatient therapy session  [facility] every 4 weeks and will continue and pharmacotherapy as scheduled with MARISELA Madsen.  Patient will adhere to medication regimen as prescribed and report any side effects. Patient will contact this office, call 911 or present to the nearest emergency room should suicidal or homicidal ideations occur. Provide Cognitive Behavioral Therapy and Integrative Therapy to improve functioning, maintain stability, and avoid decompensation and the need for higher level of care.          Return in about 4 weeks (around 10/26/2018) for Next scheduled follow up.      This document signed by Vidal Ortiz LCSW, [facility] October 1, 2018 12:41 PM

## 2018-10-15 ENCOUNTER — OFFICE VISIT (OUTPATIENT)
Dept: CARDIOLOGY | Facility: CLINIC | Age: 48
End: 2018-10-15

## 2018-10-15 VITALS
DIASTOLIC BLOOD PRESSURE: 66 MMHG | WEIGHT: 176 LBS | HEART RATE: 56 BPM | SYSTOLIC BLOOD PRESSURE: 109 MMHG | BODY MASS INDEX: 35.48 KG/M2 | RESPIRATION RATE: 16 BRPM | HEIGHT: 59 IN

## 2018-10-15 DIAGNOSIS — E78.5 DYSLIPIDEMIA: ICD-10-CM

## 2018-10-15 DIAGNOSIS — I10 ESSENTIAL HYPERTENSION: ICD-10-CM

## 2018-10-15 DIAGNOSIS — Z79.4 TYPE 2 DIABETES MELLITUS WITH DIABETIC PERIPHERAL ANGIOPATHY WITHOUT GANGRENE, WITH LONG-TERM CURRENT USE OF INSULIN (HCC): ICD-10-CM

## 2018-10-15 DIAGNOSIS — E11.51 TYPE 2 DIABETES MELLITUS WITH DIABETIC PERIPHERAL ANGIOPATHY WITHOUT GANGRENE, WITH LONG-TERM CURRENT USE OF INSULIN (HCC): ICD-10-CM

## 2018-10-15 DIAGNOSIS — I25.10 ARTERIOSCLEROTIC CARDIOVASCULAR DISEASE (ASCVD): Primary | ICD-10-CM

## 2018-10-15 PROCEDURE — 93000 ELECTROCARDIOGRAM COMPLETE: CPT | Performed by: PHYSICIAN ASSISTANT

## 2018-10-15 PROCEDURE — 99213 OFFICE O/P EST LOW 20 MIN: CPT | Performed by: PHYSICIAN ASSISTANT

## 2018-10-15 NOTE — PROGRESS NOTES
Jess Hanley, APRN  Tammi Levine  1970  10/15/2018    Patient Active Problem List   Diagnosis   • Arteriosclerotic cardiovascular disease (ASCVD), s/p MI in 2012, clinically stable.    • Type 2 diabetes mellitus with diabetic peripheral angiopathy without gangrene, with long-term current use of insulin (CMS/HCC)   • Essential hypertension   • Dyslipidemia   • Sleep apnea   • Myocardial infarction (CMS/HCC)   • Migraine headache   • Abnormal CT scan   • ASCVD (arteriosclerotic cardiovascular disease)   • Hypertension, well controlled   • Disease of thyroid gland       Dear Jess Hanley, APRN:    Subjective       History of Present Illness:    Chief Complaint   Patient presents with   • Follow-up     6 mos   • Med Management     list provided       Tammi Levine is a pleasant 48 y.o. female with a past medical history significant for ASCVD status post acute MI in thousand 12, essential hypertension, dyslipidemia, and type 2 diabetes mellitus.  Patient comes in for routine cardiology follow-up.    Patient states that she is doing well.  She denies any chest pain, shortness of breath, weakness, fatigue, dizziness, or syncope.  Patient states she has not been checking her blood pressure at home however it has always been well-controlled in the office.      Allergies   Allergen Reactions   • Zoloft [Sertraline Hcl] Nausea And Vomiting   • Ciprofloxacin Rash     Also caused chest pain   :      Current Outpatient Prescriptions:   •  aspirin 81 MG EC tablet, Take 81 mg by mouth 1 (One) Time Per Week., Disp: , Rfl:   •  atorvastatin (LIPITOR) 80 MG tablet, Take 1 tablet by mouth Every Night., Disp: 30 tablet, Rfl: 3  •  Canagliflozin (INVOKANA) 100 MG tablet, Take 100 mg by mouth Daily., Disp: , Rfl:   •  HUMALOG MIX 75/25 KWIKPEN (75-25) 100 UNIT/ML suspension pen-injector, , Disp: , Rfl:   •  levothyroxine (SYNTHROID, LEVOTHROID) 50 MCG tablet, Take 50 mcg by mouth daily., Disp: , Rfl:   •  lisinopril  "(PRINIVIL,ZESTRIL) 40 MG tablet, Take 1 tablet by mouth Daily., Disp: 30 tablet, Rfl: 6  •  lithium carbonate 300 MG capsule, Take 1 capsule by mouth 2 (Two) Times a Day With Meals., Disp: 60 capsule, Rfl: 1  •  ONETOUCH VERIO test strip, , Disp: , Rfl:   •  pantoprazole (PROTONIX) 40 MG EC tablet, Daily., Disp: , Rfl:   •  BYDUREON 2 MG pen-injector injection, , Disp: , Rfl:   •  citalopram (CELEXA) 10 MG tablet, Take 1 tablet by mouth Daily., Disp: 30 tablet, Rfl: 1  •  ezetimibe (ZETIA) 10 MG tablet, Take 1 tablet by mouth Daily., Disp: 30 tablet, Rfl: 3  •  ferrous sulfate 325 (65 FE) MG tablet, , Disp: , Rfl:   •  Insulin Glargine (BASAGLAR KWIKPEN) 100 UNIT/ML injection pen, Inject 20 Units under the skin 2 (Two) Times a Day., Disp: , Rfl:       The following portions of the patient's history were reviewed and updated as appropriate: allergies, current medications, past family history, past medical history, past social history, past surgical history and problem list.    Social History   Substance Use Topics   • Smoking status: Never Smoker   • Smokeless tobacco: Never Used   • Alcohol use No       Review of Systems   Constitution: Negative for weakness and malaise/fatigue.   Cardiovascular: Negative for chest pain, leg swelling and palpitations.   Respiratory: Negative for cough and shortness of breath.    Hematologic/Lymphatic: Negative for bleeding problem. Does not bruise/bleed easily.       Objective   Vitals:    10/15/18 1154   BP: 109/66   Pulse: 56   Resp: 16   Weight: 79.8 kg (176 lb)   Height: 149.9 cm (59\")     Body mass index is 35.55 kg/m².        Physical Exam   Constitutional: She is oriented to person, place, and time. She appears well-developed and well-nourished. No distress.   Cardiovascular: Normal rate, regular rhythm and normal heart sounds.    No murmur heard.  Pulmonary/Chest: Effort normal and breath sounds normal. No respiratory distress. She has no wheezes.   Abdominal: Soft. Bowel " sounds are normal.   Musculoskeletal: She exhibits no edema.   Neurological: She is alert and oriented to person, place, and time.   Skin: She is not diaphoretic.       Lab Results   Component Value Date     (L) 05/14/2018    K 5.3 05/14/2018     05/14/2018    CO2 22.9 (L) 05/14/2018    BUN 26 (H) 05/14/2018    CREATININE 1.03 05/14/2018    GLUCOSE 205 (H) 05/14/2018    CALCIUM 9.1 05/14/2018    AST 17 03/20/2018    ALT 14 03/20/2018    ALKPHOS 79 03/20/2018    LABIL2 1.4 (L) 02/04/2016     No results found for: CKTOTAL  Lab Results   Component Value Date    WBC 9.25 03/06/2017    HGB 10.5 (L) 03/06/2017    HCT 31.1 (L) 03/06/2017     03/06/2017     No results found for: INR  No results found for: MG  Lab Results   Component Value Date    TSH 0.886 05/14/2018    TRIG 163 (H) 03/20/2018    HDL 44 (L) 03/20/2018     (H) 03/20/2018      Lab Results   Component Value Date    BNP 36 09/16/2015       During this visit the following were done:  Labs Reviewed [x]    Labs Ordered []    Radiology Reports Reviewed [x]    Radiology Ordered []    PCP Records Reviewed []    Referring Provider Records Reviewed []    ER Records Reviewed []    Hospital Records Reviewed []    History Obtained From Family []    Radiology Images Reviewed []    Other Reviewed []    Records Requested []        ECG 12 Lead  Date/Time: 10/15/2018 11:55 AM  Performed by: CONNIE ALMEIDA  Authorized by: CONNIE ALMEIDA   Rhythm: sinus rhythm  Clinical impression: normal ECG and non-specific ECG              Assessment/Plan    Diagnosis Plan   1. Arteriosclerotic cardiovascular disease (ASCVD), s/p MI in 2012, clinically stable.   Lipid Panel    Comprehensive Metabolic Panel   2. Type 2 diabetes mellitus with diabetic peripheral angiopathy without gangrene, with long-term current use of insulin (CMS/[facility])     3. Essential hypertension     4. Dyslipidemia                  Recommendations:  1. Patient's fasting lipid panel last March  and LDL of 124 and since patient has a history of ASCVD her goal is less than 70.  She is currently on 80 mg of atorvastatin I will recheck cholesterol panel, if not at goal I will consider adding PCSK9 inhibitor I discussed this with the patient and she is agreeable.  2. Continue aspirin, Zetia, and lisinopril.  3. Follow-up in 3 months.    Return in about 3 months (around 1/15/2019).    As always, I appreciate very much the opportunity to participate in the cardiovascular care of your patients.      With Best Regards,    PEPE Rashid disclaimer:  Much of this encounter note is an electronic transcription/translation of spoken language to printed text. The electronic translation of spoken language may permit erroneous, or at times, nonsensical words or phrases to be inadvertently transcribed; Although I have reviewed the note for such errors, some may still exist.

## 2018-11-08 ENCOUNTER — OFFICE VISIT (OUTPATIENT)
Dept: SURGERY | Facility: CLINIC | Age: 48
End: 2018-11-08

## 2018-11-08 VITALS
DIASTOLIC BLOOD PRESSURE: 60 MMHG | WEIGHT: 176 LBS | BODY MASS INDEX: 35.48 KG/M2 | SYSTOLIC BLOOD PRESSURE: 120 MMHG | HEIGHT: 59 IN

## 2018-11-08 DIAGNOSIS — R93.89 ABNORMAL CT SCAN: ICD-10-CM

## 2018-11-08 DIAGNOSIS — R10.84 GENERALIZED ABDOMINAL PAIN: Primary | ICD-10-CM

## 2018-11-08 DIAGNOSIS — M54.41 ACUTE RIGHT-SIDED LOW BACK PAIN WITH RIGHT-SIDED SCIATICA: ICD-10-CM

## 2018-11-08 PROBLEM — K59.01 SLOW TRANSIT CONSTIPATION: Status: ACTIVE | Noted: 2018-11-08

## 2018-11-08 PROCEDURE — 99204 OFFICE O/P NEW MOD 45 MIN: CPT | Performed by: SURGERY

## 2018-11-08 NOTE — PROGRESS NOTES
Subjective   Tammi Levine is a 48 y.o. female.     History of Present Illness She had lower back pain more to the right that came around to her mid abdomen and then all over. It is now more in her back and right leg. She gets nauseated from the pain . 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 She has not seen anyone for her.     The following portions of the patient's history were reviewed and updated as appropriate: current medications, past family history, past medical history, past social history, past surgical history and problem list.    Review of Systems   Constitutional: Negative for activity change, appetite change, chills, fever and unexpected weight change.   HENT: Negative for congestion, facial swelling and sore throat.    Eyes: Negative for photophobia and visual disturbance.   Respiratory: Negative for chest tightness, shortness of breath and wheezing.    Cardiovascular: Negative for chest pain, palpitations and leg swelling.   Gastrointestinal: Positive for abdominal pain, constipation and nausea. Negative for abdominal distention, anal bleeding, blood in stool, diarrhea, rectal pain and vomiting.   Endocrine: Negative for cold intolerance, heat intolerance, polydipsia and polyuria.   Genitourinary: Negative for difficulty urinating, dysuria, flank pain and urgency.   Musculoskeletal: Positive for back pain and gait problem. Negative for myalgias.   Skin: Negative for rash and wound.   Allergic/Immunologic: Negative for immunocompromised state.   Neurological: Negative for dizziness, seizures, syncope, light-headedness, numbness and headaches.   Hematological: Negative for adenopathy. Does not bruise/bleed easily.   Psychiatric/Behavioral: Negative for behavioral problems and confusion. The patient is not nervous/anxious.        Objective   Physical Exam   Constitutional: She is oriented to  person, place, and time. She appears well-developed and well-nourished. She does not appear ill. No distress.       HENT:   Head: Normocephalic. Head is without laceration. Hair is normal.   Right Ear: Hearing and ear canal normal.   Left Ear: Hearing and ear canal normal.   Nose: Nose normal. No sinus tenderness. No epistaxis. Right sinus exhibits no maxillary sinus tenderness and no frontal sinus tenderness. Left sinus exhibits no maxillary sinus tenderness and no frontal sinus tenderness.   Eyes: Pupils are equal, round, and reactive to light. Conjunctivae and lids are normal.   Neck: Normal range of motion. No JVD present. No tracheal tenderness present. No tracheal deviation present. No thyroid mass and no thyromegaly present.   Cardiovascular: Normal rate and regular rhythm.  Exam reveals no gallop.    No murmur heard.  Pulmonary/Chest: Effort normal and breath sounds normal. No stridor. She has no wheezes. She exhibits no tenderness.   Abdominal: Soft. Bowel sounds are normal. She exhibits no distension, no ascites and no mass. There is tenderness. There is no rebound and no guarding. No hernia.   Musculoskeletal: She exhibits no edema or deformity.   Lymphadenopathy:     She has no cervical adenopathy.     She has no axillary adenopathy.        Right: No inguinal and no supraclavicular adenopathy present.        Left: No inguinal and no supraclavicular adenopathy present.   Neurological: She is alert and oriented to person, place, and time. She exhibits normal muscle tone.   Skin: Skin is warm, dry and intact. No rash noted. No erythema. No pallor.   Psychiatric: She has a normal mood and affect. Her behavior is normal. Thought content normal.   Vitals reviewed.      Assessment/Plan   Tammi was seen today for abdominal pain.    Diagnoses and all orders for this visit:    Generalized abdominal pain    Abnormal CT scan    Acute right-sided low back pain with right-sided sciatica    I do not think her  umbilical hernia or lipoma on the adrenal is significant at this time. She needs her back pain evaluated and treated before adressing the hernia. She should stay on the miralax as well.     Patient's Body mass index is 35.55 kg/m². BMI is above normal parameters. Recommendations include: nutrition counseling.

## 2018-11-09 ENCOUNTER — OFFICE VISIT (OUTPATIENT)
Dept: PSYCHIATRY | Facility: CLINIC | Age: 48
End: 2018-11-09

## 2018-11-09 DIAGNOSIS — F31.81 BIPOLAR II DISORDER (HCC): Primary | ICD-10-CM

## 2018-11-09 PROCEDURE — 90832 PSYTX W PT 30 MINUTES: CPT | Performed by: SOCIAL WORKER

## 2018-11-09 NOTE — PROGRESS NOTES
"Date of Service: November 9, 2018  Time In: 11:00 AM  Time Out: 11:34 AM    PROGRESS NOTE  Data:  Tammi Levine is a 48 y.o. female who met 1:1 with the undersigned for a regularly scheduled individual outpatient therapy session [facility] for follow-up of bipolar II disorder.     HPI:  Patient reports she, her mother, and her cousin recently took a trip to [facility] and states it was really enjoyable.  Patient does report she is dreading the holidays and states she struggles to be around large groups of people. Patient Continues to struggle with periods of depression including sad mood, anhedonia, anergia, automatic negative cognition, feeling hopeless, and periodic social isolation.  Patient rates current symptoms of depression at  3/4 on a scale of 1-10 with 10 being most severe.  Patient also reports she continues to struggle with \"what if\" questions which she states continues to cause her difficulty with driving as she constantly plays out worst case scenarios .  Patient also reports she continues to have periods of irritability and has a tendency to lash out at others.  Patient reports she continues to adhere to medication regimen as prescribed.  The patient adamantly and convincingly denies suicidal ideation vehemently denies any substance use.      Clinical Maneuvering/Intervention:  Assisted patient in processing above session content; acknowledged and normalized patient’s thoughts, feelings, and concerns.  This session was primarily focused on addressing the patient's tendency toward \"what if\" questions and automatic negative thoughts.  Utilize motivational techniques to assist the patient in identifying and acknowledging positive aspects of her life and encouraged her to remind herself she has the ability to choose whether she focuses on the positive or negative.  Continue to discuss healthy skills of daily living and strongly urged patient to continue to seek enjoyable " activities to engage in a regular basis.  Provided unconditional positive regard in a safe, supportive environment.    Allowed patient to freely discuss issues without interruption or judgment. Provided safe, confidential environment to facilitate the development of positive therapeutic relationship and encourage open, honest communication. Assisted patient in identifying risk factors which would indicate the need for higher level of care including thoughts to harm self or others and/or self-harming behavior and encouraged patient to contact this office, call 911, or present to the nearest emergency room should any of these events occur. Discussed crisis intervention services and means to access.  Patient adamantly and convincingly denies current suicidal or homicidal ideation or perceptual disturbance.    Assessment    Patient continues to struggle with mood instability with depression being primary.  The patient's symptomology continue to cause impairment in important areas of functioning.  Patient can be reasonably expected to continue to benefit treatment and would likely be at increased risk for decompensation.    Diagnoses and all orders for this visit:    Bipolar II disorder (CMS/[facility])               Mental Status Exam  Hygiene:  good  Dress:  casual  Attitude:  Cooperative  Motor Activity:  Appropriate  Speech:  Normal  Mood:  depressed  Affect:  depressed  Thought Processes:  Linear  Thought Content:  normal  Suicidal Thoughts:  denies  Homicidal Thoughts:  denies  Crisis Safety Plan: yes, to come to the emergency room.  Hallucinations:  denies    Patient's Support Network Includes:  mother and extended family    Progress toward goal: Not at goal    Functional Status: Moderate impairment     Prognosis: Fair with Ongoing Treatment     Plan         Patient will continue in individual outpatient therapy session [facility] every 4 weeks and will continue and pharmacotherapy as scheduled  with MARISELA Madsen.  Patient will adhere to medication regimen as prescribed and report any side effects. Patient will contact this office, call 911 or present to the nearest emergency room should suicidal or homicidal ideations occur. Provide Cognitive Behavioral Therapy and Integrative Therapy to improve functioning, maintain stability, and avoid decompensation and the need for higher level of care.          Return in about 4 weeks (around 12/7/2018) for Next scheduled follow up.      This document signed by Vidal Ortiz LCSW, SAT[place] November 9, 2018 2:31 PM

## 2018-11-14 ENCOUNTER — OFFICE VISIT (OUTPATIENT)
Dept: PSYCHIATRY | Facility: CLINIC | Age: 48
End: 2018-11-14

## 2018-11-14 VITALS
HEIGHT: 59 IN | SYSTOLIC BLOOD PRESSURE: 108 MMHG | BODY MASS INDEX: 34.47 KG/M2 | HEART RATE: 72 BPM | WEIGHT: 171 LBS | DIASTOLIC BLOOD PRESSURE: 65 MMHG

## 2018-11-14 DIAGNOSIS — F31.81 BIPOLAR II DISORDER (HCC): ICD-10-CM

## 2018-11-14 PROCEDURE — 99213 OFFICE O/P EST LOW 20 MIN: CPT | Performed by: NURSE PRACTITIONER

## 2018-11-14 RX ORDER — CITALOPRAM 10 MG/1
10 TABLET ORAL DAILY
Qty: 30 TABLET | Refills: 1 | Status: SHIPPED | OUTPATIENT
Start: 2018-11-14 | End: 2019-01-24 | Stop reason: SDUPTHER

## 2018-11-14 RX ORDER — LITHIUM CARBONATE 300 MG/1
300 CAPSULE ORAL 2 TIMES DAILY WITH MEALS
Qty: 60 CAPSULE | Refills: 1 | Status: SHIPPED | OUTPATIENT
Start: 2018-11-14 | End: 2019-01-24 | Stop reason: SDUPTHER

## 2018-11-14 NOTE — PROGRESS NOTES
"    Subjective   Tammi Levine is a 48 y.o. female is here today for medication management follow-up.   Chief Complaint: f/u Bipolar II    History of Present Illness   Patient presents for follow-up today. Patient reports she has been to [place] since last visit with her mom, aunt and cousin. She reports it was a nice trip. She reports mood has been stable. She denies any hypomania or flaquito. She reports she drove herself to the visit today. She reports it was a good trip and gave her some time to herself. She reports compliance with medication and tolerating without side effects. Patient had labs done at PCP a couple of weeks ago. Will request lab results. Sleep is good. Appetite is good. She denies suicidal and homicidal ideations. She denies auditory and visual hallucinations.     The following portions of the patient's history were reviewed and updated as appropriate: allergies, current medications, past family history, past medical history, past social history, past surgical history and problem list.    Review of Systems   Constitutional: Negative.    HENT: Negative.    Eyes: Negative.    Respiratory: Negative.    Cardiovascular: Negative.    Gastrointestinal: Negative.    Endocrine: Negative.    Genitourinary: Negative.    Musculoskeletal: Positive for gait problem.        Ambulates with assist of cane.    Skin: Negative.    Allergic/Immunologic: Negative.    Hematological: Negative.    Psychiatric/Behavioral: Negative for agitation, dysphoric mood, hallucinations, self-injury, sleep disturbance and suicidal ideas. The patient is nervous/anxious.        Objective   Physical Exam   Constitutional: She appears well-developed and well-nourished. No distress.   Skin: She is not diaphoretic.   Vitals reviewed.    Blood pressure 108/65, pulse 72, height 149.9 cm (59.02\"), weight 77.6 kg (171 lb).    Medication List:   Current Outpatient Medications   Medication Sig Dispense Refill   • aspirin 81 MG EC tablet " Take 81 mg by mouth 1 (One) Time Per Week.     • atorvastatin (LIPITOR) 80 MG tablet Take 1 tablet by mouth Every Night. 30 tablet 3   • BYDUREON 2 MG pen-injector injection      • Canagliflozin (INVOKANA) 100 MG tablet Take 100 mg by mouth Daily.     • citalopram (CELEXA) 10 MG tablet Take 1 tablet by mouth Daily. 30 tablet 1   • ezetimibe (ZETIA) 10 MG tablet Take 1 tablet by mouth Daily. 30 tablet 3   • ferrous sulfate 325 (65 FE) MG tablet      • HUMALOG MIX 75/25 KWIKPEN (75-25) 100 UNIT/ML suspension pen-injector      • Insulin Glargine (BASAGLAR KWIKPEN) 100 UNIT/ML injection pen Inject 20 Units under the skin 2 (Two) Times a Day.     • levothyroxine (SYNTHROID, LEVOTHROID) 50 MCG tablet Take 50 mcg by mouth daily.     • lisinopril (PRINIVIL,ZESTRIL) 40 MG tablet Take 1 tablet by mouth Daily. 30 tablet 6   • lithium carbonate 300 MG capsule Take 1 capsule by mouth 2 (Two) Times a Day With Meals. 60 capsule 1   • ONETOUCH VERIO test strip      • pantoprazole (PROTONIX) 40 MG EC tablet Daily.       No current facility-administered medications for this visit.        Labs: No results found for this or any previous visit (from the past 2016 hour(s)).      Mental Status Exam:   Hygiene:   good  Cooperation:  Cooperative  Eye Contact:  Good  Psychomotor Behavior:  Appropriate  Affect:  Full range  Hopelessness: Denies  Speech:  Normal  Thought Process:  Goal directed and Linear  Thought Content:  Normal  Suicidal:  None  Homicidal:  None  Hallucinations:  None  Delusion:  None  Memory:  Intact  Orientation:  Person, Place, Time and Situation  Reliability:  good  Insight:  Good  Judgement:  Good  Impulse Control:  Good  Physical/Medical Issues:  Yes HTN, Diabetes, Hypothyroidism     Assessment/Plan   Diagnoses and all orders for this visit:    Bipolar II disorder (CMS/[facility])  -     citalopram (CELEXA) 10 MG tablet; Take 1 tablet by mouth Daily.  -     lithium carbonate 300 MG capsule; Take 1 capsule by mouth 2 (Two)  Times a Day With Meals.      Body mass index is 34.52 kg/m².  Patient was educated on healthier and more balanced diet choices and encouraged exercise within physical limitations.  Prognosis: Good dependent on medication/follow up and treatment plan compliance.  Functionality: pt showing improvements in important areas of daily functioning.      Impression: Patient mood stable. Sleep good. Symptoms of Bipolar II disorder currently managed with Lithium and Celexa.    Plan:  1) Continue Lithium 300 mg po BID for Bipolar II  2) Continue Celexa 10 mg po daily for mood and anxiety  3) Continue psychotherapy with Vidal Ortiz LCSW  4) Request lab results from [facility]   5) RTC in 2 months       Discussed medication options.  Reviewed the risks, benefits, and side effects of the medications; patient acknowledged and verbally consented.  Patient is agreeable to call the [facility].  Patient is aware to call 911 or go to the nearest ER should begin having SI/HI.

## 2018-12-21 ENCOUNTER — OFFICE VISIT (OUTPATIENT)
Dept: PSYCHIATRY | Facility: CLINIC | Age: 48
End: 2018-12-21

## 2018-12-21 DIAGNOSIS — F31.81 BIPOLAR II DISORDER (HCC): Primary | ICD-10-CM

## 2018-12-21 PROCEDURE — 90832 PSYTX W PT 30 MINUTES: CPT | Performed by: SOCIAL WORKER
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
## 2019-01-11 ENCOUNTER — OFFICE VISIT (OUTPATIENT)
Dept: PSYCHIATRY | Facility: CLINIC | Age: 49
End: 2019-01-11

## 2019-01-11 DIAGNOSIS — F31.81 BIPOLAR II DISORDER (HCC): Primary | ICD-10-CM

## 2019-01-11 PROCEDURE — 90832 PSYTX W PT 30 MINUTES: CPT | Performed by: SOCIAL WORKER

## 2019-01-15 ENCOUNTER — OFFICE VISIT (OUTPATIENT)
Dept: CARDIOLOGY | Facility: CLINIC | Age: 49
End: 2019-01-15

## 2019-01-15 VITALS
HEART RATE: 66 BPM | WEIGHT: 171.2 LBS | DIASTOLIC BLOOD PRESSURE: 65 MMHG | BODY MASS INDEX: 34.51 KG/M2 | SYSTOLIC BLOOD PRESSURE: 108 MMHG | HEIGHT: 59 IN | RESPIRATION RATE: 16 BRPM

## 2019-01-15 DIAGNOSIS — E78.5 DYSLIPIDEMIA: ICD-10-CM

## 2019-01-15 DIAGNOSIS — I25.10 ARTERIOSCLEROTIC CARDIOVASCULAR DISEASE (ASCVD): Primary | ICD-10-CM

## 2019-01-15 DIAGNOSIS — Z79.4 TYPE 2 DIABETES MELLITUS WITH DIABETIC PERIPHERAL ANGIOPATHY WITHOUT GANGRENE, WITH LONG-TERM CURRENT USE OF INSULIN (HCC): ICD-10-CM

## 2019-01-15 DIAGNOSIS — E11.51 TYPE 2 DIABETES MELLITUS WITH DIABETIC PERIPHERAL ANGIOPATHY WITHOUT GANGRENE, WITH LONG-TERM CURRENT USE OF INSULIN (HCC): ICD-10-CM

## 2019-01-15 DIAGNOSIS — I10 ESSENTIAL HYPERTENSION: ICD-10-CM

## 2019-01-15 PROCEDURE — 99213 OFFICE O/P EST LOW 20 MIN: CPT | Performed by: PHYSICIAN ASSISTANT

## 2019-01-15 NOTE — PROGRESS NOTES
Jess Hanley, MARISELA  Tammi Levine  1970  01/15/2019    Patient Active Problem List   Diagnosis   • Arteriosclerotic cardiovascular disease (ASCVD), s/p MI in 2012, clinically stable.    • Type 2 diabetes mellitus with diabetic peripheral angiopathy without gangrene, with long-term current use of insulin (CMS/HCC)   • Essential hypertension   • Dyslipidemia   • Sleep apnea   • Myocardial infarction (CMS/HCC)   • Migraine headache   • Abnormal CT scan   • ASCVD (arteriosclerotic cardiovascular disease)   • Hypertension, well controlled   • Disease of thyroid gland   • Generalized abdominal pain   • Right-sided low back pain with right-sided sciatica   • Slow transit constipation       Dear Jess Hanley, MARISELA:    Subjective     History of Present Illness:    Chief Complaint   Patient presents with   • ASCVD     S/P MI       Tammi Levine is a pleasant 48 y.o. female with a past medical history significant for ASCVD status post acute MI in thousand 12, essential hypertension, dyslipidemia, and type 2 diabetes mellitus.  Patient comes in for routine cardiology follow-up.    Patient states that she has been doing well.  She denies any chest pain, shortness of breath, dizziness, syncope, orthopnea, or pedal edema.  At last visit we did discuss starting a new injectable medication for her high cholesterol, however, I wanted to check cholesterol before doing this and thankfully her cholesterol is significantly better with an LDL of 60, thus injectable medication is not needed at this time.      Allergies   Allergen Reactions   • Zoloft [Sertraline Hcl] Nausea And Vomiting   • Ciprofloxacin Rash     Also caused chest pain   :      Current Outpatient Medications:   •  atorvastatin (LIPITOR) 80 MG tablet, Take 1 tablet by mouth Every Night., Disp: 30 tablet, Rfl: 3  •  Canagliflozin (INVOKANA) 100 MG tablet, Take 100 mg by mouth Daily., Disp: , Rfl:   •  levothyroxine (SYNTHROID, LEVOTHROID) 50 MCG tablet,  Take 50 mcg by mouth daily., Disp: , Rfl:   •  lisinopril (PRINIVIL,ZESTRIL) 40 MG tablet, Take 1 tablet by mouth Daily., Disp: 30 tablet, Rfl: 6  •  lithium carbonate 300 MG capsule, Take 1 capsule by mouth 2 (Two) Times a Day With Meals., Disp: 60 capsule, Rfl: 1  •  pantoprazole (PROTONIX) 40 MG EC tablet, Daily., Disp: , Rfl:   •  aspirin 81 MG EC tablet, Take 81 mg by mouth 1 (One) Time Per Week., Disp: , Rfl:   •  BYDUREON 2 MG pen-injector injection, , Disp: , Rfl:   •  citalopram (CELEXA) 10 MG tablet, Take 1 tablet by mouth Daily., Disp: 30 tablet, Rfl: 1  •  ezetimibe (ZETIA) 10 MG tablet, Take 1 tablet by mouth Daily., Disp: 30 tablet, Rfl: 3  •  ferrous sulfate 325 (65 FE) MG tablet, , Disp: , Rfl:   •  HUMALOG MIX 75/25 KWIKPEN (75-25) 100 UNIT/ML suspension pen-injector, , Disp: , Rfl:   •  Insulin Glargine (BASAGLAR KWIKPEN) 100 UNIT/ML injection pen, Inject 20 Units under the skin 2 (Two) Times a Day., Disp: , Rfl:   •  ONETOUCH VERIO test strip, , Disp: , Rfl:       The following portions of the patient's history were reviewed and updated as appropriate: allergies, current medications, past family history, past medical history, past social history, past surgical history and problem list.    Social History     Tobacco Use   • Smoking status: Never Smoker   • Smokeless tobacco: Never Used   Substance Use Topics   • Alcohol use: No   • Drug use: No       Review of Systems   Constitution: Negative for weakness and malaise/fatigue.   Cardiovascular: Negative for chest pain, dyspnea on exertion, irregular heartbeat, leg swelling, palpitations and syncope.   Respiratory: Negative for cough and shortness of breath.    Hematologic/Lymphatic: Negative for bleeding problem. Does not bruise/bleed easily.   Gastrointestinal: Negative for nausea and vomiting.   Neurological: Negative for dizziness.       Objective   Vitals:    01/15/19 1308   BP: 108/65   BP Location: Right arm   Pulse: 66   Resp: 16   Weight:  "77.7 kg (171 lb 3.2 oz)   Height: 149.9 cm (59.02\")     Body mass index is 34.56 kg/m².        Physical Exam   Constitutional: She is oriented to person, place, and time. She appears well-developed and well-nourished. No distress.   HENT:   Head: Normocephalic and atraumatic.   Cardiovascular: Normal rate, regular rhythm, normal heart sounds and intact distal pulses.   Pulmonary/Chest: Effort normal and breath sounds normal. No respiratory distress.   Musculoskeletal: She exhibits no edema.   Patient wearing splint on left ankle   Neurological: She is alert and oriented to person, place, and time.   Skin: She is not diaphoretic.       Lab Results   Component Value Date     (L) 05/14/2018    K 5.3 05/14/2018     05/14/2018    CO2 22.9 (L) 05/14/2018    BUN 26 (H) 05/14/2018    CREATININE 1.03 05/14/2018    GLUCOSE 205 (H) 05/14/2018    CALCIUM 9.1 05/14/2018    AST 17 03/20/2018    ALT 14 03/20/2018    ALKPHOS 79 03/20/2018    LABIL2 1.4 (L) 02/04/2016     No results found for: CKTOTAL  Lab Results   Component Value Date    WBC 9.25 03/06/2017    HGB 10.5 (L) 03/06/2017    HCT 31.1 (L) 03/06/2017     03/06/2017     No results found for: INR  No results found for: MG  Lab Results   Component Value Date    TSH 0.886 05/14/2018    TRIG 163 (H) 03/20/2018    HDL 44 (L) 03/20/2018     (H) 03/20/2018      Lab Results   Component Value Date    BNP 36 09/16/2015       During this visit the following were done:  Labs Reviewed [x]    Labs Ordered []    Radiology Reports Reviewed [x]    Radiology Ordered []    PCP Records Reviewed []    Referring Provider Records Reviewed []    ER Records Reviewed []    Hospital Records Reviewed []    History Obtained From Family []    Radiology Images Reviewed []    Other Reviewed []    Records Requested []       Procedures      Assessment/Plan    Diagnosis Plan   1. Arteriosclerotic cardiovascular disease (ASCVD), s/p MI in 2012, clinically stable.      2. Type 2 " diabetes mellitus with diabetic peripheral angiopathy without gangrene, with long-term current use of insulin (CMS/[facility])     3. Essential hypertension     4. Dyslipidemia            Recommendations:  1. Continue Lipitor, Zetia, lisinopril, aspirin.  2. Follow-up in 6-7 months.    Return in about 6 months (around 7/15/2019).    As always, I appreciate very much the opportunity to participate in the cardiovascular care of your patients.      With Best Regards,    PEPE Rashid disclaimer:  Much of this encounter note is an electronic transcription/translation of spoken language to printed text. The electronic translation of spoken language may permit erroneous, or at times, nonsensical words or phrases to be inadvertently transcribed; Although I have reviewed the note for such errors, some may still exist.

## 2019-01-16 NOTE — PROGRESS NOTES
"Date of Service: January 11, 2019  Time In: 10:05 AM  Time Out: 10:34 AM    PROGRESS NOTE  Data:  Tammi Levine is a 48 y.o. female who met 1:1 with the undersigned for a regularly scheduled individual outpatient therapy session [facility] for follow-up of bipolar II disorder.     HPI:  Patient reports she continues to struggle with various family issues and states her younger brother continues to be taken advantage of by his wife.  The patient states she and her mother believes the patient's youngest brother's wife is cheating on him.  Patient states she becomes very frustrated and angry and feels as though she and her mother are being taken advantage of.  Patient Continues to struggle with periods of depression including sad mood, anhedonia, anergia, automatic negative cognition, feeling hopeless, and periodic social isolation.  Patient rates current symptoms of depression at  4 on a scale of 1-10 with 10 being most severe. Patient also reports she continues to have periods of irritability and has a tendency to lash out at others.  Patient reports she continues to adhere to medication regimen as prescribed.  The patient adamantly and convincingly denies suicidal ideation vehemently denies any substance use.      Clinical Maneuvering/Intervention:  Assisted patient in processing above session content; acknowledged and normalized patient’s thoughts, feelings, and concerns.  Allow the patient to discuss/vent her feelings and frustrations with her younger brother validated her feelings.  However, also utilize lower extremity edema including complex reflections to assist the patient with admitting the fact her brother is an adult and she cannot control his decisions or behaviors.  Utilized cognitive behavioral therapy to assist the patient in restructuring her thought process concerning her brother which ultimately became we will watch the children when we can and not attempt to \"fix his " "problems\".  Provided unconditional positive regard in a safe, supportive environment.    Allowed patient to freely discuss issues without interruption or judgment. Provided safe, confidential environment to facilitate the development of positive therapeutic relationship and encourage open, honest communication. Assisted patient in identifying risk factors which would indicate the need for higher level of care including thoughts to harm self or others and/or self-harming behavior and encouraged patient to contact this office, call 911, or present to the nearest emergency room should any of these events occur. Discussed crisis intervention services and means to access.  Patient adamantly and convincingly denies current suicidal or homicidal ideation or perceptual disturbance.    Assessment    Patient continues to struggle with mood instability with depression being primary.  The patient's symptomology continue to cause impairment in important areas of functioning.  Patient can be reasonably expected to continue to benefit treatment and would likely be at increased risk for decompensation.    Diagnoses and all orders for this visit:    Bipolar II disorder (CMS/[facility])               Mental Status Exam  Hygiene:  good  Dress:  casual  Attitude:  Cooperative  Motor Activity:  Appropriate  Speech:  Normal  Mood:  depressed  Affect:  depressed  Thought Processes:  Linear  Thought Content:  normal  Suicidal Thoughts:  denies  Homicidal Thoughts:  denies  Crisis Safety Plan: yes, to come to the emergency room.  Hallucinations:  denies    Patient's Support Network Includes:  mother and extended family    Progress toward goal: Not at goal    Functional Status: Moderate impairment     Prognosis: Fair with Ongoing Treatment     Plan         Patient will continue in individual outpatient therapy session [facility] every 4 weeks and will continue and pharmacotherapy as scheduled with MARISELA Madsen.  " Patient will adhere to medication regimen as prescribed and report any side effects. Patient will contact this office, call 911 or present to the nearest emergency room should suicidal or homicidal ideations occur. Provide Cognitive Behavioral Therapy and Integrative Therapy to improve functioning, maintain stability, and avoid decompensation and the need for higher level of care.          Return in about 4 weeks (around 2/8/2019) for Next scheduled follow up.      This document signed by Vidal Ortiz LCSW, [facility] January 16, 2019 9:26 AM

## 2019-01-24 ENCOUNTER — LAB (OUTPATIENT)
Dept: LAB | Facility: HOSPITAL | Age: 49
End: 2019-01-24

## 2019-01-24 ENCOUNTER — OFFICE VISIT (OUTPATIENT)
Dept: PSYCHIATRY | Facility: CLINIC | Age: 49
End: 2019-01-24

## 2019-01-24 VITALS
HEIGHT: 59 IN | HEART RATE: 70 BPM | DIASTOLIC BLOOD PRESSURE: 63 MMHG | SYSTOLIC BLOOD PRESSURE: 109 MMHG | WEIGHT: 172.6 LBS | BODY MASS INDEX: 34.8 KG/M2

## 2019-01-24 DIAGNOSIS — Z79.899 HIGH RISK MEDICATION USE: ICD-10-CM

## 2019-01-24 DIAGNOSIS — F31.81 BIPOLAR II DISORDER (HCC): Primary | ICD-10-CM

## 2019-01-24 DIAGNOSIS — F31.81 BIPOLAR II DISORDER (HCC): ICD-10-CM

## 2019-01-24 LAB
ANION GAP SERPL CALCULATED.3IONS-SCNC: 6.7 MMOL/L (ref 3.6–11.2)
BASOPHILS # BLD AUTO: 0.01 10*3/MM3 (ref 0–0.3)
BASOPHILS NFR BLD AUTO: 0.1 % (ref 0–2)
BUN BLD-MCNC: 18 MG/DL (ref 7–21)
BUN/CREAT SERPL: 18.2 (ref 7–25)
CALCIUM SPEC-SCNC: 9.2 MG/DL (ref 7.7–10)
CHLORIDE SERPL-SCNC: 106 MMOL/L (ref 99–112)
CO2 SERPL-SCNC: 23.3 MMOL/L (ref 24.3–31.9)
CREAT BLD-MCNC: 0.99 MG/DL (ref 0.43–1.29)
DEPRECATED RDW RBC AUTO: 42.1 FL (ref 37–54)
EOSINOPHIL # BLD AUTO: 0.09 10*3/MM3 (ref 0–0.7)
EOSINOPHIL NFR BLD AUTO: 1.1 % (ref 0–5)
ERYTHROCYTE [DISTWIDTH] IN BLOOD BY AUTOMATED COUNT: 13.3 % (ref 11.5–14.5)
GFR SERPL CREATININE-BSD FRML MDRD: 60 ML/MIN/1.73
GLUCOSE BLD-MCNC: 173 MG/DL (ref 70–110)
HCT VFR BLD AUTO: 34.4 % (ref 37–47)
HGB BLD-MCNC: 10.9 G/DL (ref 12–16)
IMM GRANULOCYTES # BLD AUTO: 0.01 10*3/MM3 (ref 0–0.03)
IMM GRANULOCYTES NFR BLD AUTO: 0.1 % (ref 0–0.5)
LITHIUM SERPL-SCNC: 0.8 MMOL/L (ref 0.6–1.2)
LYMPHOCYTES # BLD AUTO: 2.04 10*3/MM3 (ref 1–3)
LYMPHOCYTES NFR BLD AUTO: 24.2 % (ref 21–51)
MCH RBC QN AUTO: 28 PG (ref 27–33)
MCHC RBC AUTO-ENTMCNC: 31.7 G/DL (ref 33–37)
MCV RBC AUTO: 88.4 FL (ref 80–94)
MONOCYTES # BLD AUTO: 0.5 10*3/MM3 (ref 0.1–0.9)
MONOCYTES NFR BLD AUTO: 5.9 % (ref 0–10)
NEUTROPHILS # BLD AUTO: 5.79 10*3/MM3 (ref 1.4–6.5)
NEUTROPHILS NFR BLD AUTO: 68.6 % (ref 30–70)
OSMOLALITY SERPL CALC.SUM OF ELEC: 278 MOSM/KG (ref 273–305)
PLATELET # BLD AUTO: 257 10*3/MM3 (ref 130–400)
PMV BLD AUTO: 9.9 FL (ref 6–10)
POTASSIUM BLD-SCNC: 4.6 MMOL/L (ref 3.5–5.3)
RBC # BLD AUTO: 3.89 10*6/MM3 (ref 4.2–5.4)
SODIUM BLD-SCNC: 136 MMOL/L (ref 135–153)
T4 FREE SERPL-MCNC: 1.11 NG/DL (ref 0.89–1.76)
TSH SERPL DL<=0.05 MIU/L-ACNC: 0.72 MIU/ML (ref 0.55–4.78)
WBC NRBC COR # BLD: 8.44 10*3/MM3 (ref 4.5–12.5)

## 2019-01-24 PROCEDURE — 84443 ASSAY THYROID STIM HORMONE: CPT

## 2019-01-24 PROCEDURE — 80178 ASSAY OF LITHIUM: CPT

## 2019-01-24 PROCEDURE — 80048 BASIC METABOLIC PNL TOTAL CA: CPT

## 2019-01-24 PROCEDURE — 85025 COMPLETE CBC W/AUTO DIFF WBC: CPT

## 2019-01-24 PROCEDURE — 36415 COLL VENOUS BLD VENIPUNCTURE: CPT

## 2019-01-24 PROCEDURE — 84439 ASSAY OF FREE THYROXINE: CPT

## 2019-01-24 PROCEDURE — 99214 OFFICE O/P EST MOD 30 MIN: CPT | Performed by: NURSE PRACTITIONER

## 2019-01-24 RX ORDER — CITALOPRAM 10 MG/1
10 TABLET ORAL DAILY
Qty: 30 TABLET | Refills: 1 | Status: SHIPPED | OUTPATIENT
Start: 2019-01-24 | End: 2019-03-21 | Stop reason: SDUPTHER

## 2019-01-24 RX ORDER — LITHIUM CARBONATE 300 MG/1
300 CAPSULE ORAL 2 TIMES DAILY WITH MEALS
Qty: 60 CAPSULE | Refills: 1 | Status: SHIPPED | OUTPATIENT
Start: 2019-01-24 | End: 2019-03-21 | Stop reason: SDUPTHER

## 2019-01-24 NOTE — PROGRESS NOTES
"    Nanette Levine is a 48 y.o. female is here today for medication management follow-up.   Chief Complaint: f/u Bipolar II    History of Present Illness   Patient presents for follow-up today. Patient reports today is her dad's birthday and she and her mother went and put de on his grave. Patient rates depression 6/10 with 10 being the worst. She contributes the increase to her dad's birthday. She reports she has been doing crafts and praying. She enjoys her cats. She denies anxiety at present. She reports mood has been stable. She denies any hypomania or flaquito. She reports compliance with medication and tolerating without side effects. Sleep is good. Appetite is good. She denies suicidal and homicidal ideations. She denies auditory and visual hallucinations.     The following portions of the patient's history were reviewed and updated as appropriate: allergies, current medications, past family history, past medical history, past social history, past surgical history and problem list.    Review of Systems   Constitutional: Negative.    HENT: Negative.    Eyes: Negative.    Respiratory: Negative.    Cardiovascular: Negative.    Gastrointestinal: Negative.    Endocrine: Negative.    Genitourinary: Negative.    Musculoskeletal: Positive for gait problem.        Ambulates with assist of cane.    Skin: Negative.    Allergic/Immunologic: Negative.    Hematological: Negative.    Psychiatric/Behavioral: Negative for agitation, dysphoric mood, hallucinations, self-injury, sleep disturbance and suicidal ideas. The patient is not nervous/anxious.        Objective   Physical Exam   Constitutional: She appears well-developed and well-nourished. No distress.   Skin: She is not diaphoretic.   Vitals reviewed.    Blood pressure 109/63, pulse 70, height 149.9 cm (59.02\"), weight 78.3 kg (172 lb 9.6 oz).    Medication List:   Current Outpatient Medications   Medication Sig Dispense Refill   • aspirin 81 MG EC " tablet Take 81 mg by mouth 1 (One) Time Per Week.     • atorvastatin (LIPITOR) 80 MG tablet Take 1 tablet by mouth Every Night. 30 tablet 3   • BYDUREON 2 MG pen-injector injection      • Canagliflozin (INVOKANA) 100 MG tablet Take 100 mg by mouth Daily.     • citalopram (CELEXA) 10 MG tablet Take 1 tablet by mouth Daily. 30 tablet 1   • ferrous sulfate 325 (65 FE) MG tablet      • HUMALOG MIX 75/25 KWIKPEN (75-25) 100 UNIT/ML suspension pen-injector      • Insulin Glargine (BASAGLAR KWIKPEN) 100 UNIT/ML injection pen Inject 20 Units under the skin 2 (Two) Times a Day.     • levothyroxine (SYNTHROID, LEVOTHROID) 50 MCG tablet Take 50 mcg by mouth daily.     • lisinopril (PRINIVIL,ZESTRIL) 40 MG tablet Take 1 tablet by mouth Daily. 30 tablet 6   • lithium carbonate 300 MG capsule Take 1 capsule by mouth 2 (Two) Times a Day With Meals. 60 capsule 1   • ONETOUCH VERIO test strip      • pantoprazole (PROTONIX) 40 MG EC tablet Daily.     • ezetimibe (ZETIA) 10 MG tablet Take 1 tablet by mouth Daily. 30 tablet 3     No current facility-administered medications for this visit.        Labs: No results found for this or any previous visit (from the past 2016 hour(s)).      Mental Status Exam:   Hygiene:   good  Cooperation:  Cooperative  Eye Contact:  Good  Psychomotor Behavior:  Appropriate  Affect:  Full range  Hopelessness: Denies  Speech:  Normal  Thought Process:  Goal directed and Linear  Thought Content:  Normal  Suicidal:  None  Homicidal:  None  Hallucinations:  None  Delusion:  None  Memory:  Intact  Orientation:  Person, Place, Time and Situation  Reliability:  good  Insight:  Good  Judgement:  Good  Impulse Control:  Good  Physical/Medical Issues:  Yes HTN, Diabetes, Hypothyroidism     Assessment/Plan   Diagnoses and all orders for this visit:    Bipolar II disorder (CMS/[facility])  -     citalopram (CELEXA) 10 MG tablet; Take 1 tablet by mouth Daily.  -     lithium carbonate 300 MG capsule; Take 1 capsule by mouth 2  (Two) Times a Day With Meals.  -     CBC & Differential; Future  -     Basic Metabolic Panel; Future  -     Lithium Level; Future  -     TSH; Future  -     T4, Free; Future    High risk medication use  -     CBC & Differential; Future  -     Basic Metabolic Panel; Future  -     Lithium Level; Future  -     TSH; Future  -     T4, Free; Future      Body mass index is 34.84 kg/m².  Patient was educated on healthier and more balanced diet choices and encouraged exercise within physical limitations.  Prognosis: Good dependent on medication/follow up and treatment plan compliance.  Functionality: pt showing improvements in important areas of daily functioning.      Impression: Patient continues with stable mood. Sleep good. Symptoms of Bipolar II disorder currently managed with Lithium and Celexa.    Plan:  1) Continue Lithium 300 mg po BID for Bipolar II  2) Continue Celexa 10 mg po daily for mood and anxiety  3) Continue psychotherapy with Vidal Ortiz LCSW  4) Will obtain labs due to Lithium. Lithium level, BMP, TSH and T4   5) RTC in 2 months       Discussed medication options.  Reviewed the risks, benefits, and side effects of the medications; patient acknowledged and verbally consented.  Patient is agreeable to call the [facility].  Patient is aware to call 911 or go to the nearest ER should begin having SI/HI.

## 2019-01-28 ENCOUNTER — TELEPHONE (OUTPATIENT)
Dept: PSYCHIATRY | Facility: CLINIC | Age: 49
End: 2019-01-28

## 2019-01-28 NOTE — TELEPHONE ENCOUNTER
----- Message from MARISELA Sanchez sent at 1/28/2019  8:47 AM EST -----  Please let patient know her glucose was elevated 173. It has improved from 8 months ago. Please have her continue to follow-up with PCP for management of her blood sugar. Thank you.

## 2019-01-28 NOTE — PROGRESS NOTES
Please let patient know her glucose was elevated 173. It has improved from 8 months ago. Please have her continue to follow-up with PCP for management of her blood sugar. Thank you.

## 2019-01-28 NOTE — TELEPHONE ENCOUNTER
Spoke with patient and informed her of lab results. Patient stated she would continue to follow-up with PCP.

## 2019-02-01 ENCOUNTER — OFFICE VISIT (OUTPATIENT)
Dept: PSYCHIATRY | Facility: CLINIC | Age: 49
End: 2019-02-01

## 2019-02-01 DIAGNOSIS — F31.81 BIPOLAR II DISORDER (HCC): Primary | ICD-10-CM

## 2019-02-01 PROCEDURE — 90832 PSYTX W PT 30 MINUTES: CPT | Performed by: SOCIAL WORKER

## 2019-02-05 NOTE — PROGRESS NOTES
"Date of Service: February 1, 2019  Time In: 11:05 AM  Time Out: 11:30 AM    PROGRESS NOTE  Data:  Tammi Levine is a 48 y.o. female who met 1:1 with the undersigned for a regularly scheduled individual outpatient therapy session [facility] for follow-up of bipolar II disorder.     HPI:  Patient reports she continues to struggle with feelings of anger toward her brother and her sister-in-law she feels her sister-in-law is taking advantage of her and her mother and not caring for their children appropriately.  Patient states she becomes very angry and would like to \"tell him like it is\" but states her mother discourages her.  Patient also reports she continues to struggle to secure disability benefits and states she isn't sure why they continue to deny her.  Patient Continues to struggle with periods of depression including sad mood, anhedonia, anergia, automatic negative cognition, feeling hopeless, and periodic social isolation.  Patient rates current symptoms of depression at  4 on a scale of 1-10 with 10 being most severe. Patient also reports she continues to have periods of irritability and has a tendency to lash out at others.  Patient reports she continues to adhere to medication regimen as prescribed.  The patient adamantly and convincingly denies suicidal ideation vehemently denies any substance use.      Clinical Maneuvering/Intervention:  Assisted patient in processing above session content; acknowledged and normalized patient’s thoughts, feelings, and concerns.  Allowed the patient to discuss/vent her feelings and frustrations concerning issues with her brother and sister-in-law validated her feelings.  However, discussed the concept of things we can control and things we cannot control and encourage the patient to remind herself she cannot control the decisions and behaviors of others.  Continue to discuss the importance of healthy skills of daily living.  Also utilized " cognitive behavioral therapy to assist patient in developing appropriate coping mechanisms to decrease severity and frequency of symptoms.  Provided unconditional positive regard in a safe, supportive environment.    Allowed patient to freely discuss issues without interruption or judgment. Provided safe, confidential environment to facilitate the development of positive therapeutic relationship and encourage open, honest communication. Assisted patient in identifying risk factors which would indicate the need for higher level of care including thoughts to harm self or others and/or self-harming behavior and encouraged patient to contact this office, call 911, or present to the nearest emergency room should any of these events occur. Discussed crisis intervention services and means to access.  Patient adamantly and convincingly denies current suicidal or homicidal ideation or perceptual disturbance.    Assessment    Patient continues to struggle with mood instability with depression being primary.  The patient's symptomology continue to cause impairment in important areas of functioning.  Patient can be reasonably expected to continue to benefit treatment and would likely be at increased risk for decompensation.    Diagnoses and all orders for this visit:    Bipolar II disorder (CMS/[facility])               Mental Status Exam  Hygiene:  good  Dress:  casual  Attitude:  Cooperative  Motor Activity:  Appropriate  Speech:  Normal  Mood:  depressed  Affect:  depressed  Thought Processes:  Linear  Thought Content:  normal  Suicidal Thoughts:  denies  Homicidal Thoughts:  denies  Crisis Safety Plan: yes, to come to the emergency room.  Hallucinations:  denies    Patient's Support Network Includes:  mother and extended family    Progress toward goal: Not at goal    Functional Status: Moderate impairment     Prognosis: Fair with Ongoing Treatment     Plan         Patient will continue in individual outpatient therapy session  [facility] every 4 weeks and will continue and pharmacotherapy as scheduled with MARISELA Madsen.  Patient will adhere to medication regimen as prescribed and report any side effects. Patient will contact this office, call 911 or present to the nearest emergency room should suicidal or homicidal ideations occur. Provide Cognitive Behavioral Therapy and Integrative Therapy to improve functioning, maintain stability, and avoid decompensation and the need for higher level of care.          Return in about 4 weeks (around 3/1/2019) for Next scheduled follow up.      This document signed by Vidal Ortiz LCSW, [facility] February 5, 2019 2:23 PM

## 2019-03-12 ENCOUNTER — OFFICE VISIT (OUTPATIENT)
Dept: PSYCHIATRY | Facility: CLINIC | Age: 49
End: 2019-03-12

## 2019-03-12 DIAGNOSIS — F31.81 BIPOLAR II DISORDER (HCC): Primary | ICD-10-CM

## 2019-03-12 PROCEDURE — 90832 PSYTX W PT 30 MINUTES: CPT | Performed by: SOCIAL WORKER

## 2019-03-12 NOTE — TREATMENT PLAN
Multi-Disciplinary Problems (from Behavioral Health Treatment Plan)    Active Problems     Problem: Mood Instability  Start Date: 03/12/19    Problem Details:  The patient self-scales this problem as a 4 with 10 being the worst.       Goal Priority Start Date Expected End Date End Date    Patient will achieve mood stability as evidenced by controlled behavior and a more deliberate thought process High 03/12/19 03/12/20 --    Goal Details:  Progress toward goal:  The patient self-scales their progress related to this goal as a 4 with 10 being the worst.       Goal Intervention Frequency Start Date End Date    Provide structure and focus to patient's thoughts and actions by establishing plans and routine. Weekly 03/12/19 --    Intervention Details:  Duration of treatment until remission of symptoms.       Goal Intervention Frequency Start Date End Date    Assist patient in setting responsible goals and limits in behavior. Weekly 03/12/19 03/12/20    Intervention Details:  Duration of treatment until remission of symptoms.                          I have discussed and reviewed this treatment plan with the patient.  It has been printed for signatures.    This document signed by Vidal Ortiz LCSW, [facility] March 12, 2019 11:20 AM

## 2019-03-13 NOTE — PROGRESS NOTES
"Date of Service: March 12, 2019  Time In:  11:00 AM  Time Out: 11:33 AM    PROGRESS NOTE  Data:  Tammi Levine is a 48 y.o. female who met 1:1 with the undersigned for a regularly scheduled individual outpatient therapy session [facility] for follow-up of bipolar II disorder.     HPI: Patient reports past few weeks have been very difficult as her mother has gotten a boyfriend and states she feels as though her mother cares more about him than she does her.  Patient reports her mother has remarked \"he is the most important thing in my life right now\".  Patient reports this makes her feel very vulnerable and states she even fears being \"kicked out of the home\".  Patient reports she spends a great deal of her time in her room and reports she feels a significant sense of hopelessness.  Patient Continues to struggle with periods of depression including sad mood, anhedonia, anergia, automatic negative cognition, feeling hopeless, and periodic social isolation.  Patient rates current symptoms of depression at 5/6 on a scale of 1-10 with 10 being most severe. Patient also reports she continues to have periods of irritability and has a tendency to lash out at others.  Patient reports she continues to adhere to medication regimen as prescribed.  The patient adamantly and convincingly denies suicidal ideation vehemently denies any substance use.      Clinical Maneuvering/Intervention:  Assisted patient in processing above session content; acknowledged and normalized patient’s thoughts, feelings, and concerns.  Allowed the patient to discuss/vent her feelings of frustration and concerning her mother's new relationship and validated her feelings.  Also utilize motivational reviewed techniques including complex reflections to assist the patient in recognizing her mother is likely not attempting to replace her father and her mother sees things differently than the patient does.  Also was able to go " under agreement from the patient that her mother has right to be happy.  Continue to utilize cognitive behavioral therapy to assist patient in developing appropriate coping mechanisms to decrease the severity and frequency of symptoms.  Also continue to focus on healthy skills of daily living and behavioral activation.  Provided unconditional positive regard in a safe, supportive environment.    Allowed patient to freely discuss issues without interruption or judgment. Provided safe, confidential environment to facilitate the development of positive therapeutic relationship and encourage open, honest communication. Assisted patient in identifying risk factors which would indicate the need for higher level of care including thoughts to harm self or others and/or self-harming behavior and encouraged patient to contact this office, call 911, or present to the nearest emergency room should any of these events occur. Discussed crisis intervention services and means to access.  Patient adamantly and convincingly denies current suicidal or homicidal ideation or perceptual disturbance.    Assessment    Patient continues to struggle with mood instability with depression being primary.  Patient symptomology appears to be recently exacerbated by strained relationship with her mother as her mother has began dating following the death of the patient's father.  The patient's symptomology continue to cause impairment in important areas of functioning.  Patient can be reasonably expected to continue to benefit treatment and would likely be at increased risk for decompensation.    Diagnoses and all orders for this visit:    Bipolar II disorder (CMS/[facility])               Mental Status Exam  Hygiene:  good  Dress:  casual  Attitude:  Cooperative  Motor Activity:  Appropriate  Speech:  Normal  Mood:  depressed/irritable  Affect:  depressed  Thought Processes:  Linear  Thought Content:  normal  Suicidal Thoughts:  denies  Homicidal  Thoughts:  denies  Crisis Safety Plan: yes, to come to the emergency room.  Hallucinations:  denies    Patient's Support Network Includes:  mother and extended family    Progress toward goal: Not at goal    Functional Status: Moderate impairment     Prognosis: Fair with Ongoing Treatment     Plan         Patient will continue in individual outpatient therapy session [facility] every 3 weeks and will continue and pharmacotherapy as scheduled with MARISELA Madsen.  Patient will adhere to medication regimen as prescribed and report any side effects. Patient will contact this office, call 911 or present to the nearest emergency room should suicidal or homicidal ideations occur. Provide Cognitive Behavioral Therapy and Integrative Therapy to improve functioning, maintain stability, and avoid decompensation and the need for higher level of care.          Return in about 3 weeks (around 4/2/2019) for Next scheduled follow up.      This document signed by Vidal Ortiz LCSW, [facility] March 13, 2019 9:18 AM

## 2019-03-21 ENCOUNTER — OFFICE VISIT (OUTPATIENT)
Dept: PSYCHIATRY | Facility: CLINIC | Age: 49
End: 2019-03-21

## 2019-03-21 VITALS
HEART RATE: 75 BPM | WEIGHT: 172 LBS | SYSTOLIC BLOOD PRESSURE: 116 MMHG | HEIGHT: 59 IN | BODY MASS INDEX: 34.68 KG/M2 | DIASTOLIC BLOOD PRESSURE: 66 MMHG

## 2019-03-21 DIAGNOSIS — F31.81 BIPOLAR II DISORDER (HCC): ICD-10-CM

## 2019-03-21 PROCEDURE — 99214 OFFICE O/P EST MOD 30 MIN: CPT | Performed by: NURSE PRACTITIONER

## 2019-03-21 RX ORDER — LITHIUM CARBONATE 300 MG/1
300 CAPSULE ORAL 2 TIMES DAILY WITH MEALS
Qty: 60 CAPSULE | Refills: 1 | Status: SHIPPED | OUTPATIENT
Start: 2019-03-21 | End: 2019-05-23 | Stop reason: SDUPTHER

## 2019-03-21 RX ORDER — CITALOPRAM 10 MG/1
10 TABLET ORAL DAILY
Qty: 30 TABLET | Refills: 1 | Status: SHIPPED | OUTPATIENT
Start: 2019-03-21 | End: 2019-05-23 | Stop reason: SDUPTHER

## 2019-03-21 NOTE — PROGRESS NOTES
Subjective   Tammi Levine is a 49 y.o. female is here today for medication management follow-up.     Chief Complaint: f/u Bipolar II    History of Present Illness   Patient presents for follow-up today. Patient reports she is trying to deal with her mother having a boyfriend. She reports she started dating a man she went to school with the week before Valentines Day. Patient reports she is jealous because she doesn't have a boyfriend. Patient worries her mother won't continue to allow her to live there. She reports her mother has reassured her that she can always live there. Patient continue to work on her crafts. She fixed a picture frame to take to her dad's grave. She denies anxiety at present. She rates depression 5/10 with 10 being the worst. Patient reports she is going to start getting out more in hopes of meeting someone for herself. She is thinking about going to [employer]. She reports mood has been stable. She denies any hypomania or flaquito. She reports compliance with medication and tolerating without side effects. Sleep is good. Appetite is good. She denies suicidal and homicidal ideations. She denies auditory and visual hallucinations.     The following portions of the patient's history were reviewed and updated as appropriate: allergies, current medications, past family history, past medical history, past social history, past surgical history and problem list.    Review of Systems   Constitutional: Negative.    HENT: Negative.    Eyes: Negative.    Respiratory: Negative.    Cardiovascular: Negative.    Gastrointestinal: Negative.    Endocrine: Negative.    Genitourinary: Negative.    Musculoskeletal: Positive for gait problem.        Ambulates with assist of cane.    Skin: Negative.    Allergic/Immunologic: Negative.    Hematological: Negative.    Psychiatric/Behavioral: Negative for agitation, dysphoric mood, hallucinations, self-injury, sleep disturbance and suicidal ideas. The patient is not  "nervous/anxious.        Objective   Physical Exam   Constitutional: She appears well-developed and well-nourished. No distress.   Skin: She is not diaphoretic.   Vitals reviewed.    Blood pressure 116/66, pulse 75, height 149.9 cm (59.02\"), weight 78 kg (172 lb).    Medication List:   Current Outpatient Medications   Medication Sig Dispense Refill   • aspirin 81 MG EC tablet Take 81 mg by mouth 1 (One) Time Per Week.     • atorvastatin (LIPITOR) 80 MG tablet Take 1 tablet by mouth Every Night. 30 tablet 3   • BYDUREON 2 MG pen-injector injection      • Canagliflozin (INVOKANA) 100 MG tablet Take 100 mg by mouth Daily.     • citalopram (CELEXA) 10 MG tablet Take 1 tablet by mouth Daily. 30 tablet 1   • ezetimibe (ZETIA) 10 MG tablet Take 1 tablet by mouth Daily. 30 tablet 3   • ferrous sulfate 325 (65 FE) MG tablet      • HUMALOG MIX 75/25 KWIKPEN (75-25) 100 UNIT/ML suspension pen-injector      • Insulin Glargine (BASAGLAR KWIKPEN) 100 UNIT/ML injection pen Inject 20 Units under the skin 2 (Two) Times a Day.     • levothyroxine (SYNTHROID, LEVOTHROID) 50 MCG tablet Take 50 mcg by mouth daily.     • lisinopril (PRINIVIL,ZESTRIL) 40 MG tablet Take 1 tablet by mouth Daily. 30 tablet 6   • lithium carbonate 300 MG capsule Take 1 capsule by mouth 2 (Two) Times a Day With Meals. 60 capsule 1   • ONETOUCH VERIO test strip      • pantoprazole (PROTONIX) 40 MG EC tablet Daily.       No current facility-administered medications for this visit.        Labs:   Recent Results (from the past 2016 hour(s))   Basic Metabolic Panel    Collection Time: 01/24/19  4:39 PM   Result Value Ref Range    Glucose 173 (H) 70 - 110 mg/dL    BUN 18 7 - 21 mg/dL    Creatinine 0.99 0.43 - 1.29 mg/dL    Sodium 136 135 - 153 mmol/L    Potassium 4.6 3.5 - 5.3 mmol/L    Chloride 106 99 - 112 mmol/L    CO2 23.3 (L) 24.3 - 31.9 mmol/L    Calcium 9.2 7.7 - 10.0 mg/dL    eGFR Non African Amer 60 (L) >60 mL/min/1.73    BUN/Creatinine Ratio 18.2 7.0 - 25.0 "    Anion Gap 6.7 3.6 - 11.2 mmol/L   Lithium Level    Collection Time: 01/24/19  4:39 PM   Result Value Ref Range    Lithium 0.8 0.6 - 1.2 mmol/L   TSH    Collection Time: 01/24/19  4:39 PM   Result Value Ref Range    TSH 0.717 0.550 - 4.780 mIU/mL   T4, Free    Collection Time: 01/24/19  4:39 PM   Result Value Ref Range    Free T4 1.11 0.89 - 1.76 ng/dL   CBC Auto Differential    Collection Time: 01/24/19  4:39 PM   Result Value Ref Range    WBC 8.44 4.50 - 12.50 10*3/mm3    RBC 3.89 (L) 4.20 - 5.40 10*6/mm3    Hemoglobin 10.9 (L) 12.0 - 16.0 g/dL    Hematocrit 34.4 (L) 37.0 - 47.0 %    MCV 88.4 80.0 - 94.0 fL    MCH 28.0 27.0 - 33.0 pg    MCHC 31.7 (L) 33.0 - 37.0 g/dL    RDW 13.3 11.5 - 14.5 %    RDW-SD 42.1 37.0 - 54.0 fl    MPV 9.9 6.0 - 10.0 fL    Platelets 257 130 - 400 10*3/mm3    Neutrophil % 68.6 30.0 - 70.0 %    Lymphocyte % 24.2 21.0 - 51.0 %    Monocyte % 5.9 0.0 - 10.0 %    Eosinophil % 1.1 0.0 - 5.0 %    Basophil % 0.1 0.0 - 2.0 %    Immature Grans % 0.1 0.0 - 0.5 %    Neutrophils, Absolute 5.79 1.40 - 6.50 10*3/mm3    Lymphocytes, Absolute 2.04 1.00 - 3.00 10*3/mm3    Monocytes, Absolute 0.50 0.10 - 0.90 10*3/mm3    Eosinophils, Absolute 0.09 0.00 - 0.70 10*3/mm3    Basophils, Absolute 0.01 0.00 - 0.30 10*3/mm3    Immature Grans, Absolute 0.01 0.00 - 0.03 10*3/mm3   Osmolality, Calculated    Collection Time: 01/24/19  4:39 PM   Result Value Ref Range    Osmolality Calc 278.0 273.0 - 305.0 mOsm/kg         Mental Status Exam:   Hygiene:   good  Cooperation:  Cooperative  Eye Contact:  Good  Psychomotor Behavior:  Appropriate  Affect:  Full range  Hopelessness: Denies  Speech:  Normal  Thought Process:  Goal directed and Linear  Thought Content:  Normal  Suicidal:  None  Homicidal:  None  Hallucinations:  None  Delusion:  None  Memory:  Intact  Orientation:  Person, Place, Time and Situation  Reliability:  good  Insight:  Good  Judgement:  Good  Impulse Control:  Good  Physical/Medical Issues:  Yes HTN,  Diabetes, Hypothyroidism     Assessment/Plan   Diagnoses and all orders for this visit:    Bipolar II disorder (CMS/[facility])  -     citalopram (CELEXA) 10 MG tablet; Take 1 tablet by mouth Daily.  -     lithium carbonate 300 MG capsule; Take 1 capsule by mouth 2 (Two) Times a Day With Meals.      Body mass index is 34.72 kg/m².  Patient was educated on healthier and more balanced diet choices and encouraged exercise within physical limitations.  Prognosis: Good dependent on medication/follow up and treatment plan compliance.  Functionality: pt showing impairment in important areas of daily functioning.      Impression: Patient continues with stable mood. Sleep good. Symptoms of Bipolar II disorder currently managed with Lithium and Celexa. Some worsening of depression due to stressor at home.    Plan:  1) Continue Lithium 300 mg po BID for Bipolar II  2) Continue Celexa 10 mg po daily for mood and anxiety  3) Continue psychotherapy with Vidal Ortiz LCSW  4) RTC in 2 months   5) Completed patient's paperwork for food stamps this visit.         Discussed medication options.  Reviewed the risks, benefits, and side effects of the medications; patient acknowledged and verbally consented.  Patient is agreeable to call the [facility].  Patient is aware to call 911 or go to the nearest ER should begin having SI/HI.

## 2019-03-29 ENCOUNTER — HOSPITAL ENCOUNTER (EMERGENCY)
Facility: HOSPITAL | Age: 49
Discharge: HOME | End: 2019-03-29
Attending: FAMILY MEDICINE | Admitting: FAMILY MEDICINE

## 2019-03-29 ENCOUNTER — APPOINTMENT (OUTPATIENT)
Dept: CT IMAGING | Facility: HOSPITAL | Age: 49
End: 2019-03-29

## 2019-03-29 VITALS
HEIGHT: 59 IN | SYSTOLIC BLOOD PRESSURE: 105 MMHG | TEMPERATURE: 97.6 F | DIASTOLIC BLOOD PRESSURE: 62 MMHG | BODY MASS INDEX: 34.68 KG/M2 | HEART RATE: 76 BPM | WEIGHT: 172 LBS | RESPIRATION RATE: 20 BRPM | OXYGEN SATURATION: 99 %

## 2019-03-29 DIAGNOSIS — K52.9 ENTERITIS: Primary | ICD-10-CM

## 2019-03-29 LAB
ALBUMIN SERPL-MCNC: 4.39 G/DL (ref 3.5–5.2)
ALBUMIN/GLOB SERPL: 1.4 G/DL
ALP SERPL-CCNC: 105 U/L (ref 39–117)
ALT SERPL W P-5'-P-CCNC: 13 U/L (ref 1–33)
AMYLASE SERPL-CCNC: 50 U/L (ref 28–100)
ANION GAP SERPL CALCULATED.3IONS-SCNC: 11.7 MMOL/L
AST SERPL-CCNC: 15 U/L (ref 1–32)
BACTERIA UR QL AUTO: ABNORMAL /HPF
BASOPHILS # BLD AUTO: 0.02 10*3/MM3 (ref 0–0.2)
BASOPHILS NFR BLD AUTO: 0.2 % (ref 0–1.5)
BILIRUB SERPL-MCNC: 0.3 MG/DL (ref 0.2–1.2)
BILIRUB UR QL STRIP: NEGATIVE
BUN BLD-MCNC: 18 MG/DL (ref 6–20)
BUN/CREAT SERPL: 13.8 (ref 7–25)
CALCIUM SPEC-SCNC: 9.3 MG/DL (ref 8.6–10.5)
CHLORIDE SERPL-SCNC: 98 MMOL/L (ref 98–107)
CLARITY UR: CLEAR
CO2 SERPL-SCNC: 21.3 MMOL/L (ref 22–29)
COLOR UR: YELLOW
CREAT BLD-MCNC: 1.3 MG/DL (ref 0.57–1)
DEPRECATED RDW RBC AUTO: 41.6 FL (ref 37–54)
EOSINOPHIL # BLD AUTO: 0.05 10*3/MM3 (ref 0–0.4)
EOSINOPHIL NFR BLD AUTO: 0.5 % (ref 0.3–6.2)
ERYTHROCYTE [DISTWIDTH] IN BLOOD BY AUTOMATED COUNT: 13.4 % (ref 12.3–15.4)
GFR SERPL CREATININE-BSD FRML MDRD: 43 ML/MIN/1.73
GLOBULIN UR ELPH-MCNC: 3.2 GM/DL
GLUCOSE BLD-MCNC: 179 MG/DL (ref 65–99)
GLUCOSE UR STRIP-MCNC: ABNORMAL MG/DL
HCT VFR BLD AUTO: 34 % (ref 34–46.6)
HGB BLD-MCNC: 11.2 G/DL (ref 12–15.9)
HGB UR QL STRIP.AUTO: NEGATIVE
HYALINE CASTS UR QL AUTO: ABNORMAL /LPF
IMM GRANULOCYTES # BLD AUTO: 0.03 10*3/MM3 (ref 0–0.05)
IMM GRANULOCYTES NFR BLD AUTO: 0.3 % (ref 0–0.5)
KETONES UR QL STRIP: NEGATIVE
LEUKOCYTE ESTERASE UR QL STRIP.AUTO: ABNORMAL
LIPASE SERPL-CCNC: 44 U/L (ref 13–60)
LITHIUM SERPL-SCNC: 0.5 MMOL/L (ref 0.6–1.2)
LYMPHOCYTES # BLD AUTO: 1.78 10*3/MM3 (ref 0.7–3.1)
LYMPHOCYTES NFR BLD AUTO: 16.3 % (ref 19.6–45.3)
MCH RBC QN AUTO: 28.4 PG (ref 26.6–33)
MCHC RBC AUTO-ENTMCNC: 32.9 G/DL (ref 31.5–35.7)
MCV RBC AUTO: 86.3 FL (ref 79–97)
MONOCYTES # BLD AUTO: 0.55 10*3/MM3 (ref 0.1–0.9)
MONOCYTES NFR BLD AUTO: 5 % (ref 5–12)
NEUTROPHILS # BLD AUTO: 8.52 10*3/MM3 (ref 1.4–7)
NEUTROPHILS NFR BLD AUTO: 77.7 % (ref 42.7–76)
NITRITE UR QL STRIP: NEGATIVE
PH UR STRIP.AUTO: <=5 [PH] (ref 5–8)
PLATELET # BLD AUTO: 271 10*3/MM3 (ref 140–450)
PMV BLD AUTO: 10 FL (ref 6–12)
POTASSIUM BLD-SCNC: 5 MMOL/L (ref 3.5–5.2)
PROT SERPL-MCNC: 7.6 G/DL (ref 6–8.5)
PROT UR QL STRIP: NEGATIVE
RBC # BLD AUTO: 3.94 10*6/MM3 (ref 3.77–5.28)
RBC # UR: ABNORMAL /HPF
REF LAB TEST METHOD: ABNORMAL
SODIUM BLD-SCNC: 131 MMOL/L (ref 136–145)
SP GR UR STRIP: 1.02 (ref 1–1.03)
SQUAMOUS #/AREA URNS HPF: ABNORMAL /HPF
UROBILINOGEN UR QL STRIP: ABNORMAL
WBC NRBC COR # BLD: 10.95 10*3/MM3 (ref 3.4–10.8)
WBC UR QL AUTO: ABNORMAL /HPF

## 2019-03-29 PROCEDURE — 74176 CT ABD & PELVIS W/O CONTRAST: CPT

## 2019-03-29 PROCEDURE — 80053 COMPREHEN METABOLIC PANEL: CPT | Performed by: NURSE PRACTITIONER

## 2019-03-29 PROCEDURE — 25010000002 ONDANSETRON PER 1 MG: Performed by: NURSE PRACTITIONER

## 2019-03-29 PROCEDURE — 82150 ASSAY OF AMYLASE: CPT | Performed by: NURSE PRACTITIONER

## 2019-03-29 PROCEDURE — 85025 COMPLETE CBC W/AUTO DIFF WBC: CPT | Performed by: NURSE PRACTITIONER

## 2019-03-29 PROCEDURE — 96361 HYDRATE IV INFUSION ADD-ON: CPT

## 2019-03-29 PROCEDURE — 83690 ASSAY OF LIPASE: CPT | Performed by: NURSE PRACTITIONER

## 2019-03-29 PROCEDURE — 99283 EMERGENCY DEPT VISIT LOW MDM: CPT

## 2019-03-29 PROCEDURE — 96376 TX/PRO/DX INJ SAME DRUG ADON: CPT

## 2019-03-29 PROCEDURE — 25010000002 PROMETHAZINE PER 50 MG: Performed by: NURSE PRACTITIONER

## 2019-03-29 PROCEDURE — 96375 TX/PRO/DX INJ NEW DRUG ADDON: CPT

## 2019-03-29 PROCEDURE — 81001 URINALYSIS AUTO W/SCOPE: CPT | Performed by: NURSE PRACTITIONER

## 2019-03-29 PROCEDURE — 80178 ASSAY OF LITHIUM: CPT | Performed by: NURSE PRACTITIONER

## 2019-03-29 PROCEDURE — 74176 CT ABD & PELVIS W/O CONTRAST: CPT | Performed by: RADIOLOGY

## 2019-03-29 PROCEDURE — 96374 THER/PROPH/DIAG INJ IV PUSH: CPT

## 2019-03-29 RX ORDER — PROMETHAZINE HYDROCHLORIDE 25 MG/1
25 TABLET ORAL EVERY 8 HOURS PRN
Qty: 12 TABLET | Refills: 0 | Status: ON HOLD | OUTPATIENT
Start: 2019-03-29 | End: 2020-01-12

## 2019-03-29 RX ORDER — PROMETHAZINE HYDROCHLORIDE 25 MG/ML
12.5 INJECTION, SOLUTION INTRAMUSCULAR; INTRAVENOUS ONCE
Status: COMPLETED | OUTPATIENT
Start: 2019-03-29 | End: 2019-03-29

## 2019-03-29 RX ORDER — DICYCLOMINE HCL 20 MG
20 TABLET ORAL EVERY 6 HOURS PRN
Qty: 20 TABLET | Refills: 0 | Status: ON HOLD | OUTPATIENT
Start: 2019-03-29 | End: 2020-01-12

## 2019-03-29 RX ORDER — ONDANSETRON 2 MG/ML
4 INJECTION INTRAMUSCULAR; INTRAVENOUS ONCE
Status: COMPLETED | OUTPATIENT
Start: 2019-03-29 | End: 2019-03-29

## 2019-03-29 RX ORDER — PROMETHAZINE HYDROCHLORIDE 25 MG/ML
6.25 INJECTION, SOLUTION INTRAMUSCULAR; INTRAVENOUS ONCE
Status: COMPLETED | OUTPATIENT
Start: 2019-03-29 | End: 2019-03-29

## 2019-03-29 RX ORDER — SODIUM CHLORIDE 0.9 % (FLUSH) 0.9 %
10 SYRINGE (ML) INJECTION AS NEEDED
Status: DISCONTINUED | OUTPATIENT
Start: 2019-03-29 | End: 2019-03-29 | Stop reason: HOSPADM

## 2019-03-29 RX ADMIN — PROMETHAZINE HYDROCHLORIDE 6.25 MG: 25 INJECTION INTRAMUSCULAR; INTRAVENOUS at 12:03

## 2019-03-29 RX ADMIN — ONDANSETRON 4 MG: 2 INJECTION, SOLUTION INTRAMUSCULAR; INTRAVENOUS at 10:15

## 2019-03-29 RX ADMIN — PROMETHAZINE HYDROCHLORIDE 12.5 MG: 25 INJECTION INTRAMUSCULAR; INTRAVENOUS at 10:57

## 2019-03-29 RX ADMIN — SODIUM CHLORIDE 500 ML: 9 INJECTION, SOLUTION INTRAVENOUS at 10:15

## 2019-05-21 ENCOUNTER — OFFICE VISIT (OUTPATIENT)
Dept: PSYCHIATRY | Facility: CLINIC | Age: 49
End: 2019-05-21

## 2019-05-21 DIAGNOSIS — Z63.8 STRESS DUE TO FAMILY TENSION: ICD-10-CM

## 2019-05-21 DIAGNOSIS — F31.81 BIPOLAR II DISORDER (HCC): Primary | ICD-10-CM

## 2019-05-21 PROCEDURE — 90832 PSYTX W PT 30 MINUTES: CPT | Performed by: SOCIAL WORKER

## 2019-05-21 SDOH — SOCIAL STABILITY - SOCIAL INSECURITY: OTHER SPECIFIED PROBLEMS RELATED TO PRIMARY SUPPORT GROUP: Z63.8

## 2019-05-21 NOTE — PROGRESS NOTES
"Date of Service: May 21, 2019  Time In: 1:40 PM  Time Out: 2:15 PM    PROGRESS NOTE  Data:  Tammi Levine is a 49 y.o. female who met 1:1 with the undersigned for a regularly scheduled individual outpatient therapy session [facility] for follow-up of bipolar II disorder.     HPI: Patient reports she was recently awarded her disability benefits and states she feels as though \"it is about time\".  She does report she continues to struggle with her mother and states she feels as though her relationship has been fundamentally changed.  Patient reports she continues to feel as though her mother is choosing her new boyfriend over her children.  Patient reports she feels as though her feelings and thoughts are not even considered in the home anymore.  Patient Continues to struggle with periods of depression including sad mood, anhedonia, anergia, automatic negative cognition, feeling hopeless, and periodic social isolation.  Patient rates current symptoms of depression at 5/6 on a scale of 1-10 with 10 being most severe. Patient also reports she continues to have periods of irritability and has a tendency to lash out at others.  Patient reports she continues to adhere to medication regimen as prescribed.  The patient adamantly and convincingly denies suicidal ideation vehemently denies any substance use.      Clinical Maneuvering/Intervention:  Assisted patient in processing above session content; acknowledged and normalized patient’s thoughts, feelings, and concerns.  Allow the patient to discuss/vent her feelings and frustrations with what she perceives to be bad behavior by her mother and validated her feelings.  However, also utilized motivational interviewing techniques including complex reflections to assist the patient in recognizing her mother's behaviors are likely not facetious.  Also utilized cognitive behavioral therapy to assist the patient and identifying faulty, irrational " thoughts and challenging with factual counter arguments.  Provided unconditional positive regard in a safe, supportive environment.    Allowed patient to freely discuss issues without interruption or judgment. Provided safe, confidential environment to facilitate the development of positive therapeutic relationship and encourage open, honest communication. Assisted patient in identifying risk factors which would indicate the need for higher level of care including thoughts to harm self or others and/or self-harming behavior and encouraged patient to contact this office, call 911, or present to the nearest emergency room should any of these events occur. Discussed crisis intervention services and means to access.  Patient adamantly and convincingly denies current suicidal or homicidal ideation or perceptual disturbance.    Assessment    Patient continues to struggle with mood instability with depression being primary.  Patient symptomology appears to be recently exacerbated by strained relationship with her mother as her mother has began dating following the death of the patient's father.  The patient's symptomology continue to cause impairment in important areas of functioning.  Patient can be reasonably expected to continue to benefit treatment and would likely be at increased risk for decompensation.    Diagnoses and all orders for this visit:    Bipolar II disorder (CMS/[facility])    Stress due to family tension               Mental Status Exam  Hygiene:  good  Dress:  casual  Attitude:  Cooperative  Motor Activity:  Appropriate  Speech:  Normal  Mood:  depressed/irritable  Affect:  depressed  Thought Processes:  Linear  Thought Content:  normal  Suicidal Thoughts:  denies  Homicidal Thoughts:  denies  Crisis Safety Plan: yes, to come to the emergency room.  Hallucinations:  denies    Patient's Support Network Includes:  mother and extended family    Progress toward goal: Not at goal    Functional Status: Moderate  impairment     Prognosis: Fair with Ongoing Treatment     Plan         Patient will continue in individual outpatient therapy session [facility] every 3 weeks and will continue and pharmacotherapy as scheduled with MARISELA Madsen.  Patient will adhere to medication regimen as prescribed and report any side effects. Patient will contact this office, call 911 or present to the nearest emergency room should suicidal or homicidal ideations occur. Provide Cognitive Behavioral Therapy and Integrative Therapy to improve functioning, maintain stability, and avoid decompensation and the need for higher level of care.          Return in about 3 weeks (around 6/11/2019) for Next scheduled follow up.      This document signed by Vidal Ortiz LCSW, [facility] May 21, 2019 4:03 PM

## 2019-05-23 ENCOUNTER — OFFICE VISIT (OUTPATIENT)
Dept: PSYCHIATRY | Facility: CLINIC | Age: 49
End: 2019-05-23

## 2019-05-23 VITALS
DIASTOLIC BLOOD PRESSURE: 80 MMHG | WEIGHT: 178 LBS | SYSTOLIC BLOOD PRESSURE: 130 MMHG | HEART RATE: 90 BPM | BODY MASS INDEX: 35.88 KG/M2 | HEIGHT: 59 IN

## 2019-05-23 DIAGNOSIS — Z63.8 STRESS DUE TO FAMILY TENSION: ICD-10-CM

## 2019-05-23 DIAGNOSIS — F31.81 BIPOLAR II DISORDER (HCC): Primary | ICD-10-CM

## 2019-05-23 DIAGNOSIS — Z79.899 HIGH RISK MEDICATION USE: ICD-10-CM

## 2019-05-23 PROCEDURE — 99213 OFFICE O/P EST LOW 20 MIN: CPT | Performed by: NURSE PRACTITIONER

## 2019-05-23 RX ORDER — CITALOPRAM 10 MG/1
10 TABLET ORAL DAILY
Qty: 30 TABLET | Refills: 2 | Status: SHIPPED | OUTPATIENT
Start: 2019-05-23 | End: 2019-09-05 | Stop reason: SDUPTHER

## 2019-05-23 RX ORDER — LITHIUM CARBONATE 300 MG/1
300 CAPSULE ORAL 2 TIMES DAILY WITH MEALS
Qty: 60 CAPSULE | Refills: 2 | Status: SHIPPED | OUTPATIENT
Start: 2019-05-23 | End: 2019-09-05 | Stop reason: SDUPTHER

## 2019-05-23 SDOH — SOCIAL STABILITY - SOCIAL INSECURITY: OTHER SPECIFIED PROBLEMS RELATED TO PRIMARY SUPPORT GROUP: Z63.8

## 2019-05-23 NOTE — PROGRESS NOTES
Subjective   Tammi Levine is a 49 y.o. female is here today for medication management follow-up.     Chief Complaint: f/u Bipolar II    History of Present Illness   Patient presents for follow-up today. Patient reports she has had the stomach bug that she is getting over. She reports she is having dental problems. She is going to have her teeth removed at  next week and she will get dentures. She has a bruise on her right lower jaw. She reports she went to [employer] and tried to reach a bottle of gatorade on top shelf and it hit her in the face. Appetite fair. She is having trouble eating due to dental problems. She denies suicidal and homicidal ideations. She denies auditory and visual hallucinations. She reports mood has been stable. She denies any hypomania or flaquito. She reports she is getting along better with her mom and mom's boyfriend. Patient got approved for SSI.       The following portions of the patient's history were reviewed and updated as appropriate: allergies, current medications, past family history, past medical history, past social history, past surgical history and problem list.    Review of Systems   Constitutional: Positive for appetite change.   HENT: Positive for dental problem.    Eyes: Negative.    Respiratory: Negative.    Cardiovascular: Negative.    Gastrointestinal: Negative.    Endocrine: Negative.    Genitourinary: Negative.    Musculoskeletal: Positive for gait problem.        Ambulates with assist of cane.    Skin: Positive for color change.        Bruising to right lower jaw.    Allergic/Immunologic: Negative.    Hematological: Negative.    Psychiatric/Behavioral: Negative for agitation, dysphoric mood, hallucinations, self-injury, sleep disturbance and suicidal ideas. The patient is not nervous/anxious.        Objective   Physical Exam   Constitutional: She appears well-developed and well-nourished. No distress.   Skin: She is not diaphoretic.   Vitals reviewed.    Blood  "pressure 130/80, pulse 90, height 149.9 cm (59.02\"), weight 80.7 kg (178 lb).    Medication List:   Current Outpatient Medications   Medication Sig Dispense Refill   • aspirin 81 MG EC tablet Take 81 mg by mouth 1 (One) Time Per Week.     • atorvastatin (LIPITOR) 80 MG tablet Take 1 tablet by mouth Every Night. 30 tablet 3   • BYDUREON 2 MG pen-injector injection      • Canagliflozin (INVOKANA) 100 MG tablet Take 100 mg by mouth Daily.     • citalopram (CELEXA) 10 MG tablet Take 1 tablet by mouth Daily. 30 tablet 2   • dicyclomine (BENTYL) 20 MG tablet Take 1 tablet by mouth Every 6 (Six) Hours As Needed (abdominal cramps). 20 tablet 0   • ezetimibe (ZETIA) 10 MG tablet Take 1 tablet by mouth Daily. 30 tablet 3   • ferrous sulfate 325 (65 FE) MG tablet      • HUMALOG MIX 75/25 KWIKPEN (75-25) 100 UNIT/ML suspension pen-injector      • Insulin Glargine (BASAGLAR KWIKPEN) 100 UNIT/ML injection pen Inject 20 Units under the skin 2 (Two) Times a Day.     • levothyroxine (SYNTHROID, LEVOTHROID) 50 MCG tablet Take 50 mcg by mouth daily.     • lisinopril (PRINIVIL,ZESTRIL) 40 MG tablet Take 1 tablet by mouth Daily. 30 tablet 6   • lithium carbonate 300 MG capsule Take 1 capsule by mouth 2 (Two) Times a Day With Meals. 60 capsule 2   • ONETOUCH VERIO test strip      • pantoprazole (PROTONIX) 40 MG EC tablet Daily.     • promethazine (PHENERGAN) 25 MG tablet Take 1 tablet by mouth Every 8 (Eight) Hours As Needed for Nausea or Vomiting. 12 tablet 0     No current facility-administered medications for this visit.        Labs:   Recent Results (from the past 2016 hour(s))   Comprehensive Metabolic Panel    Collection Time: 03/29/19 10:08 AM   Result Value Ref Range    Glucose 179 (H) 65 - 99 mg/dL    BUN 18 6 - 20 mg/dL    Creatinine 1.30 (H) 0.57 - 1.00 mg/dL    Sodium 131 (L) 136 - 145 mmol/L    Potassium 5.0 3.5 - 5.2 mmol/L    Chloride 98 98 - 107 mmol/L    CO2 21.3 (L) 22.0 - 29.0 mmol/L    Calcium 9.3 8.6 - 10.5 mg/dL    " Total Protein 7.6 6.0 - 8.5 g/dL    Albumin 4.39 3.50 - 5.20 g/dL    ALT (SGPT) 13 1 - 33 U/L    AST (SGOT) 15 1 - 32 U/L    Alkaline Phosphatase 105 39 - 117 U/L    Total Bilirubin 0.3 0.2 - 1.2 mg/dL    eGFR Non African Amer 43 (L) >60 mL/min/1.73    Globulin 3.2 gm/dL    A/G Ratio 1.4 g/dL    BUN/Creatinine Ratio 13.8 7.0 - 25.0    Anion Gap 11.7 mmol/L   Lipase    Collection Time: 03/29/19 10:08 AM   Result Value Ref Range    Lipase 44 13 - 60 U/L   Amylase    Collection Time: 03/29/19 10:08 AM   Result Value Ref Range    Amylase 50 28 - 100 U/L   Lithium Level    Collection Time: 03/29/19 10:08 AM   Result Value Ref Range    Lithium 0.5 (L) 0.6 - 1.2 mmol/L   CBC Auto Differential    Collection Time: 03/29/19 10:08 AM   Result Value Ref Range    WBC 10.95 (H) 3.40 - 10.80 10*3/mm3    RBC 3.94 3.77 - 5.28 10*6/mm3    Hemoglobin 11.2 (L) 12.0 - 15.9 g/dL    Hematocrit 34.0 34.0 - 46.6 %    MCV 86.3 79.0 - 97.0 fL    MCH 28.4 26.6 - 33.0 pg    MCHC 32.9 31.5 - 35.7 g/dL    RDW 13.4 12.3 - 15.4 %    RDW-SD 41.6 37.0 - 54.0 fl    MPV 10.0 6.0 - 12.0 fL    Platelets 271 140 - 450 10*3/mm3    Neutrophil % 77.7 (H) 42.7 - 76.0 %    Lymphocyte % 16.3 (L) 19.6 - 45.3 %    Monocyte % 5.0 5.0 - 12.0 %    Eosinophil % 0.5 0.3 - 6.2 %    Basophil % 0.2 0.0 - 1.5 %    Immature Grans % 0.3 0.0 - 0.5 %    Neutrophils, Absolute 8.52 (H) 1.40 - 7.00 10*3/mm3    Lymphocytes, Absolute 1.78 0.70 - 3.10 10*3/mm3    Monocytes, Absolute 0.55 0.10 - 0.90 10*3/mm3    Eosinophils, Absolute 0.05 0.00 - 0.40 10*3/mm3    Basophils, Absolute 0.02 0.00 - 0.20 10*3/mm3    Immature Grans, Absolute 0.03 0.00 - 0.05 10*3/mm3   Urinalysis With Culture If Indicated - Urine, Clean Catch    Collection Time: 03/29/19 10:16 AM   Result Value Ref Range    Color, UA Yellow Yellow, Straw    Appearance, UA Clear Clear    pH, UA <=5.0 5.0 - 8.0    Specific Gravity, UA 1.016 1.005 - 1.030    Glucose, UA >=1000 mg/dL (3+) (A) Negative    Ketones, UA Negative  Negative    Bilirubin, UA Negative Negative    Blood, UA Negative Negative    Protein, UA Negative Negative    Leuk Esterase, UA Small (1+) (A) Negative    Nitrite, UA Negative Negative    Urobilinogen, UA 0.2 E.U./dL 0.2 - 1.0 E.U./dL   Urinalysis, Microscopic Only - Urine, Clean Catch    Collection Time: 03/29/19 10:16 AM   Result Value Ref Range    RBC, UA 0-2 None Seen, 0-2 /HPF    WBC, UA 3-5 (A) None Seen, 0-2 /HPF    Bacteria, UA Trace (A) None Seen /HPF    Squamous Epithelial Cells, UA 3-6 (A) None Seen, 0-2 /HPF    Hyaline Casts, UA None Seen None Seen /LPF    Methodology Manual Light Microscopy          Mental Status Exam:   Hygiene:   good  Cooperation:  Cooperative  Eye Contact:  Good  Psychomotor Behavior:  Appropriate  Affect:  Full range  Hopelessness: Denies  Speech:  Normal  Thought Process:  Goal directed and Linear  Thought Content:  Normal  Suicidal:  None  Homicidal:  None  Hallucinations:  None  Delusion:  None  Memory:  Intact  Orientation:  Person, Place, Time and Situation  Reliability:  good  Insight:  Good  Judgement:  Good  Impulse Control:  Good  Physical/Medical Issues:  Yes HTN, Diabetes, Hypothyroidism     Assessment/Plan   Diagnoses and all orders for this visit:    Bipolar II disorder (CMS/HCC)  -     lithium carbonate 300 MG capsule; Take 1 capsule by mouth 2 (Two) Times a Day With Meals.  -     citalopram (CELEXA) 10 MG tablet; Take 1 tablet by mouth Daily.    Stress due to family tension  -     citalopram (CELEXA) 10 MG tablet; Take 1 tablet by mouth Daily.    High risk medication use  -     lithium carbonate 300 MG capsule; Take 1 capsule by mouth 2 (Two) Times a Day With Meals.      Body mass index is 35.93 kg/m².  Patient was educated on healthier and more balanced diet choices and encouraged exercise within physical limitations.  Prognosis: Good dependent on medication/follow up and treatment plan compliance.  Functionality: pt showing improvement in important areas of daily  functioning.      Impression: Mood stable.     Plan:  1) Continue Lithium 300 mg po BID for Bipolar II  2) Continue Celexa 10 mg po daily for mood and anxiety  3) Continue psychotherapy with Vidal Ortiz LCSW  4) RTC in 3 months       Labs reviewed from 3/29/19 unremarkable    Discussed medication options.  Reviewed the risks, benefits, and side effects of the medications; patient acknowledged and verbally consented.  Patient is agreeable to call the [facility].  Patient is aware to call 911 or go to the nearest ER should begin having SI/HI.

## 2019-06-11 ENCOUNTER — OFFICE VISIT (OUTPATIENT)
Dept: PSYCHIATRY | Facility: CLINIC | Age: 49
End: 2019-06-11

## 2019-06-11 DIAGNOSIS — F31.81 BIPOLAR II DISORDER (HCC): Primary | ICD-10-CM

## 2019-06-11 PROCEDURE — 90837 PSYTX W PT 60 MINUTES: CPT | Performed by: SOCIAL WORKER
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
## 2019-07-02 ENCOUNTER — OFFICE VISIT (OUTPATIENT)
Dept: PSYCHIATRY | Facility: CLINIC | Age: 49
End: 2019-07-02

## 2019-07-02 DIAGNOSIS — Z63.8 STRESS DUE TO FAMILY TENSION: ICD-10-CM

## 2019-07-02 DIAGNOSIS — F31.81 BIPOLAR II DISORDER (HCC): Primary | ICD-10-CM

## 2019-07-02 PROCEDURE — 90832 PSYTX W PT 30 MINUTES: CPT | Performed by: SOCIAL WORKER

## 2019-07-02 SDOH — SOCIAL STABILITY - SOCIAL INSECURITY: OTHER SPECIFIED PROBLEMS RELATED TO PRIMARY SUPPORT GROUP: Z63.8

## 2019-07-03 NOTE — PROGRESS NOTES
"Date of Service: July 2, 2019  Time In: 1:38 PM  Time Out: 2:10 PM    PROGRESS NOTE  Data:  Tammi Levine is a 49 y.o. female who met 1:1 with the undersigned for a regularly scheduled individual outpatient therapy session [facility] [place] for follow-up of bipolar II disorder.     HPI: Patient reports she continues to struggle with significant stress at home and states she and her mother's relationship has apparently changed since her mother began dating following the death of the patient's father.  Patient reports as though she feels as though her mother has \"thrown down everyone\" since she began dating.  Patient reports she is considering seeking her on apartment and states she has put in applications for at least 2 apartment complexes.  Patient Continues to struggle with periods of depression including sad mood, anhedonia, anergia, automatic negative cognition, feeling hopeless, and periodic social isolation.  Patient rates current symptoms of depression at 6 on a scale of 1-10 with 10 being most severe. Patient also reports she continues to have periods of irritability and has a tendency to lash out at others.  Patient reports she continues to adhere to medication regimen as prescribed.  The patient adamantly and convincingly denies suicidal ideation vehemently denies any substance use.      Clinical Maneuvering/Intervention:  Assisted patient in processing above session content; acknowledged and normalized patient’s thoughts, feelings, and concerns.  Validated the patient's feelings and frustrations but also reminded her she cannot control the decisions or behaviors of others.  Also encouraged the patient to ask herself that she believe her mother still lives her and it could it be possible her mother is simply attempting to do what she feels is best for her.  Discussed the importance of communication and anti-didactic relationship and explaining the use of \"high\" statements and " reflective listening and encouraged the patient to consider attempting to have a calm discussion with her mother.  Also validated the patient's plan to get her own apartment.  Provided unconditional positive regard in a safe, supportive environment.    Allowed patient to freely discuss issues without interruption or judgment. Provided safe, confidential environment to facilitate the development of positive therapeutic relationship and encourage open, honest communication. Assisted patient in identifying risk factors which would indicate the need for higher level of care including thoughts to harm self or others and/or self-harming behavior and encouraged patient to contact this office, call 911, or present to the nearest emergency room should any of these events occur. Discussed crisis intervention services and means to access.  Patient adamantly and convincingly denies current suicidal or homicidal ideation or perceptual disturbance.    Assessment    Patient continues to struggle with mood instability with depression being primary.  Patient symptomology appears to be recently exacerbated by strained relationship with her mother as her mother has began dating following the death of the patient's father.  The patient's symptomology continue to cause impairment in important areas of functioning.  Patient can be reasonably expected to continue to benefit treatment and would likely be at increased risk for decompensation.    Diagnoses and all orders for this visit:    Bipolar II disorder (CMS/[facility])    Stress due to family tension               Mental Status Exam  Hygiene:  good  Dress:  casual  Attitude:  Cooperative  Motor Activity:  Appropriate  Speech:  Normal  Mood:  depressed/irritable  Affect:  depressed  Thought Processes:  Linear  Thought Content:  normal  Suicidal Thoughts:  denies  Homicidal Thoughts:  denies  Crisis Safety Plan: yes, to come to the emergency room.  Hallucinations:  denies    Patient's Support  Network Includes:  mother and extended family    Progress toward goal: Not at goal    Functional Status: Moderate impairment     Prognosis: Fair with Ongoing Treatment     Plan         Patient will continue in individual outpatient therapy session [facility] [place] every 3 weeks and will continue and pharmacotherapy as scheduled with MARISELA Madsen.  Patient will adhere to medication regimen as prescribed and report any side effects. Patient will contact this office, call 911 or present to the nearest emergency room should suicidal or homicidal ideations occur. Provide Cognitive Behavioral Therapy and Integrative Therapy to improve functioning, maintain stability, and avoid decompensation and the need for higher level of care.          Return in about 3 weeks (around 7/23/2019) for Next scheduled follow up.      This document signed by Vidal Ortiz LCSW, [facility] July 3, 2019 7:41 AM

## 2019-07-05 ENCOUNTER — OFFICE VISIT (OUTPATIENT)
Dept: CARDIOLOGY | Facility: CLINIC | Age: 49
End: 2019-07-05

## 2019-07-05 VITALS
DIASTOLIC BLOOD PRESSURE: 65 MMHG | HEIGHT: 59 IN | OXYGEN SATURATION: 99 % | SYSTOLIC BLOOD PRESSURE: 111 MMHG | WEIGHT: 167.8 LBS | HEART RATE: 73 BPM | BODY MASS INDEX: 33.83 KG/M2

## 2019-07-05 DIAGNOSIS — I10 ESSENTIAL HYPERTENSION: ICD-10-CM

## 2019-07-05 DIAGNOSIS — Z79.4 TYPE 2 DIABETES MELLITUS WITH DIABETIC PERIPHERAL ANGIOPATHY WITHOUT GANGRENE, WITH LONG-TERM CURRENT USE OF INSULIN (HCC): ICD-10-CM

## 2019-07-05 DIAGNOSIS — I25.10 ARTERIOSCLEROTIC CARDIOVASCULAR DISEASE (ASCVD): Primary | ICD-10-CM

## 2019-07-05 DIAGNOSIS — E11.51 TYPE 2 DIABETES MELLITUS WITH DIABETIC PERIPHERAL ANGIOPATHY WITHOUT GANGRENE, WITH LONG-TERM CURRENT USE OF INSULIN (HCC): ICD-10-CM

## 2019-07-05 DIAGNOSIS — E78.5 DYSLIPIDEMIA: ICD-10-CM

## 2019-07-05 PROCEDURE — 99213 OFFICE O/P EST LOW 20 MIN: CPT | Performed by: PHYSICIAN ASSISTANT

## 2019-07-05 PROCEDURE — 93000 ELECTROCARDIOGRAM COMPLETE: CPT | Performed by: PHYSICIAN ASSISTANT

## 2019-07-05 RX ORDER — OMEPRAZOLE 20 MG/1
20 CAPSULE, DELAYED RELEASE ORAL DAILY
COMMUNITY
End: 2020-08-14
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
## 2019-07-23 ENCOUNTER — OFFICE VISIT (OUTPATIENT)
Dept: PSYCHIATRY | Facility: CLINIC | Age: 49
End: 2019-07-23

## 2019-07-23 DIAGNOSIS — F31.81 BIPOLAR II DISORDER (HCC): Primary | ICD-10-CM

## 2019-07-23 PROCEDURE — 90832 PSYTX W PT 30 MINUTES: CPT | Performed by: SOCIAL WORKER
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
## 2019-08-13 ENCOUNTER — OFFICE VISIT (OUTPATIENT)
Dept: PSYCHIATRY | Facility: CLINIC | Age: 49
End: 2019-08-13

## 2019-08-13 DIAGNOSIS — F31.81 BIPOLAR II DISORDER (HCC): Primary | ICD-10-CM

## 2019-08-13 PROCEDURE — 90834 PSYTX W PT 45 MINUTES: CPT | Performed by: SOCIAL WORKER

## 2019-08-13 NOTE — TREATMENT PLAN
Multi-Disciplinary Problems (from Behavioral Health Treatment Plan)    Active Problems     Problem:  Patient Care Overview (Adult)  Start Date: 08/13/19    Problem Details:  Patient continues to struggle with mood instability including depression and hypomania; she appears to be doing better psychologically since being awarded her disability and getting her own place. She continues to struggle with mood instability and adjus tment to being on her own.      Goal Priority Start Date Expected End Date End Date    Plan of Care Review High 08/13/19 11/13/19 --    Goal Details:  Patient will continue in psychotherapy monthly and pharmacotherapy as scheduled.                         I have discussed and reviewed this treatment plan with the patient.  It has been printed for signatures.  This document signed by Vidal Ortiz LCSW, [facility] August 13, 2019 1:54 PM

## 2019-08-13 NOTE — PROGRESS NOTES
"Date of Service: August 13, 2019  Time In:  1:35 PM  Time Out: 2:25 PM    PROGRESS NOTE  Data:  Tammi Levine is a 49 y.o. female who met 1:1 with the undersigned for a regularly scheduled individual outpatient therapy session [facility] for follow-up of bipolar II disorder.     HPI: Patient reports she moved into her own apartment less than a week ago and states she feels that this was a good decision.  However, she reports her mother is \"pissed\".  Patient reports she continues to have strained relationship with her mother and states she became upset when her mother reneged on giving her a piece of furniture simply because her mother's boyfriend did not agree.  Patient Continues to struggle with periods of depression including sad mood, anhedonia, anergia, automatic negative cognition, feeling hopeless, and periodic social isolation.  Patient rates current symptoms of depression at a 4 on a scale of 1-10 with 10 being most severe. Patient also reports she continues to have periods of irritability and has a tendency to lash out at others.  Patient reports she continues to adhere to medication regimen as prescribed.  The patient adamantly and convincingly denies suicidal ideation vehemently denies any substance use.      Clinical Maneuvering/Intervention:  Assisted patient in processing above session content; acknowledged and normalized patient’s thoughts, feelings, and concerns.  Validated the patient's right to move into her own apartment and praised her for her effort to bring about positive change.  Also utilized cognitive behavioral therapy to assist the patient restructuring her thought process and realizing she does not have to be angry with anyone or explaining herself to anyone.  Discussed the importance of healthy skills of daily living and encouraged the patient to began to develop her own routines and schedules and to remain active.  Continue to assist the patient with " developing appropriate coping mechanisms to decrease the severity frequency of symptoms.  Provided unconditional positive regard in a safe, supportive environment.    Allowed patient to freely discuss issues without interruption or judgment. Provided safe, confidential environment to facilitate the development of positive therapeutic relationship and encourage open, honest communication. Assisted patient in identifying risk factors which would indicate the need for higher level of care including thoughts to harm self or others and/or self-harming behavior and encouraged patient to contact this office, call 911, or present to the nearest emergency room should any of these events occur. Discussed crisis intervention services and means to access.  Patient adamantly and convincingly denies current suicidal or homicidal ideation or perceptual disturbance.    Patient participated in the formulation of her treatment plan and verbalizes her agreement with all goals and objectives.  Treatment plan completed on this date.    Assessment    Patient appears to be doing somewhat better since moving into her own apartment although she continues to struggle with periods of depression which likely continue to be exacerbated by ongoing strained relationship with her mother.   The patient's symptomology continue to cause impairment in important areas of functioning.  Patient can be reasonably expected to continue to benefit treatment and would likely be at increased risk for decompensation.    Diagnoses and all orders for this visit:    Bipolar II disorder (CMS/[facility])               Mental Status Exam  Hygiene:  good  Dress:  casual  Attitude:  Cooperative  Motor Activity:  Appropriate  Speech:  Normal  Mood:  depressed/irritable  Affect:  depressed  Thought Processes:  Linear  Thought Content:  normal  Suicidal Thoughts:  denies  Homicidal Thoughts:  denies  Crisis Safety Plan: yes, to come to the emergency room.  Hallucinations:   denies    Patient's Support Network Includes:  mother and extended family    Progress toward goal: Not at goal    Functional Status: Moderate impairment     Prognosis: Fair with Ongoing Treatment     Plan         Patient will continue in individual outpatient therapy session [facility] every 3 weeks and will continue and pharmacotherapy as scheduled with MARISELA Madsen.  Patient will adhere to medication regimen as prescribed and report any side effects. Patient will contact this office, call 911 or present to the nearest emergency room should suicidal or homicidal ideations occur. Provide Cognitive Behavioral Therapy and Integrative Therapy to improve functioning, maintain stability, and avoid decompensation and the need for higher level of care.          Return in about 3 weeks (around 9/3/2019) for Next scheduled follow up.      This document signed by Vidal Ortiz LCSW, [facility] August 13, 2019 2:52 PM

## 2019-09-05 ENCOUNTER — LAB (OUTPATIENT)
Dept: LAB | Facility: HOSPITAL | Age: 49
End: 2019-09-05

## 2019-09-05 ENCOUNTER — TRANSCRIBE ORDERS (OUTPATIENT)
Dept: ADMINISTRATIVE | Facility: HOSPITAL | Age: 49
End: 2019-09-05

## 2019-09-05 ENCOUNTER — OFFICE VISIT (OUTPATIENT)
Dept: PSYCHIATRY | Facility: CLINIC | Age: 49
End: 2019-09-05

## 2019-09-05 VITALS
WEIGHT: 162.2 LBS | HEIGHT: 59 IN | HEART RATE: 86 BPM | BODY MASS INDEX: 32.7 KG/M2 | SYSTOLIC BLOOD PRESSURE: 100 MMHG | DIASTOLIC BLOOD PRESSURE: 60 MMHG

## 2019-09-05 DIAGNOSIS — Z63.8 STRESS DUE TO FAMILY TENSION: ICD-10-CM

## 2019-09-05 DIAGNOSIS — E11.9 DIABETES MELLITUS WITHOUT COMPLICATION (HCC): Primary | ICD-10-CM

## 2019-09-05 DIAGNOSIS — E11.9 DIABETES MELLITUS WITHOUT COMPLICATION (HCC): ICD-10-CM

## 2019-09-05 DIAGNOSIS — F31.81 BIPOLAR II DISORDER (HCC): ICD-10-CM

## 2019-09-05 DIAGNOSIS — Z79.899 HIGH RISK MEDICATION USE: ICD-10-CM

## 2019-09-05 PROCEDURE — 84443 ASSAY THYROID STIM HORMONE: CPT

## 2019-09-05 PROCEDURE — 80048 BASIC METABOLIC PNL TOTAL CA: CPT

## 2019-09-05 PROCEDURE — 36415 COLL VENOUS BLD VENIPUNCTURE: CPT

## 2019-09-05 PROCEDURE — 99213 OFFICE O/P EST LOW 20 MIN: CPT | Performed by: NURSE PRACTITIONER

## 2019-09-05 PROCEDURE — 82570 ASSAY OF URINE CREATININE: CPT

## 2019-09-05 PROCEDURE — 82043 UR ALBUMIN QUANTITATIVE: CPT

## 2019-09-05 PROCEDURE — 80178 ASSAY OF LITHIUM: CPT

## 2019-09-05 PROCEDURE — 80061 LIPID PANEL: CPT

## 2019-09-05 RX ORDER — ERTUGLIFLOZIN 15 MG/1
TABLET, FILM COATED ORAL
Status: ON HOLD | COMMUNITY
Start: 2019-07-22 | End: 2020-01-12

## 2019-09-05 RX ORDER — FLURBIPROFEN SODIUM 0.3 MG/ML
SOLUTION/ DROPS OPHTHALMIC
COMMUNITY
Start: 2019-07-18 | End: 2020-01-12

## 2019-09-05 RX ORDER — LITHIUM CARBONATE 300 MG/1
300 CAPSULE ORAL 2 TIMES DAILY WITH MEALS
Qty: 60 CAPSULE | Refills: 2 | Status: SHIPPED | OUTPATIENT
Start: 2019-09-05 | End: 2019-11-26 | Stop reason: SDUPTHER

## 2019-09-05 RX ORDER — CITALOPRAM 10 MG/1
10 TABLET ORAL DAILY
Qty: 30 TABLET | Refills: 2 | Status: SHIPPED | OUTPATIENT
Start: 2019-09-05 | End: 2019-11-26 | Stop reason: SDUPTHER

## 2019-09-05 SDOH — SOCIAL STABILITY - SOCIAL INSECURITY: OTHER SPECIFIED PROBLEMS RELATED TO PRIMARY SUPPORT GROUP: Z63.8

## 2019-09-05 NOTE — PROGRESS NOTES
"    Nanette Levine is a 49 y.o. female is here today for medication management follow-up.     Chief Complaint: f/u Bipolar II    History of Present Illness   Patient presents for follow-up today. Patient has moved out into her own away from her mom and mom's boyfriend. Patient reports it is just her and her cat. She states \"it's peaceful, no arguing or fighting, I enjoy it\". Patient reports going to [employer] and shopping with her friend Cheal. She has made friends at her apartment. Sleep and appetite are good. She denies suicidal and homicidal ideations. She denies auditory and visual hallucinations. She reports mood has been stable. She denies any hypomania or flaquito. She reports she is getting along better with her mom and mom's boyfriend. Patient got approved for SSI.       The following portions of the patient's history were reviewed and updated as appropriate: allergies, current medications, past family history, past medical history, past social history, past surgical history and problem list.    Review of Systems   Constitutional: Negative.    HENT: Negative.    Eyes: Negative.    Respiratory: Negative.    Cardiovascular: Negative.    Gastrointestinal: Negative.    Endocrine: Negative.    Genitourinary: Negative.    Musculoskeletal: Positive for gait problem.        Ambulates with assist of cane.    Skin: Negative.    Allergic/Immunologic: Negative.    Hematological: Negative.    Psychiatric/Behavioral: Negative for agitation, dysphoric mood, hallucinations, self-injury, sleep disturbance and suicidal ideas. The patient is not nervous/anxious.        Objective   Physical Exam   Constitutional: She appears well-developed and well-nourished. No distress.   Skin: She is not diaphoretic.   Vitals reviewed.    Blood pressure 100/60, pulse 86, height 149.9 cm (59\"), weight 73.6 kg (162 lb 3.2 oz).    Medication List:   Current Outpatient Medications   Medication Sig Dispense Refill   • aspirin 81 MG EC " tablet Take 81 mg by mouth 1 (One) Time Per Week.     • atorvastatin (LIPITOR) 80 MG tablet Take 1 tablet by mouth Every Night. 30 tablet 3   • B-D UF III MINI PEN NEEDLES 31G X 5 MM misc      • BYDUREON 2 MG pen-injector injection      • citalopram (CELEXA) 10 MG tablet Take 1 tablet by mouth Daily. 30 tablet 2   • dicyclomine (BENTYL) 20 MG tablet Take 1 tablet by mouth Every 6 (Six) Hours As Needed (abdominal cramps). 20 tablet 0   • ferrous sulfate 325 (65 FE) MG tablet      • HUMALOG MIX 75/25 KWIKPEN (75-25) 100 UNIT/ML suspension pen-injector      • levothyroxine (SYNTHROID, LEVOTHROID) 50 MCG tablet Take 50 mcg by mouth daily.     • lisinopril (PRINIVIL,ZESTRIL) 40 MG tablet Take 1 tablet by mouth Daily. 30 tablet 6   • lithium carbonate 300 MG capsule Take 1 capsule by mouth 2 (Two) Times a Day With Meals. 60 capsule 2   • omeprazole (priLOSEC) 20 MG capsule Take 20 mg by mouth Daily.     • ONETOUCH VERIO test strip      • promethazine (PHENERGAN) 25 MG tablet Take 1 tablet by mouth Every 8 (Eight) Hours As Needed for Nausea or Vomiting. 12 tablet 0   • STEGLATRO 15 MG tablet      • ezetimibe (ZETIA) 10 MG tablet Take 1 tablet by mouth Daily. 30 tablet 3     No current facility-administered medications for this visit.        Labs:   No results found for this or any previous visit (from the past 2016 hour(s)).      Mental Status Exam:   Hygiene:   good  Cooperation:  Cooperative  Eye Contact:  Good  Psychomotor Behavior:  Appropriate  Affect:  Full range  Hopelessness: Denies  Speech:  Normal  Thought Process:  Goal directed and Linear  Thought Content:  Normal  Suicidal:  None  Homicidal:  None  Hallucinations:  None  Delusion:  None  Memory:  Intact  Orientation:  Person, Place, Time and Situation  Reliability:  good  Insight:  Good  Judgement:  Good  Impulse Control:  Good  Physical/Medical Issues:  Yes HTN, Diabetes, Hypothyroidism     Assessment/Plan   Diagnoses and all orders for this visit:    Bipolar  II disorder (CMS/HCC)  -     Lithium Level; Future  -     citalopram (CELEXA) 10 MG tablet; Take 1 tablet by mouth Daily.  -     lithium carbonate 300 MG capsule; Take 1 capsule by mouth 2 (Two) Times a Day With Meals.    Stress due to family tension  -     citalopram (CELEXA) 10 MG tablet; Take 1 tablet by mouth Daily.    High risk medication use  -     Lithium Level; Future  -     lithium carbonate 300 MG capsule; Take 1 capsule by mouth 2 (Two) Times a Day With Meals.      Body mass index is 32.76 kg/m².  Patient was educated on healthier and more balanced diet choices and encouraged exercise within physical limitations.  Prognosis: Good dependent on medication/follow up and treatment plan compliance.  Functionality: pt showing improvement in important areas of daily functioning.      Impression: Mood stable.     Plan:  1) Continue Lithium 300 mg po BID for Bipolar II  2) Continue Celexa 10 mg po daily for mood and anxiety  3) Continue psychotherapy with Vidal Ortiz LCSW  4) RTC in 3 months   5) Patient has lab order from PCP for BMP, Lipid, Kidney function, TSH. I have added a Lithium level to     Discussed medication options.  Reviewed the risks, benefits, and side effects of the medications; patient acknowledged and verbally consented.  Patient is agreeable to [facility].  Patient is aware to call 911 or go to the nearest ER should begin having SI/HI.

## 2019-09-06 LAB
ALBUMIN UR-MCNC: <1.2 MG/DL
ANION GAP SERPL CALCULATED.3IONS-SCNC: 11.5 MMOL/L (ref 5–15)
BUN BLD-MCNC: 18 MG/DL (ref 6–20)
BUN/CREAT SERPL: 17.3 (ref 7–25)
CALCIUM SPEC-SCNC: 9.9 MG/DL (ref 8.6–10.5)
CHLORIDE SERPL-SCNC: 103 MMOL/L (ref 98–107)
CHOLEST SERPL-MCNC: 97 MG/DL (ref 0–200)
CO2 SERPL-SCNC: 21.5 MMOL/L (ref 22–29)
CREAT BLD-MCNC: 1.04 MG/DL (ref 0.57–1)
CREAT UR-MCNC: 49.7 MG/DL
GFR SERPL CREATININE-BSD FRML MDRD: 56 ML/MIN/1.73
GLUCOSE BLD-MCNC: 150 MG/DL (ref 65–99)
HDLC SERPL-MCNC: 41 MG/DL (ref 40–60)
LDLC SERPL CALC-MCNC: 39 MG/DL (ref 0–100)
LDLC/HDLC SERPL: 0.95 {RATIO}
LITHIUM SERPL-SCNC: 0.8 MMOL/L (ref 0.6–1.2)
MICROALBUMIN/CREAT UR: NORMAL MG/G{CREAT}
POTASSIUM BLD-SCNC: 4.4 MMOL/L (ref 3.5–5.2)
SODIUM BLD-SCNC: 136 MMOL/L (ref 136–145)
TRIGL SERPL-MCNC: 86 MG/DL (ref 0–150)
TSH SERPL DL<=0.05 MIU/L-ACNC: 0.84 UIU/ML (ref 0.27–4.2)
VLDLC SERPL-MCNC: 17.2 MG/DL (ref 5–40)

## 2019-09-24 ENCOUNTER — OFFICE VISIT (OUTPATIENT)
Dept: PSYCHIATRY | Facility: CLINIC | Age: 49
End: 2019-09-24

## 2019-09-24 DIAGNOSIS — F31.81 BIPOLAR II DISORDER (HCC): Primary | ICD-10-CM

## 2019-09-24 DIAGNOSIS — Z63.8 STRESS DUE TO FAMILY TENSION: ICD-10-CM

## 2019-09-24 PROCEDURE — 90832 PSYTX W PT 30 MINUTES: CPT | Performed by: SOCIAL WORKER

## 2019-09-24 SDOH — SOCIAL STABILITY - SOCIAL INSECURITY: OTHER SPECIFIED PROBLEMS RELATED TO PRIMARY SUPPORT GROUP: Z63.8

## 2019-09-24 NOTE — PROGRESS NOTES
"Date of Service: September 24, 2019  Time In: 1:15 PM  Time Out: 1:50 PM    PROGRESS NOTE  Data:  Tammi Levine is a 49 y.o. female who met 1:1 with the undersigned for a regularly scheduled individual outpatient therapy session [facility] for follow-up of bipolar II disorder.  Patient reports she is also struggling with strained relationship with her mother since finding out her mother was unfaithful to her father prior to his death.     HPI: Patient reports she feels she is doing much better since she has her own place and states she has made several friends at the apartment complex and states they have formed a habit of sitting out together in the evenings and talking.  Patient does report she continues to struggle with strained relationship with her mother and states she has come to the conclusion she would just have to \"keep to myself\".  Patient Continues to struggle with periods of depression including sad mood, anhedonia, anergia, automatic negative cognition, feeling hopeless, and periodic social isolation.  Patient rates current symptoms of depression at a 3 on a scale of 1-10 with 10 being most severe. Patient also reports she continues to have periods of irritability and has a tendency to lash out at others.  Patient reports she continues to adhere to medication regimen as prescribed.  The patient adamantly and convincingly denies suicidal ideation vehemently denies any substance use.      Clinical Maneuvering/Intervention:  Assisted patient in processing above session content; acknowledged and normalized patient’s thoughts, feelings, and concerns.  Utilized motivational reviewed techniques including complex reflections to assist the patient and identifying her possible biases concerning her mother's behavior in her right to engage in a relationship.  Also validated the patient's feelings but encouraged her to consider no longer getting into arguments with her mother as this " will not lead to a positive outcome.  Continue to utilize cognitive behavioral therapy to assist patient in developing appropriate coping mechanisms to decrease the severity frequency of symptoms.  Also focused on healthy skills of daily living and behavioral activation.  Provided unconditional positive regard in a safe, supportive environment.    Allowed patient to freely discuss issues without interruption or judgment. Provided safe, confidential environment to facilitate the development of positive therapeutic relationship and encourage open, honest communication. Assisted patient in identifying risk factors which would indicate the need for higher level of care including thoughts to harm self or others and/or self-harming behavior and encouraged patient to contact this office, call 911, or present to the nearest emergency room should any of these events occur. Discussed crisis intervention services and means to access.  Patient adamantly and convincingly denies current suicidal or homicidal ideation or perceptual disturbance.      Assessment    Patient appears to be doing somewhat better since moving into her own apartment; however, she continues to struggle with periods of depression which likely continue to be exacerbated by ongoing strained relationship with her mother.   The patient's symptomology continue to cause impairment in important areas of functioning.  Patient can be reasonably expected to continue to benefit treatment and would likely be at increased risk for decompensation.    Diagnoses and all orders for this visit:    Bipolar II disorder (CMS/[facility])    Stress due to family tension               Mental Status Exam  Hygiene:  good  Dress:  casual  Attitude:  Cooperative  Motor Activity:  Appropriate  Speech:  Normal  Mood:  depressed/irritable  Affect:  depressed  Thought Processes:  Linear  Thought Content:  normal  Suicidal Thoughts:  denies  Homicidal Thoughts:  denies  Crisis Safety Plan: yes,  to come to the emergency room.  Hallucinations:  denies    Patient's Support Network Includes:  mother and extended family    Progress toward goal: Not at goal    Functional Status: Moderate impairment     Prognosis: Fair with Ongoing Treatment     Plan         Patient will continue in individual outpatient therapy session [facility] every 4 weeks and will continue and pharmacotherapy as scheduled with MARISELA Madsen.  Patient will adhere to medication regimen as prescribed and report any side effects. Patient will contact this office, call 911 or present to the nearest emergency room should suicidal or homicidal ideations occur. Provide Cognitive Behavioral Therapy and Integrative Therapy to improve functioning, maintain stability, and avoid decompensation and the need for higher level of care.          Return in about 4 weeks (around 10/22/2019) for Next scheduled follow up.      This document signed by Vidal Ortiz LCSW, [facility] September 24, 2019 4:00 PM

## 2019-11-19 ENCOUNTER — OFFICE VISIT (OUTPATIENT)
Dept: PSYCHIATRY | Facility: CLINIC | Age: 49
End: 2019-11-19

## 2019-11-19 DIAGNOSIS — F31.81 BIPOLAR II DISORDER (HCC): Primary | ICD-10-CM

## 2019-11-19 DIAGNOSIS — Z63.8 STRESS DUE TO FAMILY TENSION: ICD-10-CM

## 2019-11-19 PROCEDURE — 90834 PSYTX W PT 45 MINUTES: CPT | Performed by: SOCIAL WORKER

## 2019-11-19 SDOH — SOCIAL STABILITY - SOCIAL INSECURITY: OTHER SPECIFIED PROBLEMS RELATED TO PRIMARY SUPPORT GROUP: Z63.8

## 2019-11-19 NOTE — TREATMENT PLAN
Multi-Disciplinary Problems (from Behavioral Health Treatment Plan)    Active Problems     Problem:  Patient Care Overview (Adult)  Start Date: 11/19/19    Problem Details:  Patient continues to struggle with mood instability including depression and hypomania; she appears to be doing better psychologically since being awarded her disability and getting her own place. She continues to struggle with mood instability and adjus tment to being on her own.       Goal Priority Start Date Expected End Date End Date    Plan of Care Review High 11/19/19 02/19/20 --    Goal Details:  Patient will continue in psychotherapy monthly and pharmacotherapy as scheduled. Will utilize CBT and solution focused therapy to assist patient in developing coping skills to decrease frequency and severity of symptoms. All this to improve functioning,  maintain stability and avoid higher level of care.                         I have discussed and reviewed this treatment plan with the patient.  It has been printed for signatures.  This document signed by Vidal Ortiz LCSW, [facility] November 19, 2019 1:50 PM

## 2019-11-20 NOTE — PROGRESS NOTES
"Date of Service: November 19, 2019  Time In: 1:14 PM  Time Out: 2:00 PM    PROGRESS NOTE  Data:  Tammi Levine is a 49 y.o. female who met 1:1 with the undersigned for a regularly scheduled individual outpatient therapy session [facility] for follow-up of bipolar II disorder.       HPI: Patient reports she continues to struggle with her relationship with her mother and when the undersigned inquires as to her plans for Thanksgiving she states \"I do not have any\" and states she and her mother are not having any kind of communication.  Patient continues to report she feels as though her mother has chosen her new boyfriend over her family.  Patient reports she continues to live in her own apartment and states she likes where she lives and has made some friends..  Patient Continues to struggle with periods of depression including sad mood, anhedonia, anergia, automatic negative cognition, feeling hopeless, and periodic social isolation.  Patient rates current symptoms of depression at a 3 on a scale of 1-10 with 10 being most severe. Patient also reports she continues to have periods of irritability and has a tendency to lash out at others.  Patient reports she continues to adhere to medication regimen as prescribed.  The patient adamantly and convincingly denies suicidal ideation vehemently denies any substance use.      Clinical Maneuvering/Intervention:  Assisted patient in processing above session content; acknowledged and normalized patient’s thoughts, feelings, and concerns.  Allowed the patient to discuss/vent her feelings and frustration with ongoing difficulties with her mother and validated her feelings.  Also utilized motivational reviewed techniques including complex reflections to discussed concept of things we can control things he cannot control and encouraged her to remind herself she cannot control the decisions or behaviors of others.  However, also utilized cognitive " behavioral theory to encourage the patient to at least consider the likelihood that she has some responsibility for her communication issues and encouraged her to consider attempting to contact her mother.  Provided unconditional positive regard in a safe, supportive environment.    Allowed patient to freely discuss issues without interruption or judgment. Provided safe, confidential environment to facilitate the development of positive therapeutic relationship and encourage open, honest communication. Assisted patient in identifying risk factors which would indicate the need for higher level of care including thoughts to harm self or others and/or self-harming behavior and encouraged patient to contact this office, call 911, or present to the nearest emergency room should any of these events occur. Discussed crisis intervention services and means to access.  Patient adamantly and convincingly denies current suicidal or homicidal ideation or perceptual disturbance.      Assessment    Patient appears to be doing somewhat better since moving into her own apartment; however, she continues to struggle with periods of depression which likely continue to be exacerbated by ongoing strained relationship with her mother.   The patient's symptomology continue to cause impairment in important areas of functioning.  Patient can be reasonably expected to continue to benefit treatment and would likely be at increased risk for decompensation.    Diagnoses and all orders for this visit:    Bipolar II disorder (CMS/[facility])    Stress due to family tension               Mental Status Exam  Hygiene:  good  Dress:  casual  Attitude:  Cooperative  Motor Activity:  Appropriate  Speech:  Normal  Mood:  depressed/irritable  Affect:  depressed  Thought Processes:  Linear  Thought Content:  normal  Suicidal Thoughts:  denies  Homicidal Thoughts:  denies  Crisis Safety Plan: yes, to come to the emergency room.  Hallucinations:   denies    Patient's Support Network Includes:  mother and extended family    Progress toward goal: Not at goal    Functional Status: Moderate impairment     Prognosis: Fair with Ongoing Treatment     Plan         Patient will continue in individual outpatient therapy session [facility] every 4 weeks and will continue and pharmacotherapy as scheduled with MARISELA Madsen.  Patient will adhere to medication regimen as prescribed and report any side effects. Patient will contact this office, call 911 or present to the nearest emergency room should suicidal or homicidal ideations occur. Provide Cognitive Behavioral Therapy and Integrative Therapy to improve functioning, maintain stability, and avoid decompensation and the need for higher level of care.          Return in about 4 weeks (around 12/17/2019) for Next scheduled follow up.      This document signed by Vidal Ortiz LCSW, [facility] November 20, 2019 11:20 AM

## 2019-11-26 ENCOUNTER — OFFICE VISIT (OUTPATIENT)
Dept: PSYCHIATRY | Facility: CLINIC | Age: 49
End: 2019-11-26

## 2019-11-26 VITALS
HEART RATE: 68 BPM | WEIGHT: 162.2 LBS | BODY MASS INDEX: 32.7 KG/M2 | HEIGHT: 59 IN | SYSTOLIC BLOOD PRESSURE: 121 MMHG | DIASTOLIC BLOOD PRESSURE: 68 MMHG

## 2019-11-26 DIAGNOSIS — Z63.8 STRESS DUE TO FAMILY TENSION: ICD-10-CM

## 2019-11-26 DIAGNOSIS — F31.81 BIPOLAR II DISORDER (HCC): ICD-10-CM

## 2019-11-26 DIAGNOSIS — Z79.899 HIGH RISK MEDICATION USE: ICD-10-CM

## 2019-11-26 PROCEDURE — 99213 OFFICE O/P EST LOW 20 MIN: CPT | Performed by: NURSE PRACTITIONER

## 2019-11-26 RX ORDER — LITHIUM CARBONATE 300 MG/1
300 CAPSULE ORAL 2 TIMES DAILY WITH MEALS
Qty: 60 CAPSULE | Refills: 2 | Status: ON HOLD | OUTPATIENT
Start: 2019-11-26 | End: 2020-01-12

## 2019-11-26 RX ORDER — CITALOPRAM 10 MG/1
10 TABLET ORAL DAILY
Qty: 30 TABLET | Refills: 2 | Status: ON HOLD | OUTPATIENT
Start: 2019-11-26 | End: 2020-01-12

## 2019-11-26 SDOH — SOCIAL STABILITY - SOCIAL INSECURITY: OTHER SPECIFIED PROBLEMS RELATED TO PRIMARY SUPPORT GROUP: Z63.8

## 2019-11-26 NOTE — PROGRESS NOTES
"    Subjective   Tammi Levine is a 49 y.o. female is here today for medication management follow-up.     Chief Complaint: f/u Bipolar II    History of Present Illness   Patient presents for follow-up today. She reports mother's boyfriend is not wanting her mother to talk to her or her brother. She reports she isn't going to get to spend Thanksgiving with her mother. She reports her brother has invited her to his house for Thanksgiving dinner and she is planning to go. She reports she has been coping with this as it has been hard. She reports her friend from [employer] brought her to her appointment today and she has been spending time with her and this has been \"keeping my spirits up\".Sleep and appetite are good. She denies suicidal and homicidal ideations. She denies auditory and visual hallucinations. She reports mood has been stable. She denies any hypomania or flaquito.        The following portions of the patient's history were reviewed and updated as appropriate: allergies, current medications, past family history, past medical history, past social history, past surgical history and problem list.    Review of Systems   Constitutional: Negative.    HENT: Negative.    Eyes: Negative.    Respiratory: Negative.    Cardiovascular: Negative.    Gastrointestinal: Negative.    Endocrine: Negative.    Genitourinary: Negative.    Musculoskeletal: Positive for gait problem.        Ambulates with assist of cane.    Skin: Negative.    Allergic/Immunologic: Negative.    Hematological: Negative.    Psychiatric/Behavioral: Negative for agitation, dysphoric mood, hallucinations, self-injury, sleep disturbance and suicidal ideas. The patient is not nervous/anxious.        Objective   Physical Exam   Constitutional: She appears well-developed and well-nourished. No distress.   Skin: She is not diaphoretic.   Vitals reviewed.    Blood pressure 121/68, pulse 68, height 149.9 cm (59\"), weight 73.6 kg (162 lb 3.2 oz).    Medication " List:   Current Outpatient Medications   Medication Sig Dispense Refill   • aspirin 81 MG EC tablet Take 81 mg by mouth 1 (One) Time Per Week.     • atorvastatin (LIPITOR) 80 MG tablet Take 1 tablet by mouth Every Night. 30 tablet 3   • B-D UF III MINI PEN NEEDLES 31G X 5 MM misc      • BYDUREON 2 MG pen-injector injection      • citalopram (CELEXA) 10 MG tablet Take 1 tablet by mouth Daily. 30 tablet 2   • dicyclomine (BENTYL) 20 MG tablet Take 1 tablet by mouth Every 6 (Six) Hours As Needed (abdominal cramps). 20 tablet 0   • ferrous sulfate 325 (65 FE) MG tablet      • HUMALOG MIX 75/25 KWIKPEN (75-25) 100 UNIT/ML suspension pen-injector      • levothyroxine (SYNTHROID, LEVOTHROID) 50 MCG tablet Take 50 mcg by mouth daily.     • lisinopril (PRINIVIL,ZESTRIL) 40 MG tablet Take 1 tablet by mouth Daily. 30 tablet 6   • lithium carbonate 300 MG capsule Take 1 capsule by mouth 2 (Two) Times a Day With Meals. 60 capsule 2   • omeprazole (priLOSEC) 20 MG capsule Take 20 mg by mouth Daily.     • ONETOUCH VERIO test strip      • promethazine (PHENERGAN) 25 MG tablet Take 1 tablet by mouth Every 8 (Eight) Hours As Needed for Nausea or Vomiting. 12 tablet 0   • STEGLATRO 15 MG tablet        No current facility-administered medications for this visit.        Labs:   No results found for this or any previous visit (from the past 2016 hour(s)).      Mental Status Exam:   Hygiene:   good  Cooperation:  Cooperative  Eye Contact:  Good  Psychomotor Behavior:  Appropriate  Affect:  Full range  Hopelessness: Denies  Speech:  Normal  Thought Process:  Goal directed and Linear  Thought Content:  Normal  Suicidal:  None  Homicidal:  None  Hallucinations:  None  Delusion:  None  Memory:  Intact  Orientation:  Person, Place, Time and Situation  Reliability:  good  Insight:  Good  Judgement:  Good  Impulse Control:  Good  Physical/Medical Issues:  Yes HTN, Diabetes, Hypothyroidism     Assessment/Plan   Diagnoses and all orders for this  visit:    Bipolar II disorder (CMS/[facility])  -     citalopram (CELEXA) 10 MG tablet; Take 1 tablet by mouth Daily.  -     lithium carbonate 300 MG capsule; Take 1 capsule by mouth 2 (Two) Times a Day With Meals.    Stress due to family tension  -     citalopram (CELEXA) 10 MG tablet; Take 1 tablet by mouth Daily.    High risk medication use  -     lithium carbonate 300 MG capsule; Take 1 capsule by mouth 2 (Two) Times a Day With Meals.      Body mass index is 32.76 kg/m².  Patient was educated on healthier and more balanced diet choices and encouraged exercise within physical limitations.  Prognosis: Good dependent on medication/follow up and treatment plan compliance.  Functionality: pt showing improvement in important areas of daily functioning.      Impression: Mood stable.     Plan:  1) Continue Lithium 300 mg po BID for Bipolar II  2) Continue Celexa 10 mg po daily for mood and anxiety  3) Continue psychotherapy with Vidal Ortiz LCSW  4) RTC in 3 months       Discussed medication options.  Reviewed the risks, benefits, and side effects of the medications; patient acknowledged and verbally consented.  Patient is agreeable to call the [facility].  Patient is aware to call 911 or go to the nearest ER should begin having SI/HI.

## 2019-12-10 ENCOUNTER — OFFICE VISIT (OUTPATIENT)
Dept: PSYCHIATRY | Facility: CLINIC | Age: 49
End: 2019-12-10

## 2019-12-10 DIAGNOSIS — Z63.8 STRESS DUE TO FAMILY TENSION: ICD-10-CM

## 2019-12-10 DIAGNOSIS — F31.81 BIPOLAR II DISORDER (HCC): Primary | ICD-10-CM

## 2019-12-10 PROCEDURE — 90834 PSYTX W PT 45 MINUTES: CPT | Performed by: SOCIAL WORKER

## 2019-12-10 SDOH — SOCIAL STABILITY - SOCIAL INSECURITY: OTHER SPECIFIED PROBLEMS RELATED TO PRIMARY SUPPORT GROUP: Z63.8

## 2019-12-10 NOTE — PROGRESS NOTES
"Date of Service: 12/10/2019  Time In: 1:20 PM  Time Out: 2:00 PM    PROGRESS NOTE  Data:  Tammi Levine is a 49 y.o. female who met 1:1 with the undersigned for a regularly scheduled individual outpatient therapy session [facility] for follow-up of bipolar II disorder.       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  Also validated the patient's try to make her own decisions but also touted the benefits of being as active as possible.  Also allow the patient to discuss/process her feelings and frustrations with her mother and validated her feelings.  However, also discussed the concept of things we can control things we cannot control and encourage the patient to reminders if she cannot control the decisions or behaviors of others.  Continue to focus on healthy skills of daily living and behavioral activation.  The patient adamantly and convincingly denies suicidal ideation vehemently denies any substance use.      Clinical Maneuvering/Intervention:  Assisted patient in processing above session content; acknowledged and normalized patient’s thoughts, feelings, and concerns.  Allowed the patient to discuss/vent her feelings and frustration with ongoing difficulties with her mother and validated her feelings.  Also utilized motivational reviewed techniques including complex reflections to discussed concept of things we can control things he cannot control and encouraged her to remind herself she cannot control the decisions or behaviors of others.  However, also utilized cognitive behavioral theory to encourage the patient to at least consider the likelihood that she has some responsibility for her communication issues and " encouraged her to consider attempting to contact her mother.  Provided unconditional positive regard in a safe, supportive environment.    Allowed patient to freely discuss issues without interruption or judgment. Provided safe, confidential environment to facilitate the development of positive therapeutic relationship and encourage open, honest communication. Assisted patient in identifying risk factors which would indicate the need for higher level of care including thoughts to harm self or others and/or self-harming behavior and encouraged patient to contact this office, call 911, or present to the nearest emergency room should any of these events occur. Discussed crisis intervention services and means to access.  Patient adamantly and convincingly denies current suicidal or homicidal ideation or perceptual disturbance.      Assessment    Patient appears to be doing somewhat better since moving into her own apartment; however, she continues to struggle with periods of depression which likely continue to be exacerbated by ongoing strained relationship with her mother.   The patient's symptomology continue to cause impairment in important areas of functioning.  Patient can be reasonably expected to continue to benefit treatment and would likely be at increased risk for decompensation.    Diagnoses and all orders for this visit:    Bipolar II disorder (CMS/[facility])    Stress due to family tension               Mental Status Exam  Hygiene:  good  Dress:  casual  Attitude:  Cooperative  Motor Activity:  Appropriate  Speech:  Normal  Mood:  depressed/irritable  Affect:  depressed  Thought Processes:  Linear  Thought Content:  normal  Suicidal Thoughts:  denies  Homicidal Thoughts:  denies  Crisis Safety Plan: yes, to come to the emergency room.  Hallucinations:  denies    Patient's Support Network Includes:  mother and extended family    Progress toward goal: Not at goal    Functional Status: Moderate impairment      Prognosis: Fair with Ongoing Treatment     Plan         Patient will continue in individual outpatient therapy session [facility] every 4 weeks and will continue and pharmacotherapy as scheduled with MARISELA Madsen.  Patient will adhere to medication regimen as prescribed and report any side effects. Patient will contact this office, call 911 or present to the nearest emergency room should suicidal or homicidal ideations occur. Provide Cognitive Behavioral Therapy and Integrative Therapy to improve functioning, maintain stability, and avoid decompensation and the need for higher level of care.          Return in about 4 weeks (around 1/7/2020) for Next scheduled follow up.      This document signed by Vidal Ortiz LCSW, [facility] December 10, 2019 3:10 PM

## 2020-01-08 ENCOUNTER — OFFICE VISIT (OUTPATIENT)
Dept: CARDIOLOGY | Facility: CLINIC | Age: 50
End: 2020-01-08

## 2020-01-08 VITALS
WEIGHT: 162.2 LBS | BODY MASS INDEX: 32.7 KG/M2 | SYSTOLIC BLOOD PRESSURE: 116 MMHG | OXYGEN SATURATION: 99 % | HEIGHT: 59 IN | HEART RATE: 64 BPM | DIASTOLIC BLOOD PRESSURE: 63 MMHG

## 2020-01-08 DIAGNOSIS — Z79.4 TYPE 2 DIABETES MELLITUS WITH DIABETIC PERIPHERAL ANGIOPATHY WITHOUT GANGRENE, WITH LONG-TERM CURRENT USE OF INSULIN (HCC): ICD-10-CM

## 2020-01-08 DIAGNOSIS — E78.5 DYSLIPIDEMIA: ICD-10-CM

## 2020-01-08 DIAGNOSIS — E11.51 TYPE 2 DIABETES MELLITUS WITH DIABETIC PERIPHERAL ANGIOPATHY WITHOUT GANGRENE, WITH LONG-TERM CURRENT USE OF INSULIN (HCC): ICD-10-CM

## 2020-01-08 DIAGNOSIS — I25.10 ARTERIOSCLEROTIC CARDIOVASCULAR DISEASE (ASCVD): Primary | ICD-10-CM

## 2020-01-08 DIAGNOSIS — I10 ESSENTIAL HYPERTENSION: ICD-10-CM

## 2020-01-08 PROCEDURE — 93000 ELECTROCARDIOGRAM COMPLETE: CPT | Performed by: NURSE PRACTITIONER

## 2020-01-08 PROCEDURE — 99213 OFFICE O/P EST LOW 20 MIN: CPT | Performed by: NURSE PRACTITIONER

## 2020-01-08 NOTE — PROGRESS NOTES
Diane Rios  Tammi Levine  1970  01/08/2020    Patient Active Problem List   Diagnosis   • Arteriosclerotic cardiovascular disease (ASCVD), s/p MI in 2012, clinically stable.    • Type 2 diabetes mellitus with diabetic peripheral angiopathy without gangrene, with long-term current use of insulin (CMS/HCC)   • Essential hypertension   • Dyslipidemia   • Sleep apnea   • Myocardial infarction (CMS/HCC)   • Migraine headache   • Abnormal CT scan   • ASCVD (arteriosclerotic cardiovascular disease)   • Hypertension, well controlled   • Disease of thyroid gland   • Generalized abdominal pain   • Right-sided low back pain with right-sided sciatica   • Slow transit constipation       Dear Diane Rios:    Subjective     Chief Complaint   Patient presents with   • ASCVD           History of Present Illness:    Tammi Levine is a 49 y.o. female with a history of ASCVD status post acute MI, hypertension, dyslipidemia, diabetes mellitus type 2.  She presents today for routine cardiology follow-up.  Her blood pressures been well controlled.  She does report a few days ago she took her first dose of Invokana.  About 30 minutes after taking the medication she did have some chest pain with nausea and became very diaphoretic.  She has not taken any more the medication and has had no further symptoms.  Prior to this she was not having any chest pains or dyspnea.  Otherwise, she reports she has been feeling well.  Recent labs are available for review.          Allergies   Allergen Reactions   • Zoloft [Sertraline Hcl] Nausea And Vomiting   • Ciprofloxacin Rash     Also caused chest pain   :      Current Outpatient Medications:   •  aspirin 81 MG EC tablet, Take 81 mg by mouth 1 (One) Time Per Week., Disp: , Rfl:   •  atorvastatin (LIPITOR) 80 MG tablet, Take 1 tablet by mouth Every Night., Disp: 30 tablet, Rfl: 3  •  B-D UF III MINI PEN NEEDLES 31G X 5 MM misc, , Disp: , Rfl:   •  BYDUREON 2 MG pen-injector  injection, , Disp: , Rfl:   •  Canagliflozin (INVOKANA) 100 MG tablet, Daily., Disp: , Rfl:   •  citalopram (CELEXA) 10 MG tablet, Take 1 tablet by mouth Daily., Disp: 30 tablet, Rfl: 2  •  dicyclomine (BENTYL) 20 MG tablet, Take 1 tablet by mouth Every 6 (Six) Hours As Needed (abdominal cramps)., Disp: 20 tablet, Rfl: 0  •  ferrous sulfate 325 (65 FE) MG tablet, , Disp: , Rfl:   •  levothyroxine (SYNTHROID, LEVOTHROID) 50 MCG tablet, Take 50 mcg by mouth daily., Disp: , Rfl:   •  lisinopril (PRINIVIL,ZESTRIL) 40 MG tablet, Take 1 tablet by mouth Daily., Disp: 30 tablet, Rfl: 6  •  lithium carbonate 300 MG capsule, Take 1 capsule by mouth 2 (Two) Times a Day With Meals., Disp: 60 capsule, Rfl: 2  •  omeprazole (priLOSEC) 20 MG capsule, Take 20 mg by mouth Daily., Disp: , Rfl:   •  ONETOUCH VERIO test strip, , Disp: , Rfl:   •  promethazine (PHENERGAN) 25 MG tablet, Take 1 tablet by mouth Every 8 (Eight) Hours As Needed for Nausea or Vomiting., Disp: 12 tablet, Rfl: 0  •  HUMALOG MIX 75/25 KWIKPEN (75-25) 100 UNIT/ML suspension pen-injector, , Disp: , Rfl:   •  STEGLATRO 15 MG tablet, , Disp: , Rfl:       The following portions of the patient's history were reviewed and updated as appropriate: allergies, current medications, past family history, past medical history, past social history, past surgical history and problem list.    Social History     Tobacco Use   • Smoking status: Never Smoker   • Smokeless tobacco: Never Used   Substance Use Topics   • Alcohol use: No   • Drug use: No       Review of Systems   Constitution: Negative for malaise/fatigue.   Cardiovascular: Positive for chest pain. Negative for dyspnea on exertion, irregular heartbeat, leg swelling, near-syncope, orthopnea, palpitations, paroxysmal nocturnal dyspnea and syncope.   Respiratory: Negative for cough, shortness of breath and wheezing.    Neurological: Negative for dizziness, light-headedness and weakness.       Objective   Vitals:     "01/08/20 1032   BP: 116/63   Pulse: 64   SpO2: 99%   Weight: 73.6 kg (162 lb 3.2 oz)   Height: 149.9 cm (59\")     Body mass index is 32.76 kg/m².        Physical Exam   Constitutional: She is oriented to person, place, and time. She appears well-developed and well-nourished.   HENT:   Head: Normocephalic and atraumatic.   Cardiovascular: Normal rate, regular rhythm and normal heart sounds. Exam reveals no S3 and no S4.   No murmur heard.  Pulmonary/Chest: Effort normal and breath sounds normal. She has no wheezes. She has no rales.   Abdominal: Soft. Bowel sounds are normal.   Musculoskeletal: She exhibits no edema.   Neurological: She is alert and oriented to person, place, and time.   Skin: Skin is warm and dry.   Psychiatric: She has a normal mood and affect. Her behavior is normal.       Lab Results   Component Value Date     09/05/2019    K 4.4 09/05/2019     09/05/2019    CO2 21.5 (L) 09/05/2019    BUN 18 09/05/2019    CREATININE 1.04 (H) 09/05/2019    GLUCOSE 150 (H) 09/05/2019    CALCIUM 9.9 09/05/2019    AST 15 03/29/2019    ALT 13 03/29/2019    ALKPHOS 105 03/29/2019    LABIL2 1.4 (L) 02/04/2016        Lab Results   Component Value Date    WBC 10.95 (H) 03/29/2019    HGB 11.2 (L) 03/29/2019    HCT 34.0 03/29/2019     03/29/2019      Lab Results   Component Value Date    TSH 0.836 09/05/2019    TRIG 86 09/05/2019    HDL 41 09/05/2019    LDL 39 09/05/2019      Lab Results   Component Value Date    BNP 36 09/16/2015             ECG 12 Lead  Date/Time: 1/8/2020 10:41 AM  Performed by: Marisa Romero APRN  Authorized by: Marisa Romero APRN   Comparison: compared with previous ECG   Similar to previous ECG  Rhythm: sinus rhythm  BPM: 69                Assessment/Plan    Diagnosis Plan   1. Arteriosclerotic cardiovascular disease (ASCVD), s/p MI in 2012, clinically stable.   ECG 12 Lead   2. Essential hypertension  ECG 12 Lead   3. Type 2 diabetes mellitus with diabetic peripheral " angiopathy without gangrene, with long-term current use of insulin (CMS/[facility])  ECG 12 Lead   4. Dyslipidemia  ECG 12 Lead                Recommendations:    Her recent symptoms do seem to be directly related to the new medication.  Therefore we will continue to monitor for now.  However, I have asked her to please notify us if she has any further episodes of chest pain or associated symptoms.  At that time, she would need to be evaluated further with a stress test.  She voices understanding.  We will continue the low-dose aspirin, lisinopril, and the atorvastatin.      Return in about 6 months (around 7/8/2020) for Recheck.    As always, I appreciate very much the opportunity to participate in the cardiovascular care of your patients.      With Best Regards,    MARISELA Tomlinson

## 2020-01-12 ENCOUNTER — APPOINTMENT (OUTPATIENT)
Dept: GENERAL RADIOLOGY | Facility: HOSPITAL | Age: 50
End: 2020-01-12

## 2020-01-12 ENCOUNTER — HOSPITAL ENCOUNTER (OUTPATIENT)
Facility: HOSPITAL | Age: 50
Setting detail: OBSERVATION
Discharge: HOME | End: 2020-01-13
Attending: FAMILY MEDICINE | Admitting: HOSPITALIST

## 2020-01-12 ENCOUNTER — APPOINTMENT (OUTPATIENT)
Dept: CARDIOLOGY | Facility: HOSPITAL | Age: 50
End: 2020-01-12

## 2020-01-12 DIAGNOSIS — R73.9 HYPERGLYCEMIA: ICD-10-CM

## 2020-01-12 DIAGNOSIS — R07.9 CHEST PAIN, UNSPECIFIED TYPE: Primary | ICD-10-CM

## 2020-01-12 LAB
ALBUMIN SERPL-MCNC: 3.98 G/DL (ref 3.5–5.2)
ALBUMIN/GLOB SERPL: 1.2 G/DL
ALP SERPL-CCNC: 97 U/L (ref 39–117)
ALT SERPL W P-5'-P-CCNC: 9 U/L (ref 1–33)
ANION GAP SERPL CALCULATED.3IONS-SCNC: 12.8 MMOL/L (ref 5–15)
AST SERPL-CCNC: 13 U/L (ref 1–32)
BASOPHILS # BLD AUTO: 0.03 10*3/MM3 (ref 0–0.2)
BASOPHILS NFR BLD AUTO: 0.3 % (ref 0–1.5)
BILIRUB SERPL-MCNC: 0.2 MG/DL (ref 0.2–1.2)
BUN BLD-MCNC: 28 MG/DL (ref 6–20)
BUN/CREAT SERPL: 26.4 (ref 7–25)
CALCIUM SPEC-SCNC: 9.3 MG/DL (ref 8.6–10.5)
CHLORIDE SERPL-SCNC: 101 MMOL/L (ref 98–107)
CO2 SERPL-SCNC: 21.2 MMOL/L (ref 22–29)
CREAT BLD-MCNC: 1.06 MG/DL (ref 0.57–1)
D DIMER PPP FEU-MCNC: 0.31 MCGFEU/ML (ref 0–0.5)
DEPRECATED RDW RBC AUTO: 42.1 FL (ref 37–54)
EOSINOPHIL # BLD AUTO: 0.05 10*3/MM3 (ref 0–0.4)
EOSINOPHIL NFR BLD AUTO: 0.5 % (ref 0.3–6.2)
ERYTHROCYTE [DISTWIDTH] IN BLOOD BY AUTOMATED COUNT: 12.9 % (ref 12.3–15.4)
GFR SERPL CREATININE-BSD FRML MDRD: 55 ML/MIN/1.73
GLOBULIN UR ELPH-MCNC: 3.4 GM/DL
GLUCOSE BLD-MCNC: 264 MG/DL (ref 65–99)
GLUCOSE BLDC GLUCOMTR-MCNC: 203 MG/DL (ref 70–130)
GLUCOSE BLDC GLUCOMTR-MCNC: 237 MG/DL (ref 70–130)
GLUCOSE BLDC GLUCOMTR-MCNC: 259 MG/DL (ref 70–130)
HBA1C MFR BLD: 7.5 % (ref 4.8–5.6)
HCT VFR BLD AUTO: 34.2 % (ref 34–46.6)
HGB BLD-MCNC: 11.2 G/DL (ref 12–15.9)
HOLD SPECIMEN: NORMAL
HOLD SPECIMEN: NORMAL
IMM GRANULOCYTES # BLD AUTO: 0.04 10*3/MM3 (ref 0–0.05)
IMM GRANULOCYTES NFR BLD AUTO: 0.4 % (ref 0–0.5)
INR PPP: 0.91 (ref 0.9–1.1)
LYMPHOCYTES # BLD AUTO: 1.37 10*3/MM3 (ref 0.7–3.1)
LYMPHOCYTES NFR BLD AUTO: 14.9 % (ref 19.6–45.3)
MAGNESIUM SERPL-MCNC: 2.1 MG/DL (ref 1.6–2.6)
MCH RBC QN AUTO: 29.3 PG (ref 26.6–33)
MCHC RBC AUTO-ENTMCNC: 32.7 G/DL (ref 31.5–35.7)
MCV RBC AUTO: 89.5 FL (ref 79–97)
MONOCYTES # BLD AUTO: 0.54 10*3/MM3 (ref 0.1–0.9)
MONOCYTES NFR BLD AUTO: 5.9 % (ref 5–12)
NEUTROPHILS # BLD AUTO: 7.16 10*3/MM3 (ref 1.7–7)
NEUTROPHILS NFR BLD AUTO: 78 % (ref 42.7–76)
NRBC BLD AUTO-RTO: 0 /100 WBC (ref 0–0.2)
NT-PROBNP SERPL-MCNC: 57.2 PG/ML (ref 5–900)
PLATELET # BLD AUTO: 229 10*3/MM3 (ref 140–450)
PMV BLD AUTO: 10.1 FL (ref 6–12)
POTASSIUM BLD-SCNC: 4.7 MMOL/L (ref 3.5–5.2)
PROT SERPL-MCNC: 7.4 G/DL (ref 6–8.5)
PROTHROMBIN TIME: 12.7 SECONDS (ref 11–15.4)
RBC # BLD AUTO: 3.82 10*6/MM3 (ref 3.77–5.28)
SODIUM BLD-SCNC: 135 MMOL/L (ref 136–145)
TROPONIN T SERPL-MCNC: <0.01 NG/ML (ref 0–0.03)
TSH SERPL DL<=0.05 MIU/L-ACNC: 1.44 UIU/ML (ref 0.27–4.2)
WBC NRBC COR # BLD: 9.19 10*3/MM3 (ref 3.4–10.8)
WHOLE BLOOD HOLD SPECIMEN: NORMAL
WHOLE BLOOD HOLD SPECIMEN: NORMAL

## 2020-01-12 PROCEDURE — 85379 FIBRIN DEGRADATION QUANT: CPT | Performed by: FAMILY MEDICINE

## 2020-01-12 PROCEDURE — 80053 COMPREHEN METABOLIC PANEL: CPT | Performed by: FAMILY MEDICINE

## 2020-01-12 PROCEDURE — 90674 CCIIV4 VAC NO PRSV 0.5 ML IM: CPT | Performed by: INTERNAL MEDICINE

## 2020-01-12 PROCEDURE — 83880 ASSAY OF NATRIURETIC PEPTIDE: CPT | Performed by: FAMILY MEDICINE

## 2020-01-12 PROCEDURE — G0378 HOSPITAL OBSERVATION PER HR: HCPCS

## 2020-01-12 PROCEDURE — 96372 THER/PROPH/DIAG INJ SC/IM: CPT

## 2020-01-12 PROCEDURE — 82962 GLUCOSE BLOOD TEST: CPT

## 2020-01-12 PROCEDURE — 93005 ELECTROCARDIOGRAM TRACING: CPT | Performed by: FAMILY MEDICINE

## 2020-01-12 PROCEDURE — 63710000001 INSULIN ASPART PER 5 UNITS: Performed by: NURSE PRACTITIONER

## 2020-01-12 PROCEDURE — 83735 ASSAY OF MAGNESIUM: CPT | Performed by: FAMILY MEDICINE

## 2020-01-12 PROCEDURE — 99283 EMERGENCY DEPT VISIT LOW MDM: CPT

## 2020-01-12 PROCEDURE — 84484 ASSAY OF TROPONIN QUANT: CPT | Performed by: NURSE PRACTITIONER

## 2020-01-12 PROCEDURE — 25010000002 HEPARIN (PORCINE) PER 1000 UNITS: Performed by: NURSE PRACTITIONER

## 2020-01-12 PROCEDURE — 94799 UNLISTED PULMONARY SVC/PX: CPT

## 2020-01-12 PROCEDURE — 85025 COMPLETE CBC W/AUTO DIFF WBC: CPT | Performed by: FAMILY MEDICINE

## 2020-01-12 PROCEDURE — 93306 TTE W/DOPPLER COMPLETE: CPT

## 2020-01-12 PROCEDURE — 63710000001 INSULIN ASPART PER 5 UNITS: Performed by: INTERNAL MEDICINE

## 2020-01-12 PROCEDURE — 71045 X-RAY EXAM CHEST 1 VIEW: CPT

## 2020-01-12 PROCEDURE — 93306 TTE W/DOPPLER COMPLETE: CPT | Performed by: INTERNAL MEDICINE

## 2020-01-12 PROCEDURE — G0008 ADMIN INFLUENZA VIRUS VAC: HCPCS | Performed by: INTERNAL MEDICINE

## 2020-01-12 PROCEDURE — 83036 HEMOGLOBIN GLYCOSYLATED A1C: CPT | Performed by: NURSE PRACTITIONER

## 2020-01-12 PROCEDURE — 85610 PROTHROMBIN TIME: CPT | Performed by: FAMILY MEDICINE

## 2020-01-12 PROCEDURE — 84443 ASSAY THYROID STIM HORMONE: CPT | Performed by: NURSE PRACTITIONER

## 2020-01-12 PROCEDURE — 84484 ASSAY OF TROPONIN QUANT: CPT | Performed by: FAMILY MEDICINE

## 2020-01-12 PROCEDURE — 25010000002 INFLUENZA VAC SUBUNIT QUAD 0.5 ML SUSPENSION PREFILLED SYRINGE: Performed by: INTERNAL MEDICINE

## 2020-01-12 RX ORDER — LITHIUM CARBONATE 300 MG/1
300 CAPSULE ORAL EVERY EVENING
Status: DISCONTINUED | OUTPATIENT
Start: 2020-01-12 | End: 2020-01-13 | Stop reason: HOSPADM

## 2020-01-12 RX ORDER — DEXTROSE MONOHYDRATE 25 G/50ML
25 INJECTION, SOLUTION INTRAVENOUS
Status: DISCONTINUED | OUTPATIENT
Start: 2020-01-12 | End: 2020-01-13 | Stop reason: HOSPADM

## 2020-01-12 RX ORDER — ASPIRIN 81 MG/1
81 TABLET ORAL EVERY EVENING
Status: CANCELLED | OUTPATIENT
Start: 2020-01-12

## 2020-01-12 RX ORDER — NITROGLYCERIN 0.4 MG/1
0.4 TABLET SUBLINGUAL
Status: DISCONTINUED | OUTPATIENT
Start: 2020-01-12 | End: 2020-01-12 | Stop reason: SDUPTHER

## 2020-01-12 RX ORDER — LISINOPRIL 10 MG/1
40 TABLET ORAL DAILY
Status: DISCONTINUED | OUTPATIENT
Start: 2020-01-12 | End: 2020-01-13 | Stop reason: HOSPADM

## 2020-01-12 RX ORDER — ASPIRIN 81 MG/1
324 TABLET, CHEWABLE ORAL ONCE
Status: COMPLETED | OUTPATIENT
Start: 2020-01-12 | End: 2020-01-12

## 2020-01-12 RX ORDER — ASPIRIN 81 MG/1
81 TABLET ORAL DAILY
Status: DISCONTINUED | OUTPATIENT
Start: 2020-01-13 | End: 2020-01-13 | Stop reason: HOSPADM

## 2020-01-12 RX ORDER — LEVOTHYROXINE SODIUM 0.05 MG/1
50 TABLET ORAL EVERY EVENING
Status: DISCONTINUED | OUTPATIENT
Start: 2020-01-12 | End: 2020-01-13 | Stop reason: HOSPADM

## 2020-01-12 RX ORDER — LITHIUM CARBONATE 300 MG/1
300 CAPSULE ORAL EVERY EVENING
COMMUNITY
End: 2020-03-30 | Stop reason: SDUPTHER

## 2020-01-12 RX ORDER — SODIUM CHLORIDE 0.9 % (FLUSH) 0.9 %
10 SYRINGE (ML) INJECTION AS NEEDED
Status: DISCONTINUED | OUTPATIENT
Start: 2020-01-12 | End: 2020-01-13 | Stop reason: HOSPADM

## 2020-01-12 RX ORDER — ATORVASTATIN CALCIUM 40 MG/1
80 TABLET, FILM COATED ORAL NIGHTLY
Status: DISCONTINUED | OUTPATIENT
Start: 2020-01-12 | End: 2020-01-13 | Stop reason: HOSPADM

## 2020-01-12 RX ORDER — NICOTINE POLACRILEX 4 MG
15 LOZENGE BUCCAL
Status: DISCONTINUED | OUTPATIENT
Start: 2020-01-12 | End: 2020-01-13 | Stop reason: HOSPADM

## 2020-01-12 RX ORDER — HEPARIN SODIUM 5000 [USP'U]/ML
5000 INJECTION, SOLUTION INTRAVENOUS; SUBCUTANEOUS EVERY 12 HOURS SCHEDULED
Status: DISCONTINUED | OUTPATIENT
Start: 2020-01-12 | End: 2020-01-13 | Stop reason: HOSPADM

## 2020-01-12 RX ORDER — NITROGLYCERIN 0.4 MG/1
0.4 TABLET SUBLINGUAL
Status: DISCONTINUED | OUTPATIENT
Start: 2020-01-12 | End: 2020-01-13 | Stop reason: HOSPADM

## 2020-01-12 RX ORDER — SODIUM CHLORIDE 0.9 % (FLUSH) 0.9 %
10 SYRINGE (ML) INJECTION EVERY 12 HOURS SCHEDULED
Status: DISCONTINUED | OUTPATIENT
Start: 2020-01-12 | End: 2020-01-13 | Stop reason: HOSPADM

## 2020-01-12 RX ORDER — PANTOPRAZOLE SODIUM 40 MG/1
40 TABLET, DELAYED RELEASE ORAL EVERY MORNING
Status: DISCONTINUED | OUTPATIENT
Start: 2020-01-13 | End: 2020-01-13 | Stop reason: HOSPADM

## 2020-01-12 RX ADMIN — INSULIN ASPART 3 UNITS: 100 INJECTION, SOLUTION INTRAVENOUS; SUBCUTANEOUS at 17:45

## 2020-01-12 RX ADMIN — ATORVASTATIN CALCIUM 80 MG: 40 TABLET, FILM COATED ORAL at 22:00

## 2020-01-12 RX ADMIN — HEPARIN SODIUM 5000 UNITS: 5000 INJECTION INTRAVENOUS; SUBCUTANEOUS at 22:07

## 2020-01-12 RX ADMIN — INSULIN ASPART 3 UNITS: 100 INJECTION, SOLUTION INTRAVENOUS; SUBCUTANEOUS at 22:01

## 2020-01-12 RX ADMIN — INSULIN ASPART 20 UNITS: 100 INJECTION, SOLUTION INTRAVENOUS; SUBCUTANEOUS at 22:01

## 2020-01-12 RX ADMIN — HEPARIN SODIUM 5000 UNITS: 5000 INJECTION INTRAVENOUS; SUBCUTANEOUS at 15:36

## 2020-01-12 RX ADMIN — ASPIRIN 324 MG: 81 TABLET, CHEWABLE ORAL at 09:05

## 2020-01-12 RX ADMIN — NITROGLYCERIN 0.4 MG: 0.4 TABLET, ORALLY DISINTEGRATING SUBLINGUAL at 09:07

## 2020-01-12 RX ADMIN — INFLUENZA A VIRUS A/SINGAPORE/GP1908/2015 IVR-180 (H1N1) ANTIGEN (MDCK CELL DERIVED, PROPIOLACTONE INACTIVATED), INFLUENZA A VIRUS A/NORTH CAROLINA/04/2016 (H3N2) HEMAGGLUTININ ANTIGEN (MDCK CELL DERIVED, PROPIOLACTONE INACTIVATED), INFLUENZA B VIRUS B/IOWA/06/2017 HEMAGGLUTININ ANTIGEN (MDCK CELL DERIVED, PROPIOLACTONE INACTIVATED), INFLUENZA B VIRUS B/SINGAPORE/INFTT-16-0610/2016 HEMAGGLUTININ ANTIGEN (MDCK CELL DERIVED, PROPIOLACTONE INACTIVATED) 0.5 ML: 15; 15; 15; 15 INJECTION, SUSPENSION INTRAMUSCULAR at 15:36

## 2020-01-12 RX ADMIN — SODIUM CHLORIDE, PRESERVATIVE FREE 10 ML: 5 INJECTION INTRAVENOUS at 22:03

## 2020-01-12 RX ADMIN — LEVOTHYROXINE SODIUM 50 MCG: 50 TABLET ORAL at 21:58

## 2020-01-12 RX ADMIN — LITHIUM CARBONATE 300 MG: 300 CAPSULE, GELATIN COATED ORAL at 22:00

## 2020-01-12 NOTE — PROGRESS NOTES
"Discharge Planning Assessment  [facility]     Patient Name: Tammi Levine  MRN: [id number]  Today's Date: 1/12/2020    Admit Date: 1/12/2020    Discharge Needs Assessment     Row Name 01/12/20 1248       Living Environment    Lives With  alone    Current Living Arrangements  home/apartment/condo    Primary Care Provided by  self    Family Caregiver if Needed  other relative(s)    Quality of Family Relationships  helpful;involved;supportive    Able to Return to Prior Arrangements  yes       Resource/Environmental Concerns    Resource/Environmental Concerns  none       Transition Planning    Patient/Family Anticipates Transition to  home    Patient/Family Anticipated Services at Transition  none    Transportation Anticipated  car, drives self;family or friend will provide       Discharge Needs Assessment    Readmission Within the Last 30 Days  no previous admission in last 30 days    Concerns to be Addressed  no discharge needs identified;denies needs/concerns at this time    Equipment Needed After Discharge  bipap/cpap;cane, quad;glucometer LEFT LEG BRACE    Outpatient/Agency/Support Group Needs  other (see comments) [facility] [place]        Discharge Plan     Row Name 01/12/20 1250       Plan    Plan  PT LIVES AT HOME ALONE AND PLANS TO RETURN HOME UPON DISCHARGE, STATES SHE DRIVES BUT HER AUNT KEKE PATTERSON CAN PROVIDE TRANSPORTATION IS NEEDED. PCP IS SOLEDAD HERNANDEZ, SHE USES [facility] IN [place] AND HAS [place] MEDICAID. DENIES ANY ISSUES WITH MEDS OR COPAYS. PT DOES NOT HAVE A POA OR LIVING WILL. SHE USES A CPAP FROM [facility], QUAD CANE, GLUCOMETER AND LEG BRACE. SHE DOES NOT HAVE HOME HEALTH OR HOME O2. SHE USES [facility] OUT OF [place]. THEY HELP HER WITH CLEANING, LAUNDRY AND GROCERY SHOPPING. NO NEEDS IDENTIFED AT THIS TIME.     Patient/Family in Agreement with Plan  yes        Destination      Coordination has not been started for this encounter.    "   Durable Medical Equipment      Coordination has not been started for this encounter.      Dialysis/Infusion      Coordination has not been started for this encounter.      Home Medical Care      Coordination has not been started for this encounter.      Therapy      Coordination has not been started for this encounter.      Community Resources      Coordination has not been started for this encounter.          Demographic Summary     Row Name 01/12/20 1248       General Information    Admission Type  observation    Arrived From  home    Referral Source  emergency department    Reason for Consult  discharge planning    Preferred Language  English        Functional Status     Row Name 01/12/20 1248       Functional Status    Usual Activity Tolerance  moderate    Current Activity Tolerance  moderate       Mental Status    General Appearance WDL  WDL        Psychosocial     Row Name 01/12/20 1248       Behavior WDL    Behavior WDL  WDL       Emotion Mood WDL    Emotion/Mood/Affect WDL  WDL       Speech WDL    Speech WDL  WDL        Abuse/Neglect    No documentation.       Legal    No documentation.       Substance Abuse    No documentation.       Patient Forms    No documentation.           Rica Powell RN

## 2020-01-12 NOTE — H&P
[facility] Medicine Services  History & Physical          Patient Identification:  Name:  Tammi Levine  Age:  49 y.o.  Sex:  female  :  1970  MRN:  [id number]   Visit Number:  [id number]  Primary Care Physician:  Diane Rios    I have seen the patient in conjunction with MARISELA Garcia and I agree with the following statements:     Subjective     Chief complaint: chest pain    History of presenting illness:    Mrs. Levine is a 49 year old female patient who presented to [facility] ED on 2020. She states she came to the ED for chest pain. She states she had a episode Monday after noon and she thought it was a medication reaction as her chest started feeling better, she states she laid in bed all day that day.  She states this didn't happen the rest of the week then last night when she got out of the shower she fell and nearly passed out, she was having dizziness, dyspnea, sweating bad and her chest was hurting. She states her cat was there meowing at her and kept her awake, she denies LOC. She was able to get to the bedroom and rested then checked her blood sugar which was okay. She states she laid in bed again and rested and her chest started to hurt again around 0600. She states it had hurt on and off through the night. She states when her chest pain comes it is down on her right side to her right arm into the back of her neck. She denies pain or injuries from her fall. She aw her Marisa Romero on Wednesday and everything looked okay that day and had planned to do a stress test if this occurred again. She states her pain improved after NTG SL.     Her work up in the ED showed initial negative troponin T, glucose 264, sodium 135, potassium 4.7, HCO 21.2, , Creatinine 1.06, BUN 28, calcium 9.3, D-Dimer 0.31, WBC 9.19, H/H 11.2/34.2,  . Chest xray showed no acute findings. V/S in the ER were temp 98.3, HR 79, /76, 100% on room air.  She was given  ASA 324mg PO x1 in the ED.     Her past medical history is significant for hypothyroidism, DM, HTN, DIONTE (does use CPAP), Depression/bipolar disorder, and hyperlipidemia, she states she had a heart cath around 6-7 years ago and she had no blockages. She denies any history of heart stents, no history of strokes, cancers, seizures or blood clots, no history of heart failure. Her father passed, had black lung, DM and heart problems (no stents or bypass) he did have ablations, her mother with hx of HTN currently living. She states both of her brother have DM. She does not smoke with no history of it, no ETOH or drug use. Her Aunt Cindy Herrera, is her next of Kin decision maker.       ---------------------------------------------------------------------------------------------------------------------   Review of Systems   Constitutional: Positive for diaphoresis and fatigue. Negative for chills and fever.   HENT: Negative for congestion and rhinorrhea.    Eyes: Negative for visual disturbance.   Respiratory: Positive for shortness of breath. Negative for cough.    Cardiovascular: Positive for chest pain. Negative for leg swelling.   Gastrointestinal: Positive for nausea. Negative for abdominal distention, constipation, diarrhea and vomiting.   Endocrine: Negative for cold intolerance and heat intolerance.   Genitourinary: Negative for difficulty urinating.   Musculoskeletal: Negative for arthralgias and myalgias.   Skin: Negative for color change and pallor.   Neurological: Negative for dizziness, weakness and light-headedness.   Hematological: Negative for adenopathy.   Psychiatric/Behavioral: Negative for agitation, behavioral problems and confusion.      ---------------------------------------------------------------------------------------------------------------------   Past Medical History:   Diagnosis Date   • Abnormal CT scan     ADRENAL GLAND FOCAL MASS LESION MULTIPLE, LEFT ONLY   • Anxiety    • ASCVD  "(arteriosclerotic cardiovascular disease)     \"status post MI in 2012, clinically stable\"   • Bipolar disorder (CMS/[facility])    • Depression    • Diabetes mellitus (CMS/[facility])    • Disease of thyroid gland    • Dyslipidemia    • Hypertension, well controlled    • Migraine headache    • Myocardial infarction (CMS/[facility]) 2012   • Sleep apnea      Past Surgical History:   Procedure Laterality Date   • CHOLECYSTECTOMY     • CORONARY ANGIOPLASTY WITH STENT PLACEMENT  2012    Dr. Denson   • HYSTERECTOMY  12/2014   • NECK SURGERY  2010   • TRANSESOPHAGEAL ECHOCARDIOGRAM (SARAN)  07/13/2015    EF 60-65%     Family History   Problem Relation Age of Onset   • Diabetes Father    • Diabetes Brother    • Cancer Maternal Grandmother    • Heart disease Maternal Grandfather    • Diabetes Paternal Grandfather    • Cancer Other      Social History     Socioeconomic History   • Marital status: Single     Spouse name: Not on file   • Number of children: Not on file   • Years of education: Not on file   • Highest education level: Not on file   Tobacco Use   • Smoking status: Never Smoker   • Smokeless tobacco: Never Used   Substance and Sexual Activity   • Alcohol use: No   • Drug use: No   • Sexual activity: Defer     ---------------------------------------------------------------------------------------------------------------------   Allergies:  Zoloft [sertraline hcl] and Ciprofloxacin  ---------------------------------------------------------------------------------------------------------------------     Medications below are reported home medications pulling from within the system; at this time, these medications have not been reconciled unless otherwise specified and are in the verification process for further verifcation as current home medications.    Prior to Admission Medications     Prescriptions Last Dose Informant Patient Reported? Taking?    aspirin 81 MG EC tablet   Yes No    Take 81 mg by mouth 1 (One) Time Per Week.    " atorvastatin (LIPITOR) 80 MG tablet   No No    Take 1 tablet by mouth Every Night.    B-D UF III MINI PEN NEEDLES 31G X 5 MM misc   Yes No    BYDUREON 2 MG pen-injector injection   Yes No    Canagliflozin (INVOKANA) 100 MG tablet   Yes No    Daily.    citalopram (CELEXA) 10 MG tablet   No No    Take 1 tablet by mouth Daily.    dicyclomine (BENTYL) 20 MG tablet   No No    Take 1 tablet by mouth Every 6 (Six) Hours As Needed (abdominal cramps).    ferrous sulfate 325 (65 FE) MG tablet   Yes No    HUMALOG MIX 75/25 KWIKPEN (75-25) 100 UNIT/ML suspension pen-injector   Yes No    levothyroxine (SYNTHROID, LEVOTHROID) 50 MCG tablet   Yes No    Take 50 mcg by mouth daily.    lisinopril (PRINIVIL,ZESTRIL) 40 MG tablet   No No    Take 1 tablet by mouth Daily.    lithium carbonate 300 MG capsule   No No    Take 1 capsule by mouth 2 (Two) Times a Day With Meals.    omeprazole (priLOSEC) 20 MG capsule   Yes No    Take 20 mg by mouth Daily.    ONETOUCH VERIO test strip   Yes No    promethazine (PHENERGAN) 25 MG tablet   No No    Take 1 tablet by mouth Every 8 (Eight) Hours As Needed for Nausea or Vomiting.    STEGLATRO 15 MG tablet   Yes No        Hospital Scheduled Meds:        ---------------------------------------------------------------------------------------------------------------------   Objective       Vital Signs:  Temp:  [98.3 °F (36.8 °C)] 98.3 °F (36.8 °C)  Heart Rate:  [57-79] 57  Resp:  [18] 18  BP: (113-136)/(76-88) 136/76      01/12/20  0856   Weight: 73.5 kg (162 lb)     Body mass index is 32.72 kg/m².  ---------------------------------------------------------------------------------------------------------------------       Physical Exam    Physical Exam:  Constitutional:  Well-developed and well-nourished.     HENT:  Head: Normocephalic and atraumatic.  Mouth:  Moist mucous membranes.    Eyes:   Pupils are equal, round, and reactive to light.  No scleral icterus.  Neck:  Neck supple.  No JVD present.     Cardiovascular:  Normal rate, regular rhythm.  With 2/6 systolic murmur.  Pulmonary/Chest:  No respiratory distress, no wheezes, no crackles, with normal breath sounds and good air movement.  Abdominal:  Soft.  Bowel sounds are present.  No distension and no tenderness.   Musculoskeletal:  No edema, no tenderness, and no deformity.  No red or swollen joints anywhere.    Neurological:  Alert and oriented to person, place, and time. No tongue deviation.  No facial droop.  No slurred speech.   Skin:  Skin is warm and dry.  No rash noted.  No pallor.   Psychiatric:  Normal mood and affect.  Behavior is normal.  Judgment and thought content normal.   Peripheral vascular:  No edema and strong pulses on all 4 extremities.  ---------------------------------------------------------------------------------------------------------------------  EKG:          ---------------------------------------------------------------------------------------------------------------------   Results from last 7 days   Lab Units 01/12/20  0905   WBC 10*3/mm3 9.19   HEMOGLOBIN g/dL 11.2*   HEMATOCRIT % 34.2   MCV fL 89.5   MCHC g/dL 32.7   PLATELETS 10*3/mm3 229   INR  0.91         Results from last 7 days   Lab Units 01/12/20  0905   SODIUM mmol/L 135*   POTASSIUM mmol/L 4.7   MAGNESIUM mg/dL 2.1   CHLORIDE mmol/L 101   CO2 mmol/L 21.2*   BUN mg/dL 28*   CREATININE mg/dL 1.06*   EGFR IF NONAFRICN AM mL/min/1.73 55*   CALCIUM mg/dL 9.3   GLUCOSE mg/dL 264*   ALBUMIN g/dL 3.98   BILIRUBIN mg/dL 0.2   ALK PHOS U/L 97   AST (SGOT) U/L 13   ALT (SGPT) U/L 9   Estimated Creatinine Clearance: 58.8 mL/min (A) (by C-G formula based on SCr of 1.06 mg/dL (H)).  No results found for: AMMONIA  Results from last 7 days   Lab Units 01/12/20  0905   TROPONIN T ng/mL <0.010     Results from last 7 days   Lab Units 01/12/20  0905   PROBNP pg/mL 57.2     No results found for: HGBA1C  Lab Results   Component Value Date    TSH 1.440 01/12/2020    FREET4 1.11  01/24/2019     Lab Results   Component Value Date    PREGTESTUR Negative 11/25/2014     Pain Management Panel     Pain Management Panel Latest Ref Rng & Units 9/5/2019 6/5/2017    CREATININE UR mg/dL 49.7 155.7    AMPHETAMINES SCREEN, URINE NEGATIVE - -    BARBITURATES SCREEN NEGATIVE - -    BENZODIAZEPINE SCREEN, URINE NEGATIVE - -    COCAINE SCREEN, URINE NEGATIVE - -    METHADONE SCREEN, URINE NEGATIVE - -              ---------------------------------------------------------------------------------------------------------------------  Imaging Results (Last 7 Days)     Procedure Component Value Units Date/Time    XR Chest 1 View [[id number]] Collected:  01/12/20 1002     Updated:  01/12/20 1004    Narrative:       CR Chest 1 Vw    INDICATION:   Intermittent chest pain for the past day     COMPARISON:    None available.    FINDINGS:  Single portable AP view(s) of the chest.  The heart and mediastinal contours are normal. The lungs are clear. No pneumothorax or pleural effusion.      Impression:       No acute cardiopulmonary findings.    Signer Name: Selvin Abdi MD   Signed: 1/12/2020 10:02 AM   Workstation Name: [id number]    [facility]        Cultures:none    ---------------------------------------------------------------------------------------------------------------------  Assessment / Plan       Assessment and Plan:    Chest pain and near syncope: trend troponin, monitor on telemetry, daily baby ASA. Lipid panel in the am, NPO after MN for stress test, echo ordered. Orthostatic v/s ordered.     CKD stage III: creatinine 1.06 appears to be at baseline, monitor and repeat in the am.     Dm Type 2, insulin dependent: A1c ordered, hypoglycemia protocol initiated, accu checks ac/hs and prn, diabetic diet, low dose sliding scale ordered.     Hypothyroidism: TSH level ordered, continue home synthroid.     Obesity, BMI 32.72    Activity: up ad macarena  Diet: cardiac, consistent carb  DVT  prophylaxis: heparin SQ    Code status: Full     MARISELA Jimenez  01/12/20  11:35 AM  ---------------------------------------------------------------------------------------------------------------------   Patient seen and examined independently of MARISELA Garcia.  Agree with above-mentioned HPI, physical exam, assessment and plan.

## 2020-01-12 NOTE — ED NOTES
I called lab again to stick pt and they said they would come as soon as possible     Rica Dumas  01/12/20 1149

## 2020-01-12 NOTE — PLAN OF CARE
Problem: Patient Care Overview  Goal: Plan of Care Review  Outcome: Ongoing (interventions implemented as appropriate)  Flowsheets  Taken 1/12/2020 1803  Progress: no change  Outcome Summary: Pt arrived to floor today with complaints of ongoing chest discomfort stating it is more midsternal with radiation to the right side of the shoulder, and back. Pt denies any shortness of breath, however states she does have a little nausea at this time. Pt states she has not had any relief since yesterday as this has been an ongoing thing. Pt is A&Ox4, no signs of distress noted.  Taken 1/12/2020 1523  Plan of Care Reviewed With: patient

## 2020-01-12 NOTE — ED NOTES
multiple attempts made to get pts trop were unsuccessful I called lab and spoke with adri and she is going to get a phleb to stick the pt       Cony Dela Cruz  01/12/20 2701

## 2020-01-12 NOTE — ED PROVIDER NOTES
Subjective   49-year-old female with history of hypertension hyperlipidemia hypothyroidism diabetes presents the emergency room with complaints of chest pain.  Patient reports 6 days ago she was having substernal chest pain described as a dull.  She states she felt short of breath and nauseated pain subsided on its own.  She states that she went to her family doctor who performed an EKG that was unremarkable.  She states that she had symptoms return again on Wednesday and again today.  She states that pain is moderate in severity.  She states she still feels short of breath and nauseated.  She has not taken any medication for relief of symptoms.  She has had a stress test in the past reports multiple years ago.      Chest Pain   Pain location:  Substernal area  Pain quality: dull    Pain radiates to:  R arm  Pain severity:  Moderate  Timing:  Intermittent  Progression:  Waxing and waning  Chronicity:  New  Relieved by:  Nothing  Worsened by:  Nothing  Associated symptoms: nausea and shortness of breath    Associated symptoms: no abdominal pain, no anxiety, no claudication, no cough, no diaphoresis, no dizziness, no dysphagia, no fatigue, no fever, no headache, no numbness, no orthopnea, no palpitations, no vomiting and no weakness    Risk factors: diabetes mellitus, high cholesterol and hypertension        Review of Systems   Constitutional: Negative for diaphoresis, fatigue and fever.   HENT: Negative for trouble swallowing.    Respiratory: Positive for shortness of breath. Negative for cough.    Cardiovascular: Positive for chest pain. Negative for palpitations, orthopnea and claudication.   Gastrointestinal: Positive for nausea. Negative for abdominal pain and vomiting.   Neurological: Negative for dizziness, weakness, numbness and headaches.   All other systems reviewed and are negative.      Past Medical History:   Diagnosis Date   • Abnormal CT scan     ADRENAL GLAND FOCAL MASS LESION MULTIPLE, LEFT ONLY   •  "Anxiety    • ASCVD (arteriosclerotic cardiovascular disease)     \"status post MI in 2012, clinically stable\"   • Bipolar disorder (CMS/HCC)    • Depression    • Diabetes mellitus (CMS/HCC)    • Disease of thyroid gland    • Dyslipidemia    • Hypertension, well controlled    • Migraine headache    • Myocardial infarction (CMS/HCC) 2012   • Sleep apnea        Allergies   Allergen Reactions   • Zoloft [Sertraline Hcl] Nausea And Vomiting   • Ciprofloxacin Rash     Also caused chest pain       Past Surgical History:   Procedure Laterality Date   • CHOLECYSTECTOMY     • CORONARY ANGIOPLASTY WITH STENT PLACEMENT  2012    Dr. Denson   • HYSTERECTOMY  12/2014   • NECK SURGERY  2010   • TRANSESOPHAGEAL ECHOCARDIOGRAM (SARAN)  07/13/2015    EF 60-65%       Family History   Problem Relation Age of Onset   • Diabetes Father    • Diabetes Brother    • Cancer Maternal Grandmother    • Heart disease Maternal Grandfather    • Diabetes Paternal Grandfather    • Cancer Other        Social History     Socioeconomic History   • Marital status: Single     Spouse name: Not on file   • Number of children: Not on file   • Years of education: Not on file   • Highest education level: Not on file   Tobacco Use   • Smoking status: Never Smoker   • Smokeless tobacco: Never Used   Substance and Sexual Activity   • Alcohol use: No   • Drug use: No   • Sexual activity: Defer           Objective   Physical Exam   Constitutional: She is oriented to person, place, and time. She does not appear ill. No distress.   HENT:   Head: Normocephalic and atraumatic.   Eyes: Pupils are equal, round, and reactive to light. EOM are normal.   Neck: Neck supple. No JVD present.   Cardiovascular: Normal rate and regular rhythm.  No extrasystoles are present.   No murmur heard.  Pulses:       Radial pulses are 2+ on the right side, and 2+ on the left side.        Dorsalis pedis pulses are 2+ on the right side, and 2+ on the left side.   Pulmonary/Chest: Effort normal " and breath sounds normal. No accessory muscle usage. No tachypnea. She has no decreased breath sounds. She has no wheezes. She has no rhonchi. She has no rales.   Speaks in full sentences.  No accessory muscle use.  Chest wall nontender   Abdominal: Soft. Bowel sounds are normal. There is no tenderness. There is no rebound and no guarding.   No abdominal bruit   Musculoskeletal:        Right lower leg: She exhibits no tenderness and no edema.        Left lower leg: She exhibits no tenderness and no edema.   Dry cracked toes bilaterally on plantar aspect.   Neurological: She is alert and oriented to person, place, and time. No cranial nerve deficit.   Symmetric sensation light touch   Skin: Skin is warm and dry.   Psychiatric: She has a normal mood and affect.   Nursing note and vitals reviewed.      Procedures           ED Course  ED Course as of Jan 12 1003   Sun Jan 12, 2020   0902 EKG: Normal sinus rhythm ventricular 80  QRS 86 QTc 417 normal axis.    [BB]   0914 Patient Accu-Chek 259    [BB]   0951 Patient outside of elevated glucose labs are essentially unremarkable.  Troponin negative x1 set chest x-ray is unremarkable.    [BB]   0953 Patient was given 1 sublingual nitroglycerin with relief of chest pain.  Patient's heart score is a 4.  Have contacted hospitalist awaiting callback    [BB]   1001 Have spoken to Dr. Toney who is agreeable to admit to hospital    [BB]      ED Course User Index  [BB] Kenneth Dykes MD                                               MDM  Number of Diagnoses or Management Options  Chest pain, unspecified type: new and requires workup  Hyperglycemia: established and worsening     Amount and/or Complexity of Data Reviewed  Clinical lab tests: ordered and reviewed  Tests in the radiology section of CPT®: reviewed and ordered  Decide to obtain previous medical records or to obtain history from someone other than the patient: yes  Discuss the patient with other providers:  yes    Risk of Complications, Morbidity, and/or Mortality  Presenting problems: moderate  Diagnostic procedures: moderate  Management options: moderate    Patient Progress  Patient progress: stable      Final diagnoses:   Chest pain, unspecified type   Hyperglycemia            Kenneth Dykes MD  01/12/20 0105

## 2020-01-13 ENCOUNTER — APPOINTMENT (OUTPATIENT)
Dept: NUCLEAR MEDICINE | Facility: HOSPITAL | Age: 50
End: 2020-01-13

## 2020-01-13 ENCOUNTER — APPOINTMENT (OUTPATIENT)
Dept: CT IMAGING | Facility: HOSPITAL | Age: 50
End: 2020-01-13

## 2020-01-13 ENCOUNTER — APPOINTMENT (OUTPATIENT)
Dept: CARDIOLOGY | Facility: HOSPITAL | Age: 50
End: 2020-01-13

## 2020-01-13 ENCOUNTER — APPOINTMENT (OUTPATIENT)
Dept: ULTRASOUND IMAGING | Facility: HOSPITAL | Age: 50
End: 2020-01-13

## 2020-01-13 VITALS
HEIGHT: 59 IN | TEMPERATURE: 97.5 F | HEART RATE: 80 BPM | WEIGHT: 164.1 LBS | RESPIRATION RATE: 18 BRPM | OXYGEN SATURATION: 98 % | BODY MASS INDEX: 33.08 KG/M2 | SYSTOLIC BLOOD PRESSURE: 112 MMHG | DIASTOLIC BLOOD PRESSURE: 67 MMHG

## 2020-01-13 LAB
ALBUMIN SERPL-MCNC: 3.72 G/DL (ref 3.5–5.2)
ALBUMIN/GLOB SERPL: 1.1 G/DL
ALP SERPL-CCNC: 83 U/L (ref 39–117)
ALT SERPL W P-5'-P-CCNC: 9 U/L (ref 1–33)
ANION GAP SERPL CALCULATED.3IONS-SCNC: 15.4 MMOL/L (ref 5–15)
AST SERPL-CCNC: 12 U/L (ref 1–32)
BASOPHILS # BLD AUTO: 0.01 10*3/MM3 (ref 0–0.2)
BASOPHILS NFR BLD AUTO: 0.1 % (ref 0–1.5)
BH CV ECHO MEAS - % IVS THICK: 71.2 %
BH CV ECHO MEAS - % LVPW THICK: 31.6 %
BH CV ECHO MEAS - ACS: 1.2 CM
BH CV ECHO MEAS - AO MAX PG: 14.7 MMHG
BH CV ECHO MEAS - AO MEAN PG: 9 MMHG
BH CV ECHO MEAS - AO ROOT AREA (BSA CORRECTED): 1.8
BH CV ECHO MEAS - AO ROOT AREA: 7.5 CM^2
BH CV ECHO MEAS - AO ROOT DIAM: 3.1 CM
BH CV ECHO MEAS - AO V2 MAX: 192 CM/SEC
BH CV ECHO MEAS - AO V2 MEAN: 144 CM/SEC
BH CV ECHO MEAS - AO V2 VTI: 46.2 CM
BH CV ECHO MEAS - BSA(HAYCOCK): 1.8 M^2
BH CV ECHO MEAS - BSA: 1.7 M^2
BH CV ECHO MEAS - BZI_BMI: 32.7 KILOGRAMS/M^2
BH CV ECHO MEAS - BZI_METRIC_HEIGHT: 149.9 CM
BH CV ECHO MEAS - BZI_METRIC_WEIGHT: 73.5 KG
BH CV ECHO MEAS - EDV(CUBED): 94.5 ML
BH CV ECHO MEAS - EDV(MOD-SP4): 40 ML
BH CV ECHO MEAS - EDV(TEICH): 95.1 ML
BH CV ECHO MEAS - EF(CUBED): 85.9 %
BH CV ECHO MEAS - EF(MOD-SP4): 62.5 %
BH CV ECHO MEAS - EF(TEICH): 79.5 %
BH CV ECHO MEAS - ESV(CUBED): 13.3 ML
BH CV ECHO MEAS - ESV(MOD-SP4): 15 ML
BH CV ECHO MEAS - ESV(TEICH): 19.5 ML
BH CV ECHO MEAS - FS: 48 %
BH CV ECHO MEAS - IVS/LVPW: 1
BH CV ECHO MEAS - IVSD: 1.2 CM
BH CV ECHO MEAS - IVSS: 2 CM
BH CV ECHO MEAS - LA DIMENSION: 3.5 CM
BH CV ECHO MEAS - LA/AO: 1.1
BH CV ECHO MEAS - LV DIASTOLIC VOL/BSA (35-75): 23.7 ML/M^2
BH CV ECHO MEAS - LV MASS(C)D: 192.3 GRAMS
BH CV ECHO MEAS - LV MASS(C)DI: 114.1 GRAMS/M^2
BH CV ECHO MEAS - LV MASS(C)S: 159.5 GRAMS
BH CV ECHO MEAS - LV MASS(C)SI: 94.6 GRAMS/M^2
BH CV ECHO MEAS - LV SYSTOLIC VOL/BSA (12-30): 8.9 ML/M^2
BH CV ECHO MEAS - LVIDD: 4.6 CM
BH CV ECHO MEAS - LVIDS: 2.4 CM
BH CV ECHO MEAS - LVLD AP4: 6.6 CM
BH CV ECHO MEAS - LVLS AP4: 5.5 CM
BH CV ECHO MEAS - LVOT AREA (M): 2.5 CM^2
BH CV ECHO MEAS - LVOT AREA: 2.5 CM^2
BH CV ECHO MEAS - LVOT DIAM: 1.8 CM
BH CV ECHO MEAS - LVPWD: 1.1 CM
BH CV ECHO MEAS - LVPWS: 1.5 CM
BH CV ECHO MEAS - MV A MAX VEL: 104 CM/SEC
BH CV ECHO MEAS - MV E MAX VEL: 110 CM/SEC
BH CV ECHO MEAS - MV E/A: 1.1
BH CV ECHO MEAS - PA ACC SLOPE: 741 CM/SEC^2
BH CV ECHO MEAS - PA ACC TIME: 0.13 SEC
BH CV ECHO MEAS - PA PR(ACCEL): 18.7 MMHG
BH CV ECHO MEAS - RAP SYSTOLE: 10 MMHG
BH CV ECHO MEAS - RVSP: 39.8 MMHG
BH CV ECHO MEAS - SI(AO): 206.8 ML/M^2
BH CV ECHO MEAS - SI(CUBED): 48.2 ML/M^2
BH CV ECHO MEAS - SI(MOD-SP4): 14.8 ML/M^2
BH CV ECHO MEAS - SI(TEICH): 44.8 ML/M^2
BH CV ECHO MEAS - SV(AO): 348.7 ML
BH CV ECHO MEAS - SV(CUBED): 81.2 ML
BH CV ECHO MEAS - SV(MOD-SP4): 25 ML
BH CV ECHO MEAS - SV(TEICH): 75.6 ML
BH CV ECHO MEAS - TR MAX VEL: 273 CM/SEC
BH CV NUCLEAR PRIOR STUDY: 3
BH CV STRESS BP STAGE 1: NORMAL
BH CV STRESS BP STAGE 2: NORMAL
BH CV STRESS COMMENTS STAGE 1: NORMAL
BH CV STRESS COMMENTS STAGE 2: NORMAL
BH CV STRESS DOSE REGADENOSON STAGE 1: 0.4
BH CV STRESS DURATION MIN STAGE 1: 0
BH CV STRESS DURATION MIN STAGE 2: 4
BH CV STRESS DURATION SEC STAGE 1: 10
BH CV STRESS DURATION SEC STAGE 2: 0
BH CV STRESS HR STAGE 1: 107
BH CV STRESS HR STAGE 2: 73
BH CV STRESS PROTOCOL 1: NORMAL
BH CV STRESS RECOVERY BP: NORMAL MMHG
BH CV STRESS RECOVERY HR: 68 BPM
BH CV STRESS STAGE 1: 1
BH CV STRESS STAGE 2: 2
BILIRUB SERPL-MCNC: 0.3 MG/DL (ref 0.2–1.2)
BUN BLD-MCNC: 26 MG/DL (ref 6–20)
BUN/CREAT SERPL: 30.6 (ref 7–25)
CALCIUM SPEC-SCNC: 9.2 MG/DL (ref 8.6–10.5)
CHLORIDE SERPL-SCNC: 103 MMOL/L (ref 98–107)
CHOLEST SERPL-MCNC: 118 MG/DL (ref 0–200)
CO2 SERPL-SCNC: 17.6 MMOL/L (ref 22–29)
CREAT BLD-MCNC: 0.85 MG/DL (ref 0.57–1)
D DIMER PPP FEU-MCNC: <0.27 MCGFEU/ML (ref 0–0.5)
DEPRECATED RDW RBC AUTO: 44 FL (ref 37–54)
EOSINOPHIL # BLD AUTO: 0.1 10*3/MM3 (ref 0–0.4)
EOSINOPHIL NFR BLD AUTO: 1 % (ref 0.3–6.2)
ERYTHROCYTE [DISTWIDTH] IN BLOOD BY AUTOMATED COUNT: 13 % (ref 12.3–15.4)
GFR SERPL CREATININE-BSD FRML MDRD: 71 ML/MIN/1.73
GLOBULIN UR ELPH-MCNC: 3.3 GM/DL
GLUCOSE BLD-MCNC: 112 MG/DL (ref 65–99)
GLUCOSE BLDC GLUCOMTR-MCNC: 120 MG/DL (ref 70–130)
GLUCOSE BLDC GLUCOMTR-MCNC: 134 MG/DL (ref 70–130)
GLUCOSE BLDC GLUCOMTR-MCNC: 136 MG/DL (ref 70–130)
GLUCOSE BLDC GLUCOMTR-MCNC: 141 MG/DL (ref 70–130)
GLUCOSE BLDC GLUCOMTR-MCNC: 205 MG/DL (ref 70–130)
GLUCOSE BLDC GLUCOMTR-MCNC: 225 MG/DL (ref 70–130)
GLUCOSE BLDC GLUCOMTR-MCNC: 35 MG/DL (ref 70–130)
HCT VFR BLD AUTO: 34.8 % (ref 34–46.6)
HDLC SERPL-MCNC: 50 MG/DL (ref 40–60)
HGB BLD-MCNC: 11 G/DL (ref 12–15.9)
IMM GRANULOCYTES # BLD AUTO: 0.05 10*3/MM3 (ref 0–0.05)
IMM GRANULOCYTES NFR BLD AUTO: 0.5 % (ref 0–0.5)
LDLC SERPL CALC-MCNC: 46 MG/DL (ref 0–100)
LDLC/HDLC SERPL: 0.92 {RATIO}
LITHIUM SERPL-SCNC: 1 MMOL/L (ref 0.6–1.2)
LYMPHOCYTES # BLD AUTO: 1.94 10*3/MM3 (ref 0.7–3.1)
LYMPHOCYTES NFR BLD AUTO: 18.7 % (ref 19.6–45.3)
MAXIMAL PREDICTED HEART RATE: 170 BPM
MCH RBC QN AUTO: 29.2 PG (ref 26.6–33)
MCHC RBC AUTO-ENTMCNC: 31.6 G/DL (ref 31.5–35.7)
MCV RBC AUTO: 92.3 FL (ref 79–97)
MONOCYTES # BLD AUTO: 0.68 10*3/MM3 (ref 0.1–0.9)
MONOCYTES NFR BLD AUTO: 6.6 % (ref 5–12)
NEUTROPHILS # BLD AUTO: 7.57 10*3/MM3 (ref 1.7–7)
NEUTROPHILS NFR BLD AUTO: 73.1 % (ref 42.7–76)
NRBC BLD AUTO-RTO: 0 /100 WBC (ref 0–0.2)
PERCENT MAX PREDICTED HR: 62.94 %
PLATELET # BLD AUTO: 226 10*3/MM3 (ref 140–450)
PMV BLD AUTO: 10.5 FL (ref 6–12)
POTASSIUM BLD-SCNC: 3.8 MMOL/L (ref 3.5–5.2)
PROT SERPL-MCNC: 7 G/DL (ref 6–8.5)
RBC # BLD AUTO: 3.77 10*6/MM3 (ref 3.77–5.28)
SODIUM BLD-SCNC: 136 MMOL/L (ref 136–145)
STRESS BASELINE BP: NORMAL MMHG
STRESS BASELINE HR: 69 BPM
STRESS PERCENT HR: 74 %
STRESS POST PEAK BP: NORMAL MMHG
STRESS POST PEAK HR: 107 BPM
STRESS TARGET HR: 145 BPM
TRIGL SERPL-MCNC: 111 MG/DL (ref 0–150)
TROPONIN T SERPL-MCNC: <0.01 NG/ML (ref 0–0.03)
VLDLC SERPL-MCNC: 22.2 MG/DL
WBC NRBC COR # BLD: 10.35 10*3/MM3 (ref 3.4–10.8)

## 2020-01-13 PROCEDURE — 93018 CV STRESS TEST I&R ONLY: CPT | Performed by: INTERNAL MEDICINE

## 2020-01-13 PROCEDURE — 85025 COMPLETE CBC W/AUTO DIFF WBC: CPT | Performed by: NURSE PRACTITIONER

## 2020-01-13 PROCEDURE — 96372 THER/PROPH/DIAG INJ SC/IM: CPT

## 2020-01-13 PROCEDURE — 93880 EXTRACRANIAL BILAT STUDY: CPT | Performed by: RADIOLOGY

## 2020-01-13 PROCEDURE — 93005 ELECTROCARDIOGRAM TRACING: CPT | Performed by: INTERNAL MEDICINE

## 2020-01-13 PROCEDURE — 25010000002 REGADENOSON 0.4 MG/5ML SOLUTION: Performed by: HOSPITALIST

## 2020-01-13 PROCEDURE — 80053 COMPREHEN METABOLIC PANEL: CPT | Performed by: NURSE PRACTITIONER

## 2020-01-13 PROCEDURE — 70450 CT HEAD/BRAIN W/O DYE: CPT

## 2020-01-13 PROCEDURE — 85379 FIBRIN DEGRADATION QUANT: CPT | Performed by: HOSPITALIST

## 2020-01-13 PROCEDURE — 82962 GLUCOSE BLOOD TEST: CPT

## 2020-01-13 PROCEDURE — A9500 TC99M SESTAMIBI: HCPCS | Performed by: HOSPITALIST

## 2020-01-13 PROCEDURE — 25010000002 HEPARIN (PORCINE) PER 1000 UNITS: Performed by: NURSE PRACTITIONER

## 2020-01-13 PROCEDURE — 70450 CT HEAD/BRAIN W/O DYE: CPT | Performed by: RADIOLOGY

## 2020-01-13 PROCEDURE — G0378 HOSPITAL OBSERVATION PER HR: HCPCS

## 2020-01-13 PROCEDURE — 78452 HT MUSCLE IMAGE SPECT MULT: CPT

## 2020-01-13 PROCEDURE — 99217 PR OBSERVATION CARE DISCHARGE MANAGEMENT: CPT | Performed by: HOSPITALIST

## 2020-01-13 PROCEDURE — 25010000002 AMINOPHYLLINE PER 250 MG: Performed by: NURSE PRACTITIONER

## 2020-01-13 PROCEDURE — 80178 ASSAY OF LITHIUM: CPT | Performed by: PHYSICIAN ASSISTANT

## 2020-01-13 PROCEDURE — 93010 ELECTROCARDIOGRAM REPORT: CPT | Performed by: INTERNAL MEDICINE

## 2020-01-13 PROCEDURE — 93017 CV STRESS TEST TRACING ONLY: CPT

## 2020-01-13 PROCEDURE — 93880 EXTRACRANIAL BILAT STUDY: CPT

## 2020-01-13 PROCEDURE — 63710000001 INSULIN ASPART PER 5 UNITS: Performed by: NURSE PRACTITIONER

## 2020-01-13 PROCEDURE — 0 TECHNETIUM SESTAMIBI: Performed by: HOSPITALIST

## 2020-01-13 PROCEDURE — 78452 HT MUSCLE IMAGE SPECT MULT: CPT | Performed by: INTERNAL MEDICINE

## 2020-01-13 PROCEDURE — 96374 THER/PROPH/DIAG INJ IV PUSH: CPT

## 2020-01-13 PROCEDURE — 84484 ASSAY OF TROPONIN QUANT: CPT | Performed by: INTERNAL MEDICINE

## 2020-01-13 PROCEDURE — 80061 LIPID PANEL: CPT | Performed by: NURSE PRACTITIONER

## 2020-01-13 RX ORDER — AMINOPHYLLINE DIHYDRATE 25 MG/ML
125 INJECTION, SOLUTION INTRAVENOUS
Status: COMPLETED | OUTPATIENT
Start: 2020-01-13 | End: 2020-01-13

## 2020-01-13 RX ADMIN — HEPARIN SODIUM 5000 UNITS: 5000 INJECTION INTRAVENOUS; SUBCUTANEOUS at 08:38

## 2020-01-13 RX ADMIN — INSULIN ASPART 3 UNITS: 100 INJECTION, SOLUTION INTRAVENOUS; SUBCUTANEOUS at 17:23

## 2020-01-13 RX ADMIN — AMINOPHYLLINE 125 MG: 25 INJECTION, SOLUTION INTRAVENOUS at 12:26

## 2020-01-13 RX ADMIN — ASPIRIN 81 MG: 81 TABLET, COATED ORAL at 08:38

## 2020-01-13 RX ADMIN — SODIUM CHLORIDE, PRESERVATIVE FREE 10 ML: 5 INJECTION INTRAVENOUS at 08:38

## 2020-01-13 RX ADMIN — PANTOPRAZOLE SODIUM 40 MG: 40 TABLET, DELAYED RELEASE ORAL at 06:28

## 2020-01-13 RX ADMIN — DEXTROSE 50 % IN WATER (D50W) INTRAVENOUS SYRINGE 25 G: at 00:21

## 2020-01-13 RX ADMIN — TECHNETIUM TC 99M SESTAMIBI 1 DOSE: 1 INJECTION INTRAVENOUS at 12:18

## 2020-01-13 RX ADMIN — TECHNETIUM TC 99M SESTAMIBI 1 DOSE: 1 INJECTION INTRAVENOUS at 10:45

## 2020-01-13 RX ADMIN — REGADENOSON 0.4 MG: 0.08 INJECTION, SOLUTION INTRAVENOUS at 12:18

## 2020-01-13 RX ADMIN — LITHIUM CARBONATE 300 MG: 300 CAPSULE, GELATIN COATED ORAL at 17:23

## 2020-01-13 NOTE — PLAN OF CARE
Pt had a hypoglycemic episode during the shift in which it dropped to 35. Initiated hypoglycemic protocol, notified the provider, administered an amp of D50, and ordered stat EKG, and STAT trops as well. Pts glucose returned to normal limits and states she feels better now, and hasn't had any complaints after that. Will continue to monitor the patient closely. Provider is already aware of this situation.

## 2020-01-13 NOTE — PROGRESS NOTES
"    [facility] HOSPITALIST PROGRESS NOTE     Patient Identification:  Name:  Tammi Levine  Age:  49 y.o.  Sex:  female  :  1970  MRN:  [id number]  Visit Number:  [id number]  Primary Care Provider:  Diane Rios    Length of stay:  0    Subjective: Patient seen and examined assisted by Prema Chamberlain RN.  Patient had episodes of hypoglycemia early this morning requiring D50, chest pain occurred during the episode of hypoglycemia.  Patient seems to be very anxious and nervous on exam.  She claims she is \"always like this \".  Patient reports chest pain mostly on this sternal wall right parasternal area rating to the right shoulder, worsened with deep inspiration.  Denies coughing.  So far cardiac enzymes are all negative.    Chief Complaint: Chest pain  ----------------------------------------------------------------------------------------------------------------------  Current Hospital Meds:    aspirin 81 mg Oral Daily   atorvastatin 80 mg Oral Nightly   heparin (porcine) 5,000 Units Subcutaneous Q12H   insulin aspart 0-7 Units Subcutaneous 4x Daily AC & at Bedtime   levothyroxine 50 mcg Oral Q PM   lisinopril 40 mg Oral Daily   lithium carbonate 300 mg Oral Q PM   pantoprazole 40 mg Oral QAM   sodium chloride 10 mL Intravenous Q12H        ----------------------------------------------------------------------------------------------------------------------  Vital Signs:  Temp:  [97.5 °F (36.4 °C)-98.4 °F (36.9 °C)] 98.1 °F (36.7 °C)  Heart Rate:  [59-79] 74  Resp:  [18] 18  BP: ()/(53-76) 103/62       Tele: Sinus rhythm 64 bpm      20  1534 20  0458 20  0548   Weight: 73.4 kg (161 lb 14.4 oz) 74.4 kg (164 lb 1.6 oz) 74.4 kg (164 lb 1.6 oz)     Body mass index is 33.14 kg/m².    Intake/Output Summary (Last 24 hours) at 2020 1234  Last data filed at 2020 1716  Gross per 24 hour   Intake 240 ml   Output --   Net 240 ml     NPO " Diet  ----------------------------------------------------------------------------------------------------------------------  Physical exam:  General: Comfortable,awake, alert, oriented to self, place, and time, well-developed and well-nourished.  No respiratory distress.  Somewhat nervous and anxious   Skin:  Skin is warm and dry. No rash noted. No pallor.    HENT:  Head:  Normocephalic and atraumatic.  Mouth:  Moist mucous membranes.    Eyes:  Conjunctivae and EOM are normal.  Pupils are equal, round, and reactive to light.  No scleral icterus.    Neck:  Neck supple.  No JVD present.    Pulmonary/Chest:  No respiratory distress, no wheezes, no crackles, with normal breath sounds and good air movement.  No rash or lesions noted to the chest wall, she has subjective tenderness of the right lower parasternal area, no symmetry of the chest wall, equal chest expansion.  Cardiovascular:  Normal rate, regular rhythm and normal heart sounds with no murmur.  Abdominal:  Soft.  Bowel sounds are normal.  No distension and no tenderness.  Bees  Extremities:  No edema, no tenderness, and no deformity.  No red or swollen joints anywhere.  Strong pulses in all 4 extremities with no clubbing, no cyanosis, no edema.  Neurological:  Motor strength equal no obvious deficit, sensory grossly intact.   No cranial nerve deficit.  No tongue deviation.  No facial droop.  No slurred speech.  She has tremors both upper extremity.  Genitourinary: No Esparza catheter    ----------------------------------------------------------------------------------------------------------------------  ----------------------------------------------------------------------------------------------------------------------  Results from last 7 days   Lab Units 01/13/20  0104 01/12/20  1700 01/12/20  1204   TROPONIN T ng/mL <0.010 <0.010 <0.010     Results from last 7 days   Lab Units 01/13/20  0104 01/12/20  0905   WBC 10*3/mm3 10.35 9.19   HEMOGLOBIN g/dL 11.0*  11.2*   HEMATOCRIT % 34.8 34.2   MCV fL 92.3 89.5   MCHC g/dL 31.6 32.7   PLATELETS 10*3/mm3 226 229   INR   --  0.91         Results from last 7 days   Lab Units 01/13/20 0104 01/12/20  0905   SODIUM mmol/L 136 135*   POTASSIUM mmol/L 3.8 4.7   MAGNESIUM mg/dL  --  2.1   CHLORIDE mmol/L 103 101   CO2 mmol/L 17.6* 21.2*   BUN mg/dL 26* 28*   CREATININE mg/dL 0.85 1.06*   EGFR IF NONAFRICN AM mL/min/1.73 71 55*   CALCIUM mg/dL 9.2 9.3   GLUCOSE mg/dL 112* 264*   ALBUMIN g/dL 3.72 3.98   BILIRUBIN mg/dL 0.3 0.2   ALK PHOS U/L 83 97   AST (SGOT) U/L 12 13   ALT (SGPT) U/L 9 9   Estimated Creatinine Clearance: 73.8 mL/min (by C-G formula based on SCr of 0.85 mg/dL).    No results found for: AMMONIA  Results from last 7 days   Lab Units 01/13/20  0104   CHOLESTEROL mg/dL 118   TRIGLYCERIDES mg/dL 111   HDL CHOL mg/dL 50   LDL CHOL mg/dL 46     No results found for: BLOODCX  No results found for: URINECX  No results found for: WOUNDCX  No results found for: STOOLCX    I have personally looked at the labs and they are summarized above.  ----------------------------------------------------------------------------------------------------------------------  Imaging Results (Last 24 Hours)     ** No results found for the last 24 hours. **        ----------------------------------------------------------------------------------------------------------------------  Assessment and Plan:  -Chest pain, MI ruled out negative troponins, atypical/typical  -History of bipolar disorder  -Diabetes type 2 insulin requiring  -Diabetes related hypoglycemia  -Anxiety  -Acquired hypothyroidism    Patient will have a stress test today, awaiting for results, hopefully home today.  Her usual insulin regimen at home has been discontinued will monitor Accu-Chek closely.      Carlita Chance MD  01/13/20  12:34 PM

## 2020-01-13 NOTE — NURSING NOTE
Pt called out and stated that she felt funny. She stated that her chest was hurting, and was tearful. Checked patients blood sugar it was 35, ordered stat EKG, and Troponins at this time. Notified Sindy Bell as well about the critical glucose. Administered an amp of D50, and will continue to monitor patients glucose, and follow protocol as ordered. Pt states she now feels better and EKG was normal.

## 2020-01-13 NOTE — PLAN OF CARE
Problem: Patient Care Overview  Goal: Plan of Care Review  1/13/2020 1531 by Prema Chamberlain, RN  Outcome: Ongoing (interventions implemented as appropriate)   Pt resting in bed. Pt has no complaints at this time but is visibly anxious. Explained care to pt in hopes of calming pt down and explained to pt that if she thought of anything that we could do to make her more comfortable to let me know. Awaiting test results.

## 2020-01-14 NOTE — DISCHARGE SUMMARY
[facility] HOSPITALIST MEDICINE DISCHARGE SUMMARY    Patient Identification:  Name:  Tammi Levine  Age:  49 y.o.  Sex:  female  :  1970  MRN:  [id number]  Visit Number:  [id number]    Date of Admission: 2020  Date of Discharge:  2020   DISCHARGE DISPOSITION   Stable  PCP: Diane Rios    DISCHARGE DIAGNOSIS : Chest pain atypical MI was ruled out by serial cardiac enzymes and stress test which was negative for ischemia and low risk study, diabetes related hypoglycemia, obese type II insulin requiring, active sleep apnea noncompliant with CPAP, bipolar disorder, dyslipidemia, acquired hypothyroidism, obesity with a BMI of 33.1, central hypertension.    HOSPITAL COURSE  Patient is a 49 y.o. female presented to [facility] complaining of chest pain and dyspnea on exertion at least twice, last one occurred while she was taking a shower, because of this she decided come the emergency room, work-up includes EKG showing no acute ischemic changes, serial cardiac enzymes are all negative, x-ray of the chest is unremarkable, d-dimer was also normal.  Because of her risk factors she was admitted under observation, telemetry failed to reveal arrhythmia.  She did develop episode of hypoglycemia improved with giving oral glucose.  She reports that she does have occasional hypoglycemia at home, here at our facility even of her home maintenance insulin her Accu-Chek is never more than 150.  She reports that she takes 20 regular insulin twice a day before meals.  Because of concerns for hypoglycemia I cut it down to 10 twice a day, started the patient to keep a diary of Accu-Chek results to bring to her primary care provider for further adjustment.  I also advised the patient not to take insulin if she decides not to eat.  After ruling out for MI by cardiac enzymes she underwent nuclear stress test which is negative for ischemia and low risk study.  On exam she does have more of  a reproducible tenderness especially on the left parasternal border.  No lesions.  Plan is to discharge her home and keep a diary for acute resolved for her primary care provider to review.      VITAL SIGNS:      01/12/20  1534 01/13/20  0458 01/13/20  0548   Weight: 73.4 kg (161 lb 14.4 oz) 74.4 kg (164 lb 1.6 oz) 74.4 kg (164 lb 1.6 oz)     Body mass index is 33.14 kg/m².  Vitals:    01/13/20 1551   BP: 112/67   Pulse: 80   Resp: 18   Temp: 97.5 °F (36.4 °C)   SpO2: 98%     PHYSICAL EXAM:  General: Comfortable,awake, alert, oriented to self, place, and time, well-developed and well-nourished.  No respiratory distress.  Somewhat nervous and anxious   Skin:  Skin is warm and dry. No rash noted. No pallor.    HENT:  Head:  Normocephalic and atraumatic.  Mouth:  Moist mucous membranes.    Eyes:  Conjunctivae and EOM are normal.  Pupils are equal, round, and reactive to light.  No scleral icterus.    Neck:  Neck supple.  No JVD present.    Pulmonary/Chest:  No respiratory distress, no wheezes, no crackles, with normal breath sounds and good air movement.  No rash or lesions noted to the chest wall, she has subjective tenderness of the right lower parasternal area, no symmetry of the chest wall, equal chest expansion.  Cardiovascular:  Normal rate, regular rhythm and normal heart sounds with no murmur.  Abdominal:  Soft.  Bowel sounds are normal.  No distension and no tenderness.  Bees  Extremities:  No edema, no tenderness, and no deformity.  No red or swollen joints anywhere.  Strong pulses in all 4 extremities with no clubbing, no cyanosis, no edema.  Neurological:  Motor strength equal no obvious deficit, sensory grossly intact.   No cranial nerve deficit.  No tongue deviation.  No facial droop.  No slurred speech.  She has tremors both upper extremity.  Genitourinary: No Esparza catheter    ----------    DISCHARGE MEDICATIONS:     Discharge Medications      Changes to Medications      Instructions Start Date      insulin aspart 100 UNIT/ML injection  Commonly known as:  novoLOG  What changed:  how much to take   10 Units, Subcutaneous, 2 Times Daily         Continue These Medications      Instructions Start Date   aspirin 81 MG EC tablet   81 mg, Oral, Every Evening      atorvastatin 80 MG tablet  Commonly known as:  LIPITOR   80 mg, Oral, Nightly      INVOKANA 100 MG tablet  Generic drug:  Canagliflozin   100 mg, Oral, Every Evening      levothyroxine 50 MCG tablet  Commonly known as:  SYNTHROID, LEVOTHROID   50 mcg, Oral, Every Evening      lisinopril 40 MG tablet  Commonly known as:  PRINIVIL,ZESTRIL   40 mg, Oral, Daily      lithium carbonate 300 MG capsule   300 mg, Oral, Every Evening      omeprazole 20 MG capsule  Commonly known as:  priLOSEC   20 mg, Oral, Daily               Your Scheduled Appointments    Jan 21, 2020  1:00 PM EST  Psychotherapy with Vidal Ortiz LCSW  [facility] (--) [address] [id number]  [phone]      Feb 11, 2020 11:30 AM EST  Follow Up with Vidal Ortiz LCSW  [facility] (--) [address] [id number]  [phone]   Arrive 15 minutes prior to appointment.     Feb 18, 2020 10:30 AM EST  Medicine Check with MARISELA Sanchez  [facility] (--) [address] [facility] [address]  [phone]      Mar 17, 2020 11:30 AM EDT  Follow Up with Vidal Ortiz LCSW  [facility] (--) [address] [id number]  [phone]   Arrive 15 minutes prior to appointment.     Apr 14, 2020 11:30 AM EDT  Follow Up with Vidal Ortiz LCSW  [facility] (--) [address] [id number]  [phone]   Arrive 15 minutes prior to appointment.     May 12, 2020 11:30 AM EDT  Follow Up with Vidal Ortiz LCSW  [facility] (--) [address]  [facility] [address]  [phone]   Arrive 15 minutes prior to appointment.     Jul 08, 2020 10:15 AM EDT  Follow Up with MARISELA Tomlinson  [facility] (--) [address] [place] [address] [phone]   Arrive 15 minutes prior to appointment.            Additional Instructions for the Follow-ups that You Need to Schedule     Discharge Follow-up with PCP   As directed       Currently Documented PCP:    Diane Rios    PCP Phone Number:    [phone]     Follow Up Details:  Diane Rios (follow-up on home Accu-Chek results)           Follow-up Information     Diane Rios .    Specialty:  Nurse Practitioner  Why:  Diane Rios (follow-up on home Accu-Chek results)  Contact information:  [address]  [place] [address]  [phone]             Diane Rios .    Specialty:  Nurse Practitioner  Contact information:  [address]  [place] [address]  [phone]                   Carlita Chance MD  01/14/20  5:16 PM    Please note that this discharge summary required more than 30 minutes to complete.

## 2020-01-14 NOTE — PLAN OF CARE
Pt glucose stayed WDL, no complaints of chest pain as told in report from day shift. Patient is pleasant and ready to be discharged home at this time.

## 2020-01-21 ENCOUNTER — OFFICE VISIT (OUTPATIENT)
Dept: PSYCHIATRY | Facility: CLINIC | Age: 50
End: 2020-01-21

## 2020-01-21 DIAGNOSIS — F31.81 BIPOLAR II DISORDER (HCC): Primary | ICD-10-CM

## 2020-01-21 DIAGNOSIS — Z63.8 STRESS DUE TO FAMILY TENSION: ICD-10-CM

## 2020-01-21 PROCEDURE — 90832 PSYTX W PT 30 MINUTES: CPT | Performed by: SOCIAL WORKER

## 2020-01-21 SDOH — SOCIAL STABILITY - SOCIAL INSECURITY: OTHER SPECIFIED PROBLEMS RELATED TO PRIMARY SUPPORT GROUP: Z63.8

## 2020-01-22 NOTE — PROGRESS NOTES
Date of Service: January 21, 2020  Time In: 1:10 PM  Time Out: 1:45 PM    PROGRESS NOTE  Data:  Tammi Levine is a 49 y.o. female who met 1:1 with the undersigned for a regularly scheduled individual outpatient therapy session [facility] for follow-up of bipolar II disorder ongoing strained relationship with her mother.       HPI: Patient continues to report she and her mother continue to have a strained relationship and states she believes her mother has chosen her boyfriend over her.  Patient states this makes it very difficult for them to talk or have any type of interaction.  Patient reports she realizes she becomes irritable easily and states that she also is able to admit that she might be unreasonable regarding this situation.  The patient does report she is doing relatively well in her life as far as with her apartment and her friends but states she continues to struggle with periods of depressed mood, anhedonia, anergia, and feeling hopeless.  Patient rates current symptoms at a 3 on a scale of 1-10 with 10 being most severe.  The patient adamantly and convincingly denies suicidal ideation vehemently denies any substance use.      Clinical Maneuvering/Intervention:  Assisted patient in processing above session content; acknowledged and normalized patient’s thoughts, feelings, and concerns.  Allow the patient to discuss/vent her feelings of frustration with ongoing difficulties with relationship with her mother and validated her feelings.  Also utilized motivational reviewed techniques including complex reflections to discuss the concept of things we can control things he cannot control encouraged patient to remind herself she cannot control the decisions or behaviors of others.  Challenged patient to consider whether or not it would be reasonable to simply ask her mother to plan an activity just for the patient and her mother.  Also challenged the patient to ask herself if it is  reasonable that her mother wants to be happy and that although no one can replace her father is not unreasonable for her mother to pursue a relationship.  Continue to focus on healthy skills of daily living and behavioral activation.  Provided unconditional positive regard in a safe, supportive environment.    Allowed patient to freely discuss issues without interruption or judgment. Provided safe, confidential environment to facilitate the development of positive therapeutic relationship and encourage open, honest communication. Assisted patient in identifying risk factors which would indicate the need for higher level of care including thoughts to harm self or others and/or self-harming behavior and encouraged patient to contact this office, call 911, or present to the nearest emergency room should any of these events occur. Discussed crisis intervention services and means to access.  Patient adamantly and convincingly denies current suicidal or homicidal ideation or perceptual disturbance.      Assessment    Patient appears to be doing somewhat better since moving into her own apartment; however, she continues to struggle with periods of depression which likely continue to be exacerbated by ongoing strained relationship with her mother.   The patient's symptomology continue to cause impairment in important areas of functioning.  Patient can be reasonably expected to continue to benefit treatment and would likely be at increased risk for decompensation.    Diagnoses and all orders for this visit:    Bipolar II disorder (CMS/[facility])    Stress due to family tension               Mental Status Exam  Hygiene:  good  Dress:  casual  Attitude:  Cooperative  Motor Activity:  Appropriate  Speech:  Normal  Mood:  depressed/irritable  Affect:  depressed  Thought Processes:  Linear  Thought Content:  normal  Suicidal Thoughts:  denies  Homicidal Thoughts:  denies  Crisis Safety Plan: yes, to come to the emergency  room.  Hallucinations:  denies    Patient's Support Network Includes:  mother and extended family    Progress toward goal: Not at goal    Functional Status: Moderate impairment     Prognosis: Fair with Ongoing Treatment     Plan         Patient will continue in individual outpatient therapy session [facility] every 4 weeks and will continue and pharmacotherapy as scheduled with MARISELA Madsen.  Patient will adhere to medication regimen as prescribed and report any side effects. Patient will contact this office, call 911 or present to the nearest emergency room should suicidal or homicidal ideations occur. Provide Cognitive Behavioral Therapy and Integrative Therapy to improve functioning, maintain stability, and avoid decompensation and the need for higher level of care.          Return in about 4 weeks (around 2/18/2020) for Next scheduled follow up.      This document signed by Vidal Ortiz LCSW, [facility] January 22, 2020 5:30 PM

## 2020-03-17 ENCOUNTER — OFFICE VISIT (OUTPATIENT)
Dept: PSYCHIATRY | Facility: CLINIC | Age: 50
End: 2020-03-17

## 2020-03-17 DIAGNOSIS — F31.81 BIPOLAR II DISORDER (HCC): Primary | ICD-10-CM

## 2020-03-17 DIAGNOSIS — Z63.8 STRESS DUE TO FAMILY TENSION: ICD-10-CM

## 2020-03-17 PROCEDURE — 90834 PSYTX W PT 45 MINUTES: CPT | Performed by: SOCIAL WORKER

## 2020-03-17 SDOH — SOCIAL STABILITY - SOCIAL INSECURITY: OTHER SPECIFIED PROBLEMS RELATED TO PRIMARY SUPPORT GROUP: Z63.8

## 2020-03-17 NOTE — PROGRESS NOTES
Date of Service: March 17, 2020  Time In: 11:20 AM   Time Out: 12:00 PM    PROGRESS NOTE  Data:  Tammi Levine is a 50 y.o. female who met 1:1 with the undersigned for a regularly scheduled individual outpatient therapy session [facility] for follow-up of bipolar II disorder ongoing strained relationship with her mother.       HPI: Patient reports she and her mother continues to have difficult relationship and states she is simply keeping to herself.  The patient reports she continues to have difficult feelings toward her mother and states she continues to feel as though her mother is choosing her new boyfriend over her family.  Patient states this makes it very difficult for them to talk or have any type of interaction.  Patient also discusses her general feeling of fear concerning the current COVID 19 pandemic and states there are times she feels very afraid.  The patient reports she continues to struggle with periods of depressed mood, anhedonia, anergia, and feeling hopeless.  Patient rates current symptoms at a 3 on a scale of 1-10 with 10 being most severe.  She denies any significant symptoms of hypomania other than having periods where she feels somewhat irritable and has a tendency to lash out at others.  Patient reports she is sleeping relatively well at this time.  The patient adamantly and convincingly denies suicidal ideation vehemently denies any substance use.      Clinical Maneuvering/Intervention:  Assisted patient in processing above session content; acknowledged and normalized patient’s thoughts, feelings, and concerns.  Allowed the patient to discuss/process her ongoing feelings of frustration with her relationship with her mother and validated her feelings.  Also utilized motivational reviewed techniques including complex reflections to discussed concept of things we can control things we cannot control and encouraged her to remind herself she cannot control her  decisions or behaviors of others.  Also allow the patient to process her feelings and fears concerning the current pandemic and encouraged her to take reasonable precautions per authorities but also cautioned her not to become obsessive.  Provided unconditional positive regard in a safe, supportive environment.    Allowed patient to freely discuss issues without interruption or judgment. Provided safe, confidential environment to facilitate the development of positive therapeutic relationship and encourage open, honest communication. Assisted patient in identifying risk factors which would indicate the need for higher level of care including thoughts to harm self or others and/or self-harming behavior and encouraged patient to contact this office, call 911, or present to the nearest emergency room should any of these events occur. Discussed crisis intervention services and means to access.  Patient adamantly and convincingly denies current suicidal or homicidal ideation or perceptual disturbance.      Assessment    Patient appears to be doing somewhat better since moving into her own apartment; however, she continues to struggle with periods of depression which likely continue to be exacerbated by ongoing strained relationship with her mother.   The patient's symptomology continue to cause impairment in important areas of functioning.  Patient can be reasonably expected to continue to benefit treatment and would likely be at increased risk for decompensation.    Diagnoses and all orders for this visit:    Bipolar II disorder (CMS/[facility])    Stress due to family tension               Mental Status Exam  Hygiene:  good  Dress:  casual  Attitude:  Cooperative  Motor Activity:  Appropriate  Speech:  Normal  Mood:  depressed/irritable  Affect:  depressed  Thought Processes:  Linear  Thought Content:  normal  Suicidal Thoughts:  denies  Homicidal Thoughts:  denies  Crisis Safety Plan: yes, to come to the emergency  room.  Hallucinations:  denies    Patient's Support Network Includes:  mother and extended family    Progress toward goal: Not at goal    Functional Status: Moderate impairment     Prognosis: Fair with Ongoing Treatment     Plan         Patient will continue in individual outpatient therapy session [facility] every 4 weeks and will continue and pharmacotherapy as scheduled with MARISELA Dixon.  Patient will adhere to medication regimen as prescribed and report any side effects. Patient will contact this office, call 911 or present to the nearest emergency room should suicidal or homicidal ideations occur. Provide Cognitive Behavioral Therapy and Integrative Therapy to improve functioning, maintain stability, and avoid decompensation and the need for higher level of care.          Return in about 4 weeks (around 4/14/2020) for Next scheduled follow up.      This document signed by Vidal Ortiz LCSW, [facility] March 17, 2020 15:36

## 2020-03-30 ENCOUNTER — OFFICE VISIT (OUTPATIENT)
Dept: PSYCHIATRY | Facility: CLINIC | Age: 50
End: 2020-03-30

## 2020-03-30 VITALS
HEIGHT: 59 IN | HEART RATE: 83 BPM | SYSTOLIC BLOOD PRESSURE: 125 MMHG | BODY MASS INDEX: 31.85 KG/M2 | DIASTOLIC BLOOD PRESSURE: 75 MMHG | WEIGHT: 158 LBS

## 2020-03-30 DIAGNOSIS — F31.81 BIPOLAR II DISORDER (HCC): Primary | ICD-10-CM

## 2020-03-30 PROCEDURE — 99213 OFFICE O/P EST LOW 20 MIN: CPT | Performed by: NURSE PRACTITIONER

## 2020-03-30 RX ORDER — CITALOPRAM 20 MG/1
20 TABLET ORAL DAILY
Qty: 30 TABLET | Refills: 2 | Status: SHIPPED | OUTPATIENT
Start: 2020-03-30 | End: 2020-08-14

## 2020-03-30 RX ORDER — LITHIUM CARBONATE 300 MG/1
300 CAPSULE ORAL EVERY EVENING
Qty: 30 CAPSULE | Refills: 2 | Status: SHIPPED | OUTPATIENT
Start: 2020-03-30 | End: 2020-08-19 | Stop reason: SDUPTHER

## 2020-03-30 NOTE — PROGRESS NOTES
"  Subjective   Tammi Levine is a 50 y.o. female is here today for medication management follow-up at the [facility] after being transferred from [facility] for continuation of treatment for bipolar disorder.  This is the first encounter with the patient.    Chief Complaint: Bipolar Disorder    History of Present Illness  She states that she has been having trouble with her mother since her father passed away, mother feels like she needs to be in a relationship.  She states that she tries not to  her mother but she has chosen to not let her mother in her life.  She has talked to her a couple of times.  She states that she has been taking the celexa and her lithium.  She states that the celexa doesn't seem to be that helpful.  She realizes that she needs to stop watching the news because it is making her worried.  She rates her mood about 6/10 with 10 being the worse, she states that she is easily irritable and has racing thoughts at night, excessive worry.  She states that her sleep has been \"pretty good\", she has a cat that tends to interfere with her sleep, she is getting about 6-7 hours per night with no NM.  Appetite has been good with slight weight decrease since last visit.  She states that she moved into her apartment in July after her mother \"kicked her out\".  She states that she is worried about the Corona Virus; arthritis in her legs.  Denies any AV hallucinations, denies any SI/HI.      The following portions of the patient's history were reviewed and updated as appropriate: allergies, current medications, past family history, past medical history, past social history, past surgical history and problem list.    Review of Systems   Constitutional: Negative for appetite change, chills, diaphoresis, fatigue, fever and unexpected weight change.   HENT: Negative for hearing loss, sore throat, trouble swallowing and voice change.    Eyes: Negative for photophobia and visual disturbance. " "  Respiratory: Negative for cough, chest tightness and shortness of breath.    Cardiovascular: Negative for chest pain and palpitations.   Gastrointestinal: Negative for abdominal pain, constipation, nausea and vomiting.   Endocrine: Negative for cold intolerance and heat intolerance.   Genitourinary: Negative for dysuria and frequency.   Musculoskeletal: Negative for arthralgias, back pain, joint swelling and neck stiffness.        Uses her cane for mobility   Skin: Negative for color change and wound.   Allergic/Immunologic: Negative for environmental allergies and immunocompromised state.   Neurological: Negative for dizziness, tremors, seizures, syncope, weakness, light-headedness and headaches.   Hematological: Negative for adenopathy. Does not bruise/bleed easily.       Objective   Physical Exam   Constitutional: She appears well-developed and well-nourished. No distress.   Neurological: She is alert. Coordination and gait normal.   Vitals reviewed.    Blood pressure 125/75, pulse 83, height 149.9 cm (59.02\"), weight 71.7 kg (158 lb).    Medication List:   Current Outpatient Medications   Medication Sig Dispense Refill   • aspirin 81 MG EC tablet Take 81 mg by mouth Every Evening.     • atorvastatin (LIPITOR) 80 MG tablet Take 1 tablet by mouth Every Night. 30 tablet 3   • Canagliflozin (INVOKANA) 100 MG tablet Take 100 mg by mouth Every Evening.     • insulin aspart (novoLOG) 100 UNIT/ML injection Inject 10 Units under the skin into the appropriate area as directed 2 (Two) Times a Day.  12   • levothyroxine (SYNTHROID, LEVOTHROID) 50 MCG tablet Take 50 mcg by mouth Every Evening.     • lisinopril (PRINIVIL,ZESTRIL) 40 MG tablet Take 1 tablet by mouth Daily. 30 tablet 6   • lithium carbonate 300 MG capsule Take 1 capsule by mouth Every Evening. 30 capsule 2   • omeprazole (priLOSEC) 20 MG capsule Take 20 mg by mouth Daily.     • citalopram (CeleXA) 20 MG tablet Take 1 tablet by mouth Daily. 30 tablet 2     No " current facility-administered medications for this visit.        Mental Status Exam:   Hygiene:   good  Cooperation:  Cooperative  Eye Contact:  Fair  Psychomotor Behavior:  Appropriate  Affect:  Appropriate  Hopelessness: Denies  Speech:  Normal  Thought Process:  Goal directed and Linear  Thought Content:  Mood congruent  Suicidal:  None  Homicidal:  None  Hallucinations:  None  Delusion:  None  Memory:  Intact  Orientation:  Person, Place, Time and Situation  Reliability:  fair  Insight:  Fair  Judgement:  Fair  Impulse Control:  Fair  Physical/Medical Issues:  No     Assessment/Plan   Problems Addressed this Visit     None      Visit Diagnoses     Bipolar II disorder (CMS/[facility])    -  Primary    Relevant Medications    lithium carbonate 300 MG capsule    citalopram (CeleXA) 20 MG tablet        Discussed medication options. Continue the lithium for mood and increase the celexa for anxiety and depression.   Reviewed the risks, benefits, and side effects of the medications; patient acknowledged and verbally consented.  Patient is agreeable to call the [facility] with any worsening of symptoms.  Patient is aware to call 911 or go to the nearest ER should begin having SI/HI.     Prognosis: Guarded dependent on medication, follow up appointment and treatment plan compliance     Functionality: Fair.  Symptoms are mostly under control with periodic episodes of increased anxiety.    Return in 12 weeks

## 2020-04-14 ENCOUNTER — OFFICE VISIT (OUTPATIENT)
Dept: PSYCHIATRY | Facility: CLINIC | Age: 50
End: 2020-04-14

## 2020-04-14 DIAGNOSIS — F31.81 BIPOLAR II DISORDER (HCC): Primary | ICD-10-CM

## 2020-04-14 PROCEDURE — 90832 PSYTX W PT 30 MINUTES: CPT | Performed by: SOCIAL WORKER

## 2020-04-14 NOTE — PROGRESS NOTES
Date of Service: April 14, 2020  Time In: 11:25 AM  Time Out: 11:55 AM    PROGRESS NOTE  Data:  Tammi Levine is a 50 y.o. female who met 1:1 with the undersigned for a regularly scheduled individual outpatient therapy session. This provider is located at [address] [place] [address].  The provider identified himself as well as his credentials.   The Patient is at her home using her phone because problems with video connection. The patient's condition being diagnosed/treated is appropriate for telemedicine. The patient gave consent to be seen remotely, and when consent is given they understand that the consent allows for patient identifiable information to be sent to a third party as needed.   They may refuse to be seen remotely at any time. The electronic data is encrypted and password protected, and the patient has been advised of the potential risks to privacy not withstanding such measures.    You have chosen to receive care through a telephone visit. Do you consent to use a telephone visit for your medical care today? Yes  This visit has been rescheduled as a phone visit to comply with patient safety concerns in accordance with CDC recommendations. Total time of discussion was 30 minutes.       HPI: Patient reports although she feels very stress and at times afraid by the COVID-19 pandemic, she continues to spend most of her time at home and states she is taking all appropriate precautions.  Patient does report she gets frustrated at others who she perceives as not to be taking the pandemic seriously and states she feels as though they are putting everyone at risk.  The patient reports she continues to struggle with periods of depressed mood, anhedonia, anergia, and feeling hopeless.  Patient rates current symptoms at a 3 on a scale of 1-10 with 10 being most severe.  She denies any significant symptoms of hypomania other than having periods where she feels somewhat irritable and has a  tendency to lash out at others.  Patient reports she is sleeping relatively well at this time.  The patient adamantly and convincingly denies suicidal ideation vehemently denies any substance use.      Clinical Maneuvering/Intervention:  Assisted patient in processing above session content; acknowledged and normalized patient’s thoughts, feelings, and concerns.  Allow the patient to discuss/process her feelings and fears concerning the COVID-19 pandemic and validated her feelings.  Also utilized motivational reviewed techniques including complex reflections to discussed concept of things we can control things he cannot control encouraged the patient to consciously focus on things she has the ability to manipulate and change.  Also utilize solution focused therapy to assist the patient and developing strategy to engender a sense of normalcy including continuing to engage in as many activities in her normal routine as possible while maintaining appropriate boundaries and taking appropriate precautions.  Continue to focus on healthy skills of daily living and behavioral activation.  Provided unconditional positive regard in a safe, supportive environment.    Allowed patient to freely discuss issues without interruption or judgment. Provided safe, confidential environment to facilitate the development of positive therapeutic relationship and encourage open, honest communication. Assisted patient in identifying risk factors which would indicate the need for higher level of care including thoughts to harm self or others and/or self-harming behavior and encouraged patient to contact this office, call 911, or present to the nearest emergency room should any of these events occur. Discussed crisis intervention services and means to access.  Patient adamantly and convincingly denies current suicidal or homicidal ideation or perceptual disturbance.      Assessment    Patient appears to maintain relative stability as compared  to her baseline.  However, she is struggling with increased stress and a sense of uncertainty due to current COVID-19 pandemic.  The patient's symptomology continue to cause impairment in important areas of functioning.  Patient can be reasonably expected to continue to benefit treatment and would likely be at increased risk for decompensation.    Diagnoses and all orders for this visit:    Bipolar II disorder (CMS/[facility])               Mental Status Exam  Hygiene: Not applicable due to telephone session  Dress: Not applicable due to telephone session  Attitude:  Cooperative  Motor Activity: Not applicable due to telephone session  Speech:  Normal  Mood: Within normal limits  Affect: Not applicable due to telephone relation  Thought Processes:  Linear  Thought Content:  normal  Suicidal Thoughts:  denies  Homicidal Thoughts:  denies  Crisis Safety Plan: yes, to come to the emergency room.  Hallucinations:  denies    Patient's Support Network Includes:  mother and extended family    Progress toward goal: Not at goal    Functional Status: Mild impairment     Prognosis: Fair with Ongoing Treatment     Plan         Patient will continue in individual outpatient therapy session [facility] every 4 weeks and will continue and pharmacotherapy as scheduled with MARISELA Dixon.  Patient will adhere to medication regimen as prescribed and report any side effects. Patient will contact this office, call 911 or present to the nearest emergency room should suicidal or homicidal ideations occur. Provide Cognitive Behavioral Therapy and Integrative Therapy to improve functioning, maintain stability, and avoid decompensation and the need for higher level of care.          Return in about 4 weeks (around 5/12/2020) for Next scheduled follow up.      This document signed by Vidal Ortiz LCSW, [facility] April 14, 2020 13:49

## 2020-05-12 ENCOUNTER — TELEMEDICINE (OUTPATIENT)
Dept: PSYCHIATRY | Facility: CLINIC | Age: 50
End: 2020-05-12

## 2020-05-12 DIAGNOSIS — F31.81 BIPOLAR II DISORDER (HCC): Primary | ICD-10-CM

## 2020-05-12 PROCEDURE — 90785 PSYTX COMPLEX INTERACTIVE: CPT | Performed by: SOCIAL WORKER

## 2020-05-12 PROCEDURE — 90832 PSYTX W PT 30 MINUTES: CPT | Performed by: SOCIAL WORKER

## 2020-05-12 NOTE — PROGRESS NOTES
Date of Service: May 12, 2020  Time In: 11:27 AM  Time Out: 12:00 PM    PROGRESS NOTE  Data:  Tammi Levine is a 50 y.o. female who met 1:1 with the undersigned for a regularly scheduled individual outpatient therapy session. This provider is located at [address] [place] [address].  The provider identified himself as well as his credentials.   The Patient is at her home using her device with video connection. The patient's condition being diagnosed/treated is appropriate for telemedicine. The patient gave consent to be seen remotely, and when consent is given they understand that the consent allows for patient identifiable information to be sent to a third party as needed.   They may refuse to be seen remotely at any time. The electronic data is encrypted and password protected, and the patient has been advised of the potential risks to privacy not withstanding such measures.         HPI: Patient reports she feels she is doing relatively well and states she continues to feel a great deal of stress and a sense of uncertainty due to COVID-19 pandemic.  She reports she continues spend the vast majority of her time at home and states she takes all appropriate precautions including protective equipment.  Patient reveals for the first time she has a boyfriend and states they have been dating for approximately 3 months.  Patient reports they dated many years ago and recently reconnected.  Patient states she is simply elected not to tell any of her family members.  She continues to discuss the difficulties she has a relationship with her mother and states she believes her mother allowed her new boyfriend to destroy their relationship.  The patient reports she continues to struggle with periods of depressed mood, anhedonia, anergia, and feeling hopeless.  Patient rates current symptoms at a 3 on a scale of 1-10 with 10 being most severe.  She denies any significant symptoms of hypomania other than having  periods where she feels somewhat irritable and has a tendency to lash out at others.  Patient reports she is sleeping relatively well at this time.  The patient adamantly and convincingly denies suicidal ideation vehemently denies any substance use.      Clinical Maneuvering/Intervention:  Assisted patient in processing above session content; acknowledged and normalized patient’s thoughts, feelings, and concerns.  Allow the patient to discuss/process her feelings of fear concerning COVID-19 pandemic and validated her feelings.  Also utilize motivational reviewed techniques including complex reflections to discussed concept things we can control things he cannot control and strongly urged the patient to remind herself she has taken all appropriate precautions as possible which should decrease her feelings of fear.  Continue to utilize cognitive behavioral therapy to assist patient in developing appropriate coping mechanisms to decrease the severity frequency of symptoms. Also strongly urged the patient to avoid excessive social isolation and encouraged her to remind herself it is okay to simply go outside.  Also validated the patient's new relationship.  Provided unconditional positive regard in a safe, supportive environment.    Interactive complexity due to patient's poor connection which caused repeated interruptions in the session.    Allowed patient to freely discuss issues without interruption or judgment. Provided safe, confidential environment to facilitate the development of positive therapeutic relationship and encourage open, honest communication. Assisted patient in identifying risk factors which would indicate the need for higher level of care including thoughts to harm self or others and/or self-harming behavior and encouraged patient to contact this office, call 911, or present to the nearest emergency room should any of these events occur. Discussed crisis intervention services and means to access.   Patient adamantly and convincingly denies current suicidal or homicidal ideation or perceptual disturbance.      Assessment    Patient appears to maintain relative stability as compared to her baseline.  However, she is struggling with increased stress and a sense of uncertainty due to current COVID-19 pandemic.  The patient's symptomology continue to cause impairment in important areas of functioning.  Patient can be reasonably expected to continue to benefit treatment and would likely be at increased risk for decompensation.    Diagnoses and all orders for this visit:    Bipolar II disorder (CMS/[facility])               Mental Status Exam  Hygiene: Difficult to assess due to video visit  Dress: Casual  Attitude:  Cooperative  Motor Activity: Appropriate  Speech:  Normal  Mood: Within normal limits  Affect: Full range  Thought Processes:  Linear  Thought Content:  normal  Suicidal Thoughts:  denies  Homicidal Thoughts:  denies  Crisis Safety Plan: yes, to come to the emergency room.  Hallucinations:  denies    Patient's Support Network Includes:  mother and extended family    Progress toward goal: Not at goal    Functional Status: Mild impairment     Prognosis: Fair with Ongoing Treatment     Plan         Patient will continue in individual outpatient therapy session [facility] every 4 weeks and will continue and pharmacotherapy as scheduled with MARISELA Dixon.  Patient will adhere to medication regimen as prescribed and report any side effects. Patient will contact this office, call 911 or present to the nearest emergency room should suicidal or homicidal ideations occur. Provide Cognitive Behavioral Therapy and Integrative Therapy to improve functioning, maintain stability, and avoid decompensation and the need for higher level of care.          Return in about 4 weeks (around 6/9/2020) for Next scheduled follow up.      This document signed by Vidal Ortiz LCSW, [facility] May 12, 2020  13:22

## 2020-07-14 ENCOUNTER — OFFICE VISIT (OUTPATIENT)
Dept: PSYCHIATRY | Facility: CLINIC | Age: 50
End: 2020-07-14

## 2020-07-14 DIAGNOSIS — F31.81 BIPOLAR II DISORDER (HCC): Primary | ICD-10-CM

## 2020-07-14 PROCEDURE — 90834 PSYTX W PT 45 MINUTES: CPT | Performed by: SOCIAL WORKER

## 2020-07-15 NOTE — PROGRESS NOTES
Date of Service: July 14, 2020  Time In: 12:30 PM  Time Out: 1:15 PM    PROGRESS NOTE  Data:  Tammi Levine is a 50 y.o. female who met 1:1 with the undersigned for a regularly scheduled individual outpatient therapy session at [facility] for follow-up of bipolar II disorder.  Patient and the undersigned wore masks throughout the session and maintained appropriate distancing.       HPI: Patient reports she continues to feel a great deal of stress and a sense of uncertainty concerning the COVID-19 pandemic and states she even becomes irritable and angry at others who she perceives as not taking appropriate precautions and not taking the situation seriously.  She also states she has been frustrated as of late as the rumors in her apartment complex is that they will be re-housed in an hotel for a few weeks as their apartments are remodeled.  Patient also continues to states she and her mother continue to have a strained relationship due to the patient's perception that her mother has chose her new boyfriend over her family.  She reports she continues spend the vast majority of her time at home and states she takes all appropriate precautions including protective equipment.  The patient reports she continues to struggle with periods of depressed mood, anhedonia, anergia, and feeling hopeless.  Patient rates current symptoms at a 3 on a scale of 1-10 with 10 being most severe.  She denies any significant symptoms of hypomania other than having periods where she feels somewhat irritable and has a tendency to lash out at others.  Patient reports she is sleeping relatively well at this time.  The patient adamantly and convincingly denies suicidal ideation vehemently denies any substance use.      Clinical Maneuvering/Intervention:  Assisted patient in processing above session content; acknowledged and normalized patient’s thoughts, feelings, and concerns.  Allow the patient to discuss/vent her  feelings and fears concerning the COVID-19 pandemic and validated her feelings.  Also utilized motivational reviewing techniques including complex reflections to discussed the concept of things we can control daily cannot control and encouraged her to remind herself she cannot control the pandemic in general and can only take appropriate preventative measures.  Encourage patient to remind herself on a regular basis she can only control her small part of the world.  Also encouraged patient to remind herself she can also not control what happens at her apartment complex and to not jump to conclusions and to take things as they come.  Continue to focus on healthy skills of daily living and behavioral activation.  Provided unconditional positive regarding a safe, supportive environment.    Allowed patient to freely discuss issues without interruption or judgment. Provided safe, confidential environment to facilitate the development of positive therapeutic relationship and encourage open, honest communication. Assisted patient in identifying risk factors which would indicate the need for higher level of care including thoughts to harm self or others and/or self-harming behavior and encouraged patient to contact this office, call 911, or present to the nearest emergency room should any of these events occur. Discussed crisis intervention services and means to access.  Patient adamantly and convincingly denies current suicidal or homicidal ideation or perceptual disturbance.      Assessment    Patient appears to maintain relative stability as compared to her baseline.  However, she is struggling with increased stress and a sense of uncertainty due to current COVID-19 pandemic.  The patient's symptomology continue to cause impairment in important areas of functioning.  Patient can be reasonably expected to continue to benefit treatment and would likely be at increased risk for decompensation.    Diagnoses and all orders for  this visit:    Bipolar II disorder (CMS/[facility])               Mental Status Exam  Hygiene: Difficult to assess due to video visit  Dress: Casual  Attitude:  Cooperative  Motor Activity: Appropriate  Speech:  Normal  Mood: Within normal limits  Affect: Full range  Thought Processes:  Linear  Thought Content:  normal  Suicidal Thoughts:  denies  Homicidal Thoughts:  denies  Crisis Safety Plan: yes, to come to the emergency room.  Hallucinations:  denies    Patient's Support Network Includes:  mother and extended family    Progress toward goal: Not at goal    Functional Status: Mild impairment     Prognosis: Fair with Ongoing Treatment     Plan         Patient will continue in individual outpatient therapy session [facility] every 4 weeks and will continue and pharmacotherapy as scheduled with MARISELA Dixon.  Patient will adhere to medication regimen as prescribed and report any side effects. Patient will contact this office, call 911 or present to the nearest emergency room should suicidal or homicidal ideations occur. Provide Cognitive Behavioral Therapy and Integrative Therapy to improve functioning, maintain stability, and avoid decompensation and the need for higher level of care.          Return in about 4 weeks (around 8/11/2020) for Next scheduled follow up.      This document signed by Vidal Ortiz LCSW, [facility] July 15, 2020 07:23

## 2020-07-21 ENCOUNTER — OFFICE VISIT (OUTPATIENT)
Dept: CARDIOLOGY | Facility: CLINIC | Age: 50
End: 2020-07-21

## 2020-07-21 VITALS
WEIGHT: 161.6 LBS | RESPIRATION RATE: 16 BRPM | DIASTOLIC BLOOD PRESSURE: 59 MMHG | OXYGEN SATURATION: 99 % | HEART RATE: 66 BPM | BODY MASS INDEX: 32.58 KG/M2 | HEIGHT: 59 IN | TEMPERATURE: 98.7 F | SYSTOLIC BLOOD PRESSURE: 99 MMHG

## 2020-07-21 DIAGNOSIS — Z79.4 TYPE 2 DIABETES MELLITUS WITH DIABETIC PERIPHERAL ANGIOPATHY WITHOUT GANGRENE, WITH LONG-TERM CURRENT USE OF INSULIN (HCC): ICD-10-CM

## 2020-07-21 DIAGNOSIS — I25.10 ARTERIOSCLEROTIC CARDIOVASCULAR DISEASE (ASCVD): Primary | ICD-10-CM

## 2020-07-21 DIAGNOSIS — E11.51 TYPE 2 DIABETES MELLITUS WITH DIABETIC PERIPHERAL ANGIOPATHY WITHOUT GANGRENE, WITH LONG-TERM CURRENT USE OF INSULIN (HCC): ICD-10-CM

## 2020-07-21 DIAGNOSIS — I10 ESSENTIAL HYPERTENSION: ICD-10-CM

## 2020-07-21 PROCEDURE — 99213 OFFICE O/P EST LOW 20 MIN: CPT | Performed by: NURSE PRACTITIONER

## 2020-07-21 PROCEDURE — 93000 ELECTROCARDIOGRAM COMPLETE: CPT | Performed by: NURSE PRACTITIONER

## 2020-07-21 RX ORDER — LISINOPRIL 10 MG/1
10 TABLET ORAL DAILY
Qty: 30 TABLET | Refills: 11 | Status: SHIPPED | OUTPATIENT
Start: 2020-07-21 | End: 2020-08-14

## 2020-07-21 NOTE — PROGRESS NOTES
Diane Rios  Tammi Levine  1970  07/21/2020    Patient Active Problem List   Diagnosis   • Arteriosclerotic cardiovascular disease (ASCVD), s/p MI in 2012, clinically stable.    • Type 2 diabetes mellitus with diabetic peripheral angiopathy without gangrene, with long-term current use of insulin (CMS/[facility])   • Essential hypertension   • Dyslipidemia   • Sleep apnea   • Myocardial infarction (CMS/[facility])   • Migraine headache   • Abnormal CT scan   • ASCVD (arteriosclerotic cardiovascular disease)   • Hypertension, well controlled   • Disease of thyroid gland   • Generalized abdominal pain   • Right-sided low back pain with right-sided sciatica   • Slow transit constipation   • Chest pain       Dear Diane Rios:    Subjective     Chief Complaint   Patient presents with   • Follow-up     6 month/stress test results   • Med Management     list   • Dizziness     was a few days ago           History of Present Illness:    Tammi Levine is a 50 y.o. female with a past medical history significant for ASCVD status post MI in 2012, hypertension, and diabetes mellitus type 2.  She presents today for cardiology follow-up.  Since her last visit here, was hospitalized at [facility] in January 2020.  She was having chest pains at that time.  A Lexiscan sestamibi study was negative for evidence of ischemia.  She reports she was recently admitted to [facility] in [place], [place].  At that time, she was diagnosed with gastroparesis and her esophagus was dilated.  Since that time, her chest pains have resolved.  She denies any shortness of breath, palpitations, near-syncope, or syncope.  She has been dizzy the last few days.  Her blood pressure is low in the office today.  About 1 month ago, she reports her PCP decreased her lisinopril to 20 mg daily.        Allergies   Allergen Reactions   • Zoloft [Sertraline Hcl] Nausea And Vomiting   • Ciprofloxacin Rash     Also caused  chest pain   :      Current Outpatient Medications:   •  aspirin 81 MG EC tablet, Take 81 mg by mouth Every Evening., Disp: , Rfl:   •  atorvastatin (LIPITOR) 80 MG tablet, Take 1 tablet by mouth Every Night., Disp: 30 tablet, Rfl: 3  •  Canagliflozin (INVOKANA) 100 MG tablet, Take 100 mg by mouth Every Evening., Disp: , Rfl:   •  citalopram (CeleXA) 20 MG tablet, Take 1 tablet by mouth Daily., Disp: 30 tablet, Rfl: 2  •  insulin aspart (novoLOG) 100 UNIT/ML injection, Inject 10 Units under the skin into the appropriate area as directed 2 (Two) Times a Day., Disp: , Rfl: 12  •  levothyroxine (SYNTHROID, LEVOTHROID) 50 MCG tablet, Take 50 mcg by mouth Every Evening., Disp: , Rfl:   •  lithium carbonate 300 MG capsule, Take 1 capsule by mouth Every Evening., Disp: 30 capsule, Rfl: 2  •  omeprazole (priLOSEC) 20 MG capsule, Take 20 mg by mouth Daily., Disp: , Rfl:   •  lisinopril (PRINIVIL,ZESTRIL) 10 MG tablet, Take 1 tablet by mouth Daily., Disp: 30 tablet, Rfl: 11      The following portions of the patient's history were reviewed and updated as appropriate: allergies, current medications, past family history, past medical history, past social history, past surgical history and problem list.    Social History     Tobacco Use   • Smoking status: Never Smoker   • Smokeless tobacco: Never Used   Substance Use Topics   • Alcohol use: No   • Drug use: No       Review of Systems   Constitution: Negative for decreased appetite and malaise/fatigue.   Cardiovascular: Negative for chest pain, dyspnea on exertion, irregular heartbeat, leg swelling, near-syncope, orthopnea, palpitations, paroxysmal nocturnal dyspnea and syncope.   Respiratory: Negative for cough, shortness of breath and wheezing.    Neurological: Positive for dizziness. Negative for light-headedness and weakness.       Objective   Vitals:    07/21/20 1311   BP: 99/59   BP Location: Left arm   Patient Position: Sitting   Cuff Size: Large Adult   Pulse: 66   Resp:  "16   Temp: 98.7 °F (37.1 °C)   TempSrc: Temporal   SpO2: 99%   Weight: 73.3 kg (161 lb 9.6 oz)   Height: 149.9 cm (59.02\")     Body mass index is 32.62 kg/m².        Physical Exam   Constitutional: She is oriented to person, place, and time. She appears well-developed and well-nourished.   HENT:   Head: Normocephalic and atraumatic.   Cardiovascular: Normal rate, regular rhythm and normal heart sounds. Exam reveals no S3 and no S4.   No murmur heard.  Pulmonary/Chest: Effort normal and breath sounds normal. She has no wheezes. She has no rales.   Abdominal: Soft. Bowel sounds are normal.   Musculoskeletal: She exhibits no edema.   Neurological: She is alert and oriented to person, place, and time.   Skin: Skin is warm and dry.   Psychiatric: She has a normal mood and affect. Her behavior is normal.       Lab Results   Component Value Date     01/13/2020    K 3.8 01/13/2020     01/13/2020    CO2 17.6 (L) 01/13/2020    BUN 26 (H) 01/13/2020    CREATININE 0.85 01/13/2020    GLUCOSE 112 (H) 01/13/2020    CALCIUM 9.2 01/13/2020    AST 12 01/13/2020    ALT 9 01/13/2020    ALKPHOS 83 01/13/2020    LABIL2 1.4 (L) 02/04/2016        Lab Results   Component Value Date    WBC 10.35 01/13/2020    HGB 11.0 (L) 01/13/2020    HCT 34.8 01/13/2020     01/13/2020     Lab Results   Component Value Date    INR 0.91 01/12/2020     Lab Results   Component Value Date    MG 2.1 01/12/2020     Lab Results   Component Value Date    TSH 1.440 01/12/2020    TRIG 111 01/13/2020    HDL 50 01/13/2020    LDL 46 01/13/2020      Lab Results   Component Value Date    BNP 36 09/16/2015             ECG 12 Lead  Date/Time: 7/21/2020 1:14 PM  Performed by: Marisa Romero APRN  Authorized by: Marisa Romero APRN   Comparison: compared with previous ECG   Similar to previous ECG  Rhythm: sinus rhythm  BPM: 63                Assessment/Plan    Diagnosis Plan   1. Arteriosclerotic cardiovascular disease (ASCVD), s/p MI in 2012, " clinically stable.   ECG 12 Lead    lisinopril (PRINIVIL,ZESTRIL) 10 MG tablet    Comprehensive Metabolic Panel    Lipid Panel   2. Essential hypertension  ECG 12 Lead    lisinopril (PRINIVIL,ZESTRIL) 10 MG tablet    Comprehensive Metabolic Panel    Lipid Panel   3. Type 2 diabetes mellitus with diabetic peripheral angiopathy without gangrene, with long-term current use of insulin (CMS/[facility])  ECG 12 Lead    Comprehensive Metabolic Panel    Lipid Panel                Recommendations:    1.  Since her blood pressure is running low when she has been dizzy, will decrease lisinopril to 10 mg daily.    2.  CMP and lipid panel ordered.      3.  Continue low-dose aspirin and atorvastatin.  She is not on a beta-blocker due to history of bradycardia.    4.  I encouraged her to drink plenty of fluids.      5.  Follow-up in 6 months or sooner if needed.      Return in about 6 months (around 1/21/2021) for Recheck.    As always, I appreciate very much the opportunity to participate in the cardiovascular care of your patients.      With Best Regards,    MARISELA Tomlinson

## 2020-08-11 ENCOUNTER — TELEMEDICINE (OUTPATIENT)
Dept: PSYCHIATRY | Facility: CLINIC | Age: 50
End: 2020-08-11

## 2020-08-11 DIAGNOSIS — F31.81 BIPOLAR II DISORDER (HCC): Primary | ICD-10-CM

## 2020-08-11 PROCEDURE — 90834 PSYTX W PT 45 MINUTES: CPT | Performed by: SOCIAL WORKER

## 2020-08-11 PROCEDURE — 90785 PSYTX COMPLEX INTERACTIVE: CPT | Performed by: SOCIAL WORKER

## 2020-08-11 NOTE — PROGRESS NOTES
Date of Service: July 14, 2020  Time In: 12:30 PM  Time Out: 1:15 PM    PROGRESS NOTE  Data:  Tammi Levine is a 50 y.o. female who met 1:1 with the undersigned for a regularly scheduled individual outpatient therapy session via telemedicine.  This was an audio and video enabled telemedicine encounter.[place] [address]. The provider identified himself as well as his credentials.   The Patient is at her home using her device with video connection. The patient's condition being diagnosed/treated is appropriate for telemedicine. The patient gave consent to be seen remotely, and when consent is given they understand that the consent allows for patient identifiable information to be sent to a third party as needed.   They may refuse to be seen remotely at any time. The electronic data is encrypted and password protected, and the patient has been advised of the potential risks to privacy not withstanding such measures.     HPI: Patient reports she continues to feel a great deal of stress and a sense of uncertainty concerning the COVID-19 pandemic and states she even becomes irritable and angry at others who she perceives as not taking appropriate precautions and not taking the situation seriously.    The patient reports she continues to struggle with periods of depressed mood, anhedonia, anergia, and feeling hopeless.  Patient rates current symptoms at a 2/3 on a scale of 1-10 with 10 being most severe.  She denies any significant symptoms of hypomania other than having periods where she feels somewhat irritable and has a tendency to lash out at others.  Patient reports she is sleeping relatively well at this time.  The patient adamantly and convincingly denies suicidal ideation vehemently denies any substance use.      Clinical Maneuvering/Intervention:  Assisted patient in processing above session content; acknowledged and normalized  patient’s thoughts, feelings, and concerns.  Allow the patient to discuss/process her feelings and fears concerning COVID-19 pandemic and encouraged her to continue to take all appropriate precautions.  Continue to focus on healthy skills of daily living and behavioral activation.  Provided unconditional positive regarding a safe, supportive environment.    Interactive complexity due to poor video connection and the fact the patient's cat continues to interrupt the session which causes difficulty communicating.    Allowed patient to freely discuss issues without interruption or judgment. Provided safe, confidential environment to facilitate the development of positive therapeutic relationship and encourage open, honest communication. Assisted patient in identifying risk factors which would indicate the need for higher level of care including thoughts to harm self or others and/or self-harming behavior and encouraged patient to contact this office, call 911, or present to the nearest emergency room should any of these events occur. Discussed crisis intervention services and means to access.  Patient adamantly and convincingly denies current suicidal or homicidal ideation or perceptual disturbance.      Assessment    Patient appears to maintain relative stability as compared to her baseline.  However, she is struggling with increased stress and a sense of uncertainty due to current COVID-19 pandemic.  The patient's symptomology continue to cause impairment in important areas of functioning.  Patient can be reasonably expected to continue to benefit treatment and would likely be at increased risk for decompensation.    Diagnoses and all orders for this visit:    Bipolar II disorder (CMS/[facility])               Mental Status Exam  Hygiene: Difficult to assess due to video visit  Dress: Casual  Attitude:  Cooperative  Motor Activity: Appropriate  Speech:  Normal  Mood: Within normal limits  Affect: Full range  Thought  Processes:  Linear  Thought Content:  normal  Suicidal Thoughts:  denies  Homicidal Thoughts:  denies  Crisis Safety Plan: yes, to come to the emergency room.  Hallucinations:  denies    Patient's Support Network Includes:  mother and extended family    Progress toward goal: Not at goal    Functional Status: Mild impairment     Prognosis: Fair with Ongoing Treatment     Plan         Patient will continue in individual outpatient therapy session [facility] every 4 weeks and will continue and pharmacotherapy as scheduled with MARISELA Dixon.  Patient will adhere to medication regimen as prescribed and report any side effects. Patient will contact this office, call 911 or present to the nearest emergency room should suicidal or homicidal ideations occur. Provide Cognitive Behavioral Therapy and Integrative Therapy to improve functioning, maintain stability, and avoid decompensation and the need for higher level of care.          Return in about 4 weeks (around 9/8/2020) for Next scheduled follow up.      This document signed by Vidal Ortiz LCSW, [facility] August 11, 2020 13:16

## 2020-08-14 ENCOUNTER — APPOINTMENT (OUTPATIENT)
Dept: CT IMAGING | Facility: HOSPITAL | Age: 50
End: 2020-08-14

## 2020-08-14 ENCOUNTER — HOSPITAL ENCOUNTER (OUTPATIENT)
Facility: HOSPITAL | Age: 50
Setting detail: OBSERVATION
Discharge: HOME | End: 2020-08-16
Attending: FAMILY MEDICINE | Admitting: INTERNAL MEDICINE

## 2020-08-14 DIAGNOSIS — R07.89 ATYPICAL CHEST PAIN: Primary | ICD-10-CM

## 2020-08-14 DIAGNOSIS — R10.9 ABDOMINAL PAIN, UNSPECIFIED ABDOMINAL LOCATION: ICD-10-CM

## 2020-08-14 LAB
ALBUMIN SERPL-MCNC: 4.13 G/DL (ref 3.5–5.2)
ALBUMIN/GLOB SERPL: 1.3 G/DL
ALP SERPL-CCNC: 96 U/L (ref 39–117)
ALT SERPL W P-5'-P-CCNC: 10 U/L (ref 1–33)
ANION GAP SERPL CALCULATED.3IONS-SCNC: 10.2 MMOL/L (ref 5–15)
AST SERPL-CCNC: 12 U/L (ref 1–32)
BASOPHILS # BLD AUTO: 0.02 10*3/MM3 (ref 0–0.2)
BASOPHILS NFR BLD AUTO: 0.2 % (ref 0–1.5)
BILIRUB SERPL-MCNC: 0.3 MG/DL (ref 0–1.2)
BILIRUB UR QL STRIP: NEGATIVE
BUN SERPL-MCNC: 20 MG/DL (ref 6–20)
BUN/CREAT SERPL: 18.3 (ref 7–25)
CALCIUM SPEC-SCNC: 9.6 MG/DL (ref 8.6–10.5)
CHLORIDE SERPL-SCNC: 107 MMOL/L (ref 98–107)
CHOLEST SERPL-MCNC: 136 MG/DL (ref 0–200)
CK MB SERPL-CCNC: 13.96 NG/ML
CK SERPL-CCNC: 124 U/L (ref 20–180)
CLARITY UR: CLEAR
CO2 SERPL-SCNC: 19.8 MMOL/L (ref 22–29)
COLOR UR: YELLOW
CREAT SERPL-MCNC: 1.09 MG/DL (ref 0.57–1)
CRP SERPL-MCNC: 0.41 MG/DL (ref 0–0.5)
D-LACTATE SERPL-SCNC: 0.8 MMOL/L (ref 0.5–2)
DEPRECATED RDW RBC AUTO: 41.4 FL (ref 37–54)
EOSINOPHIL # BLD AUTO: 0.02 10*3/MM3 (ref 0–0.4)
EOSINOPHIL NFR BLD AUTO: 0.2 % (ref 0.3–6.2)
ERYTHROCYTE [DISTWIDTH] IN BLOOD BY AUTOMATED COUNT: 12.8 % (ref 12.3–15.4)
GFR SERPL CREATININE-BSD FRML MDRD: 53 ML/MIN/1.73
GLOBULIN UR ELPH-MCNC: 3.1 GM/DL
GLUCOSE BLDC GLUCOMTR-MCNC: 192 MG/DL (ref 70–130)
GLUCOSE SERPL-MCNC: 228 MG/DL (ref 65–99)
GLUCOSE UR STRIP-MCNC: ABNORMAL MG/DL
HBA1C MFR BLD: 8 % (ref 4.8–5.6)
HCT VFR BLD AUTO: 34.5 % (ref 34–46.6)
HDLC SERPL-MCNC: 52 MG/DL (ref 40–60)
HGB BLD-MCNC: 11.4 G/DL (ref 12–15.9)
HGB UR QL STRIP.AUTO: NEGATIVE
IMM GRANULOCYTES # BLD AUTO: 0.06 10*3/MM3 (ref 0–0.05)
IMM GRANULOCYTES NFR BLD AUTO: 0.6 % (ref 0–0.5)
KETONES UR QL STRIP: NEGATIVE
LDLC SERPL CALC-MCNC: 63 MG/DL (ref 0–100)
LDLC/HDLC SERPL: 1.21 {RATIO}
LEUKOCYTE ESTERASE UR QL STRIP.AUTO: NEGATIVE
LIPASE SERPL-CCNC: 40 U/L (ref 13–60)
LITHIUM SERPL-SCNC: 0.6 MMOL/L (ref 0.6–1.2)
LYMPHOCYTES # BLD AUTO: 1 10*3/MM3 (ref 0.7–3.1)
LYMPHOCYTES NFR BLD AUTO: 9.8 % (ref 19.6–45.3)
MAGNESIUM SERPL-MCNC: 2.4 MG/DL (ref 1.6–2.6)
MCH RBC QN AUTO: 29.2 PG (ref 26.6–33)
MCHC RBC AUTO-ENTMCNC: 33 G/DL (ref 31.5–35.7)
MCV RBC AUTO: 88.5 FL (ref 79–97)
MONOCYTES # BLD AUTO: 0.56 10*3/MM3 (ref 0.1–0.9)
MONOCYTES NFR BLD AUTO: 5.5 % (ref 5–12)
NEUTROPHILS NFR BLD AUTO: 8.55 10*3/MM3 (ref 1.7–7)
NEUTROPHILS NFR BLD AUTO: 83.7 % (ref 42.7–76)
NITRITE UR QL STRIP: NEGATIVE
NRBC BLD AUTO-RTO: 0 /100 WBC (ref 0–0.2)
NT-PROBNP SERPL-MCNC: 353 PG/ML (ref 0–900)
PH UR STRIP.AUTO: <=5 [PH] (ref 5–8)
PLATELET # BLD AUTO: 208 10*3/MM3 (ref 140–450)
PMV BLD AUTO: 9.9 FL (ref 6–12)
POTASSIUM SERPL-SCNC: 4.8 MMOL/L (ref 3.5–5.2)
PROT SERPL-MCNC: 7.2 G/DL (ref 6–8.5)
PROT UR QL STRIP: NEGATIVE
RBC # BLD AUTO: 3.9 10*6/MM3 (ref 3.77–5.28)
SARS-COV-2 RDRP RESP QL NAA+PROBE: NOT DETECTED
SODIUM SERPL-SCNC: 137 MMOL/L (ref 136–145)
SP GR UR STRIP: 1.03 (ref 1–1.03)
TRIGL SERPL-MCNC: 106 MG/DL (ref 0–150)
TROPONIN T SERPL-MCNC: 0.01 NG/ML (ref 0–0.03)
TROPONIN T SERPL-MCNC: 0.01 NG/ML (ref 0–0.03)
TROPONIN T SERPL-MCNC: 0.02 NG/ML (ref 0–0.03)
TSH SERPL DL<=0.05 MIU/L-ACNC: 1.29 UIU/ML (ref 0.27–4.2)
UROBILINOGEN UR QL STRIP: ABNORMAL
VLDLC SERPL-MCNC: 21.2 MG/DL
WBC # BLD AUTO: 10.21 10*3/MM3 (ref 3.4–10.8)

## 2020-08-14 PROCEDURE — 80061 LIPID PANEL: CPT | Performed by: INTERNAL MEDICINE

## 2020-08-14 PROCEDURE — 99220 PR INITIAL OBSERVATION CARE/DAY 70 MINUTES: CPT | Performed by: INTERNAL MEDICINE

## 2020-08-14 PROCEDURE — 83605 ASSAY OF LACTIC ACID: CPT | Performed by: FAMILY MEDICINE

## 2020-08-14 PROCEDURE — 83735 ASSAY OF MAGNESIUM: CPT | Performed by: FAMILY MEDICINE

## 2020-08-14 PROCEDURE — 86140 C-REACTIVE PROTEIN: CPT | Performed by: FAMILY MEDICINE

## 2020-08-14 PROCEDURE — 80178 ASSAY OF LITHIUM: CPT | Performed by: INTERNAL MEDICINE

## 2020-08-14 PROCEDURE — 80053 COMPREHEN METABOLIC PANEL: CPT | Performed by: FAMILY MEDICINE

## 2020-08-14 PROCEDURE — 96374 THER/PROPH/DIAG INJ IV PUSH: CPT

## 2020-08-14 PROCEDURE — G0378 HOSPITAL OBSERVATION PER HR: HCPCS

## 2020-08-14 PROCEDURE — 74176 CT ABD & PELVIS W/O CONTRAST: CPT

## 2020-08-14 PROCEDURE — 82550 ASSAY OF CK (CPK): CPT | Performed by: INTERNAL MEDICINE

## 2020-08-14 PROCEDURE — 84443 ASSAY THYROID STIM HORMONE: CPT | Performed by: FAMILY MEDICINE

## 2020-08-14 PROCEDURE — 74176 CT ABD & PELVIS W/O CONTRAST: CPT | Performed by: RADIOLOGY

## 2020-08-14 PROCEDURE — 83880 ASSAY OF NATRIURETIC PEPTIDE: CPT | Performed by: FAMILY MEDICINE

## 2020-08-14 PROCEDURE — 85025 COMPLETE CBC W/AUTO DIFF WBC: CPT | Performed by: FAMILY MEDICINE

## 2020-08-14 PROCEDURE — C9803 HOPD COVID-19 SPEC COLLECT: HCPCS

## 2020-08-14 PROCEDURE — 25010000002 HYDROMORPHONE 1 MG/ML SOLUTION: Performed by: FAMILY MEDICINE

## 2020-08-14 PROCEDURE — 87635 SARS-COV-2 COVID-19 AMP PRB: CPT | Performed by: FAMILY MEDICINE

## 2020-08-14 PROCEDURE — 82553 CREATINE MB FRACTION: CPT | Performed by: INTERNAL MEDICINE

## 2020-08-14 PROCEDURE — 84484 ASSAY OF TROPONIN QUANT: CPT | Performed by: INTERNAL MEDICINE

## 2020-08-14 PROCEDURE — 81003 URINALYSIS AUTO W/O SCOPE: CPT | Performed by: FAMILY MEDICINE

## 2020-08-14 PROCEDURE — 25010000002 ENOXAPARIN PER 10 MG: Performed by: INTERNAL MEDICINE

## 2020-08-14 PROCEDURE — 87040 BLOOD CULTURE FOR BACTERIA: CPT | Performed by: FAMILY MEDICINE

## 2020-08-14 PROCEDURE — 83690 ASSAY OF LIPASE: CPT | Performed by: FAMILY MEDICINE

## 2020-08-14 PROCEDURE — 83036 HEMOGLOBIN GLYCOSYLATED A1C: CPT | Performed by: INTERNAL MEDICINE

## 2020-08-14 PROCEDURE — 82962 GLUCOSE BLOOD TEST: CPT

## 2020-08-14 PROCEDURE — 96372 THER/PROPH/DIAG INJ SC/IM: CPT

## 2020-08-14 PROCEDURE — 99285 EMERGENCY DEPT VISIT HI MDM: CPT

## 2020-08-14 PROCEDURE — 93010 ELECTROCARDIOGRAM REPORT: CPT | Performed by: SPECIALIST

## 2020-08-14 PROCEDURE — 86677 HELICOBACTER PYLORI ANTIBODY: CPT | Performed by: INTERNAL MEDICINE

## 2020-08-14 PROCEDURE — 93005 ELECTROCARDIOGRAM TRACING: CPT | Performed by: FAMILY MEDICINE

## 2020-08-14 PROCEDURE — 84484 ASSAY OF TROPONIN QUANT: CPT | Performed by: FAMILY MEDICINE

## 2020-08-14 RX ORDER — ALUMINA, MAGNESIA, AND SIMETHICONE 2400; 2400; 240 MG/30ML; MG/30ML; MG/30ML
15 SUSPENSION ORAL EVERY 6 HOURS PRN
Status: DISCONTINUED | OUTPATIENT
Start: 2020-08-14 | End: 2020-08-16 | Stop reason: HOSPADM

## 2020-08-14 RX ORDER — ATORVASTATIN CALCIUM 40 MG/1
80 TABLET, FILM COATED ORAL NIGHTLY
Status: DISCONTINUED | OUTPATIENT
Start: 2020-08-14 | End: 2020-08-16 | Stop reason: HOSPADM

## 2020-08-14 RX ORDER — NICOTINE POLACRILEX 4 MG
15 LOZENGE BUCCAL
Status: DISCONTINUED | OUTPATIENT
Start: 2020-08-14 | End: 2020-08-16 | Stop reason: HOSPADM

## 2020-08-14 RX ORDER — INSULIN ASPART 100 [IU]/ML
20 INJECTION, SUSPENSION SUBCUTANEOUS 2 TIMES DAILY WITH MEALS
COMMUNITY
End: 2021-10-27 | Stop reason: ALTCHOICE

## 2020-08-14 RX ORDER — SODIUM CHLORIDE 0.9 % (FLUSH) 0.9 %
10 SYRINGE (ML) INJECTION AS NEEDED
Status: DISCONTINUED | OUTPATIENT
Start: 2020-08-14 | End: 2020-08-16 | Stop reason: HOSPADM

## 2020-08-14 RX ORDER — LEVOTHYROXINE SODIUM 0.05 MG/1
50 TABLET ORAL EVERY EVENING
Status: CANCELLED | OUTPATIENT
Start: 2020-08-14

## 2020-08-14 RX ORDER — SODIUM CHLORIDE 0.9 % (FLUSH) 0.9 %
10 SYRINGE (ML) INJECTION EVERY 12 HOURS SCHEDULED
Status: DISCONTINUED | OUTPATIENT
Start: 2020-08-14 | End: 2020-08-16 | Stop reason: HOSPADM

## 2020-08-14 RX ORDER — LITHIUM CARBONATE 300 MG/1
300 CAPSULE ORAL EVERY EVENING
Status: DISCONTINUED | OUTPATIENT
Start: 2020-08-14 | End: 2020-08-16 | Stop reason: HOSPADM

## 2020-08-14 RX ORDER — ONDANSETRON 4 MG/1
4 TABLET, FILM COATED ORAL EVERY 6 HOURS PRN
Status: DISCONTINUED | OUTPATIENT
Start: 2020-08-14 | End: 2020-08-16 | Stop reason: HOSPADM

## 2020-08-14 RX ORDER — PANTOPRAZOLE SODIUM 40 MG/1
40 TABLET, DELAYED RELEASE ORAL DAILY
Status: CANCELLED | OUTPATIENT
Start: 2020-08-14

## 2020-08-14 RX ORDER — DEXTROSE MONOHYDRATE 25 G/50ML
25 INJECTION, SOLUTION INTRAVENOUS
Status: DISCONTINUED | OUTPATIENT
Start: 2020-08-14 | End: 2020-08-16 | Stop reason: HOSPADM

## 2020-08-14 RX ORDER — ONDANSETRON 2 MG/ML
4 INJECTION INTRAMUSCULAR; INTRAVENOUS EVERY 6 HOURS PRN
Status: DISCONTINUED | OUTPATIENT
Start: 2020-08-14 | End: 2020-08-16 | Stop reason: HOSPADM

## 2020-08-14 RX ORDER — PANTOPRAZOLE SODIUM 40 MG/1
40 TABLET, DELAYED RELEASE ORAL DAILY
COMMUNITY
End: 2021-01-19

## 2020-08-14 RX ORDER — LEVOTHYROXINE SODIUM 0.05 MG/1
50 TABLET ORAL EVERY EVENING
Status: DISCONTINUED | OUTPATIENT
Start: 2020-08-14 | End: 2020-08-16 | Stop reason: HOSPADM

## 2020-08-14 RX ORDER — LITHIUM CARBONATE 300 MG/1
300 CAPSULE ORAL EVERY EVENING
Status: CANCELLED | OUTPATIENT
Start: 2020-08-14

## 2020-08-14 RX ORDER — LISINOPRIL 2.5 MG/1
5 TABLET ORAL DAILY
Status: CANCELLED | OUTPATIENT
Start: 2020-08-14

## 2020-08-14 RX ORDER — AMOXICILLIN 250 MG
2 CAPSULE ORAL NIGHTLY PRN
Status: DISCONTINUED | OUTPATIENT
Start: 2020-08-14 | End: 2020-08-16 | Stop reason: HOSPADM

## 2020-08-14 RX ORDER — INSULIN ASPART 100 [IU]/ML
20 INJECTION, SUSPENSION SUBCUTANEOUS 2 TIMES DAILY WITH MEALS
Status: CANCELLED | OUTPATIENT
Start: 2020-08-14

## 2020-08-14 RX ORDER — BISACODYL 10 MG
10 SUPPOSITORY, RECTAL RECTAL DAILY PRN
Status: DISCONTINUED | OUTPATIENT
Start: 2020-08-14 | End: 2020-08-16 | Stop reason: HOSPADM

## 2020-08-14 RX ORDER — LISINOPRIL 10 MG/1
5 TABLET ORAL DAILY
COMMUNITY
End: 2020-08-16 | Stop reason: HOSPADM

## 2020-08-14 RX ORDER — PANTOPRAZOLE SODIUM 40 MG/1
40 TABLET, DELAYED RELEASE ORAL
Status: DISCONTINUED | OUTPATIENT
Start: 2020-08-14 | End: 2020-08-16 | Stop reason: HOSPADM

## 2020-08-14 RX ORDER — HYDROMORPHONE HYDROCHLORIDE 1 MG/ML
0.5 INJECTION, SOLUTION INTRAMUSCULAR; INTRAVENOUS; SUBCUTANEOUS ONCE
Status: COMPLETED | OUTPATIENT
Start: 2020-08-14 | End: 2020-08-14

## 2020-08-14 RX ORDER — ALUMINA, MAGNESIA, AND SIMETHICONE 2400; 2400; 240 MG/30ML; MG/30ML; MG/30ML
15 SUSPENSION ORAL ONCE
Status: COMPLETED | OUTPATIENT
Start: 2020-08-14 | End: 2020-08-14

## 2020-08-14 RX ORDER — BISACODYL 5 MG/1
5 TABLET, DELAYED RELEASE ORAL DAILY PRN
Status: DISCONTINUED | OUTPATIENT
Start: 2020-08-14 | End: 2020-08-16 | Stop reason: HOSPADM

## 2020-08-14 RX ORDER — LIDOCAINE HYDROCHLORIDE 20 MG/ML
15 SOLUTION OROPHARYNGEAL ONCE
Status: COMPLETED | OUTPATIENT
Start: 2020-08-14 | End: 2020-08-14

## 2020-08-14 RX ORDER — ASPIRIN 81 MG/1
81 TABLET ORAL EVERY EVENING
Status: DISCONTINUED | OUTPATIENT
Start: 2020-08-14 | End: 2020-08-16 | Stop reason: HOSPADM

## 2020-08-14 RX ADMIN — LIDOCAINE HYDROCHLORIDE 15 ML: 20 SOLUTION ORAL; TOPICAL at 16:02

## 2020-08-14 RX ADMIN — ENOXAPARIN SODIUM 40 MG: 40 INJECTION SUBCUTANEOUS at 19:35

## 2020-08-14 RX ADMIN — ALUMINUM HYDROXIDE, MAGNESIUM HYDROXIDE, AND DIMETHICONE 15 ML: 400; 400; 40 SUSPENSION ORAL at 16:02

## 2020-08-14 RX ADMIN — LITHIUM CARBONATE 300 MG: 300 CAPSULE, GELATIN COATED ORAL at 21:09

## 2020-08-14 RX ADMIN — ASPIRIN 81 MG: 81 TABLET, COATED ORAL at 19:35

## 2020-08-14 RX ADMIN — LEVOTHYROXINE SODIUM 50 MCG: 50 TABLET ORAL at 21:09

## 2020-08-14 RX ADMIN — PANTOPRAZOLE SODIUM 40 MG: 40 TABLET, DELAYED RELEASE ORAL at 21:09

## 2020-08-14 RX ADMIN — ATORVASTATIN CALCIUM 80 MG: 40 TABLET, FILM COATED ORAL at 19:35

## 2020-08-14 RX ADMIN — HYDROMORPHONE HYDROCHLORIDE 0.5 MG: 1 INJECTION, SOLUTION INTRAMUSCULAR; INTRAVENOUS; SUBCUTANEOUS at 15:40

## 2020-08-14 RX ADMIN — SODIUM CHLORIDE 1000 ML: 9 INJECTION, SOLUTION INTRAVENOUS at 13:53

## 2020-08-14 NOTE — H&P
[facility]    Patient Identification:  Name:  Tammi Levine  Age:  50 y.o.  Sex:  female  :  1970  MRN:  [id number]   Visit Number:  [id number]  Primary Care Physician:  Diane Rios         Chief complaint:     History of presenting illness:    Tammi Levine is a 50 y.o. female with PMH significant for tinnitus, hyperlipidemia, hypertension presented the emergency department complaints of chest pain abdominal pain.    Patient states that she developed abdominal pain since last 1 week.  Patient states that the pain was initially intermittent but has increased.  Patient stated the pain in the abdomen is generalized and exacerbated with eating.  Patient denies any diarrhea or constipation.  Patient states that she has been having nausea and vomiting every time she eats something.  She states that she has not been able to eat anything every time she eats something she has nausea and vomiting.  Patient states that she is able to keep liquids down.  Patient states that she feels that all this started because of stress.  Patient states that since yesterday she developed chest pain which was poorly localized in anterior chest, nonradiating, no exacerbating or relieving factors.  Patient stated that she was also having pain in her both arms along with chest pain.  Patient denies any fevers, chills, dizziness, lightheadedness, cough, dyspnea.  Patient states that at this time her chest pain is improved though not completely resolved.  Patient states that the pain is poorly localized in anterior lower chest around epigastric area.  Patient states that she has pain in her left arm.    In ER, patient was found to be afebrile with stable vital signs, blood pressure was elevated to 178/97 but is improved to 131/73.  Troponin was within normal limits x2.  CO2 is 19.8, creatinine is 1.09 and rest of CMP is within normal limits.  TSH, CRP, lactic acid, lipase and  "CBC is within normal limits.  Urinalysis was unremarkable.  CT scan of the abdomen and pelvis showed no acute findings.       Review of systems:  Constitutional: Patient denies any fevers, chills, lethargy, weight loss or night sweats.  Vision and hearing: Patient denies any acute changes in vision and hearing.  Respiratory: Patient denies any cough, hemoptysis, dyspnea.  Cardiovascular: Patient is complaining of chest pain as per HPI.  GI: Patient denies any  hematemesis, melena and hematochezia.  Patient is complaining of abdominal pain, nausea and vomiting as per HPI.  Genitourinary: Patient denies any dysuria, frequency, urgency or hematuria.  Neurologic: Patient denies any dizziness, lightheadedness, seizures, syncope.  Psychiatry: Patient denies any delusions, hallucinations, suicidal ideation.  Musculoskeletal: Patient denies any gait problems, joint swelling, joint pains or back pain.      Past Medical History:   Diagnosis Date   • Abnormal CT scan     ADRENAL GLAND FOCAL MASS LESION MULTIPLE, LEFT ONLY   • Anxiety    • ASCVD (arteriosclerotic cardiovascular disease)     \"status post MI in 2012, clinically stable\"   • Bipolar disorder (CMS/HCC)    • Depression    • Diabetes mellitus (CMS/HCC)    • Disease of thyroid gland    • Dyslipidemia    • Hypertension, well controlled    • Migraine headache    • Myocardial infarction (CMS/HCC) 2012   • Sleep apnea      Past Surgical History:   Procedure Laterality Date   • CHOLECYSTECTOMY     • CORONARY ANGIOPLASTY WITH STENT PLACEMENT  2012    Dr. Denson   • HYSTERECTOMY  12/2014   • NECK SURGERY  2010   • TRANSESOPHAGEAL ECHOCARDIOGRAM (SARAN)  07/13/2015    EF 60-65%     Family History   Problem Relation Age of Onset   • Diabetes Father    • Diabetes Brother    • Cancer Maternal Grandmother    • Heart disease Maternal Grandfather    • Diabetes Paternal Grandfather    • Cancer Other      Social History     Socioeconomic History   • Marital status: Single     Spouse " name: Not on file   • Number of children: Not on file   • Years of education: Not on file   • Highest education level: Not on file   Tobacco Use   • Smoking status: Never Smoker   • Smokeless tobacco: Never Used   Substance and Sexual Activity   • Alcohol use: No   • Drug use: No   • Sexual activity: Defer     ---------------------------------------------------------------------------------------------------------------------   Allergies:  Zoloft [sertraline hcl] and Ciprofloxacin  ---------------------------------------------------------------------------------------------------------------------   Prior to Admission Medications     Prescriptions Last Dose Informant Patient Reported? Taking?    aspirin 81 MG EC tablet 8/13/2020 Self Yes Yes    Take 81 mg by mouth Every Evening.    atorvastatin (LIPITOR) 80 MG tablet 8/13/2020 Pharmacy No Yes    Take 1 tablet by mouth Every Night.    Canagliflozin (INVOKANA) 100 MG tablet 8/13/2020 Pharmacy Yes Yes    Take 100 mg by mouth Every Evening.    insulin aspart prot-insulin aspart (novoLOG 70/30) (70-30) 100 UNIT/ML injection 8/13/2020 Pharmacy Yes Yes    Inject 20 Units under the skin into the appropriate area as directed 2 (Two) Times a Day With Meals.    levothyroxine (SYNTHROID, LEVOTHROID) 50 MCG tablet 8/13/2020 Pharmacy Yes Yes    Take 50 mcg by mouth Every Evening.    lisinopril (PRINIVIL,ZESTRIL) 10 MG tablet 8/13/2020 Pharmacy Yes Yes    Take 5 mg by mouth Daily.    lithium carbonate 300 MG capsule 8/13/2020 Pharmacy No Yes    Take 1 capsule by mouth Every Evening.    pantoprazole (PROTONIX) 40 MG EC tablet 8/13/2020 Pharmacy Yes Yes    Take 40 mg by mouth Daily.        Hospital Scheduled Meds:    aspirin 81 mg Oral Q PM   atorvastatin 80 mg Oral Nightly   enoxaparin 40 mg Subcutaneous Nightly   sodium chloride 10 mL Intravenous Q12H       Pharmacy to Dose enoxaparin (LOVENOX)           ---------------------------------------------------------------------------------------------------------------------   Vital Signs:  Temp:  [98.4 °F (36.9 °C)-98.5 °F (36.9 °C)] 98.4 °F (36.9 °C)  Heart Rate:  [52-68] 61  Resp:  [18-20] 18  BP: (131-178)/(54-97) 166/54      08/14/20  1104 08/14/20  1718   Weight: 73.5 kg (162 lb) 71.7 kg (158 lb)     Body mass index is 31.91 kg/m².  ---------------------------------------------------------------------------------------------------------------------   Physical Exam:  Constitutional:  Well-developed and well-nourished.     HENT:  Head:  Normocephalic and atraumatic.  Mouth:  Moist mucous membranes.    Eyes:  Pupils are equal, round, and reactive to light.   Neck:  Neck supple.  No JVD present.    Cardiovascular:  Regular rate and rhythm. S1+S2. No murmur, rubs or gallops.   Lungs/Chest: Clear to auscultation bilaterally.   Abdomen:  Soft. Nontender. No viscera palpable.  Bowel sounds audible.   Musculoskeletal: No deformity or joint swelling.  Brace on left foot.  Patient states that she has had tendon cut due to diabetes and therefore does not have control on left foot.  Peripheral vascular: Bilateral dorsalis pedis palpable. No edema.   Neurological:  Alert and oriented to person, place, and time.  Cranial nerves grossly intact. Strength bilaterally symmetrical in upper and lower extremities.   Skin:  Skin is warm and dry. No rash noted. No pallor.  ---------------------------------------------------------------------------------------------------------------------  EKG: Sinus bradycardia with sinus arrhythmia.    ---------------------------------------------------------------------------------------------------------------------   Results from last 7 days   Lab Units 08/14/20  1126   CRP mg/dL 0.41   LACTATE mmol/L 0.8   WBC 10*3/mm3 10.21   HEMOGLOBIN g/dL 11.4*   HEMATOCRIT % 34.5   MCV fL 88.5   MCHC g/dL 33.0   PLATELETS 10*3/mm3 208         Results from  last 7 days   Lab Units 08/14/20  1126   SODIUM mmol/L 137   POTASSIUM mmol/L 4.8   MAGNESIUM mg/dL 2.4   CHLORIDE mmol/L 107   CO2 mmol/L 19.8*   BUN mg/dL 20   CREATININE mg/dL 1.09*   EGFR IF NONAFRICN AM mL/min/1.73 53*   CALCIUM mg/dL 9.6   GLUCOSE mg/dL 228*   ALBUMIN g/dL 4.13   BILIRUBIN mg/dL 0.3   ALK PHOS U/L 96   AST (SGOT) U/L 12   ALT (SGPT) U/L 10   Estimated Creatinine Clearance: 55.9 mL/min (A) (by C-G formula based on SCr of 1.09 mg/dL (H)).  No results found for: AMMONIA  Results from last 7 days   Lab Units 08/14/20  1126   TROPONIN T ng/mL 0.013     Results from last 7 days   Lab Units 08/14/20  1126   PROBNP pg/mL 353.0     Lab Results   Component Value Date    HGBA1C 7.50 (H) 01/12/2020     Lab Results   Component Value Date    TSH 1.290 08/14/2020    FREET4 1.11 01/24/2019     Lab Results   Component Value Date    PREGTESTUR Negative 11/25/2014     Pain Management Panel     Pain Management Panel Latest Ref Rng & Units 9/5/2019 6/5/2017    CREATININE UR mg/dL 49.7 155.7    AMPHETAMINES SCREEN, URINE NEGATIVE - -    BARBITURATES SCREEN NEGATIVE - -    BENZODIAZEPINE SCREEN, URINE NEGATIVE - -    COCAINE SCREEN, URINE NEGATIVE - -    METHADONE SCREEN, URINE NEGATIVE - -        No results found for: BLOODCX  No results found for: URINECX  No results found for: WOUNDCX  No results found for: STOOLCX      ---------------------------------------------------------------------------------------------------------------------  Imaging Results (Last 7 Days)     Procedure Component Value Units Date/Time    CT Abdomen Pelvis Without Contrast [[id number]] Collected:  08/14/20 1421     Updated:  08/14/20 1447    Narrative:       EXAM: CT ABDOMEN PELVIS WO CONTRAST-            TECHNIQUE: Multiple axial CT images were obtained from lung bases  through pubic symphysis WITHOUT administration of IV contrast.  Reformatted images in the coronal and/or sagittal plane(s) were  generated from the axial data set to  facilitate diagnostic accuracy  and/or surgical planning.  Oral Contrast:NONE.     Radiation dose reduction techniques were utilized per ALARA protocol.  Automated exposure control was initiated through either or [employer] or  [employer] software packages by  protocol.       DOSE:     Clinical information  abd pain      Comparison  Comparison: 03/29/2019     FINDINGS:     Lower thorax: Clear. No effusions.     Abdomen:     Liver: Homogeneous. No focal hepatic mass or ductal dilatation.  Gallbladder: Cholecystectomy.  Pancreas: Unremarkable. No mass or ductal dilatation.  Spleen: Homogeneous. No splenomegaly.  Adrenals: Stable lipid rich benign left adrenal adenoma, 2.3 cm.  Kidneys/ureters: No mass. No obstructive uropathy.  No evidence of  urolithiasis.  GI tract: Colon is decompressed which likely accounts for apparent wall  thickening. Small bowel is within normal limits. No bowel obstruction  identified.  Peritoneum: No free air. No free fluid or loculated fluid collections.  Mesentery: Unremarkable.  Lymph nodes: No lymphadenopathy.  Vasculature: No evidence of aneurysm.  Abdominal wall: No focal hernia or mass.     Other: None.     Pelvis:     Bladder: No focal mass or significant wall thickening  Reproductive: Prior hysterectomy.  Appendix: The appendix is unremarkable.     Bones: Advanced degenerative changes of the lower lumbar spine with  multilevel stenosis. No acute bony findings.       Impression:       No acute findings. Other nonacute and incidental findings detailed above  stable from previous exam.     This report was finalized on 8/14/2020 2:23 PM by Dr. Milo Suarez MD.             I have personally reviewed the radiology images and read the final radiology report.  ---------------------------------------------------------------------------------------------------------------------  Assessment and Plan:    -Chest pain  Patient has atypical features with her chest pain.  Patient has  improvement in her chest pain.  Cardiac enzymes have been negative so far.  She had an echocardiogram done and stress test done on 1/13/2020, stress test showed normal EKG stress test and normal myocardial perfusion study and echocardiogram showed EF of 61 to 65%, normal LV systolic and diastolic function and mildly elevated RV systolic pressure (35-45 mmHg).  I will continue to monitor patient with serial cardiac enzymes and serial EKG.  Continue aspirin and statin.  Hold beta-blocker due to bradycardia.    -Abdominal pain  CT scan of the abdomen showed no acute abnormality.  Patient feels that stress caused her abdominal pain.  Patient has previously seen Dr. Del Angel and states that she currently sees Dr. Barrios.  I discussed the patient that she may need endoscopy if she continues to have abdominal pain.    -Nausea and vomiting  May be due to underlying gastroesophageal reflux disease worsened due to stress.  I will continue patient on antiemetics and change her diet to clear liquids.  Continue patient IV fluids.     -Dehydration  Patient has slightly elevated creatinine at 1.09, previously creatinine was 0.8-0.9.  I will continue patient IV fluids.  Monitor electrolytes and renal function.  I will hold her lisinopril at this time.    -Diabetes mellitus  Blood glucose is elevated.  I will check A1c.  Monitor blood with Accu-Cheks and treat hyperglycemia with sliding scale insulin.    Activity: Up with assist  Nutrition: Clear liquid diet.  Fluids: NS at 100 mL/h  DVT prophylaxis: Heparin Subcu  GI prophylaxis: PPI    I discussed the plan of care with patient and nursing staff.  Questions answered.    Melissa Pardo MD  08/14/20  18:25

## 2020-08-14 NOTE — ED PROVIDER NOTES
Subjective   Patient is a 50-year-old female who presents emergency department for upper abdominal pain and chest pain that started 2 days ago.  Patient states that she was seen in an outlying emergency department for the symptoms yesterday, but states that she is still having symptoms and came in today to be seen again.  The patient describes the pain in her upper abdomen as a gnawing sensation.  She describes the pain in her chest as a pressure sensation.  She is not experiencing any shortness of breath but does admit to nausea with occasional vomiting.  The patient states that she has vomited twice this morning.  She denies any coughing, fever, chills or sick contacts.  She was not tested for COVID.  The patient states that the symptoms are staying the same over the past 2 days and not getting any better or worse.  She has no additional complaints at this time.  She denies any diarrhea, blood in her stool for any additional symptoms.          Review of Systems   Constitutional: Positive for appetite change and fatigue. Negative for activity change, chills, diaphoresis and fever.   HENT: Negative for congestion, postnasal drip, rhinorrhea, sinus pressure, sinus pain, sneezing and sore throat.    Eyes: Negative for discharge and itching.   Respiratory: Negative for apnea, cough, chest tightness, shortness of breath and wheezing.    Cardiovascular: Negative for chest pain and leg swelling.   Gastrointestinal: Positive for abdominal pain, nausea and vomiting. Negative for abdominal distention, constipation and diarrhea.   Genitourinary: Negative for difficulty urinating and flank pain.   Musculoskeletal: Negative for arthralgias and back pain.   Neurological: Negative for dizziness and headaches.   Psychiatric/Behavioral: Negative for agitation and confusion.       Past Medical History:   Diagnosis Date   • Abnormal CT scan     ADRENAL GLAND FOCAL MASS LESION MULTIPLE, LEFT ONLY   • Anxiety    • ASCVD  "(arteriosclerotic cardiovascular disease)     \"status post MI in 2012, clinically stable\"   • Bipolar disorder (CMS/HCC)    • Depression    • Diabetes mellitus (CMS/HCC)    • Disease of thyroid gland    • Dyslipidemia    • Hypertension, well controlled    • Migraine headache    • Myocardial infarction (CMS/HCC) 2012   • Sleep apnea        Allergies   Allergen Reactions   • Zoloft [Sertraline Hcl] Nausea And Vomiting   • Ciprofloxacin Rash     Also caused chest pain       Past Surgical History:   Procedure Laterality Date   • CHOLECYSTECTOMY     • CORONARY ANGIOPLASTY WITH STENT PLACEMENT  2012    Dr. Denson   • HYSTERECTOMY  12/2014   • NECK SURGERY  2010   • TRANSESOPHAGEAL ECHOCARDIOGRAM (SARAN)  07/13/2015    EF 60-65%       Family History   Problem Relation Age of Onset   • Diabetes Father    • Diabetes Brother    • Cancer Maternal Grandmother    • Heart disease Maternal Grandfather    • Diabetes Paternal Grandfather    • Cancer Other        Social History     Socioeconomic History   • Marital status: Single     Spouse name: Not on file   • Number of children: Not on file   • Years of education: Not on file   • Highest education level: Not on file   Tobacco Use   • Smoking status: Never Smoker   • Smokeless tobacco: Never Used   Substance and Sexual Activity   • Alcohol use: No   • Drug use: No   • Sexual activity: Defer           Objective   Physical Exam   Constitutional: She is oriented to person, place, and time. She appears well-developed and well-nourished.  Non-toxic appearance. She does not appear ill. No distress.   HENT:   Head: Normocephalic and atraumatic.   Mouth/Throat: Oropharynx is clear and moist. No oropharyngeal exudate.   Eyes: Pupils are equal, round, and reactive to light. EOM are normal. No scleral icterus.   Cardiovascular: Normal rate, regular rhythm, normal heart sounds and intact distal pulses. Exam reveals no gallop and no friction rub.   No murmur heard.  Pulmonary/Chest: Effort normal " and breath sounds normal. No stridor. No respiratory distress. She has no wheezes. She has no rales. She exhibits no tenderness.   Abdominal: Soft. Normal appearance and bowel sounds are normal. She exhibits no shifting dullness, no distension, no pulsatile liver, no fluid wave, no abdominal bruit, no ascites, no pulsatile midline mass and no mass. There is no hepatosplenomegaly, splenomegaly or hepatomegaly. There is no tenderness. There is no rigidity, no rebound, no guarding, no CVA tenderness, no tenderness at McBurney's point and negative Tracy's sign.   Neurological: She is alert and oriented to person, place, and time.   Skin: Skin is warm and dry. Capillary refill takes less than 2 seconds. No rash noted. She is not diaphoretic. No cyanosis or erythema. No pallor.   Psychiatric: She has a normal mood and affect. Her behavior is normal.   Nursing note and vitals reviewed.      Procedures           ED Course  ED Course as of Aug 14 1616   Fri Aug 14, 2020   1439 Awaiting CT results    [EG]      ED Course User Index  [EG] Kerry Best DO                                           [facility]  Number of Diagnoses or Management Options  Abdominal pain, unspecified abdominal location: new and requires workup  Atypical chest pain: new and requires workup     Amount and/or Complexity of Data Reviewed  Clinical lab tests: ordered and reviewed  Tests in the radiology section of CPT®: ordered and reviewed  Tests in the medicine section of CPT®: reviewed and ordered  Independent visualization of images, tracings, or specimens: yes    Risk of Complications, Morbidity, and/or Mortality  Presenting problems: high  Diagnostic procedures: high  Management options: high    Patient Progress  Patient progress: stable      Final diagnoses:   Atypical chest pain   Abdominal pain, unspecified abdominal location            Kerry Best DO  08/14/20 1616

## 2020-08-15 ENCOUNTER — APPOINTMENT (OUTPATIENT)
Dept: CARDIOLOGY | Facility: HOSPITAL | Age: 50
End: 2020-08-15

## 2020-08-15 LAB
ALBUMIN SERPL-MCNC: 3.48 G/DL (ref 3.5–5.2)
ALBUMIN/GLOB SERPL: 1.2 G/DL
ALP SERPL-CCNC: 81 U/L (ref 39–117)
ALT SERPL W P-5'-P-CCNC: 10 U/L (ref 1–33)
ANION GAP SERPL CALCULATED.3IONS-SCNC: 9.1 MMOL/L (ref 5–15)
AST SERPL-CCNC: 14 U/L (ref 1–32)
BASOPHILS # BLD AUTO: 0.02 10*3/MM3 (ref 0–0.2)
BASOPHILS NFR BLD AUTO: 0.2 % (ref 0–1.5)
BH CV ECHO MEAS - % IVS THICK: -3.1 %
BH CV ECHO MEAS - % LVPW THICK: 11.2 %
BH CV ECHO MEAS - ACS: 1.8 CM
BH CV ECHO MEAS - AO MAX PG (FULL): 9.1 MMHG
BH CV ECHO MEAS - AO MAX PG: 14.6 MMHG
BH CV ECHO MEAS - AO MEAN PG (FULL): 3.5 MMHG
BH CV ECHO MEAS - AO MEAN PG: 6 MMHG
BH CV ECHO MEAS - AO ROOT AREA (BSA CORRECTED): 1.7
BH CV ECHO MEAS - AO ROOT AREA: 6.6 CM^2
BH CV ECHO MEAS - AO ROOT DIAM: 2.9 CM
BH CV ECHO MEAS - AO V2 MAX: 191 CM/SEC
BH CV ECHO MEAS - AO V2 MEAN: 115 CM/SEC
BH CV ECHO MEAS - AO V2 VTI: 42.6 CM
BH CV ECHO MEAS - AVA(I,A): 1.8 CM^2
BH CV ECHO MEAS - AVA(I,D): 1.8 CM^2
BH CV ECHO MEAS - AVA(V,A): 1.9 CM^2
BH CV ECHO MEAS - AVA(V,D): 1.9 CM^2
BH CV ECHO MEAS - BSA(HAYCOCK): 1.8 M^2
BH CV ECHO MEAS - BSA: 1.7 M^2
BH CV ECHO MEAS - BZI_BMI: 31.7 KILOGRAMS/M^2
BH CV ECHO MEAS - BZI_METRIC_HEIGHT: 149.9 CM
BH CV ECHO MEAS - BZI_METRIC_WEIGHT: 71.2 KG
BH CV ECHO MEAS - EDV(CUBED): 121.3 ML
BH CV ECHO MEAS - EDV(MOD-SP4): 54.1 ML
BH CV ECHO MEAS - EDV(TEICH): 115.5 ML
BH CV ECHO MEAS - EF(CUBED): 64.7 %
BH CV ECHO MEAS - EF(MOD-SP4): 62.3 %
BH CV ECHO MEAS - EF(TEICH): 56 %
BH CV ECHO MEAS - ESV(CUBED): 42.9 ML
BH CV ECHO MEAS - ESV(MOD-SP4): 20.4 ML
BH CV ECHO MEAS - ESV(TEICH): 50.9 ML
BH CV ECHO MEAS - FS: 29.3 %
BH CV ECHO MEAS - IVS/LVPW: 1.1
BH CV ECHO MEAS - IVSD: 1.1 CM
BH CV ECHO MEAS - IVSS: 1.1 CM
BH CV ECHO MEAS - LA DIMENSION: 3.8 CM
BH CV ECHO MEAS - LA/AO: 1.3
BH CV ECHO MEAS - LV DIASTOLIC VOL/BSA (35-75): 32.5 ML/M^2
BH CV ECHO MEAS - LV MASS(C)D: 201.2 GRAMS
BH CV ECHO MEAS - LV MASS(C)DI: 120.9 GRAMS/M^2
BH CV ECHO MEAS - LV MASS(C)S: 123.9 GRAMS
BH CV ECHO MEAS - LV MASS(C)SI: 74.5 GRAMS/M^2
BH CV ECHO MEAS - LV MAX PG: 5.5 MMHG
BH CV ECHO MEAS - LV MEAN PG: 2.5 MMHG
BH CV ECHO MEAS - LV SYSTOLIC VOL/BSA (12-30): 12.3 ML/M^2
BH CV ECHO MEAS - LV V1 MAX: 115.8 CM/SEC
BH CV ECHO MEAS - LV V1 MEAN: 63.9 CM/SEC
BH CV ECHO MEAS - LV V1 VTI: 25.1 CM
BH CV ECHO MEAS - LVIDD: 5 CM
BH CV ECHO MEAS - LVIDS: 3.5 CM
BH CV ECHO MEAS - LVLD AP4: 6.8 CM
BH CV ECHO MEAS - LVLS AP4: 5.1 CM
BH CV ECHO MEAS - LVOT AREA (M): 3.1 CM^2
BH CV ECHO MEAS - LVOT AREA: 3.1 CM^2
BH CV ECHO MEAS - LVOT DIAM: 2 CM
BH CV ECHO MEAS - LVPWD: 1 CM
BH CV ECHO MEAS - LVPWS: 1.2 CM
BH CV ECHO MEAS - MV A MAX VEL: 73.7 CM/SEC
BH CV ECHO MEAS - MV E MAX VEL: 107 CM/SEC
BH CV ECHO MEAS - MV E/A: 1.5
BH CV ECHO MEAS - PA ACC TIME: 0.11 SEC
BH CV ECHO MEAS - PA PR(ACCEL): 28.2 MMHG
BH CV ECHO MEAS - SI(AO): 169.1 ML/M^2
BH CV ECHO MEAS - SI(CUBED): 47.1 ML/M^2
BH CV ECHO MEAS - SI(LVOT): 47.3 ML/M^2
BH CV ECHO MEAS - SI(MOD-SP4): 20.3 ML/M^2
BH CV ECHO MEAS - SI(TEICH): 38.9 ML/M^2
BH CV ECHO MEAS - SV(AO): 281.4 ML
BH CV ECHO MEAS - SV(CUBED): 78.4 ML
BH CV ECHO MEAS - SV(LVOT): 78.7 ML
BH CV ECHO MEAS - SV(MOD-SP4): 33.7 ML
BH CV ECHO MEAS - SV(TEICH): 64.6 ML
BILIRUB SERPL-MCNC: 0.3 MG/DL (ref 0–1.2)
BUN SERPL-MCNC: 16 MG/DL (ref 6–20)
BUN/CREAT SERPL: 19.8 (ref 7–25)
CALCIUM SPEC-SCNC: 9 MG/DL (ref 8.6–10.5)
CHLORIDE SERPL-SCNC: 107 MMOL/L (ref 98–107)
CLUMPED PLATELETS: PRESENT
CO2 SERPL-SCNC: 16.9 MMOL/L (ref 22–29)
CREAT SERPL-MCNC: 0.81 MG/DL (ref 0.57–1)
DEPRECATED RDW RBC AUTO: 43.3 FL (ref 37–54)
EOSINOPHIL # BLD AUTO: 0.1 10*3/MM3 (ref 0–0.4)
EOSINOPHIL NFR BLD AUTO: 1.2 % (ref 0.3–6.2)
ERYTHROCYTE [DISTWIDTH] IN BLOOD BY AUTOMATED COUNT: 12.6 % (ref 12.3–15.4)
GFR SERPL CREATININE-BSD FRML MDRD: 75 ML/MIN/1.73
GLOBULIN UR ELPH-MCNC: 2.8 GM/DL
GLUCOSE BLDC GLUCOMTR-MCNC: 126 MG/DL (ref 70–130)
GLUCOSE BLDC GLUCOMTR-MCNC: 147 MG/DL (ref 70–130)
GLUCOSE BLDC GLUCOMTR-MCNC: 183 MG/DL (ref 70–130)
GLUCOSE BLDC GLUCOMTR-MCNC: 197 MG/DL (ref 70–130)
GLUCOSE SERPL-MCNC: 189 MG/DL (ref 65–99)
HCT VFR BLD AUTO: 33.5 % (ref 34–46.6)
HGB BLD-MCNC: 10.3 G/DL (ref 12–15.9)
HYPOCHROMIA BLD QL: NORMAL
IMM GRANULOCYTES # BLD AUTO: 0.03 10*3/MM3 (ref 0–0.05)
IMM GRANULOCYTES NFR BLD AUTO: 0.4 % (ref 0–0.5)
LYMPHOCYTES # BLD AUTO: 1.59 10*3/MM3 (ref 0.7–3.1)
LYMPHOCYTES NFR BLD AUTO: 18.8 % (ref 19.6–45.3)
MAXIMAL PREDICTED HEART RATE: 169 BPM
MCH RBC QN AUTO: 28.6 PG (ref 26.6–33)
MCHC RBC AUTO-ENTMCNC: 30.7 G/DL (ref 31.5–35.7)
MCV RBC AUTO: 93.1 FL (ref 79–97)
MONOCYTES # BLD AUTO: 0.43 10*3/MM3 (ref 0.1–0.9)
MONOCYTES NFR BLD AUTO: 5.1 % (ref 5–12)
NEUTROPHILS NFR BLD AUTO: 6.28 10*3/MM3 (ref 1.7–7)
NEUTROPHILS NFR BLD AUTO: 74.3 % (ref 42.7–76)
NRBC BLD AUTO-RTO: 0 /100 WBC (ref 0–0.2)
PLATELET # BLD AUTO: 163 10*3/MM3 (ref 140–450)
PMV BLD AUTO: 10.4 FL (ref 6–12)
POTASSIUM SERPL-SCNC: 4.8 MMOL/L (ref 3.5–5.2)
PROT SERPL-MCNC: 6.3 G/DL (ref 6–8.5)
RBC # BLD AUTO: 3.6 10*6/MM3 (ref 3.77–5.28)
SODIUM SERPL-SCNC: 133 MMOL/L (ref 136–145)
STRESS TARGET HR: 144 BPM
WBC # BLD AUTO: 8.45 10*3/MM3 (ref 3.4–10.8)

## 2020-08-15 PROCEDURE — 93010 ELECTROCARDIOGRAM REPORT: CPT | Performed by: SPECIALIST

## 2020-08-15 PROCEDURE — 25010000002 ONDANSETRON PER 1 MG: Performed by: INTERNAL MEDICINE

## 2020-08-15 PROCEDURE — 94799 UNLISTED PULMONARY SVC/PX: CPT

## 2020-08-15 PROCEDURE — 96361 HYDRATE IV INFUSION ADD-ON: CPT

## 2020-08-15 PROCEDURE — 82962 GLUCOSE BLOOD TEST: CPT

## 2020-08-15 PROCEDURE — 85025 COMPLETE CBC W/AUTO DIFF WBC: CPT | Performed by: INTERNAL MEDICINE

## 2020-08-15 PROCEDURE — 96372 THER/PROPH/DIAG INJ SC/IM: CPT

## 2020-08-15 PROCEDURE — 93306 TTE W/DOPPLER COMPLETE: CPT | Performed by: SPECIALIST

## 2020-08-15 PROCEDURE — 99225 PR SBSQ OBSERVATION CARE/DAY 25 MINUTES: CPT | Performed by: INTERNAL MEDICINE

## 2020-08-15 PROCEDURE — G0378 HOSPITAL OBSERVATION PER HR: HCPCS

## 2020-08-15 PROCEDURE — 96375 TX/PRO/DX INJ NEW DRUG ADDON: CPT

## 2020-08-15 PROCEDURE — 93005 ELECTROCARDIOGRAM TRACING: CPT | Performed by: INTERNAL MEDICINE

## 2020-08-15 PROCEDURE — 80053 COMPREHEN METABOLIC PANEL: CPT | Performed by: INTERNAL MEDICINE

## 2020-08-15 PROCEDURE — 25010000002 ENOXAPARIN PER 10 MG: Performed by: INTERNAL MEDICINE

## 2020-08-15 PROCEDURE — 93306 TTE W/DOPPLER COMPLETE: CPT

## 2020-08-15 PROCEDURE — 63710000001 INSULIN ASPART PER 5 UNITS: Performed by: INTERNAL MEDICINE

## 2020-08-15 PROCEDURE — 85007 BL SMEAR W/DIFF WBC COUNT: CPT | Performed by: INTERNAL MEDICINE

## 2020-08-15 RX ORDER — SODIUM CHLORIDE 9 MG/ML
75 INJECTION, SOLUTION INTRAVENOUS CONTINUOUS
Status: DISCONTINUED | OUTPATIENT
Start: 2020-08-15 | End: 2020-08-16 | Stop reason: HOSPADM

## 2020-08-15 RX ADMIN — ALUMINUM HYDROXIDE, MAGNESIUM HYDROXIDE, AND DIMETHICONE 15 ML: 400; 400; 40 SUSPENSION ORAL at 15:35

## 2020-08-15 RX ADMIN — LEVOTHYROXINE SODIUM 50 MCG: 50 TABLET ORAL at 16:05

## 2020-08-15 RX ADMIN — LITHIUM CARBONATE 300 MG: 300 CAPSULE, GELATIN COATED ORAL at 16:05

## 2020-08-15 RX ADMIN — ASPIRIN 81 MG: 81 TABLET, COATED ORAL at 16:05

## 2020-08-15 RX ADMIN — INSULIN ASPART 2 UNITS: 100 INJECTION, SOLUTION INTRAVENOUS; SUBCUTANEOUS at 11:11

## 2020-08-15 RX ADMIN — PANTOPRAZOLE SODIUM 40 MG: 40 TABLET, DELAYED RELEASE ORAL at 09:42

## 2020-08-15 RX ADMIN — SODIUM CHLORIDE 75 ML/HR: 9 INJECTION, SOLUTION INTRAVENOUS at 19:41

## 2020-08-15 RX ADMIN — SODIUM CHLORIDE, PRESERVATIVE FREE 10 ML: 5 INJECTION INTRAVENOUS at 08:22

## 2020-08-15 RX ADMIN — ONDANSETRON 4 MG: 2 INJECTION INTRAMUSCULAR; INTRAVENOUS at 15:31

## 2020-08-15 RX ADMIN — PANTOPRAZOLE SODIUM 40 MG: 40 TABLET, DELAYED RELEASE ORAL at 16:05

## 2020-08-15 RX ADMIN — ATORVASTATIN CALCIUM 80 MG: 40 TABLET, FILM COATED ORAL at 19:41

## 2020-08-15 RX ADMIN — ENOXAPARIN SODIUM 40 MG: 40 INJECTION SUBCUTANEOUS at 19:41

## 2020-08-15 NOTE — PROGRESS NOTES
[facility] HOSPITALIST PROGRESS NOTE     Patient Identification:  Name:  Tammi Levine  Age:  50 y.o.  Sex:  female  :  1970  MRN:  [id number]  Visit Number:  [id number]  Primary Care Provider:  Diane Rios    Length of stay:  0    Chief complaint:  50 y.o. old female admitted with Abdominal Pain          Subjective:    Patient had some improvement in her abdominal pain earlier this morning but still continues to have abdominal pain.  Patient continues to have nausea and vomiting and was only able to eat a popsicle for dinner.  Patient denies any further chest pain.  Patient states that she had EGD done at the [facility] a little while ago.  Patient does not know the results of the EGD.  Her abdominal pain however is poorly localized in abdomen and worsened when moving her bowels or when eating.         Current Hospital Meds:    aspirin 81 mg Oral Q PM   atorvastatin 80 mg Oral Nightly   enoxaparin 40 mg Subcutaneous Nightly   insulin aspart 0-9 Units Subcutaneous TID AC   levothyroxine 50 mcg Oral Q PM   lithium carbonate 300 mg Oral Q PM   pantoprazole 40 mg Oral BID AC   sodium chloride 10 mL Intravenous Q12H       Pharmacy to Dose enoxaparin (LOVENOX)    sodium chloride 75 mL/hr     ----------------------------------------------------------------------------------------------------------------------  Vital Signs:  Temp:  [96.7 °F (35.9 °C)-98.8 °F (37.1 °C)] 96.7 °F (35.9 °C)  Heart Rate:  [51-63] 55  Resp:  [16-20] 18  BP: (138-157)/(64-75) 157/75      20  1104 20  1718 08/15/20  0500   Weight: 73.5 kg (162 lb) 71.7 kg (158 lb) 71.2 kg (157 lb)     Body mass index is 31.71 kg/m².    Intake/Output Summary (Last 24 hours) at 8/15/2020 1925  Last data filed at 8/15/2020 1732  Gross per 24 hour   Intake 2620 ml   Output 1300 ml   Net 1320 ml     Diet Clear Liquid  NPO  Diet  ----------------------------------------------------------------------------------------------------------------------  Physical exam:  Constitutional:  Well-developed and well-nourished.     HENT:  Head:  Normocephalic and atraumatic.  Mouth:  Moist mucous membranes.    Eyes:  Conjunctivae and EOM are normal.  Pupils are equal, round, and reactive to light.   Neck:  Neck supple.  No JVD present.    Cardiovascular:  Regular rate and rhythm. S1+S2. No murmur, rubs or gallops.   Pulmonary/Chest: Clear to auscultation bilaterally.   Abdominal:  Soft.  Mild tenderness to palpation in periumbilical area. No viscera palpable.  Bowel sounds audible.   Musculoskeletal: No deformity or joint swelling.   Peripheral vascular: Bilateral dorsalis pedis palpable. No edema.   Neurological:  Alert and oriented to person, place, and time.  Cranial nerves grossly intact. Strength bilaterally symmetrical in upper and lower extremities.   Skin:  Skin is warm and dry. No rash noted. No pallor.   ----------------------------------------------------------------------------------------------------------------------  Tele:    ----------------------------------------------------------------------------------------------------------------------  Results from last 7 days   Lab Units 08/14/20  1846 08/14/20  1751 08/14/20  1126   CK TOTAL U/L  --  124  --    CKMB ng/mL  --  13.96*  --    TROPONIN T ng/mL 0.022 0.014 0.013     Results from last 7 days   Lab Units 08/15/20  1146 08/14/20  1126   CRP mg/dL  --  0.41   LACTATE mmol/L  --  0.8   WBC 10*3/mm3 8.45 10.21   HEMOGLOBIN g/dL 10.3* 11.4*   HEMATOCRIT % 33.5* 34.5   MCV fL 93.1 88.5   MCHC g/dL 30.7* 33.0   PLATELETS 10*3/mm3 163 208         Results from last 7 days   Lab Units 08/15/20  1146 08/14/20  1126   SODIUM mmol/L 133* 137   POTASSIUM mmol/L 4.8 4.8   MAGNESIUM mg/dL  --  2.4   CHLORIDE mmol/L 107 107   CO2 mmol/L 16.9* 19.8*   BUN mg/dL 16 20   CREATININE mg/dL 0.81 1.09*    EGFR IF NONAFRICN AM mL/min/1.73 75 53*   CALCIUM mg/dL 9.0 9.6   GLUCOSE mg/dL 189* 228*   ALBUMIN g/dL 3.48* 4.13   BILIRUBIN mg/dL 0.3 0.3   ALK PHOS U/L 81 96   AST (SGOT) U/L 14 12   ALT (SGPT) U/L 10 10   Estimated Creatinine Clearance: 75 mL/min (by C-G formula based on SCr of 0.81 mg/dL).    No results found for: AMMONIA  Results from last 7 days   Lab Units 08/14/20  1751   CHOLESTEROL mg/dL 136   TRIGLYCERIDES mg/dL 106   HDL CHOL mg/dL 52   LDL CHOL mg/dL 63     Blood Culture   Date Value Ref Range Status   08/14/2020 No growth at 24 hours  Preliminary   08/14/2020 No growth at 24 hours  Preliminary     No results found for: URINECX  No results found for: WOUNDCX  No results found for: STOOLCX    I have personally looked at the labs and they are summarized above.  ----------------------------------------------------------------------------------------------------------------------  Imaging Results (Last 24 Hours)     ** No results found for the last 24 hours. **        ----------------------------------------------------------------------------------------------------------------------  Assessment and Plan:    -Chest pain, atypical  Resolved.  Patient ruled out for acute MI with negative cardiac enzymes.  Echocardiogram shows normal LV systolic function, EF of 61 to 65%, borderline dilatation of LA cavity, mild AVS.  Stress test on 1/13/2020 had shown normal myocardial perfusion study.  Continue aspirin and statin.  No need for repeat stress test at this time.     -Abdominal pain  Etiology of abdominal pain remains unclear.  CT scan of the abdomen showed no acute abnormality.    Lipase and lactate was within normal limits.  Patient has had EGD done few weeks ago and I will request records from [facility] regarding the result of EGD.  Furthermore, since patient continues to have abdominal pain etiology is unclear, I will obtain a CT scan with contrast.     -Nausea and  vomiting  Patient still unable to tolerate p.o. diet.  I will keep patient on clear liquids and start her on IV fluids.  Continue antiemetics as needed.       -Metabolic acidosis, NAG  Likely due to nausea and vomiting.  Will start patient IV fluids and monitor electrolytes.     -Diabetes mellitus  A1c is 8.  Glucose is reasonably controlled today.  Continue to monitor blood glucose with Accu-Cheks and treat hyperglycemia with sliding scale insulin.      Activity: Up with assist  Nutrition: Clear liquid diet  Fluids: NS at 75 mL/h  DVT prophylaxis: Enoxaparin subcu  GI prophylaxis: PPI    The patient is high risk due to: Abdominal pain, nausea and vomiting, unable to tolerate p.o. diet, metabolic acidosis    I discussed the patient's findings and my recommendations with patient and nursing staff.    Melissa Pardo MD  08/15/20  19:25

## 2020-08-15 NOTE — PLAN OF CARE
Pt rested well today. Pt c/o of mild epigastric pain after eating that went across left and right side. Pt stated she feels much better today compared to yesterday. Will continue to monitor and follow plan of care.

## 2020-08-16 ENCOUNTER — APPOINTMENT (OUTPATIENT)
Dept: CT IMAGING | Facility: HOSPITAL | Age: 50
End: 2020-08-16

## 2020-08-16 VITALS
WEIGHT: 157.6 LBS | BODY MASS INDEX: 31.77 KG/M2 | RESPIRATION RATE: 18 BRPM | HEIGHT: 59 IN | TEMPERATURE: 97.9 F | DIASTOLIC BLOOD PRESSURE: 89 MMHG | OXYGEN SATURATION: 98 % | HEART RATE: 71 BPM | SYSTOLIC BLOOD PRESSURE: 173 MMHG

## 2020-08-16 PROBLEM — R07.89 CHEST PAIN, ATYPICAL: Status: RESOLVED | Noted: 2020-08-14 | Resolved: 2020-08-16

## 2020-08-16 LAB
ALBUMIN SERPL-MCNC: 3.39 G/DL (ref 3.5–5.2)
ALBUMIN/GLOB SERPL: 1.4 G/DL
ALP SERPL-CCNC: 75 U/L (ref 39–117)
ALT SERPL W P-5'-P-CCNC: 8 U/L (ref 1–33)
ANION GAP SERPL CALCULATED.3IONS-SCNC: 9.2 MMOL/L (ref 5–15)
AST SERPL-CCNC: 14 U/L (ref 1–32)
BASOPHILS # BLD AUTO: 0.02 10*3/MM3 (ref 0–0.2)
BASOPHILS NFR BLD AUTO: 0.3 % (ref 0–1.5)
BILIRUB SERPL-MCNC: 0.4 MG/DL (ref 0–1.2)
BUN SERPL-MCNC: 13 MG/DL (ref 6–20)
BUN/CREAT SERPL: 16.3 (ref 7–25)
CALCIUM SPEC-SCNC: 9 MG/DL (ref 8.6–10.5)
CHLORIDE SERPL-SCNC: 108 MMOL/L (ref 98–107)
CO2 SERPL-SCNC: 17.8 MMOL/L (ref 22–29)
CREAT SERPL-MCNC: 0.8 MG/DL (ref 0.57–1)
CRP SERPL-MCNC: 0.26 MG/DL (ref 0–0.5)
D-LACTATE SERPL-SCNC: 0.5 MMOL/L (ref 0.5–2)
DEPRECATED RDW RBC AUTO: 43.5 FL (ref 37–54)
EOSINOPHIL # BLD AUTO: 0.12 10*3/MM3 (ref 0–0.4)
EOSINOPHIL NFR BLD AUTO: 1.6 % (ref 0.3–6.2)
ERYTHROCYTE [DISTWIDTH] IN BLOOD BY AUTOMATED COUNT: 12.7 % (ref 12.3–15.4)
GFR SERPL CREATININE-BSD FRML MDRD: 76 ML/MIN/1.73
GLOBULIN UR ELPH-MCNC: 2.5 GM/DL
GLUCOSE BLDC GLUCOMTR-MCNC: 133 MG/DL (ref 70–130)
GLUCOSE BLDC GLUCOMTR-MCNC: 134 MG/DL (ref 70–130)
GLUCOSE BLDC GLUCOMTR-MCNC: 148 MG/DL (ref 70–130)
GLUCOSE SERPL-MCNC: 144 MG/DL (ref 65–99)
HCT VFR BLD AUTO: 32.1 % (ref 34–46.6)
HGB BLD-MCNC: 10 G/DL (ref 12–15.9)
IMM GRANULOCYTES # BLD AUTO: 0.03 10*3/MM3 (ref 0–0.05)
IMM GRANULOCYTES NFR BLD AUTO: 0.4 % (ref 0–0.5)
LIPASE SERPL-CCNC: 33 U/L (ref 13–60)
LYMPHOCYTES # BLD AUTO: 1.79 10*3/MM3 (ref 0.7–3.1)
LYMPHOCYTES NFR BLD AUTO: 23.9 % (ref 19.6–45.3)
MCH RBC QN AUTO: 29.1 PG (ref 26.6–33)
MCHC RBC AUTO-ENTMCNC: 31.2 G/DL (ref 31.5–35.7)
MCV RBC AUTO: 93.3 FL (ref 79–97)
MONOCYTES # BLD AUTO: 0.43 10*3/MM3 (ref 0.1–0.9)
MONOCYTES NFR BLD AUTO: 5.7 % (ref 5–12)
NEUTROPHILS NFR BLD AUTO: 5.09 10*3/MM3 (ref 1.7–7)
NEUTROPHILS NFR BLD AUTO: 68.1 % (ref 42.7–76)
NRBC BLD AUTO-RTO: 0 /100 WBC (ref 0–0.2)
PLATELET # BLD AUTO: 166 10*3/MM3 (ref 140–450)
PMV BLD AUTO: 9.9 FL (ref 6–12)
POTASSIUM SERPL-SCNC: 4.2 MMOL/L (ref 3.5–5.2)
PROT SERPL-MCNC: 5.9 G/DL (ref 6–8.5)
RBC # BLD AUTO: 3.44 10*6/MM3 (ref 3.77–5.28)
SODIUM SERPL-SCNC: 135 MMOL/L (ref 136–145)
WBC # BLD AUTO: 7.48 10*3/MM3 (ref 3.4–10.8)

## 2020-08-16 PROCEDURE — 74178 CT ABD&PLV WO CNTR FLWD CNTR: CPT

## 2020-08-16 PROCEDURE — 86140 C-REACTIVE PROTEIN: CPT | Performed by: INTERNAL MEDICINE

## 2020-08-16 PROCEDURE — 85025 COMPLETE CBC W/AUTO DIFF WBC: CPT | Performed by: INTERNAL MEDICINE

## 2020-08-16 PROCEDURE — G0378 HOSPITAL OBSERVATION PER HR: HCPCS

## 2020-08-16 PROCEDURE — 94799 UNLISTED PULMONARY SVC/PX: CPT

## 2020-08-16 PROCEDURE — 80053 COMPREHEN METABOLIC PANEL: CPT | Performed by: INTERNAL MEDICINE

## 2020-08-16 PROCEDURE — 96376 TX/PRO/DX INJ SAME DRUG ADON: CPT

## 2020-08-16 PROCEDURE — 82962 GLUCOSE BLOOD TEST: CPT

## 2020-08-16 PROCEDURE — 83690 ASSAY OF LIPASE: CPT | Performed by: INTERNAL MEDICINE

## 2020-08-16 PROCEDURE — 25010000002 ONDANSETRON PER 1 MG: Performed by: INTERNAL MEDICINE

## 2020-08-16 PROCEDURE — 83605 ASSAY OF LACTIC ACID: CPT | Performed by: INTERNAL MEDICINE

## 2020-08-16 PROCEDURE — 74178 CT ABD&PLV WO CNTR FLWD CNTR: CPT | Performed by: RADIOLOGY

## 2020-08-16 PROCEDURE — 99217 PR OBSERVATION CARE DISCHARGE MANAGEMENT: CPT | Performed by: INTERNAL MEDICINE

## 2020-08-16 PROCEDURE — 0 IOVERSOL 68 % SOLUTION: Performed by: INTERNAL MEDICINE

## 2020-08-16 PROCEDURE — 96361 HYDRATE IV INFUSION ADD-ON: CPT

## 2020-08-16 RX ADMIN — SODIUM CHLORIDE 75 ML/HR: 9 INJECTION, SOLUTION INTRAVENOUS at 09:33

## 2020-08-16 RX ADMIN — IOVERSOL 100 ML: 678 INJECTION INTRA-ARTERIAL; INTRAVENOUS at 15:45

## 2020-08-16 RX ADMIN — ONDANSETRON 4 MG: 2 INJECTION INTRAMUSCULAR; INTRAVENOUS at 13:56

## 2020-08-16 NOTE — DISCHARGE SUMMARY
[facility] Medicine Services  DISCHARGE SUMMARY    Patient Identification:  Name:  Tammi Levine  Age:  50 y.o.  Sex:  female  :  1970  MRN:  [id number]  Visit Number:  [id number]    Date of Admission: 2020  Date of Discharge:  2020    PCP: Diane Rios      Admission/Discharge Diagnoses     Discharge Diagnoses:  · Chest pain, atypical, resolved  · Abdominal pain, improved  · Nausea and vomiting, improved  · Non-anion gap metabolic acidosis due to nausea and vomiting, improved  · Diabetes mellitus    Consults/Procedures     Consults:   Consults     No orders found from 2020 to 8/15/2020.          Procedures Performed:         History of Presenting Illness   Patient is a 50 y.o. female presented to [facility] complaining of abdominal pain.  Please see the admitting history and physical for further details.    Hospital Course   Tammi Levine is a 50 y.o. female with PMH significant for  tinnitus, hyperlipidemia, hypertension presented the emergency department complaints of chest pain abdominal pain.    Patient presented to the emergency department with chest pain with atypical features.  She was ruled for acute MI with negative cardiac enzymes.  Echocardiogram showed normal LV systolic function,, EF of 61 to 65%, borderline dilation of LA cavity and mild AVS.  Patient had a stress test done on 1941.  Hopeful normal myocardial perfusion and therefore repeat stress test was not needed.  Patient had resolution of her chest pain.  She was continued on aspirin and statin.  Patient was complaining of abdominal pain but etiology was unclear.  CT scan of the abdomen showed no acute abnormality.  Lipase and lactate were within normal limits.  Patient has had EGD done few weeks ago at [facility]] with requested.  Repeat CT scan with contrast was done due to persistent abdominal pain.  Repeat CT scan showed no acute.  Level of bowel  obstruction, no segment of bowel wall thickening, stable benign-appearing left adrenal adenoma and no other acute abnormality.  Patient had nausea and vomiting initially but later was able to tolerate p.o. diet no CT continued to have nausea.  I discussed with the patient the need for repeat GI evaluation and she stated that she has appointment with oncologist and would like to follow-up with previously scheduled gastroenterology.  He was chest pain-free and tolerate p.o. diet.  She is felt to have achieved maximum placement we did start repletion        Discharge Vitals/Physical Examination     Vital Signs:  Temp:  [98.1 °F (36.7 °C)-98.6 °F (37 °C)] 98.5 °F (36.9 °C)  Heart Rate:  [60-77] 70  Resp:  [18] 18  BP: (153-164)/(61-87) 156/74  Mean Arterial Pressure (Non-Invasive) for the past 24 hrs (Last 3 readings):   Noninvasive MAP (mmHg)   08/16/20 1453 125   08/16/20 1031 103   08/16/20 0610 105     SpO2 Percentage    08/16/20 1031 08/16/20 1406 08/16/20 1453   SpO2: 99% 99% 98%     SpO2:  [98 %-99 %] 98 %  on   ;   Device (Oxygen Therapy): room air    Body mass index is 31.83 kg/m².  Wt Readings from Last 3 Encounters:   08/16/20 71.5 kg (157 lb 9.6 oz)   07/21/20 73.3 kg (161 lb 9.6 oz)   03/30/20 71.7 kg (158 lb)         Physical Exam:  GEN: Well-developed and well-nourished.     EYES: Pupils are equal, round, and reactive to light.   HENT: Neck supple.  No JVD present.   CV: Regular rate and rhythm. S1+S2. No murmur, rubs or gallops.   PULM: Lungs are clear to auscultation bilaterally.  GI: Abdomen is soft and nontender, no viscera palpable and bowel sounds audible.  CNS: Alert and oriented to place, person and time.  Cranial nerves grossly intact.  Strength bilaterally symmetrical in upper and lower extremities.  SKIN: Skin is warm and dry.  No rash noted.  No pallor.  MSK: No deformity or joint swelling.      Pertinent Laboratory/Radiology Results     Pertinent Laboratory Results:  Results from last 7 days     Lab Units 08/14/20  1846 08/14/20  1751 08/14/20  1126   CK TOTAL U/L  --  124  --    CKMB ng/mL  --  13.96*  --    TROPONIN T ng/mL 0.022 0.014 0.013     Results from last 7 days   Lab Units 08/14/20  1126   PROBNP pg/mL 353.0     Results from last 7 days   Lab Units 08/14/20  1751   CHOLESTEROL mg/dL 136   TRIGLYCERIDES mg/dL 106   HDL CHOL mg/dL 52   LDL CHOL mg/dL 63       Results from last 7 days   Lab Units 08/16/20  0237 08/16/20  0200 08/15/20  1146 08/14/20  1126   CRP mg/dL  --  0.26  --  0.41   LACTATE mmol/L  --  0.5  --  0.8   WBC 10*3/mm3 7.48  --  8.45 10.21   HEMOGLOBIN g/dL 10.0*  --  10.3* 11.4*   HEMATOCRIT % 32.1*  --  33.5* 34.5   MCV fL 93.3  --  93.1 88.5   MCHC g/dL 31.2*  --  30.7* 33.0   PLATELETS 10*3/mm3 166  --  163 208     Results from last 7 days   Lab Units 08/16/20  0200 08/15/20  1146 08/14/20  1126   SODIUM mmol/L 135* 133* 137   POTASSIUM mmol/L 4.2 4.8 4.8   MAGNESIUM mg/dL  --   --  2.4   CHLORIDE mmol/L 108* 107 107   CO2 mmol/L 17.8* 16.9* 19.8*   BUN mg/dL 13 16 20   CREATININE mg/dL 0.80 0.81 1.09*   EGFR IF NONAFRICN AM mL/min/1.73 76 75 53*   CALCIUM mg/dL 9.0 9.0 9.6   GLUCOSE mg/dL 144* 189* 228*   ALBUMIN g/dL 3.39* 3.48* 4.13   BILIRUBIN mg/dL 0.4 0.3 0.3   ALK PHOS U/L 75 81 96   AST (SGOT) U/L 14 14 12   ALT (SGPT) U/L 8 10 10   Estimated Creatinine Clearance: 76 mL/min (by C-G formula based on SCr of 0.8 mg/dL).  No results found for: AMMONIA    Hemoglobin A1C   Date/Time Value Ref Range Status   08/14/2020 1126 8.00 (H) 4.80 - 5.60 % Final     Glucose   Date/Time Value Ref Range Status   08/16/2020 1611 134 (H) 70 - 130 mg/dL Final   08/16/2020 1033 133 (H) 70 - 130 mg/dL Final   08/16/2020 0618 148 (H) 70 - 130 mg/dL Final   08/15/2020 1848 183 (H) 70 - 130 mg/dL Final   08/15/2020 1608 126 70 - 130 mg/dL Final   08/15/2020 1104 197 (H) 70 - 130 mg/dL Final   08/15/2020 0707 147 (H) 70 - 130 mg/dL Final   08/14/2020 1847 192 (H) 70 - 130 mg/dL Final     Lab  Results   Component Value Date    HGBA1C 8.00 (H) 08/14/2020     Lab Results   Component Value Date    TSH 1.290 08/14/2020    FREET4 1.11 01/24/2019       Blood Culture   Date Value Ref Range Status   08/14/2020 No growth at 2 days  Preliminary   08/14/2020 No growth at 2 days  Preliminary     No results found for: URINECX  No results found for: WOUNDCX  No results found for: STOOLCX  No results found for: RESPCX  Microbiology Results (last 10 days)     Procedure Component Value - Date/Time    COVID-19, ABBOTT IN-HOUSE,NP Swab (NO TRANSPORT MEDIA) 2 HR TAT - Swab, Nasopharynx [[id number]]  (Normal) Collected:  08/14/20 1229    Lab Status:  Final result Specimen:  Swab from Nasopharynx Updated:  08/14/20 1317     COVID19 Not Detected    Narrative:       Fact sheet for providers: https://www.fda.gov/media/917981/download     Fact sheet for patients: https://www.fda.gov/media/792059/download    Blood Culture - Blood, Arm, Left [[id number]] Collected:  08/14/20 1126    Lab Status:  Preliminary result Specimen:  Blood from Arm, Left Updated:  08/16/20 1145     Blood Culture No growth at 2 days    Blood Culture - Blood, Arm, Left [[id number]] Collected:  08/14/20 1126    Lab Status:  Preliminary result Specimen:  Blood from Arm, Left Updated:  08/16/20 1145     Blood Culture No growth at 2 days        Pain Management Panel     Pain Management Panel Latest Ref Rng & Units 9/5/2019 6/5/2017    CREATININE UR mg/dL 49.7 155.7    AMPHETAMINES SCREEN, URINE NEGATIVE - -    BARBITURATES SCREEN NEGATIVE - -    BENZODIAZEPINE SCREEN, URINE NEGATIVE - -    COCAINE SCREEN, URINE NEGATIVE - -    METHADONE SCREEN, URINE NEGATIVE - -          Pertinent Radiology Results:  Imaging Results (All)     Procedure Component Value Units Date/Time    CT Abdomen Pelvis With & Without Contrast [[id number]] Collected:  08/16/20 1520     Updated:  08/16/20 1525    Narrative:       EXAM: CT ABDOMEN PELVIS W WO CONTRAST-            TECHNIQUE: Multiple  axial CT images were obtained from lung bases  through pubic symphysis with and without administration of IV contrast.  Reformatted images in the coronal and/or sagittal plane(s) were  generated from the axial data set to facilitate diagnostic accuracy  and/or surgical planning.     Radiation dose reduction techniques were utilized per ALARA protocol.  Automated exposure control was initiated through either or Dashbell or  DoseRight software packages by  protocol.       DOSE: 1038.70 mGy.cm     CLINICAL INFORMATION: Abd pain, gastroenteritis or colitis suspected;  R07.89-Other chest pain; R10.9-Unspecified abdominal pain      COMPARISON: Comparison: 08/14/2020     FINDINGS:     Lower thorax: Clear. No effusions.     Abdomen:     Liver: Homogeneous. No focal hepatic mass or ductal dilatation.  Gallbladder: Cholecystectomy.  Pancreas: Unremarkable. No mass or ductal dilatation.  Spleen: Homogeneous. No splenomegaly.  Adrenals: Lipid Rich left adrenal adenoma is again noted, benign in  appearance.  Kidneys/ureters: No renal or ureteral stones or hydronephrosis.  GI tract: Small bowel within normal limits demonstrating no dilatation  or abnormal segments of wall thickening.  Peritoneum: Trace free fluid lower pelvis.  Mesentery: Unremarkable.  Lymph nodes: No lymphadenopathy.  Vasculature: No evidence of aneurysm.  Abdominal wall: No focal hernia or mass.     Other: None.     Pelvis:     Bladder: No focal mass or significant wall thickening  Reproductive: Hysterectomy.  Appendix: The appendix is unremarkable.     Bones: Stable.       Impression:       1. No air-fluid levels or bowel obstruction identified. No segments of  wall thickening are noted.  2. Stable benign-appearing left adrenal adenoma.  3. Small free fluid lower pelvis. Possibly physiologic depending on  patient's menopausal status. Otherwise this can be seen secondary to  nonspecific perineal inflammation.  4. Otherwise stable and unremarkable  exam.     This report was finalized on 8/16/2020 3:23 PM by Dr. Milo Suarez MD.       CT Abdomen Pelvis Without Contrast [[id number]] Collected:  08/14/20 1421     Updated:  08/14/20 1447    Narrative:       EXAM: CT ABDOMEN PELVIS WO CONTRAST-            TECHNIQUE: Multiple axial CT images were obtained from lung bases  through pubic symphysis WITHOUT administration of IV contrast.  Reformatted images in the coronal and/or sagittal plane(s) were  generated from the axial data set to facilitate diagnostic accuracy  and/or surgical planning.  Oral Contrast:NONE.     Radiation dose reduction techniques were utilized per ALARA protocol.  Automated exposure control was initiated through either or MostLikely or  DoseRight software packages by  protocol.       DOSE:     Clinical information  abd pain      Comparison  Comparison: 03/29/2019     FINDINGS:     Lower thorax: Clear. No effusions.     Abdomen:     Liver: Homogeneous. No focal hepatic mass or ductal dilatation.  Gallbladder: Cholecystectomy.  Pancreas: Unremarkable. No mass or ductal dilatation.  Spleen: Homogeneous. No splenomegaly.  Adrenals: Stable lipid rich benign left adrenal adenoma, 2.3 cm.  Kidneys/ureters: No mass. No obstructive uropathy.  No evidence of  urolithiasis.  GI tract: Colon is decompressed which likely accounts for apparent wall  thickening. Small bowel is within normal limits. No bowel obstruction  identified.  Peritoneum: No free air. No free fluid or loculated fluid collections.  Mesentery: Unremarkable.  Lymph nodes: No lymphadenopathy.  Vasculature: No evidence of aneurysm.  Abdominal wall: No focal hernia or mass.     Other: None.     Pelvis:     Bladder: No focal mass or significant wall thickening  Reproductive: Prior hysterectomy.  Appendix: The appendix is unremarkable.     Bones: Advanced degenerative changes of the lower lumbar spine with  multilevel stenosis. No acute bony findings.       Impression:       No acute  findings. Other nonacute and incidental findings detailed above  stable from previous exam.     This report was finalized on 8/14/2020 2:23 PM by Dr. Milo Suarez MD.             Test Results Pending at Discharge:   Order Current Status    H. Pylori Antibody, IgA In process    Blood Culture - Blood, Arm, Left Preliminary result    Blood Culture - Blood, Arm, Left Preliminary result          Discharge Disposition/Discharge Medications/Discharge Appointments     Discharge Disposition:   Home or Self Care    Condition at Discharge:  Stable     DME Prescribed at Discharge:      Discharge Diet:  Diet Instructions     Advance Diet As Tolerated      Diet: Full Liquid; Thin Liquids, No Restrictions      Discharge Diet:  Full Liquid    Fluid Consistency:  Thin Liquids, No Restrictions          Discharge Activity:  Activity Instructions     Activity as Tolerated            Code Status While Inpatient:  Code Status and Medical Interventions:   Ordered at: 08/14/20 1629     Code Status:    CPR     Medical Interventions (Level of Support Prior to Arrest):    Full       Discharge Medications:     Discharge Medications      Continue These Medications      Instructions Start Date   aspirin 81 MG EC tablet   81 mg, Oral, Every Evening      atorvastatin 80 MG tablet  Commonly known as:  LIPITOR   80 mg, Oral, Nightly      insulin aspart prot-insulin aspart (70-30) 100 UNIT/ML injection  Commonly known as:  novoLOG 70/30   20 Units, Subcutaneous, 2 Times Daily With Meals      Invokana 100 MG tablet  Generic drug:  Canagliflozin   100 mg, Oral, Every Evening      levothyroxine 50 MCG tablet  Commonly known as:  SYNTHROID, LEVOTHROID   50 mcg, Oral, Every Evening      lithium carbonate 300 MG capsule   300 mg, Oral, Every Evening      pantoprazole 40 MG EC tablet  Commonly known as:  PROTONIX   40 mg, Oral, Daily         Stop These Medications    lisinopril 10 MG tablet  Commonly known as:  PRINIVIL,ZESTRIL            Discharge  Appointments:  Your Scheduled Appointments    Sep 08, 2020 12:30 PM EDT  Psychotherapy with [facility] (--) [address] [address]  [phone]      Oct 06, 2020 12:30 PM EDT  Psychotherapy with [facility] (--) [address] [address]  [phone]      Nov 03, 2020 12:30 PM EST  Psychotherapy with [facility] (--) [address] [address]  [phone]      Jan 21, 2021 11:15 AM EST  Follow Up with MARISELA Tomlinson  [facility] CARDIOLOGY (--) [address]  [facility] [address] [phone]   Arrive 15 minutes prior to appointment.            Additional Instructions for the Follow-ups that You Need to Schedule     Discharge Follow-up with PCP   As directed       Currently Documented PCP:    Diane Rios    PCP Phone Number:    [phone]     Follow Up Details:  1 week         Discharge Follow-up with Specified Provider: Gastroenterology; 1 Week   As directed      To:  Gastroenterology    Follow Up:  1 Week    Follow Up Details:  Pt says that she has a GI that she sees.                   Melissa Pardo MD  Hospitalist Service -- [facility]       08/16/20  18:45    Discharge Time:   Please note that this discharge summary required more than 30 minutes to complete.    Please send a copy of this dictation to the following providers:    Diane Rios

## 2020-08-16 NOTE — PLAN OF CARE
Pt has had no episodes of n/v. Pt also has denied chest pain. IV fluids initiated per order. Pt has rested comfortably w/ no complaints.

## 2020-08-16 NOTE — PLAN OF CARE
Pt. Is still having nausea. Did give zofran.CT of the abd. and pelvis with and without done today. We will con't to monitor and follow plan of care.

## 2020-08-17 ENCOUNTER — TELEPHONE (OUTPATIENT)
Dept: TELEMETRY | Facility: HOSPITAL | Age: 50
End: 2020-08-17

## 2020-08-17 LAB — H PYLORI IGA SER IA-ACNC: <9 UNITS (ref 0–8.9)

## 2020-08-17 NOTE — NURSING NOTE
Pt awake, alert, oriented x 4, in no acute distress. Pt to be discharged. States her mother is on her way to get her. IV & tele monitor removed.

## 2020-08-17 NOTE — TELEPHONE ENCOUNTER
Kathy Cadena of the following appts:     Genny at Dr. Serrano office on Thursday, 8/20 @ 1030am    Diane Rios on Friday, 8/21 @ 1300pm

## 2020-08-19 LAB
BACTERIA SPEC AEROBE CULT: NORMAL
BACTERIA SPEC AEROBE CULT: NORMAL

## 2020-08-19 RX ORDER — LITHIUM CARBONATE 300 MG/1
300 CAPSULE ORAL EVERY EVENING
Qty: 30 CAPSULE | Refills: 2 | Status: SHIPPED | OUTPATIENT
Start: 2020-08-19 | End: 2020-10-19 | Stop reason: SDUPTHER

## 2020-09-08 ENCOUNTER — OFFICE VISIT (OUTPATIENT)
Dept: PSYCHIATRY | Facility: CLINIC | Age: 50
End: 2020-09-08

## 2020-09-08 DIAGNOSIS — F31.81 BIPOLAR II DISORDER (HCC): Primary | ICD-10-CM

## 2020-09-08 DIAGNOSIS — F43.23 ADJUSTMENT DISORDER WITH MIXED ANXIETY AND DEPRESSED MOOD: ICD-10-CM

## 2020-09-08 PROCEDURE — 90834 PSYTX W PT 45 MINUTES: CPT | Performed by: SOCIAL WORKER

## 2020-09-08 NOTE — PROGRESS NOTES
"Date of Service: September 8, 2020  Time In: 12:20 PM  Time Out: 1:00 PM    PROGRESS NOTE  Data:  Tammi Levine is a 50 y.o. female who met 1:1 with the undersigned for a regularly scheduled individual outpatient therapy session at [facility] for follow-up of bipolar and adjustment disorder due to difficulty of adjusting to COVID-19 pandemic and resulting stressors.  Patient and the undersigned wore masks throughout the session and maintained appropriate distancing.     HPI: Patient reports she continues to feel a great deal of stress and a sense of uncertainty concerning the COVID-19 pandemic and states she even becomes irritable and angry at others who she perceives as not taking appropriate precautions and not taking the situation seriously.   Patient states \"nobody is wearing a mask\".  Patient states she plans to continue to primarily stay home and reports she is planning to get her flu shot as soon as possible.  The patient reports she continues to struggle with periods of depressed mood, anhedonia, anergia, and feeling hopeless.  Patient rates current symptoms at a 3 on a scale of 1-10 with 10 being most severe.  She denies any significant symptoms of hypomania other than having periods where she feels somewhat irritable and has a tendency to lash out at others.  Patient reports she is sleeping relatively well at this time.  The patient adamantly and convincingly denies suicidal ideation vehemently denies any substance use.      Clinical Maneuvering/Intervention:  Assisted patient in processing above session content; acknowledged and normalized patient’s thoughts, feelings, and concerns.  Allow the patient to discuss/process her feelings and fears concerning COVID-19 pandemic and encouraged her to continue to take all appropriate precautions.  Also utilized motivational interviewing techniques including complex reflections to discussed concept of things we can control daily cannot " control and encourage patient to remind herself it is not her job to convince others of the seriousness of the situation.  Continue to utilize cognitive behavioral therapy to assist patient in developing appropriate coping mechanisms to decrease the severity frequency of symptoms.  Continue to focus on healthy skills of daily living and behavioral activation.    Allowed patient to freely discuss issues without interruption or judgment. Provided safe, confidential environment to facilitate the development of positive therapeutic relationship and encourage open, honest communication. Assisted patient in identifying risk factors which would indicate the need for higher level of care including thoughts to harm self or others and/or self-harming behavior and encouraged patient to contact this office, call 911, or present to the nearest emergency room should any of these events occur. Discussed crisis intervention services and means to access.  Patient adamantly and convincingly denies current suicidal or homicidal ideation or perceptual disturbance.      Assessment    Patient appears to maintain relative stability as compared to her baseline.  However, she is struggling with increased stress and a sense of uncertainty due to current COVID-19 pandemic.  The patient's symptomology continue to cause impairment in important areas of functioning.  Patient can be reasonably expected to continue to benefit treatment and would likely be at increased risk for decompensation.    Diagnoses and all orders for this visit:    Bipolar II disorder (CMS/[facility])    Adjustment disorder with mixed anxiety and depressed mood               Mental Status Exam  Hygiene: Difficult to assess due to video visit  Dress: Casual  Attitude:  Cooperative  Motor Activity: Appropriate  Speech:  Normal  Mood: Within normal limits  Affect: Full range  Thought Processes:  Linear  Thought Content:  normal  Suicidal Thoughts:  denies  Homicidal Thoughts:   denies  Crisis Safety Plan: yes, to come to the emergency room.  Hallucinations:  denies    Patient's Support Network Includes:  mother and extended family    Progress toward goal: Not at goal    Functional Status: Mild impairment     Prognosis: Fair with Ongoing Treatment     Plan         Patient will continue in individual outpatient therapy session [facility] every 4 weeks and will continue and pharmacotherapy as scheduled with MARISELA Dixon.  Patient will adhere to medication regimen as prescribed and report any side effects. Patient will contact this office, call 911 or present to the nearest emergency room should suicidal or homicidal ideations occur. Provide Cognitive Behavioral Therapy and Integrative Therapy to improve functioning, maintain stability, and avoid decompensation and the need for higher level of care.          Return in about 4 weeks (around 10/6/2020) for Next scheduled follow up.      This document signed by Vidal Ortiz LCSW, [facility] September 8, 2020 13:46

## 2020-10-19 ENCOUNTER — OFFICE VISIT (OUTPATIENT)
Dept: PSYCHIATRY | Facility: CLINIC | Age: 50
End: 2020-10-19

## 2020-10-19 VITALS
SYSTOLIC BLOOD PRESSURE: 113 MMHG | HEIGHT: 59 IN | TEMPERATURE: 97.5 F | WEIGHT: 156.2 LBS | DIASTOLIC BLOOD PRESSURE: 66 MMHG | HEART RATE: 78 BPM | BODY MASS INDEX: 31.49 KG/M2

## 2020-10-19 DIAGNOSIS — F31.81 BIPOLAR II DISORDER (HCC): Primary | ICD-10-CM

## 2020-10-19 PROCEDURE — 99213 OFFICE O/P EST LOW 20 MIN: CPT | Performed by: NURSE PRACTITIONER

## 2020-10-19 RX ORDER — LORATADINE 10 MG/1
TABLET ORAL EVERY 24 HOURS
COMMUNITY
End: 2021-04-13 | Stop reason: ALTCHOICE

## 2020-10-19 RX ORDER — CITALOPRAM 10 MG/1
10 TABLET ORAL DAILY
Qty: 30 TABLET | Refills: 2 | Status: SHIPPED | OUTPATIENT
Start: 2020-10-19 | End: 2021-03-04 | Stop reason: ALTCHOICE

## 2020-10-19 RX ORDER — LITHIUM CARBONATE 300 MG/1
300 CAPSULE ORAL EVERY EVENING
Qty: 30 CAPSULE | Refills: 2 | Status: SHIPPED | OUTPATIENT
Start: 2020-10-19 | End: 2021-01-19 | Stop reason: SDUPTHER

## 2020-10-19 RX ORDER — ONDANSETRON 4 MG/1
TABLET, FILM COATED ORAL
COMMUNITY
Start: 2020-04-23 | End: 2021-01-19

## 2020-10-19 NOTE — PROGRESS NOTES
"  Subjective   Tammi Levine is a 50 y.o. female is here today for medication management follow-up at the [facility] after being transferred from [facility] for continuation of treatment for bipolar disorder.  This is the first encounter with the patient.    Chief Complaint: Bipolar Disorder    History of Present Illness  She states that she is \"awake\" today.  She states that she is doing well with the lithium and feels like it is working.  She states that she continues to have issues with her mother, she has been seeing her therapist and this has helped with the abandonment issues.  She states that she likes the apartment and they are getting ready to remodel the complex and she will be staying at a local hotel, she states that she has a lot of friends and feels comfortable with them. She states that she is taking her medications and denies any SE.  She states that she would rate her mood about 2/10 with 10 being the worse. She states that she is getting about 8 hours of sleep per night with CPAP machine, denies any NM.  She states that she is eating good with stable weight.  She states that she was able to get new dentures and has been monitoring her intake, she has issues with drinking too much \"pop\".  Denies any other stressors.  Denies any new health issues- she has been taking precautions to avoid the COVID.  Denies any AV hallucinations, denies any SI/HI.  Labs reviewed with Li level at 0.6 when last hospitalized in August for stomach issues.      The following portions of the patient's history were reviewed and updated as appropriate: allergies, current medications, past family history, past medical history, past social history, past surgical history and problem list.    Review of Systems   Constitutional: Negative for appetite change, chills, diaphoresis, fatigue, fever and unexpected weight change.   HENT: Negative for hearing loss, sore throat, trouble swallowing and voice change.    Eyes: Negative " "for photophobia and visual disturbance.   Respiratory: Negative for cough, chest tightness and shortness of breath.    Cardiovascular: Negative for chest pain and palpitations.   Gastrointestinal: Negative for abdominal pain, constipation, nausea and vomiting.   Endocrine: Negative for cold intolerance and heat intolerance.   Genitourinary: Negative for dysuria and frequency.   Musculoskeletal: Negative for arthralgias, back pain, joint swelling and neck stiffness.        Uses her cane for mobility   Skin: Negative for color change and wound.   Allergic/Immunologic: Negative for environmental allergies and immunocompromised state.   Neurological: Negative for dizziness, tremors, seizures, syncope, weakness, light-headedness and headaches.   Hematological: Negative for adenopathy. Does not bruise/bleed easily.       Objective   Physical Exam   Constitutional: She appears well-developed. No distress.   Neurological: She is alert. Coordination and gait normal.   Vitals reviewed.    Blood pressure 113/66, pulse 78, temperature 97.5 °F (36.4 °C), height 149.9 cm (59.02\"), weight 70.9 kg (156 lb 3.2 oz).  Body mass index is 31.53 kg/m².    Medication List:   Current Outpatient Medications   Medication Sig Dispense Refill   • aspirin 81 MG EC tablet Take 81 mg by mouth Every Evening.     • atorvastatin (LIPITOR) 80 MG tablet Take 1 tablet by mouth Every Night. 30 tablet 3   • Canagliflozin (INVOKANA) 100 MG tablet Take 100 mg by mouth Every Evening.     • insulin aspart (NovoLOG) 100 UNIT/ML injection Novolog 75/25, take 20u in AM and 20u in PM     • insulin aspart prot-insulin aspart (novoLOG 70/30) (70-30) 100 UNIT/ML injection Inject 20 Units under the skin into the appropriate area as directed 2 (Two) Times a Day With Meals.     • levothyroxine (SYNTHROID, LEVOTHROID) 50 MCG tablet Take 50 mcg by mouth Every Evening.     • lithium carbonate 300 MG capsule Take 1 capsule by mouth Every Evening. 30 capsule 2   • " loratadine (Claritin) 10 MG tablet Daily.     • Omeprazole (PRILOSEC PO)      • ondansetron (ZOFRAN) 4 MG tablet 1 tablet     • pantoprazole (PROTONIX) 40 MG EC tablet Take 40 mg by mouth Daily.     • citalopram (CeleXA) 10 MG tablet Take 1 tablet by mouth Daily. 30 tablet 2     No current facility-administered medications for this visit.        Mental Status Exam:   Hygiene:   good  Cooperation:  Cooperative  Eye Contact:  Fair  Psychomotor Behavior:  Appropriate  Affect:  Appropriate  Hopelessness: Denies  Speech:  Normal  Thought Process:  Goal directed and Linear  Thought Content:  Mood congruent  Suicidal:  None  Homicidal:  None  Hallucinations:  None  Delusion:  None  Memory:  Intact  Orientation:  Person, Place, Time and Situation  Reliability:  fair  Insight:  Fair  Judgement:  Fair  Impulse Control:  Fair  Physical/Medical Issues:  No     Assessment/Plan   Problems Addressed this Visit     None      Visit Diagnoses     Bipolar II disorder (CMS/HCC)    -  Primary    Relevant Medications    lithium carbonate 300 MG capsule    citalopram (CeleXA) 10 MG tablet      Diagnoses       Codes Comments    Bipolar II disorder (CMS/HCC)    -  Primary ICD-10-CM: F31.81  ICD-9-CM: 296.89         Discussed medication options. Continue the lithium for mood and continue celexa for anxiety and depression.   Reviewed the risks, benefits, and side effects of the medications; patient acknowledged and verbally consented.  Patient is agreeable to call the [facility] with any worsening of symptoms.  Patient is aware to call 911 or go to the nearest ER should begin having SI/HI.     Prognosis: Guarded dependent on medication, follow up appointment and treatment plan compliance     Functionality: Fair.  Symptoms are mostly under control with periodic episodes of increased anxiety.    Return in 12 weeks

## 2020-11-03 ENCOUNTER — OFFICE VISIT (OUTPATIENT)
Dept: PSYCHIATRY | Facility: CLINIC | Age: 50
End: 2020-11-03

## 2020-11-03 DIAGNOSIS — Z63.9 FAMILY DYNAMICS PROBLEM: ICD-10-CM

## 2020-11-03 DIAGNOSIS — F31.81 BIPOLAR II DISORDER (HCC): Primary | ICD-10-CM

## 2020-11-03 DIAGNOSIS — F43.23 ADJUSTMENT DISORDER WITH MIXED ANXIETY AND DEPRESSED MOOD: ICD-10-CM

## 2020-11-03 PROCEDURE — 90834 PSYTX W PT 45 MINUTES: CPT | Performed by: SOCIAL WORKER

## 2020-11-03 SDOH — SOCIAL STABILITY - SOCIAL INSECURITY: PROBLEM RELATED TO PRIMARY SUPPORT GROUP, UNSPECIFIED: Z63.9

## 2020-11-03 NOTE — PROGRESS NOTES
"Date of Service: November 3, 2020  Time In: 12:20 PM  Time Out: 1:10 PM    PROGRESS NOTE  Data:  Tammi Levine is a 50 y.o. female who met 1:1 with the undersigned for a regularly scheduled individual outpatient therapy session at [facility] for follow-up of bipolar and adjustment disorder due to difficulty of adjusting to COVID-19 pandemic and resulting stressors.  Patient and the undersigned wore masks throughout the session and maintained appropriate distancing.     HPI: Patient states she has a distinct sense of anxiety and uncertainty concerning the current election and \"how people are acting\".  Patient also reports she recently discovered the reason she did not receive her stimulus check was that her mother has claimed her as a dependent and received her stimulus.  The patient states she has not discussed this with her mother and states she simply plans to \"write it off\".  Patient does states she continues to struggle with her relationship with her mother and continues to feel as though she has chosen her new boyfriend over the rest of her family.  Patient also discusses what she feels to be her mother's significant change to being very politically extreme after meeting her boyfriend.  Patient states her mother is currently wearing a \"drop\" hat which she would have never done before.  Patient also states when she request her mother come and see her the responses \"Joao says you should come and see me since I am older\".  Patient states she continues to have very little contact with her mother and states she goes as much as 2 weeks without hearing from her.  Patient reports she continues to feel a great deal of stress and a sense of uncertainty concerning the COVID-19 pandemic and states she even becomes irritable and angry at others who she perceives as not taking appropriate precautions and not taking the situation seriously.    Patient states she plans to continue to primarily stay " home and states she is considering beginning to accumulate food as she foresees a coming shot down.  The patient reports she continues to struggle with periods of depressed mood, anhedonia, anergia, and feeling hopeless.  Patient rates current symptoms at a 4 on a scale of 1-10 with 10 being most severe.  She denies any significant symptoms of hypomania other than having periods where she feels somewhat irritable and has a tendency to lash out at others.  Patient reports she is sleeping relatively well at this time.  The patient adamantly and convincingly denies suicidal ideation vehemently denies any substance use.      Clinical Maneuvering/Intervention:  Assisted patient in processing above session content; acknowledged and normalized patient’s thoughts, feelings, and concerns.   Allowed the patient to discuss/vent her feelings and frustrations concerning ongoing difficulties with relationship with her mother and validated her feelings.  Also utilized motivational reviewing techniques including complex reflections to discussed concept of things we can control things we cannot control and strongly urged the patient to remind herself she cannot control the decisions or behaviors of others, not even her mother.  Also discussed and provided the patient with a brief handout on the theory of radical acceptance.  Also encouraged patient to attempt to avoid the news concerning the current election to reduce her stress levels and to find some activities she can engage in which she finds to be relaxing and enjoyable.  Continue to utilize cognitive behavioral therapy to assist the patient in developing appropriate coping mechanisms decrease in severity frequency of symptoms.  Continue to focus on healthy skills of daily living and behavioral activation.    Allowed patient to freely discuss issues without interruption or judgment. Provided safe, confidential environment to facilitate the development of positive therapeutic  relationship and encourage open, honest communication. Assisted patient in identifying risk factors which would indicate the need for higher level of care including thoughts to harm self or others and/or self-harming behavior and encouraged patient to contact this office, call 911, or present to the nearest emergency room should any of these events occur. Discussed crisis intervention services and means to access.  Patient adamantly and convincingly denies current suicidal or homicidal ideation or perceptual disturbance.      Assessment    Patient appears to maintain relative stability as compared to her baseline.  However, she is struggling with increased stress and a sense of uncertainty due to current COVID-19 pandemic.  Furthermore, she continues to struggle with strained family dynamics and relationship with her mother which contributes to her symptomology.  The patient's symptomology continue to cause impairment in important areas of functioning.  Patient can be reasonably expected to continue to benefit treatment and would likely be at increased risk for decompensation.    Diagnoses and all orders for this visit:    1. Bipolar II disorder (CMS/[facility]) (Primary)    2. Adjustment disorder with mixed anxiety and depressed mood    3. Family dynamics problem               Mental Status Exam  Hygiene: Difficult to assess due to video visit  Dress: Casual  Attitude:  Cooperative  Motor Activity: Appropriate  Speech:  Normal  Mood: Within normal limits  Affect: Full range  Thought Processes:  Linear  Thought Content:  normal  Suicidal Thoughts:  denies  Homicidal Thoughts:  denies  Crisis Safety Plan: yes, to come to the emergency room.  Hallucinations:  denies    Patient's Support Network Includes:  mother and extended family    Progress toward goal: Not at goal    Functional Status: Mild impairment     Prognosis: Fair with Ongoing Treatment     Plan         Patient will continue in individual outpatient therapy  session [facility] every 4 weeks and will continue and pharmacotherapy as scheduled with MARISELA Dixon.  Patient will adhere to medication regimen as prescribed and report any side effects. Patient will contact this office, call 911 or present to the nearest emergency room should suicidal or homicidal ideations occur. Provide Cognitive Behavioral Therapy and Integrative Therapy to improve functioning, maintain stability, and avoid decompensation and the need for higher level of care.          Return in about 4 weeks (around 12/1/2020) for Next scheduled follow up.      This document signed by Vidal Ortiz LCSW, [facility] November 3, 2020 16:18 EST

## 2021-01-05 ENCOUNTER — OFFICE VISIT (OUTPATIENT)
Dept: PSYCHIATRY | Facility: CLINIC | Age: 51
End: 2021-01-05

## 2021-01-05 DIAGNOSIS — F31.81 BIPOLAR II DISORDER (HCC): Primary | ICD-10-CM

## 2021-01-05 DIAGNOSIS — F43.23 ADJUSTMENT DISORDER WITH MIXED ANXIETY AND DEPRESSED MOOD: ICD-10-CM

## 2021-01-05 DIAGNOSIS — Z63.9 FAMILY DYNAMICS PROBLEM: ICD-10-CM

## 2021-01-05 PROCEDURE — 90837 PSYTX W PT 60 MINUTES: CPT | Performed by: SOCIAL WORKER

## 2021-01-05 SDOH — SOCIAL STABILITY - SOCIAL INSECURITY: PROBLEM RELATED TO PRIMARY SUPPORT GROUP, UNSPECIFIED: Z63.9

## 2021-01-05 NOTE — PROGRESS NOTES
"Date of Service: January 5, 2021  Time In: 12:40 PM  Time Out: 1:40 PM    PROGRESS NOTE  Data:  Tammi Levine is a 50 y.o. female who met 1:1 with the undersigned for a regularly scheduled individual outpatient therapy session at [facility] for follow-up of bipolar and adjustment disorder due to difficulty of adjusting to COVID-19 pandemic and resulting stressors.  Patient and the undersigned wore masks throughout the session and maintained appropriate distancing.     HPI: Patient states she continues to struggle with COVID-19 pandemic and the current political unrest.  In fact, she states she fears violence well and slough following political activities this week whereby Sil will likely be certified as the president elect.  Patient further states she continues to struggle with her biological mother and states \"she has been brainwashed by her boyfriend\".  Patient states specifically she was recently in the car with her mother who took her to [facility] to a medical appointment and states they saw an -American person inside the road and her mother stated \"I wish I had my gun so I could shoot them\".  Patient states her mother had never been racist and states she was truly shocked at this statement.  The patient reports she continues to struggle with periods of depressed mood, anhedonia, anergia, and feeling hopeless.  Patient rates current symptoms at a 4/5 on a scale of 1-10 with 10 being most severe.  She denies any significant symptoms of hypomania other than having periods where she feels somewhat irritable and has a tendency to lash out at others.  Patient reports she is sleeping relatively well at this time.  The patient adamantly and convincingly denies suicidal ideation vehemently denies any substance use.      Clinical Maneuvering/Intervention:  Assisted patient in processing above session content; acknowledged and normalized patient’s thoughts, feelings, and " concerns.   Allow the patient to discuss/vent her feelings and frustration concerning the current climate including COVID-19 pandemic and political unrest and validated her feelings.  Also utilized motivational interviewing techniques including complex reflections to discussed concept of things we can control things he cannot control strongly urged the patient to spend her time and energy on things she has the ability to make better.  Further allow the patient to discuss/vent her feelings concerning ongoing difficulties with her mother and validated her feelings.  Further validated the patient's disbelief at her mother's racist remarks and validated her right to discontinue contact if it becomes harmful to herself.  Continue to utilize cognitive [place] therapy to develop appropriate coping mechanisms to decrease the severity and frequency of symptoms.  Continue to focus on healthy skills of daily living and behavioral activation.    Allowed patient to freely discuss issues without interruption or judgment. Provided safe, confidential environment to facilitate the development of positive therapeutic relationship and encourage open, honest communication. Assisted patient in identifying risk factors which would indicate the need for higher level of care including thoughts to harm self or others and/or self-harming behavior and encouraged patient to contact this office, call 911, or present to the nearest emergency room should any of these events occur. Discussed crisis intervention services and means to access.  Patient adamantly and convincingly denies current suicidal or homicidal ideation or perceptual disturbance.      Assessment    Patient appears to maintain relative stability as compared to her baseline.  However, she is struggling with increased stress and a sense of uncertainty due to current COVID-19 pandemic.  Furthermore, she continues to struggle with strained family dynamics and relationship with her  mother which contributes to her symptomology.  The patient's symptomology continue to cause impairment in important areas of functioning.  Patient can be reasonably expected to continue to benefit treatment and would likely be at increased risk for decompensation.    Diagnoses and all orders for this visit:    1. Bipolar II disorder (CMS/HCC) (Primary)    2. Adjustment disorder with mixed anxiety and depressed mood    3. Family dynamics problem               Mental Status Exam  Hygiene: Difficult to assess due to video visit  Dress: Casual  Attitude:  Cooperative  Motor Activity: Appropriate  Speech:  Normal  Mood: Within normal limits  Affect: Full range  Thought Processes:  Linear  Thought Content:  normal  Suicidal Thoughts:  denies  Homicidal Thoughts:  denies  Crisis Safety Plan: yes, to come to the emergency room.  Hallucinations:  denies    Patient's Support Network Includes:  mother and extended family    Progress toward goal: Not at goal    Functional Status: Mild impairment     Prognosis: Fair with Ongoing Treatment     Plan         Patient will continue in individual outpatient therapy session [facility] every 4 weeks and will continue and pharmacotherapy as scheduled with MARISELA Dixon.  Patient will adhere to medication regimen as prescribed and report any side effects. Patient will contact this office, call 911 or present to the nearest emergency room should suicidal or homicidal ideations occur. Provide Cognitive Behavioral Therapy and Integrative Therapy to improve functioning, maintain stability, and avoid decompensation and the need for higher level of care.          Return in about 4 weeks (around 2/2/2021) for Next scheduled follow up.      This document signed by Vidal Ortiz LCSW, [facility] January 5, 2021 16:20 EST

## 2021-01-19 ENCOUNTER — OFFICE VISIT (OUTPATIENT)
Dept: PSYCHIATRY | Facility: CLINIC | Age: 51
End: 2021-01-19

## 2021-01-19 VITALS
WEIGHT: 161 LBS | BODY MASS INDEX: 32.46 KG/M2 | DIASTOLIC BLOOD PRESSURE: 80 MMHG | HEIGHT: 59 IN | HEART RATE: 92 BPM | SYSTOLIC BLOOD PRESSURE: 126 MMHG

## 2021-01-19 DIAGNOSIS — F31.81 BIPOLAR II DISORDER (HCC): Primary | ICD-10-CM

## 2021-01-19 PROCEDURE — 99213 OFFICE O/P EST LOW 20 MIN: CPT | Performed by: NURSE PRACTITIONER

## 2021-01-19 RX ORDER — LITHIUM CARBONATE 300 MG/1
300 CAPSULE ORAL EVERY EVENING
Qty: 30 CAPSULE | Refills: 2 | Status: SHIPPED | OUTPATIENT
Start: 2021-01-19 | End: 2021-04-13 | Stop reason: SDUPTHER

## 2021-01-19 NOTE — PROGRESS NOTES
"  Subjective   Tammi Levine is a 50 y.o. female is here today for medication management follow-up at the [facility].    Chief Complaint: Bipolar Disorder    History of Present Illness  She states that she is doing \"fair\".  She states that the one that she was getting for anxiety caused her to feel extremely dizzy so she stopped taking it.  She has been taking her lithium on a regular basis.  She rates her mood about 4/10 with 10 being the worse, she states that she feels like she is in a good mood today and enjoying her ride over to her appointments.  She states that she made a friend with whom she spends time with and can confide in, there is a total of 4 of them that spend time together.  She states that she is worried about the state of the nation at the time.  She states that she rates her anxiety about 8/10 with 10 being the worse, she states that she gets nervous about being around people.  She is getting about 6-7 hours of sleep per night with no NM.  She states that they have prowlers that knock on the doors at night, recommended that she get pepper spray for protection.  She has been eating ok with stable weight.  She states that she did have a bout with her constipation related to her PCP.  Denies any other stressors other than her relationship with her mother.  Denies any AV hallucinations, denies any SI/HI.      The following portions of the patient's history were reviewed and updated as appropriate: allergies, current medications, past family history, past medical history, past social history, past surgical history and problem list.    Review of Systems   Constitutional: Negative for appetite change, chills, diaphoresis, fatigue, fever and unexpected weight change.   HENT: Negative for hearing loss, sore throat, trouble swallowing and voice change.    Eyes: Negative for photophobia and visual disturbance.   Respiratory: Negative for cough, chest tightness and shortness of breath.  " "  Cardiovascular: Negative for chest pain and palpitations.   Gastrointestinal: Negative for abdominal pain, constipation, nausea and vomiting.   Endocrine: Negative for cold intolerance and heat intolerance.   Genitourinary: Negative for dysuria and frequency.   Musculoskeletal: Negative for arthralgias, back pain, joint swelling and neck stiffness.        Uses her cane for mobility   Skin: Negative for color change and wound.   Allergic/Immunologic: Negative for environmental allergies and immunocompromised state.   Neurological: Negative for dizziness, tremors, seizures, syncope, weakness, light-headedness and headaches.   Hematological: Negative for adenopathy. Does not bruise/bleed easily.       Objective   Physical Exam   Constitutional: She appears well-developed. No distress.   Neurological: She is alert. Coordination and gait normal.   Vitals reviewed.    Blood pressure 126/80, pulse 92, height 149.9 cm (59.02\"), weight 73 kg (161 lb).  Body mass index is 32.5 kg/m².    Medication List:   Current Outpatient Medications   Medication Sig Dispense Refill   • aspirin 81 MG EC tablet Take 81 mg by mouth Every Evening.     • atorvastatin (LIPITOR) 80 MG tablet Take 1 tablet by mouth Every Night. 30 tablet 3   • Canagliflozin (INVOKANA) 100 MG tablet Take 100 mg by mouth Every Evening.     • citalopram (CeleXA) 10 MG tablet Take 1 tablet by mouth Daily. 30 tablet 2   • insulin aspart prot-insulin aspart (novoLOG 70/30) (70-30) 100 UNIT/ML injection Inject 20 Units under the skin into the appropriate area as directed 2 (Two) Times a Day With Meals.     • levothyroxine (SYNTHROID, LEVOTHROID) 50 MCG tablet Take 50 mcg by mouth Every Evening.     • lithium carbonate 300 MG capsule Take 1 capsule by mouth Every Evening. 30 capsule 2   • loratadine (Claritin) 10 MG tablet Daily.     • Omeprazole (PRILOSEC PO)        No current facility-administered medications for this visit.        Mental Status Exam:   Hygiene:   " good  Cooperation:  Cooperative  Eye Contact:  Fair  Psychomotor Behavior:  Appropriate  Affect:  Appropriate  Hopelessness: Denies  Speech:  Normal  Thought Process:  Goal directed and Linear  Thought Content:  Mood congruent  Suicidal:  None  Homicidal:  None  Hallucinations:  None  Delusion:  None  Memory:  Intact  Orientation:  Person, Place, Time and Situation  Reliability:  fair  Insight:  Fair  Judgement:  Fair  Impulse Control:  Fair  Physical/Medical Issues:  No     Assessment/Plan   Problems Addressed this Visit     None      Visit Diagnoses     Bipolar II disorder (CMS/[facility])    -  Primary    Relevant Medications    lithium carbonate 300 MG capsule      Diagnoses       Codes Comments    Bipolar II disorder (CMS/HCC)    -  Primary ICD-10-CM: F31.81  ICD-9-CM: 296.89         Discussed medication options. Continue the lithium for mood and discontinue celexa for side effects excessive dizziness.   Request labs from PCP.  Reviewed the risks, benefits, and side effects of the medications; patient acknowledged and verbally consented.  Patient is agreeable to call the [facility] with any worsening of symptoms.  Patient is aware to call 911 or go to the nearest ER should begin having SI/HI.     Prognosis: Guarded dependent on medication, follow up appointment and treatment plan compliance     Functionality: Fair.  Symptoms are mostly under control with periodic episodes of increased anxiety.    Return in 12 weeks

## 2021-01-27 ENCOUNTER — TRANSCRIBE ORDERS (OUTPATIENT)
Dept: ADMINISTRATIVE | Facility: HOSPITAL | Age: 51
End: 2021-01-27

## 2021-01-27 DIAGNOSIS — Z01.818 PRE-OPERATIVE CLEARANCE: Primary | ICD-10-CM

## 2021-01-29 ENCOUNTER — LAB (OUTPATIENT)
Dept: LAB | Facility: HOSPITAL | Age: 51
End: 2021-01-29

## 2021-01-29 DIAGNOSIS — Z01.818 PRE-OPERATIVE CLEARANCE: ICD-10-CM

## 2021-01-29 PROCEDURE — U0004 COV-19 TEST NON-CDC HGH THRU: HCPCS

## 2021-01-29 PROCEDURE — C9803 HOPD COVID-19 SPEC COLLECT: HCPCS

## 2021-01-30 LAB — SARS-COV-2 RNA RESP QL NAA+PROBE: NOT DETECTED

## 2021-03-02 ENCOUNTER — OFFICE VISIT (OUTPATIENT)
Dept: PSYCHIATRY | Facility: CLINIC | Age: 51
End: 2021-03-02

## 2021-03-02 DIAGNOSIS — Z63.0 MARITAL/PARTNER RELATIONAL PROBLEM: ICD-10-CM

## 2021-03-02 DIAGNOSIS — F31.81 BIPOLAR II DISORDER (HCC): Primary | ICD-10-CM

## 2021-03-02 DIAGNOSIS — Z63.9 FAMILY DYNAMICS PROBLEM: ICD-10-CM

## 2021-03-02 PROCEDURE — 90837 PSYTX W PT 60 MINUTES: CPT | Performed by: SOCIAL WORKER

## 2021-03-02 SDOH — SOCIAL STABILITY - SOCIAL INSECURITY: PROBLEM RELATED TO PRIMARY SUPPORT GROUP, UNSPECIFIED: Z63.9

## 2021-03-02 SDOH — SOCIAL STABILITY - SOCIAL INSECURITY: PROBLEMS IN RELATIONSHIP WITH SPOUSE OR PARTNER: Z63.0

## 2021-03-02 NOTE — PROGRESS NOTES
"Date of Service: March 2, 2021  Time In: 12:35 PM  Time Out: 1:45 PM    PROGRESS NOTE  Data:  Tammi Levine is a 50 y.o. female who met 1:1 with the undersigned for a regularly scheduled individual outpatient therapy session at [facility] for follow-up of bipolar and adjustment disorder due to difficulty of adjusting to COVID-19 pandemic and resulting stressors.  Patient and the undersigned wore masks throughout the session and maintained appropriate distancing.     HPI: Patient reports her boyfriend of approximately 3 months recently decided to go back to [facility] in an attempt to help raise his 7-year-old son as his ex-wife is struggling and threatened to \"put him up for adoption\".  Patient states she is struggling with states she simply could not bring herself to go to [place] with her boyfriend as she simply is not willing to go that far away from home or to give up her independence and her own place.  However, she states she is struggling with feeling as though she simply does not deserve to have someone to spend time with and is feeling a significant sense of loneliness.  In addition, the patient states she is struggling with her mother and states that she feels as though she has \"lost it\" as she has gotten deeper and deeper into what the patient describes as conspiracy theories.  In fact, the patient states her mother told her that the president had been killed approximately 2 weeks ago and that he is being played by an actor at this time.  She also states her mother told her there was a tunnel underneath the [place] from [place] to [facility] where democrats are trafficking young children into the sex trade.   The patient reports she continues to struggle with periods of depressed mood, anhedonia, anergia, and feeling hopeless.  Patient rates current symptoms at a 4/5 on a scale of 1-10 with 10 being most severe.  She denies any significant symptoms of " hypomania other than having periods where she feels somewhat irritable and has a tendency to lash out at others.  Patient reports she is sleeping relatively well at this time.  The patient adamantly and convincingly denies suicidal ideation vehemently denies any substance use.      Clinical Maneuvering/Intervention:  Assisted patient in processing above session content; acknowledged and normalized patient’s thoughts, feelings, and concerns.   Allow the patient to discuss/process her feelings concerning her boyfriend leaving and validated her feelings.  Also utilized motivational interviewing techniques including complex reflections to discussed concept things we can control daily cannot control and strongly urged the patient to remind herself she cannot control her decisions or behaviors of others.  In addition, discussed the concept of emotional distance and encouraged the patient to consider if she is not going to be in a relationship with her boyfriend she should likely discontinue communication until she can achieve some emotional stability.  Further allow the patient to discuss/verbalize her frustration and worry concerning her mother's behavior and validated her feelings.  However, also reminded her she cannot control her mother's behavior and simply encouraged her to attempt to be there and have a mother/daughter relationship.  Encouraged patient to utilize appropriate sources of support including her close friend who lives in her apartment complex.  Continue to focus on healthy skills of daily living and behavioral activation.    Allowed patient to freely discuss issues without interruption or judgment. Provided safe, confidential environment to facilitate the development of positive therapeutic relationship and encourage open, honest communication. Assisted patient in identifying risk factors which would indicate the need for higher level of care including thoughts to harm self or others and/or self-harming  behavior and encouraged patient to contact this office, call 911, or present to the nearest emergency room should any of these events occur. Discussed crisis intervention services and means to access.  Patient adamantly and convincingly denies current suicidal or homicidal ideation or perceptual disturbance.      Assessment    Patient is struggling with increased symptoms at this time due to relationship issues and ongoing and increasing issues with her mother.  The patient's symptomology continue to cause impairment in important areas of functioning.  Patient can be reasonably expected to continue to benefit treatment and would likely be at increased risk for decompensation.    Diagnoses and all orders for this visit:    1. Bipolar II disorder (CMS/[facility]) (Primary)    2. Family dynamics problem    3. Marital/partner relational problem               Mental Status Exam  Hygiene: Good  Dress: Casual  Attitude:  Cooperative  Motor Activity: Appropriate  Speech:  Normal  Mood: Within normal limits  Affect: Tearful  Thought Processes:  Linear  Thought Content:  normal  Suicidal Thoughts:  denies  Homicidal Thoughts:  denies  Crisis Safety Plan: yes, to come to the emergency room.  Hallucinations:  denies    Patient's Support Network Includes:  mother and extended family    Progress toward goal: Not at goal    Functional Status: Mild impairment     Prognosis: Fair with Ongoing Treatment     Plan         Patient will continue in individual outpatient therapy session [facility] every 3 weeks and will continue and pharmacotherapy as scheduled with MARISELA Dixon.  Patient will adhere to medication regimen as prescribed and report any side effects. Patient will contact this office, call 911 or present to the nearest emergency room should suicidal or homicidal ideations occur. Provide Cognitive Behavioral Therapy and Integrative Therapy to improve functioning, maintain stability, and avoid  decompensation and the need for higher level of care.          Return in about 3 weeks (around 3/23/2021) for Next scheduled follow up.      This document signed by Vidal Ortiz, KAYLEIGH, [facility] March 2, 2021 14:25 EST

## 2021-03-04 ENCOUNTER — OFFICE VISIT (OUTPATIENT)
Dept: CARDIOLOGY | Facility: CLINIC | Age: 51
End: 2021-03-04

## 2021-03-04 VITALS
SYSTOLIC BLOOD PRESSURE: 106 MMHG | WEIGHT: 161 LBS | HEIGHT: 59 IN | DIASTOLIC BLOOD PRESSURE: 66 MMHG | HEART RATE: 71 BPM | BODY MASS INDEX: 32.46 KG/M2 | TEMPERATURE: 97 F

## 2021-03-04 DIAGNOSIS — I25.10 ARTERIOSCLEROTIC CARDIOVASCULAR DISEASE (ASCVD): Primary | ICD-10-CM

## 2021-03-04 DIAGNOSIS — Z79.4 TYPE 2 DIABETES MELLITUS WITH DIABETIC PERIPHERAL ANGIOPATHY WITHOUT GANGRENE, WITH LONG-TERM CURRENT USE OF INSULIN (HCC): ICD-10-CM

## 2021-03-04 DIAGNOSIS — E78.5 DYSLIPIDEMIA: ICD-10-CM

## 2021-03-04 DIAGNOSIS — E11.51 TYPE 2 DIABETES MELLITUS WITH DIABETIC PERIPHERAL ANGIOPATHY WITHOUT GANGRENE, WITH LONG-TERM CURRENT USE OF INSULIN (HCC): ICD-10-CM

## 2021-03-04 DIAGNOSIS — I10 ESSENTIAL HYPERTENSION: ICD-10-CM

## 2021-03-04 DIAGNOSIS — R07.2 PRECORDIAL PAIN: ICD-10-CM

## 2021-03-04 PROCEDURE — 93000 ELECTROCARDIOGRAM COMPLETE: CPT | Performed by: NURSE PRACTITIONER

## 2021-03-04 PROCEDURE — 99213 OFFICE O/P EST LOW 20 MIN: CPT | Performed by: NURSE PRACTITIONER

## 2021-03-04 RX ORDER — LISINOPRIL 10 MG/1
10 TABLET ORAL DAILY
COMMUNITY
End: 2021-08-06

## 2021-03-04 NOTE — PROGRESS NOTES
Diane Rios  Tammi Levine  1970  03/04/2021    Patient Active Problem List   Diagnosis   • Arteriosclerotic cardiovascular disease (ASCVD), s/p MI in 2012, clinically stable.    • Type 2 diabetes mellitus with diabetic peripheral angiopathy without gangrene, with long-term current use of insulin (CMS/HCC)   • Essential hypertension   • Dyslipidemia   • Sleep apnea   • Myocardial infarction (CMS/HCC)   • Migraine headache   • Abnormal CT scan   • ASCVD (arteriosclerotic cardiovascular disease)   • Hypertension, well controlled   • Disease of thyroid gland   • Generalized abdominal pain   • Right-sided low back pain with right-sided sciatica   • Slow transit constipation   • Chest pain       Dear Diane Rios:    Subjective     Chief Complaint   Patient presents with   • Follow-up     ROUTINE   • Med Management     LIST           History of Present Illness:    Tammi Levine is a 50 y.o. female with a past medical history of ASCVD s/p MI in 2012, hypertension, and diabetes mellitus type 2. She presents today for routine cardiology follow up. She reports she had one episode of chest pain last week that occurred with severe anxiety. She denies any prior chest pain. She denies any chest pain with exertion. She denies shortness of breath, palpitations, near syncope, or syncope. Her blood pressure has been well controlled. She has not had any recent labs.          Allergies   Allergen Reactions   • Zoloft [Sertraline Hcl] Nausea And Vomiting   • Ciprofloxacin Rash     Also caused chest pain   :      Current Outpatient Medications:   •  atorvastatin (LIPITOR) 80 MG tablet, Take 1 tablet by mouth Every Night., Disp: 30 tablet, Rfl: 3  •  Canagliflozin (INVOKANA) 100 MG tablet, Take 100 mg by mouth Every Evening., Disp: , Rfl:   •  insulin aspart prot-insulin aspart (novoLOG 70/30) (70-30) 100 UNIT/ML injection, Inject 20 Units under the skin into the appropriate area as directed 2 (Two) Times a Day  "With Meals., Disp: , Rfl:   •  levothyroxine (SYNTHROID, LEVOTHROID) 50 MCG tablet, Take 50 mcg by mouth Every Evening., Disp: , Rfl:   •  lisinopril (PRINIVIL,ZESTRIL) 10 MG tablet, Take 10 mg by mouth Daily., Disp: , Rfl:   •  lithium carbonate 300 MG capsule, Take 1 capsule by mouth Every Evening., Disp: 30 capsule, Rfl: 2  •  loratadine (Claritin) 10 MG tablet, Daily., Disp: , Rfl:   •  Omeprazole (PRILOSEC PO), , Disp: , Rfl:   •  aspirin 81 MG EC tablet, Take 81 mg by mouth Every Evening., Disp: , Rfl:       The following portions of the patient's history were reviewed and updated as appropriate: allergies, current medications, past family history, past medical history, past social history, past surgical history and problem list.    Social History     Tobacco Use   • Smoking status: Never Smoker   • Smokeless tobacco: Never Used   Substance Use Topics   • Alcohol use: No   • Drug use: No       Review of Systems   Constitution: Negative for decreased appetite and malaise/fatigue.   Cardiovascular: Positive for chest pain. Negative for dyspnea on exertion, irregular heartbeat, leg swelling, near-syncope, orthopnea, palpitations, paroxysmal nocturnal dyspnea and syncope.   Respiratory: Negative for cough, shortness of breath and wheezing.    Neurological: Negative for dizziness, light-headedness and weakness.       Objective   Vitals:    03/04/21 1453 03/04/21 1525   BP:  106/66   Pulse:  71   Temp: 97 °F (36.1 °C)    Weight: 73 kg (161 lb)    Height: 149.9 cm (59\")      Body mass index is 32.52 kg/m².        Vitals signs reviewed.   Constitutional:       Appearance: Healthy appearance. Well-developed and not in distress.   HENT:      Head: Normocephalic and atraumatic.   Neck:      Vascular: No JVD.   Pulmonary:      Effort: Pulmonary effort is normal.      Breath sounds: Normal breath sounds. No wheezing. No rales.   Cardiovascular:      Normal rate. Regular rhythm.      Murmurs: There is no murmur.      . No " S3 and S4 gallop.   Edema:     Peripheral edema absent.   Abdominal:      General: Bowel sounds are normal.      Palpations: Abdomen is soft.   Skin:     General: Skin is warm and dry.   Neurological:      Mental Status: Alert, oriented to person, place, and time and oriented to person, place and time.   Psychiatric:         Mood and Affect: Mood normal.         Behavior: Behavior normal.         Lab Results   Component Value Date     (L) 08/16/2020    K 4.2 08/16/2020     (H) 08/16/2020    CO2 17.8 (L) 08/16/2020    BUN 13 08/16/2020    CREATININE 0.80 08/16/2020    GLUCOSE 144 (H) 08/16/2020    CALCIUM 9.0 08/16/2020    AST 14 08/16/2020    ALT 8 08/16/2020    ALKPHOS 75 08/16/2020    LABIL2 1.4 (L) 02/04/2016     Lab Results   Component Value Date    CKTOTAL 124 08/14/2020     Lab Results   Component Value Date    WBC 7.48 08/16/2020    HGB 10.0 (L) 08/16/2020    HCT 32.1 (L) 08/16/2020     08/16/2020     Lab Results   Component Value Date    INR 0.91 01/12/2020     Lab Results   Component Value Date    MG 2.4 08/14/2020     Lab Results   Component Value Date    TSH 1.290 08/14/2020    TRIG 106 08/14/2020    HDL 52 08/14/2020    LDL 63 08/14/2020      Lab Results   Component Value Date    BNP 36 09/16/2015             ECG 12 Lead    Date/Time: 3/4/2021 2:55 PM  Performed by: Marisa Romero APRN  Authorized by: Marisa Romero APRN   Comparison: compared with previous ECG   Similar to previous ECG  Rhythm: sinus rhythm  BPM: 74                Assessment/Plan    Diagnosis Plan   1. Arteriosclerotic cardiovascular disease (ASCVD), s/p MI in 2012, clinically stable.   ECG 12 Lead    Comprehensive Metabolic Panel    Lipid Panel    CBC & Differential   2. Essential hypertension  ECG 12 Lead    Comprehensive Metabolic Panel    Lipid Panel    CBC & Differential   3. Type 2 diabetes mellitus with diabetic peripheral angiopathy without gangrene, with long-term current use of insulin (CMS/[facility])  ECG  12 Lead    Comprehensive Metabolic Panel    Lipid Panel    CBC & Differential   4. Dyslipidemia  ECG 12 Lead    Comprehensive Metabolic Panel    Lipid Panel    CBC & Differential   5. Precordial pain  ECG 12 Lead                Recommendations:    1. I have recommended a stress test to evaluate her chest pains. However, she has declined. She states she will contact us if she has any further chest pains.  2. CBC, CMP, and lipid panel ordered.  3. Continue low dose aspirin, atorvastatin, and lisinopril. She is not on beta blocker due to history of bradycardia.  4. Follow up in 6 months or sooner if needed.       Return in about 6 months (around 9/4/2021) for Recheck.    As always, I appreciate very much the opportunity to participate in the cardiovascular care of your patients.      With Best Regards,    MARISELA Tomlinson

## 2021-03-23 ENCOUNTER — BULK ORDERING (OUTPATIENT)
Dept: CASE MANAGEMENT | Facility: OTHER | Age: 51
End: 2021-03-23

## 2021-03-23 DIAGNOSIS — Z23 IMMUNIZATION DUE: ICD-10-CM

## 2021-04-13 ENCOUNTER — LAB (OUTPATIENT)
Dept: LAB | Facility: HOSPITAL | Age: 51
End: 2021-04-13

## 2021-04-13 ENCOUNTER — OFFICE VISIT (OUTPATIENT)
Dept: PSYCHIATRY | Facility: CLINIC | Age: 51
End: 2021-04-13

## 2021-04-13 VITALS
TEMPERATURE: 98.1 F | HEART RATE: 77 BPM | OXYGEN SATURATION: 99 % | BODY MASS INDEX: 32.9 KG/M2 | SYSTOLIC BLOOD PRESSURE: 112 MMHG | WEIGHT: 163.2 LBS | DIASTOLIC BLOOD PRESSURE: 78 MMHG | HEIGHT: 59 IN

## 2021-04-13 DIAGNOSIS — F43.23 ADJUSTMENT DISORDER WITH MIXED ANXIETY AND DEPRESSED MOOD: ICD-10-CM

## 2021-04-13 DIAGNOSIS — Z63.8 STRESS DUE TO FAMILY TENSION: ICD-10-CM

## 2021-04-13 DIAGNOSIS — Z79.899 MEDICATION MANAGEMENT: ICD-10-CM

## 2021-04-13 DIAGNOSIS — Z79.899 MEDICATION MANAGEMENT: Primary | ICD-10-CM

## 2021-04-13 DIAGNOSIS — F31.81 BIPOLAR II DISORDER (HCC): ICD-10-CM

## 2021-04-13 LAB
BASOPHILS # BLD AUTO: 0.02 10*3/MM3 (ref 0–0.2)
BASOPHILS NFR BLD AUTO: 0.3 % (ref 0–1.5)
DEPRECATED RDW RBC AUTO: 40.6 FL (ref 37–54)
EOSINOPHIL # BLD AUTO: 0.08 10*3/MM3 (ref 0–0.4)
EOSINOPHIL NFR BLD AUTO: 1 % (ref 0.3–6.2)
ERYTHROCYTE [DISTWIDTH] IN BLOOD BY AUTOMATED COUNT: 12.9 % (ref 12.3–15.4)
HCT VFR BLD AUTO: 34.7 % (ref 34–46.6)
HGB BLD-MCNC: 11.6 G/DL (ref 12–15.9)
IMM GRANULOCYTES # BLD AUTO: 0.03 10*3/MM3 (ref 0–0.05)
IMM GRANULOCYTES NFR BLD AUTO: 0.4 % (ref 0–0.5)
LYMPHOCYTES # BLD AUTO: 1.46 10*3/MM3 (ref 0.7–3.1)
LYMPHOCYTES NFR BLD AUTO: 19.1 % (ref 19.6–45.3)
MCH RBC QN AUTO: 29.2 PG (ref 26.6–33)
MCHC RBC AUTO-ENTMCNC: 33.4 G/DL (ref 31.5–35.7)
MCV RBC AUTO: 87.4 FL (ref 79–97)
MONOCYTES # BLD AUTO: 0.38 10*3/MM3 (ref 0.1–0.9)
MONOCYTES NFR BLD AUTO: 5 % (ref 5–12)
NEUTROPHILS NFR BLD AUTO: 5.67 10*3/MM3 (ref 1.7–7)
NEUTROPHILS NFR BLD AUTO: 74.2 % (ref 42.7–76)
NRBC BLD AUTO-RTO: 0 /100 WBC (ref 0–0.2)
PLATELET # BLD AUTO: 255 10*3/MM3 (ref 140–450)
PMV BLD AUTO: 10.8 FL (ref 6–12)
RBC # BLD AUTO: 3.97 10*6/MM3 (ref 3.77–5.28)
WBC # BLD AUTO: 7.64 10*3/MM3 (ref 3.4–10.8)

## 2021-04-13 PROCEDURE — 36415 COLL VENOUS BLD VENIPUNCTURE: CPT

## 2021-04-13 PROCEDURE — 80178 ASSAY OF LITHIUM: CPT

## 2021-04-13 PROCEDURE — 84439 ASSAY OF FREE THYROXINE: CPT

## 2021-04-13 PROCEDURE — 84443 ASSAY THYROID STIM HORMONE: CPT

## 2021-04-13 PROCEDURE — 80053 COMPREHEN METABOLIC PANEL: CPT

## 2021-04-13 PROCEDURE — 85025 COMPLETE CBC W/AUTO DIFF WBC: CPT

## 2021-04-13 PROCEDURE — 99214 OFFICE O/P EST MOD 30 MIN: CPT | Performed by: NURSE PRACTITIONER

## 2021-04-13 RX ORDER — LITHIUM CARBONATE 300 MG/1
300 CAPSULE ORAL EVERY EVENING
Qty: 30 CAPSULE | Refills: 2 | Status: SHIPPED | OUTPATIENT
Start: 2021-04-13 | End: 2021-07-29 | Stop reason: SDUPTHER

## 2021-04-13 SDOH — SOCIAL STABILITY - SOCIAL INSECURITY: OTHER SPECIFIED PROBLEMS RELATED TO PRIMARY SUPPORT GROUP: Z63.8

## 2021-04-13 NOTE — PROGRESS NOTES
"  Subjective   Tammi Levine is a 51 y.o. female is here today for medication management follow-up at the [facility], she presents to her appointment on time.    Chief Complaint: Bipolar Disorder    History of Present Illness  She states that she is doing ok with her medications, but states that she was getting a little nervous out in the waiting room.  She states that she was considering going out to a [facility] to spend a couple of days to [facility].  She states that she feels cautious about her neighbors because they are always wondering where she is and is the 'taxi' service.  Offered encouragement and support.  She states that she is doing well with her medication, shares that it can cause her to have dry mouth times but drinks plenty of fluids and eat ice chips.  She states that about 2 weeks ago she heard her screen on her window being tapered with and it made her nervous but denies any other symptoms of anxiety or panic attacks.  She states that she has been sleeping \"pretty good\", she is getting about 6 hours per night with no NM.  Appetite has been \"here and there\", she will eat soup and sandwiches quite a bit; weight is stable.  Denies any recent health issues other than a \"little sinus infection\" with antibiotics; she is not interested in the COVID vaccine.  Denies any AV hallucinations, denies any SI/HI.      The following portions of the patient's history were reviewed and updated as appropriate: allergies, current medications, past family history, past medical history, past social history, past surgical history and problem list.    Review of Systems   Constitutional: Negative for appetite change, chills, diaphoresis, fatigue, fever and unexpected weight change.   HENT: Negative for hearing loss, sore throat, trouble swallowing and voice change.    Eyes: Negative for photophobia and visual disturbance.   Respiratory: Negative for cough, chest tightness and shortness of breath.  " "  Cardiovascular: Negative for chest pain and palpitations.   Gastrointestinal: Negative for abdominal pain, constipation, nausea and vomiting.   Endocrine: Negative for cold intolerance and heat intolerance.   Genitourinary: Negative for dysuria and frequency.   Musculoskeletal: Negative for arthralgias, back pain, joint swelling and neck stiffness.        Uses her cane for mobility   Skin: Negative for color change and wound.   Allergic/Immunologic: Negative for environmental allergies and immunocompromised state.   Neurological: Negative for dizziness, tremors, seizures, syncope, weakness, light-headedness and headaches.   Hematological: Negative for adenopathy. Does not bruise/bleed easily.       Objective   Physical Exam   Constitutional: She appears well-developed. No distress.   Neurological: She is alert. Coordination and gait normal.   Vitals reviewed.    Blood pressure 112/78, pulse 77, temperature 98.1 °F (36.7 °C), height 149.9 cm (59.02\"), weight 74 kg (163 lb 3.2 oz), SpO2 99 %.  Body mass index is 32.94 kg/m².    Medication List:   Current Outpatient Medications   Medication Sig Dispense Refill   • aspirin 81 MG EC tablet Take 81 mg by mouth Every Evening.     • atorvastatin (LIPITOR) 80 MG tablet Take 1 tablet by mouth Every Night. 30 tablet 3   • Canagliflozin (INVOKANA) 100 MG tablet Take 100 mg by mouth Every Evening.     • insulin aspart prot-insulin aspart (novoLOG 70/30) (70-30) 100 UNIT/ML injection Inject 20 Units under the skin into the appropriate area as directed 2 (Two) Times a Day With Meals.     • levothyroxine (SYNTHROID, LEVOTHROID) 50 MCG tablet Take 50 mcg by mouth Every Evening.     • lisinopril (PRINIVIL,ZESTRIL) 10 MG tablet Take 10 mg by mouth Daily.     • lithium carbonate 300 MG capsule Take 1 capsule by mouth Every Evening. 30 capsule 2   • Omeprazole (PRILOSEC PO)        No current facility-administered medications for this visit.       Mental Status Exam:   Hygiene:   " good  Cooperation:  Cooperative  Eye Contact:  Fair  Psychomotor Behavior:  Appropriate  Affect:  Appropriate  Hopelessness: Denies  Speech:  Normal  Thought Process:  Goal directed and Linear  Thought Content:  Mood congruent  Suicidal:  None  Homicidal:  None  Hallucinations:  None  Delusion:  None  Memory:  Intact  Orientation:  Person, Place, Time and Situation  Reliability:  fair  Insight:  Fair  Judgement:  Fair  Impulse Control:  Fair  Physical/Medical Issues:  No     Assessment/Plan   Problems Addressed this Visit     None      Visit Diagnoses     Medication management    -  Primary    Relevant Orders    Lithium Level    TSH    T4, Free    Comprehensive Metabolic Panel    CBC & Differential    Bipolar II disorder (CMS/HCC)        Relevant Medications    lithium carbonate 300 MG capsule    Adjustment disorder with mixed anxiety and depressed mood        Relevant Medications    lithium carbonate 300 MG capsule    Stress due to family tension          Diagnoses       Codes Comments    Medication management    -  Primary ICD-10-CM: Z79.899  ICD-9-CM: V58.69     Bipolar II disorder (CMS/HCC)     ICD-10-CM: F31.81  ICD-9-CM: 296.89     Adjustment disorder with mixed anxiety and depressed mood     ICD-10-CM: F43.23  ICD-9-CM: 309.28     Stress due to family tension     ICD-10-CM: Z63.8  ICD-9-CM: V61.8         Discussed medication options. Continue the lithium for mood, labs ordered: CBC, CMP, TSH, Free T4, lithium level.   Reviewed the risks, benefits, and side effects of the medications; patient acknowledged and verbally consented.  Patient is agreeable to call the [facility] with any worsening of symptoms.  Patient is aware to call 911 or go to the nearest ER should begin having SI/HI.     Prognosis: Guarded dependent on medication, follow up appointment and treatment plan compliance     Functionality: Fair.  Symptoms are mostly under control with periodic episodes of increased anxiety.    Return in 12 weeks      [id number] completed with no anxiety; shared decision making tool reviewed.

## 2021-04-14 LAB
ALBUMIN SERPL-MCNC: 4.4 G/DL (ref 3.5–5.2)
ALBUMIN/GLOB SERPL: 1.5 G/DL
ALP SERPL-CCNC: 90 U/L (ref 39–117)
ALT SERPL W P-5'-P-CCNC: 14 U/L (ref 1–33)
ANION GAP SERPL CALCULATED.3IONS-SCNC: 9 MMOL/L (ref 5–15)
AST SERPL-CCNC: 14 U/L (ref 1–32)
BILIRUB SERPL-MCNC: 0.2 MG/DL (ref 0–1.2)
BUN SERPL-MCNC: 16 MG/DL (ref 6–20)
BUN/CREAT SERPL: 16.7 (ref 7–25)
CALCIUM SPEC-SCNC: 9.6 MG/DL (ref 8.6–10.5)
CHLORIDE SERPL-SCNC: 105 MMOL/L (ref 98–107)
CO2 SERPL-SCNC: 22 MMOL/L (ref 22–29)
CREAT SERPL-MCNC: 0.96 MG/DL (ref 0.57–1)
GFR SERPL CREATININE-BSD FRML MDRD: 61 ML/MIN/1.73
GLOBULIN UR ELPH-MCNC: 2.9 GM/DL
GLUCOSE SERPL-MCNC: 254 MG/DL (ref 65–99)
LITHIUM SERPL-SCNC: 0.8 MMOL/L (ref 0.6–1.2)
POTASSIUM SERPL-SCNC: 4.9 MMOL/L (ref 3.5–5.2)
PROT SERPL-MCNC: 7.3 G/DL (ref 6–8.5)
SODIUM SERPL-SCNC: 136 MMOL/L (ref 136–145)
T4 FREE SERPL-MCNC: 1.32 NG/DL (ref 0.93–1.7)
TSH SERPL DL<=0.05 MIU/L-ACNC: 1.07 UIU/ML (ref 0.27–4.2)

## 2021-04-27 ENCOUNTER — OFFICE VISIT (OUTPATIENT)
Dept: PSYCHIATRY | Facility: CLINIC | Age: 51
End: 2021-04-27

## 2021-04-27 DIAGNOSIS — F31.81 BIPOLAR II DISORDER (HCC): Primary | ICD-10-CM

## 2021-04-27 DIAGNOSIS — Z63.9 FAMILY DYNAMICS PROBLEM: ICD-10-CM

## 2021-04-27 PROCEDURE — 90834 PSYTX W PT 45 MINUTES: CPT | Performed by: SOCIAL WORKER

## 2021-04-27 SDOH — SOCIAL STABILITY - SOCIAL INSECURITY: PROBLEM RELATED TO PRIMARY SUPPORT GROUP, UNSPECIFIED: Z63.9

## 2021-04-27 NOTE — PROGRESS NOTES
"-    Date of Service: April 27, 2021  Time In: 12:20 PM  Time Out: 1:10 PM    PROGRESS NOTE  Data:  Tammi Levine is a 51 y.o. female who met 1:1 with the undersigned for a regularly scheduled individual outpatient therapy session at [facility] for follow-up of bipolar disorder.  Patient and the undersigned wore masks throughout the session and maintained appropriate distancing.     HPI: Patient states she and her mother are getting along better and states she is attempting to avoid any discussion about politics.  However, she does state her mother told her that the president, rajesh Rice, and kaden Callaway have been murdered and cloned.  Patient also reports some of her friends at her apartment complex have told her that \"labor rules\" are planning to bring massive numbers of the illegal immigrants into the country that will come to [place] and take their apartments from them.  Patient further states she has not been able to get the vaccine to date and states she feels as though it is not safe as she has been told by various people it is experimental and it is being used for \"population control\".  The patient reports she continues to struggle with periods of depressed mood, anhedonia, anergia, and feeling hopeless.  Patient rates current symptoms at a 4 on a scale of 1-10 with 10 being most severe.  She denies any significant symptoms of hypomania other than having periods where she feels somewhat irritable and has a tendency to lash out at others.  Patient reports she is sleeping relatively well at this time.  The patient adamantly and convincingly denies suicidal ideation vehemently denies any substance use.      Clinical Maneuvering/Intervention:  Assisted patient in processing above session content; acknowledged and normalized patient’s thoughts, feelings, and concerns.   Allow the patient to discuss/process her feelings and fears concerning various issues and validated her " feelings.  However, also utilized motivational interviewing techniques including complex reflections to assist the patient in recognizing the irrational nature of some of the things she has discussed and assisted her with challenging them with factual counter arguments.  Also encouraged the patient to discuss the COVID-19 vaccine with her primary care provider and to follow their recommendations.  Also praised the patient for her ability to let previous difficulties between her and her mother goal so they can form a better relationship.  Continue to utilize cognitive behavioral therapy to assist patient in developing appropriate coping mechanisms decrease in severity and frequency of symptoms.  Continue to focus on healthy skills of daily living and behavioral activation.    Allowed patient to freely discuss issues without interruption or judgment. Provided safe, confidential environment to facilitate the development of positive therapeutic relationship and encourage open, honest communication. Assisted patient in identifying risk factors which would indicate the need for higher level of care including thoughts to harm self or others and/or self-harming behavior and encouraged patient to contact this office, call 911, or present to the nearest emergency room should any of these events occur. Discussed crisis intervention services and means to access.  Patient adamantly and convincingly denies current suicidal or homicidal ideation or perceptual disturbance.      Assessment    Patient is struggling with increased symptoms at this time due to relationship issues and ongoing and increasing issues with her mother.  The patient's symptomology continue to cause impairment in important areas of functioning.  Patient can be reasonably expected to continue to benefit treatment and would likely be at increased risk for decompensation.    Diagnoses and all orders for this visit:    1. Bipolar II disorder (CMS/[facility])  (Primary)    2. Family dynamics problem               Mental Status Exam  Hygiene: Good  Dress: Casual  Attitude:  Cooperative  Motor Activity: Appropriate  Speech:  Normal  Mood: Within normal limits  Affect: Tearful  Thought Processes:  Linear  Thought Content:  normal  Suicidal Thoughts:  denies  Homicidal Thoughts:  denies  Crisis Safety Plan: yes, to come to the emergency room.  Hallucinations:  denies    Patient's Support Network Includes:  mother and extended family    Progress toward goal: Not at goal    Functional Status: Mild impairment     Prognosis: Fair with Ongoing Treatment     Plan         Patient will continue in individual outpatient therapy session [facility] every 3 weeks and will continue and pharmacotherapy as scheduled with MARISELA Dixon.  Patient will adhere to medication regimen as prescribed and report any side effects. Patient will contact this office, call 911 or present to the nearest emergency room should suicidal or homicidal ideations occur. Provide Cognitive Behavioral Therapy and Integrative Therapy to improve functioning, maintain stability, and avoid decompensation and the need for higher level of care.          Return in about 3 weeks (around 5/18/2021) for Next scheduled follow up.      This document signed by Vidal Ortiz LCSW, [facility] April 27, 2021 14:32 EDT

## 2021-05-25 ENCOUNTER — HOSPITAL ENCOUNTER (OUTPATIENT)
Dept: GENERAL RADIOLOGY | Facility: HOSPITAL | Age: 51
Discharge: HOME | End: 2021-05-25
Admitting: NURSE PRACTITIONER

## 2021-05-25 ENCOUNTER — TRANSCRIBE ORDERS (OUTPATIENT)
Dept: ADMINISTRATIVE | Facility: HOSPITAL | Age: 51
End: 2021-05-25

## 2021-05-25 DIAGNOSIS — R05.9 COUGH: Primary | ICD-10-CM

## 2021-05-25 DIAGNOSIS — R05.9 COUGH: ICD-10-CM

## 2021-05-25 PROCEDURE — 71046 X-RAY EXAM CHEST 2 VIEWS: CPT

## 2021-05-25 PROCEDURE — 71046 X-RAY EXAM CHEST 2 VIEWS: CPT | Performed by: RADIOLOGY

## 2021-06-22 ENCOUNTER — OFFICE VISIT (OUTPATIENT)
Dept: PSYCHIATRY | Facility: CLINIC | Age: 51
End: 2021-06-22

## 2021-06-22 DIAGNOSIS — Z63.9 FAMILY DYNAMICS PROBLEM: ICD-10-CM

## 2021-06-22 DIAGNOSIS — F31.81 BIPOLAR II DISORDER (HCC): Primary | ICD-10-CM

## 2021-06-22 DIAGNOSIS — Z63.8 STRESS DUE TO FAMILY TENSION: ICD-10-CM

## 2021-06-22 PROCEDURE — 90834 PSYTX W PT 45 MINUTES: CPT | Performed by: SOCIAL WORKER

## 2021-06-22 SDOH — SOCIAL STABILITY - SOCIAL INSECURITY: OTHER SPECIFIED PROBLEMS RELATED TO PRIMARY SUPPORT GROUP: Z63.8

## 2021-06-22 SDOH — SOCIAL STABILITY - SOCIAL INSECURITY: PROBLEM RELATED TO PRIMARY SUPPORT GROUP, UNSPECIFIED: Z63.9

## 2021-06-23 NOTE — PROGRESS NOTES
"-  Date of Service: June 22, 2021  Time In: 12:45 PM  Time Out: 1:35 PM    PROGRESS NOTE  Data:  Tammi Levine is a 51 y.o. female who met 1:1 with the undersigned for a regularly scheduled individual outpatient therapy session at [facility] for follow-up of bipolar disorder.  Patient and the undersigned wore masks throughout the session and maintained appropriate distancing.     HPI: Patient immediately begins discussing the fact that one of her close friends in her apartment complex recently became sick and is now hospitalized at [facility] on a ventilator.  Patient states she is very afraid for her and plans to attempt to go see her following this session.  Patient also reports she and her mother travel to [facility] to her nephew's high school graduation which resulted in several arguments during the trip as the patient states her mother ascribes to various conspiracy theories.  Patient states her mother told her that all the \"movie stars\" have been murdered and are clones.  Patient also states she has been reluctant to get the COVID-19 vaccine today as she is unsure of its safety and efficacy.  Further, she states she has been told by various people that he is \"genocide juice\".  The patient reports she continues to struggle with periods of depressed mood, anhedonia, anergia, and feeling hopeless.  Patient rates current symptoms at a 4 on a scale of 1-10 with 10 being most severe.  She denies any significant symptoms of hypomania other than having periods where she feels somewhat irritable and has a tendency to lash out at others.  Patient reports she is sleeping relatively well at this time.  The patient adamantly and convincingly denies suicidal ideation vehemently denies any substance use.      Clinical Maneuvering/Intervention:  Assisted patient in processing above session content; acknowledged and normalized patient’s thoughts, feelings, and concerns.   Allow " the patient to discuss/process her feelings concerning her friends current illness and validated her feelings.  Also allow the patient to discuss/vent her feelings and frustrations concerning ongoing tension between her and her mother and the difficulties they have experienced as of late.  Utilized motivational interviewing techniques including complex reflections to discussed the concept of things we can control things we cannot control and strongly urged the patient to remind her so she cannot control her decisions or behaviors of others, not even her mother.  Also validated the patient's right to remove herself from harmful situations such as being bombarded with various computer spheres the series.  Also encouraged the patient to seek out factual scientific information concerning the COVID-19 vaccine and encouraged her to discuss it with her primary care provider.  Continue to focus on healthy skills of daily living and behavioral activation.    Allowed patient to freely discuss issues without interruption or judgment. Provided safe, confidential environment to facilitate the development of positive therapeutic relationship and encourage open, honest communication. Assisted patient in identifying risk factors which would indicate the need for higher level of care including thoughts to harm self or others and/or self-harming behavior and encouraged patient to contact this office, call 911, or present to the nearest emergency room should any of these events occur. Discussed crisis intervention services and means to access.  Patient adamantly and convincingly denies current suicidal or homicidal ideation or perceptual disturbance.      Assessment    Patient appears to currently be experiencing moderate exacerbation of symptomology due to the stress of her friend's illness and ongoing strained family dynamics and difficulties with her mother.  The patient's symptomology continue to cause impairment in important  areas of functioning.  Patient can be reasonably expected to continue to benefit treatment and would likely be at increased risk for decompensation.    Diagnoses and all orders for this visit:    1. Bipolar II disorder (CMS/HCC) (Primary)    2. Family dynamics problem    3. Stress due to family tension               Mental Status Exam  Hygiene: Good  Dress: Casual  Attitude:  Cooperative  Motor Activity: Appropriate  Speech:  Normal  Mood: Within normal limits  Affect: Tearful  Thought Processes:  Linear  Thought Content:  normal  Suicidal Thoughts:  denies  Homicidal Thoughts:  denies  Crisis Safety Plan: yes, to come to the emergency room.  Hallucinations:  denies    Patient's Support Network Includes:  mother and extended family    Progress toward goal: Not at goal    Functional Status: Mild impairment     Prognosis: Fair with Ongoing Treatment     Plan         Patient will continue in individual outpatient therapy session [facility] every 3 weeks and will continue and pharmacotherapy as scheduled with MARISELA Dixon.  Patient will adhere to medication regimen as prescribed and report any side effects. Patient will contact this office, call 911 or present to the nearest emergency room should suicidal or homicidal ideations occur. Provide Cognitive Behavioral Therapy and Integrative Therapy to improve functioning, maintain stability, and avoid decompensation and the need for higher level of care.          Return in about 3 weeks (around 7/13/2021) for Next scheduled follow up.      This document signed by Vidal Ortiz LCSW, [facility] June 23, 2021 07:20 EDT

## 2021-07-20 ENCOUNTER — OFFICE VISIT (OUTPATIENT)
Dept: PSYCHIATRY | Facility: CLINIC | Age: 51
End: 2021-07-20

## 2021-07-20 DIAGNOSIS — Z63.9 FAMILY DYNAMICS PROBLEM: ICD-10-CM

## 2021-07-20 DIAGNOSIS — F31.81 BIPOLAR II DISORDER (HCC): Primary | ICD-10-CM

## 2021-07-20 PROCEDURE — 90837 PSYTX W PT 60 MINUTES: CPT | Performed by: SOCIAL WORKER

## 2021-07-20 SDOH — SOCIAL STABILITY - SOCIAL INSECURITY: PROBLEM RELATED TO PRIMARY SUPPORT GROUP, UNSPECIFIED: Z63.9

## 2021-07-21 NOTE — PROGRESS NOTES
"-  Date of Service: 2021  Time In: 12:30 PM  Time Out: 1:30 PM    PROGRESS NOTE  Data:  Tammi Levine is a 51 y.o. female who met 1:1 with the undersigned for a regularly scheduled individual outpatient therapy session at [facility] for follow-up of bipolar disorder.  Patient and the undersigned wore masks throughout the session and maintained appropriate distancing.     HPI: Patient immediately begins discussing ongoing difficulties with her mother and states her mother has \"lost it\".  The patient reports her mother has been susceptible to conspiracy series and states she has told the patient that various politicians have been murdered and replaced with clones.  She reports her mother also tells her she is unable to watch television because she is not sure which  is participating in child sex trafficking.  Patient further reports she feels a great deal of anxiety and uncertainty due to various social issues at the time including whether or not to get the COVID-19 vaccine.  The patient states she understands the importance of protecting herself from the virus but states she has difficulty as she is also being told various things from various people including the fact that the vaccine is the way for the government to \"send the population out\" and that many, many people have  from it.  The patient reports she continues to struggle with periods of depressed mood, anhedonia, anergia, and feeling hopeless.  Patient rates current symptoms at a 5 on a scale of 1-10 with 10 being most severe.  She denies any significant symptoms of hypomania other than having periods where she feels somewhat irritable and has a tendency to lash out at others.  Patient reports she is sleeping relatively well at this time.  The patient adamantly and convincingly denies suicidal ideation vehemently denies any substance use.      Clinical Maneuvering/Intervention:  Assisted patient in " processing above session content; acknowledged and normalized patient’s thoughts, feelings, and concerns.   Allow the patient to discuss/process her feelings and frustrations with her mother and validated her feelings.  Also utilized motivational interviewing techniques including complex reflections to discussed the concept of things we can control things we cannot control strongly urged the patient to remind her so she cannot control behaviors and decisions of others.  Further validated the patient's right to remove herself from the situations if she so chooses.  The undersigned also encouraged the patient to consider there is now significant body of knowledge concerning the COVID-19 vaccine and encouraged her to discuss this with her primary care provider.  Also utilize cognitive behavioral therapy to assist the patient in developing appropriate coping mechanisms decrease in severity and frequency of symptoms and also encouraged her to limit the time she spends watching the news or listening to various conspiracy theories from others.  Continue to focus on healthy skills of daily living and behavioral activation.    Allowed patient to freely discuss issues without interruption or judgment. Provided safe, confidential environment to facilitate the development of positive therapeutic relationship and encourage open, honest communication. Assisted patient in identifying risk factors which would indicate the need for higher level of care including thoughts to harm self or others and/or self-harming behavior and encouraged patient to contact this office, call 911, or present to the nearest emergency room should any of these events occur. Discussed crisis intervention services and means to access.  Patient adamantly and convincingly denies current suicidal or homicidal ideation or perceptual disturbance.      Assessment    Patient appears to currently be experiencing moderate exacerbation of symptomology due to the  stress of her friend's illness and ongoing strained family dynamics and difficulties with her mother.  The patient's symptomology continue to cause impairment in important areas of functioning.  Patient can be reasonably expected to continue to benefit treatment and would likely be at increased risk for decompensation.    Diagnoses and all orders for this visit:    1. Bipolar II disorder (CMS/HCC) (Primary)    2. Family dynamics problem               Mental Status Exam  Hygiene: Good  Dress: Casual  Attitude:  Cooperative  Motor Activity: Appropriate  Speech:  Normal  Mood: Within normal limits  Affect: Tearful  Thought Processes:  Linear  Thought Content:  normal  Suicidal Thoughts:  denies  Homicidal Thoughts:  denies  Crisis Safety Plan: yes, to come to the emergency room.  Hallucinations:  denies    Patient's Support Network Includes:  mother and extended family    Progress toward goal: Not at goal    Functional Status: Mild impairment     Prognosis: Fair with Ongoing Treatment     Plan         Patient will continue in individual outpatient therapy session [facility] every 3 weeks and will continue and pharmacotherapy as scheduled with MARISELA Dixon.  Patient will adhere to medication regimen as prescribed and report any side effects. Patient will contact this office, call 911 or present to the nearest emergency room should suicidal or homicidal ideations occur. Provide Cognitive Behavioral Therapy and Integrative Therapy to improve functioning, maintain stability, and avoid decompensation and the need for higher level of care.          Return in about 3 weeks (around 8/10/2021) for Next scheduled follow up.      This document signed by Vidal Ortiz LCSW, [facility] July 21, 2021 14:17 EDT

## 2021-07-29 ENCOUNTER — OFFICE VISIT (OUTPATIENT)
Dept: PSYCHIATRY | Facility: CLINIC | Age: 51
End: 2021-07-29

## 2021-07-29 VITALS
HEART RATE: 72 BPM | WEIGHT: 163.4 LBS | HEIGHT: 59 IN | BODY MASS INDEX: 32.94 KG/M2 | SYSTOLIC BLOOD PRESSURE: 116 MMHG | DIASTOLIC BLOOD PRESSURE: 82 MMHG

## 2021-07-29 DIAGNOSIS — F41.3 OTHER MIXED ANXIETY DISORDERS: ICD-10-CM

## 2021-07-29 DIAGNOSIS — F31.81 BIPOLAR II DISORDER (HCC): Primary | ICD-10-CM

## 2021-07-29 PROCEDURE — 99213 OFFICE O/P EST LOW 20 MIN: CPT | Performed by: NURSE PRACTITIONER

## 2021-07-29 RX ORDER — LITHIUM CARBONATE 300 MG/1
300 CAPSULE ORAL EVERY EVENING
Qty: 30 CAPSULE | Refills: 2 | Status: SHIPPED | OUTPATIENT
Start: 2021-07-29 | End: 2021-10-27 | Stop reason: SDUPTHER

## 2021-07-29 RX ORDER — OMEPRAZOLE MAGNESIUM 10 MG/1
GRANULE, DELAYED RELEASE ORAL
COMMUNITY

## 2021-07-29 NOTE — PROGRESS NOTES
Subjective   Tammi Levine is a 51 y.o. female is here today for medication management follow-up at the [facility], she presents to her appointment on time.    Chief Complaint: Bipolar Disorder    History of Present Illness  She states that she is doing well with her current medications, denies any SE or problems and she is taking as prescribed.  She states that she and her mother are not talking at this time, she states that its because her mother is believing a lot of conspiracy theories and mother's boyfriend is coming between them.  She is getting ready to move into a new remodeled apartment which is exciting.  She states that she has a new boyfriend with whom she is traveling with, he lives in [place].  She rates her mood today about 4/10 with 10 being the worse.  She rates her anxiety about 1/10 with 10 being the worse, she states that she got a little nervous yesterday with construction happening at her home.  She has been able to go into the stores without much trouble, she does avoid big crowded places.  She lost a friend to cancer about a month ago; it was her neighbor and spend a lot of time with her.  She is getting about 6 hours per night with no NM.  Appetite has been increased with no weight changes.  States that she continues to have bilateral leg pain.  Denies any AV hallucinations, denies any SI/HI.  Denies any substance.       The following portions of the patient's history were reviewed and updated as appropriate: allergies, current medications, past family history, past medical history, past social history, past surgical history and problem list.    Review of Systems   Musculoskeletal: Positive for arthralgias and gait problem.   Psychiatric/Behavioral: The patient is nervous/anxious.        Objective   Physical Exam   Constitutional: She appears well-developed. No distress.   Neurological: She is alert. Coordination and gait normal.   Vitals reviewed.    Blood pressure 116/82, pulse  "72, height 149.9 cm (59.02\"), weight 74.1 kg (163 lb 6.4 oz).  Body mass index is 32.98 kg/m².    Medication List:   Current Outpatient Medications   Medication Sig Dispense Refill   • aspirin 81 MG EC tablet Take 81 mg by mouth Every Evening.     • atorvastatin (LIPITOR) 80 MG tablet Take 1 tablet by mouth Every Night. 30 tablet 3   • Canagliflozin (INVOKANA) 100 MG tablet Take 100 mg by mouth Every Evening.     • insulin aspart (NovoLOG) 100 UNIT/ML injection Novolog 75/25, take 20u in AM and 20u in PM     • insulin aspart prot-insulin aspart (novoLOG 70/30) (70-30) 100 UNIT/ML injection Inject 20 Units under the skin into the appropriate area as directed 2 (Two) Times a Day With Meals.     • levothyroxine (SYNTHROID, LEVOTHROID) 50 MCG tablet Take 50 mcg by mouth Every Evening.     • lisinopril (PRINIVIL,ZESTRIL) 10 MG tablet Take 10 mg by mouth Daily.     • lithium carbonate 300 MG capsule Take 1 capsule by mouth Every Evening. 30 capsule 2   • Omeprazole Magnesium (PrilOSEC) 10 MG pack        No current facility-administered medications for this visit.       Mental Status Exam:   Hygiene:   good  Cooperation:  Cooperative  Eye Contact:  Fair  Psychomotor Behavior:  Appropriate  Affect:  Appropriate  Hopelessness: Denies  Speech:  Normal  Thought Process:  Goal directed and Linear  Thought Content:  Mood congruent  Suicidal:  None  Homicidal:  None  Hallucinations:  None  Delusion:  None  Memory:  Intact  Orientation:  Person, Place, Time and Situation  Reliability:  fair  Insight:  Fair  Judgement:  Fair  Impulse Control:  Fair  Physical/Medical Issues:  No     Assessment/Plan   Problems Addressed this Visit     None      Visit Diagnoses     Bipolar II disorder (CMS/HCC)    -  Primary    Relevant Medications    lithium carbonate 300 MG capsule    Other mixed anxiety disorders        Relevant Medications    lithium carbonate 300 MG capsule      Diagnoses       Codes Comments    Bipolar II disorder (CMS/HCC)    - "  Primary ICD-10-CM: F31.81  ICD-9-CM: 296.89     Other mixed anxiety disorders     ICD-10-CM: F41.3  ICD-9-CM: 300.09         Discussed medication options. Continue the lithium for mood, labs reviewed.  Reviewed the risks, benefits, and side effects of the medications; patient acknowledged and verbally consented.  Patient is agreeable to call the [facility] with any worsening of symptoms.  Patient is aware to call 911 or go to the nearest ER should begin having SI/HI.  Medical decision making reviewed with no changes in medications.     Prognosis: Guarded dependent on medication, follow up appointment and treatment plan compliance     Functionality: Fair.  Symptoms are mostly under control with periodic episodes of increased anxiety.    Return in 12 weeks

## 2021-08-06 RX ORDER — LISINOPRIL 10 MG/1
TABLET ORAL
Qty: 30 TABLET | Refills: 2 | Status: SHIPPED | OUTPATIENT
Start: 2021-08-06 | End: 2021-10-08

## 2021-09-20 ENCOUNTER — APPOINTMENT (OUTPATIENT)
Dept: GENERAL RADIOLOGY | Facility: HOSPITAL | Age: 51
End: 2021-09-20

## 2021-09-20 ENCOUNTER — HOSPITAL ENCOUNTER (EMERGENCY)
Facility: HOSPITAL | Age: 51
Discharge: HOME | End: 2021-09-20
Attending: EMERGENCY MEDICINE | Admitting: EMERGENCY MEDICINE

## 2021-09-20 VITALS
TEMPERATURE: 97.9 F | RESPIRATION RATE: 20 BRPM | BODY MASS INDEX: 33.26 KG/M2 | HEART RATE: 72 BPM | SYSTOLIC BLOOD PRESSURE: 121 MMHG | DIASTOLIC BLOOD PRESSURE: 75 MMHG | HEIGHT: 59 IN | WEIGHT: 165 LBS | OXYGEN SATURATION: 100 %

## 2021-09-20 DIAGNOSIS — U07.1 COVID-19: Primary | ICD-10-CM

## 2021-09-20 DIAGNOSIS — N39.0 ACUTE UTI: ICD-10-CM

## 2021-09-20 LAB
ALBUMIN SERPL-MCNC: 3.54 G/DL (ref 3.5–5.2)
ALBUMIN/GLOB SERPL: 0.9 G/DL
ALP SERPL-CCNC: 82 U/L (ref 39–117)
ALT SERPL W P-5'-P-CCNC: 12 U/L (ref 1–33)
ANION GAP SERPL CALCULATED.3IONS-SCNC: 13.5 MMOL/L (ref 5–15)
AST SERPL-CCNC: 21 U/L (ref 1–32)
BACTERIA UR QL AUTO: ABNORMAL /HPF
BASOPHILS # BLD AUTO: 0 10*3/MM3 (ref 0–0.2)
BASOPHILS NFR BLD AUTO: 0 % (ref 0–1.5)
BILIRUB SERPL-MCNC: 0.3 MG/DL (ref 0–1.2)
BILIRUB UR QL STRIP: NEGATIVE
BUN SERPL-MCNC: 13 MG/DL (ref 6–20)
BUN/CREAT SERPL: 15.9 (ref 7–25)
CALCIUM SPEC-SCNC: 9.5 MG/DL (ref 8.6–10.5)
CHLORIDE SERPL-SCNC: 105 MMOL/L (ref 98–107)
CLARITY UR: ABNORMAL
CO2 SERPL-SCNC: 14.5 MMOL/L (ref 22–29)
COLOR UR: ABNORMAL
CREAT SERPL-MCNC: 0.82 MG/DL (ref 0.57–1)
CRP SERPL-MCNC: 4.42 MG/DL (ref 0–0.5)
DEPRECATED RDW RBC AUTO: 42.5 FL (ref 37–54)
EOSINOPHIL # BLD AUTO: 0.02 10*3/MM3 (ref 0–0.4)
EOSINOPHIL NFR BLD AUTO: 0.5 % (ref 0.3–6.2)
ERYTHROCYTE [DISTWIDTH] IN BLOOD BY AUTOMATED COUNT: 13.6 % (ref 12.3–15.4)
FLUAV RNA RESP QL NAA+PROBE: NOT DETECTED
FLUBV RNA RESP QL NAA+PROBE: NOT DETECTED
GFR SERPL CREATININE-BSD FRML MDRD: 73 ML/MIN/1.73
GLOBULIN UR ELPH-MCNC: 4 GM/DL
GLUCOSE SERPL-MCNC: 242 MG/DL (ref 65–99)
GLUCOSE UR STRIP-MCNC: ABNORMAL MG/DL
GRAN CASTS URNS QL MICRO: ABNORMAL /LPF
HCT VFR BLD AUTO: 36 % (ref 34–46.6)
HGB BLD-MCNC: 11.8 G/DL (ref 12–15.9)
HGB UR QL STRIP.AUTO: NEGATIVE
HOLD SPECIMEN: NORMAL
HOLD SPECIMEN: NORMAL
HYALINE CASTS UR QL AUTO: ABNORMAL /LPF
IMM GRANULOCYTES # BLD AUTO: 0.03 10*3/MM3 (ref 0–0.05)
IMM GRANULOCYTES NFR BLD AUTO: 0.8 % (ref 0–0.5)
KETONES UR QL STRIP: ABNORMAL
LEUKOCYTE ESTERASE UR QL STRIP.AUTO: ABNORMAL
LIPASE SERPL-CCNC: 80 U/L (ref 13–60)
LITHIUM SERPL-SCNC: 0.5 MMOL/L (ref 0.6–1.2)
LYMPHOCYTES # BLD AUTO: 0.73 10*3/MM3 (ref 0.7–3.1)
LYMPHOCYTES NFR BLD AUTO: 18.6 % (ref 19.6–45.3)
MCH RBC QN AUTO: 28.2 PG (ref 26.6–33)
MCHC RBC AUTO-ENTMCNC: 32.8 G/DL (ref 31.5–35.7)
MCV RBC AUTO: 85.9 FL (ref 79–97)
MONOCYTES # BLD AUTO: 0.3 10*3/MM3 (ref 0.1–0.9)
MONOCYTES NFR BLD AUTO: 7.7 % (ref 5–12)
NEUTROPHILS NFR BLD AUTO: 2.84 10*3/MM3 (ref 1.7–7)
NEUTROPHILS NFR BLD AUTO: 72.4 % (ref 42.7–76)
NITRITE UR QL STRIP: NEGATIVE
NRBC BLD AUTO-RTO: 0 /100 WBC (ref 0–0.2)
NT-PROBNP SERPL-MCNC: 96 PG/ML (ref 0–900)
PH UR STRIP.AUTO: 5.5 [PH] (ref 5–8)
PLATELET # BLD AUTO: 222 10*3/MM3 (ref 140–450)
PMV BLD AUTO: 9.5 FL (ref 6–12)
POTASSIUM SERPL-SCNC: 4.9 MMOL/L (ref 3.5–5.2)
PROT SERPL-MCNC: 7.5 G/DL (ref 6–8.5)
PROT UR QL STRIP: ABNORMAL
QT INTERVAL: 388 MS
QTC INTERVAL: 412 MS
RBC # BLD AUTO: 4.19 10*6/MM3 (ref 3.77–5.28)
RBC # UR: ABNORMAL /HPF
REF LAB TEST METHOD: ABNORMAL
SARS-COV-2 RNA RESP QL NAA+PROBE: DETECTED
SODIUM SERPL-SCNC: 133 MMOL/L (ref 136–145)
SP GR UR STRIP: 1.03 (ref 1–1.03)
SQUAMOUS #/AREA URNS HPF: ABNORMAL /HPF
TROPONIN T SERPL-MCNC: 0.02 NG/ML (ref 0–0.03)
TROPONIN T SERPL-MCNC: 0.03 NG/ML (ref 0–0.03)
UROBILINOGEN UR QL STRIP: ABNORMAL
WBC # BLD AUTO: 3.92 10*3/MM3 (ref 3.4–10.8)
WBC UR QL AUTO: ABNORMAL /HPF
WHOLE BLOOD HOLD SPECIMEN: NORMAL
WHOLE BLOOD HOLD SPECIMEN: NORMAL
YEAST URNS QL MICRO: ABNORMAL /HPF

## 2021-09-20 PROCEDURE — 96365 THER/PROPH/DIAG IV INF INIT: CPT

## 2021-09-20 PROCEDURE — 99284 EMERGENCY DEPT VISIT MOD MDM: CPT

## 2021-09-20 PROCEDURE — 93005 ELECTROCARDIOGRAM TRACING: CPT | Performed by: EMERGENCY MEDICINE

## 2021-09-20 PROCEDURE — 80053 COMPREHEN METABOLIC PANEL: CPT | Performed by: PHYSICIAN ASSISTANT

## 2021-09-20 PROCEDURE — 25010000002 ONDANSETRON PER 1 MG: Performed by: NURSE PRACTITIONER

## 2021-09-20 PROCEDURE — 71045 X-RAY EXAM CHEST 1 VIEW: CPT | Performed by: RADIOLOGY

## 2021-09-20 PROCEDURE — 99283 EMERGENCY DEPT VISIT LOW MDM: CPT

## 2021-09-20 PROCEDURE — 84484 ASSAY OF TROPONIN QUANT: CPT | Performed by: PHYSICIAN ASSISTANT

## 2021-09-20 PROCEDURE — 87636 SARSCOV2 & INF A&B AMP PRB: CPT | Performed by: PHYSICIAN ASSISTANT

## 2021-09-20 PROCEDURE — 83880 ASSAY OF NATRIURETIC PEPTIDE: CPT | Performed by: PHYSICIAN ASSISTANT

## 2021-09-20 PROCEDURE — 83690 ASSAY OF LIPASE: CPT | Performed by: PHYSICIAN ASSISTANT

## 2021-09-20 PROCEDURE — 71045 X-RAY EXAM CHEST 1 VIEW: CPT

## 2021-09-20 PROCEDURE — 96375 TX/PRO/DX INJ NEW DRUG ADDON: CPT

## 2021-09-20 PROCEDURE — 86140 C-REACTIVE PROTEIN: CPT | Performed by: PHYSICIAN ASSISTANT

## 2021-09-20 PROCEDURE — 93005 ELECTROCARDIOGRAM TRACING: CPT | Performed by: PHYSICIAN ASSISTANT

## 2021-09-20 PROCEDURE — 25010000002 CEFTRIAXONE PER 250 MG: Performed by: NURSE PRACTITIONER

## 2021-09-20 PROCEDURE — 80178 ASSAY OF LITHIUM: CPT | Performed by: PHYSICIAN ASSISTANT

## 2021-09-20 PROCEDURE — 81001 URINALYSIS AUTO W/SCOPE: CPT | Performed by: PHYSICIAN ASSISTANT

## 2021-09-20 PROCEDURE — 93010 ELECTROCARDIOGRAM REPORT: CPT | Performed by: INTERNAL MEDICINE

## 2021-09-20 PROCEDURE — 85025 COMPLETE CBC W/AUTO DIFF WBC: CPT | Performed by: PHYSICIAN ASSISTANT

## 2021-09-20 RX ORDER — ONDANSETRON 2 MG/ML
4 INJECTION INTRAMUSCULAR; INTRAVENOUS ONCE
Status: COMPLETED | OUTPATIENT
Start: 2021-09-20 | End: 2021-09-20

## 2021-09-20 RX ORDER — BENZONATATE 200 MG/1
200 CAPSULE ORAL 3 TIMES DAILY PRN
Qty: 30 CAPSULE | Refills: 0 | Status: SHIPPED | OUTPATIENT
Start: 2021-09-20 | End: 2021-10-27 | Stop reason: ALTCHOICE

## 2021-09-20 RX ORDER — NITROFURANTOIN 25; 75 MG/1; MG/1
100 CAPSULE ORAL 2 TIMES DAILY
Qty: 14 CAPSULE | Refills: 0 | Status: SHIPPED | OUTPATIENT
Start: 2021-09-20 | End: 2021-10-27 | Stop reason: ALTCHOICE

## 2021-09-20 RX ORDER — SODIUM CHLORIDE 0.9 % (FLUSH) 0.9 %
10 SYRINGE (ML) INJECTION AS NEEDED
Status: DISCONTINUED | OUTPATIENT
Start: 2021-09-20 | End: 2021-09-21 | Stop reason: HOSPADM

## 2021-09-20 RX ORDER — DEXAMETHASONE 6 MG/1
6 TABLET ORAL
Qty: 7 TABLET | Refills: 0 | Status: SHIPPED | OUTPATIENT
Start: 2021-09-20 | End: 2021-10-27 | Stop reason: ALTCHOICE

## 2021-09-20 RX ADMIN — ONDANSETRON 4 MG: 2 INJECTION INTRAMUSCULAR; INTRAVENOUS at 21:32

## 2021-09-20 RX ADMIN — CEFTRIAXONE SODIUM 2 G: 2 INJECTION, POWDER, FOR SOLUTION INTRAMUSCULAR; INTRAVENOUS at 21:32

## 2021-09-20 RX ADMIN — SODIUM CHLORIDE 1000 ML: 9 INJECTION, SOLUTION INTRAVENOUS at 21:32

## 2021-09-20 NOTE — ED NOTES
MEDICAL SCREENING:    Reason for Visit: Pt tested positive for covid 09/10/2021 and c/o nasuea, abd pain, CP and SOB.     Patient initially seen in triage.  The patient was advised further evaluation and diagnostic testing will be needed, some of the treatment and testing will be initiated in the lobby in order to begin the process.  The patient will be returned to the waiting area for the time being and possibly be re-assessed by a subsequent ED provider.  The patient will be brought back to the treatment area in as timely manner as possible.         Carmen Betancourt PA  09/20/21 3219

## 2021-09-21 NOTE — ED PROVIDER NOTES
"Subjective   Patient is a 51 year old female with PMH consistent with hypertension, diabetes mellitus, migraines, depression, anxiety, hyperlipidemia, thyroid disease and sleep apnea. She presents to the ED today with complaints of generalized weakness, shortness of breath, nausea, and abdominal pain. Patient reports she recently tested positive for Covid 19. She denies any other significant complaints at this time.       History provided by:  Patient  URI  Presenting symptoms: congestion, cough and fatigue    Presenting symptoms: no fever    Severity:  Moderate  Timing:  Constant  Progression:  Waxing and waning  Risk factors: diabetes mellitus and recent illness        Review of Systems   Constitutional: Positive for fatigue. Negative for fever.   HENT: Positive for congestion.    Eyes: Negative.    Respiratory: Positive for cough and shortness of breath.    Cardiovascular: Negative.    Gastrointestinal: Positive for nausea. Negative for vomiting.   Endocrine: Negative.    Genitourinary: Negative for flank pain, frequency and urgency.   Musculoskeletal: Negative.    Skin: Negative.    Allergic/Immunologic: Negative.    Neurological: Negative.    Hematological: Negative.    Psychiatric/Behavioral: Negative.        Past Medical History:   Diagnosis Date   • Abnormal CT scan     ADRENAL GLAND FOCAL MASS LESION MULTIPLE, LEFT ONLY   • Anxiety    • ASCVD (arteriosclerotic cardiovascular disease)     \"status post MI in 2012, clinically stable\"   • Bipolar disorder (CMS/HCC)    • Depression    • Diabetes mellitus (CMS/HCC)    • Disease of thyroid gland    • Dyslipidemia    • Hypertension, well controlled    • Migraine headache    • Myocardial infarction (CMS/HCC) 2012   • Panic disorder    • Sleep apnea        Allergies   Allergen Reactions   • Zoloft [Sertraline Hcl] Nausea And Vomiting   • Ciprofloxacin Rash     Also caused chest pain       Past Surgical History:   Procedure Laterality Date   • CHOLECYSTECTOMY     • " CORONARY ANGIOPLASTY WITH STENT PLACEMENT  2012    Dr. Denson   • ENDOSCOPY  March 2021   • HYSTERECTOMY  12/2014   • NECK SURGERY  2010   • TRANSESOPHAGEAL ECHOCARDIOGRAM (SARAN)  07/13/2015    EF 60-65%       Family History   Problem Relation Age of Onset   • Diabetes Father    • Diabetes Brother    • Cancer Maternal Grandmother    • Heart disease Maternal Grandfather    • Diabetes Paternal Grandfather    • Cancer Other        Social History     Socioeconomic History   • Marital status: Single     Spouse name: Not on file   • Number of children: Not on file   • Years of education: Not on file   • Highest education level: Not on file   Tobacco Use   • Smoking status: Never Smoker   • Smokeless tobacco: Never Used   Vaping Use   • Vaping Use: Never used   Substance and Sexual Activity   • Alcohol use: No   • Drug use: No   • Sexual activity: Never           Objective   Physical Exam  Vitals and nursing note reviewed.   Constitutional:       General: She is not in acute distress.     Appearance: She is well-developed. She is obese. She is not toxic-appearing.   HENT:      Head: Normocephalic and atraumatic.      Mouth/Throat:      Mouth: Mucous membranes are moist.      Pharynx: Oropharynx is clear.   Eyes:      Pupils: Pupils are equal, round, and reactive to light.   Cardiovascular:      Rate and Rhythm: Normal rate and regular rhythm.   Pulmonary:      Effort: Pulmonary effort is normal. No accessory muscle usage or respiratory distress.      Breath sounds: Decreased breath sounds present.   Abdominal:      General: Bowel sounds are normal.      Palpations: Abdomen is soft.   Musculoskeletal:         General: Normal range of motion.      Cervical back: Normal range of motion and neck supple.   Skin:     General: Skin is warm and dry.      Capillary Refill: Capillary refill takes less than 2 seconds.   Neurological:      General: No focal deficit present.      Mental Status: She is alert and oriented to person,  place, and time.   Psychiatric:         Mood and Affect: Mood normal.         Behavior: Behavior normal.         Procedures        Results for orders placed or performed during the hospital encounter of 09/20/21   COVID-19 and FLU A/B PCR - Swab, Nasopharynx    Specimen: Nasopharynx; Swab   Result Value Ref Range    COVID19 Detected (C) Not Detected - Ref. Range    Influenza A PCR Not Detected Not Detected    Influenza B PCR Not Detected Not Detected   Urinalysis With Microscopic If Indicated (No Culture) - Urine, Clean Catch    Specimen: Urine, Clean Catch   Result Value Ref Range    Color, UA Dark Yellow (A) Yellow, Straw    Appearance, UA Turbid (A) Clear    pH, UA 5.5 5.0 - 8.0    Specific Gravity, UA 1.028 1.005 - 1.030    Glucose,  mg/dL (Trace) (A) Negative    Ketones, UA 15 mg/dL (1+) (A) Negative    Bilirubin, UA Negative Negative    Blood, UA Negative Negative    Protein,  mg/dL (2+) (A) Negative    Leuk Esterase, UA Moderate (2+) (A) Negative    Nitrite, UA Negative Negative    Urobilinogen, UA 1.0 E.U./dL 0.2 - 1.0 E.U./dL   Comprehensive Metabolic Panel    Specimen: Arm, Right; Blood   Result Value Ref Range    Glucose 242 (H) 65 - 99 mg/dL    BUN 13 6 - 20 mg/dL    Creatinine 0.82 0.57 - 1.00 mg/dL    Sodium 133 (L) 136 - 145 mmol/L    Potassium 4.9 3.5 - 5.2 mmol/L    Chloride 105 98 - 107 mmol/L    CO2 14.5 (L) 22.0 - 29.0 mmol/L    Calcium 9.5 8.6 - 10.5 mg/dL    Total Protein 7.5 6.0 - 8.5 g/dL    Albumin 3.54 3.50 - 5.20 g/dL    ALT (SGPT) 12 1 - 33 U/L    AST (SGOT) 21 1 - 32 U/L    Alkaline Phosphatase 82 39 - 117 U/L    Total Bilirubin 0.3 0.0 - 1.2 mg/dL    eGFR Non African Amer 73 >60 mL/min/1.73    Globulin 4.0 gm/dL    A/G Ratio 0.9 g/dL    BUN/Creatinine Ratio 15.9 7.0 - 25.0    Anion Gap 13.5 5.0 - 15.0 mmol/L   Troponin    Specimen: Arm, Right; Blood   Result Value Ref Range    Troponin T 0.019 0.000 - 0.030 ng/mL   Troponin    Specimen: Arm, Right; Blood   Result Value Ref  Range    Troponin T 0.030 0.000 - 0.030 ng/mL   BNP    Specimen: Arm, Right; Blood   Result Value Ref Range    proBNP 96.0 0.0 - 900.0 pg/mL   C-reactive Protein    Specimen: Arm, Right; Blood   Result Value Ref Range    C-Reactive Protein 4.42 (H) 0.00 - 0.50 mg/dL   Lipase    Specimen: Arm, Right; Blood   Result Value Ref Range    Lipase 80 (H) 13 - 60 U/L   Lithium Level    Specimen: Arm, Right; Blood   Result Value Ref Range    Lithium 0.5 (L) 0.6 - 1.2 mmol/L   CBC Auto Differential    Specimen: Arm, Right; Blood   Result Value Ref Range    WBC 3.92 3.40 - 10.80 10*3/mm3    RBC 4.19 3.77 - 5.28 10*6/mm3    Hemoglobin 11.8 (L) 12.0 - 15.9 g/dL    Hematocrit 36.0 34.0 - 46.6 %    MCV 85.9 79.0 - 97.0 fL    MCH 28.2 26.6 - 33.0 pg    MCHC 32.8 31.5 - 35.7 g/dL    RDW 13.6 12.3 - 15.4 %    RDW-SD 42.5 37.0 - 54.0 fl    MPV 9.5 6.0 - 12.0 fL    Platelets 222 140 - 450 10*3/mm3    Neutrophil % 72.4 42.7 - 76.0 %    Lymphocyte % 18.6 (L) 19.6 - 45.3 %    Monocyte % 7.7 5.0 - 12.0 %    Eosinophil % 0.5 0.3 - 6.2 %    Basophil % 0.0 0.0 - 1.5 %    Immature Grans % 0.8 (H) 0.0 - 0.5 %    Neutrophils, Absolute 2.84 1.70 - 7.00 10*3/mm3    Lymphocytes, Absolute 0.73 0.70 - 3.10 10*3/mm3    Monocytes, Absolute 0.30 0.10 - 0.90 10*3/mm3    Eosinophils, Absolute 0.02 0.00 - 0.40 10*3/mm3    Basophils, Absolute 0.00 0.00 - 0.20 10*3/mm3    Immature Grans, Absolute 0.03 0.00 - 0.05 10*3/mm3    nRBC 0.0 0.0 - 0.2 /100 WBC   Urinalysis, Microscopic Only - Urine, Clean Catch    Specimen: Urine, Clean Catch   Result Value Ref Range    RBC, UA 3-5 (A) None Seen, 0-2 /HPF    WBC, UA 21-30 (A) None Seen, 0-2 /HPF    Bacteria, UA 2+ (A) None Seen /HPF    Squamous Epithelial Cells, UA 7-12 (A) None Seen, 0-2 /HPF    Yeast, UA Small/1+ Budding Yeast None Seen /HPF    Hyaline Casts, UA 3-6 None Seen /LPF    Granular Casts, UA 3-6 None Seen /LPF    Methodology Manual Light Microscopy    ECG 12 Lead   Result Value Ref Range    QT Interval  388 ms    QTC Interval 412 ms   Green Top (Gel)   Result Value Ref Range    Extra Tube Hold for add-ons.    Lavender Top   Result Value Ref Range    Extra Tube hold for add-on    Gold Top - SST   Result Value Ref Range    Extra Tube Hold for add-ons.    Light Blue Top   Result Value Ref Range    Extra Tube hold for add-on      XR Chest 1 View   Final Result     Minimal scattered bibasilar airspace disease that could represent   COVID-19 pneumonia.       This report was finalized on 9/20/2021 2:38 PM by Dr. Huseyin Archer MD.                ED Course  ED Course as of Sep 20 2210   Mon Sep 20, 2021   1554 Sinus rhythm with short SD interval.  Rate 68.  Normal axis.  Normal QT interval.  Inverted or isoelectric T waves in V1 through V3.  No ST elevation or depression.  Abnormal EKG.  Interpreted by me.   ECG 12 Lead [BC]   2205 CXR rad interpreted:   Minimal scattered bibasilar airspace disease that could represent  COVID-19 pneumonia.    [RB]      ED Course User Index  [BC] Pilo Arredondo MD  [RB] Tomas Gonzalez II, PA                                           [facility]    Final diagnoses:   COVID-19   Acute UTI       ED Disposition  ED Disposition     ED Disposition Condition Comment    Discharge Stable           Diane Rios  [address]  [facility] [id number] [phone]    Schedule an appointment as soon as possible for a visit            Medication List      New Prescriptions    benzonatate 200 MG capsule  Commonly known as: TESSALON  Take 1 capsule by mouth 3 (Three) Times a Day As Needed for Cough.     dexamethasone 6 MG tablet  Commonly known as: DECADRON  Take 1 tablet by mouth Daily With Breakfast.     nitrofurantoin (macrocrystal-monohydrate) 100 MG capsule  Commonly known as: MACROBID  Take 1 capsule by mouth 2 (Two) Times a Day.           Where to Get Your Medications      You can get these medications from any pharmacy    Bring a paper prescription for each of these medications  · benzonatate 200 MG  capsule  · dexamethasone 6 MG tablet  · nitrofurantoin (macrocrystal-monohydrate) 100 MG capsule          Michelle Dobbins, APRN  09/20/21 8200

## 2021-09-28 ENCOUNTER — TRANSCRIBE ORDERS (OUTPATIENT)
Dept: ADMINISTRATIVE | Facility: HOSPITAL | Age: 51
End: 2021-09-28

## 2021-09-28 DIAGNOSIS — Z01.818 OTHER SPECIFIED PRE-OPERATIVE EXAMINATION: Primary | ICD-10-CM

## 2021-10-01 ENCOUNTER — LAB (OUTPATIENT)
Dept: LAB | Facility: HOSPITAL | Age: 51
End: 2021-10-01

## 2021-10-01 DIAGNOSIS — Z01.818 OTHER SPECIFIED PRE-OPERATIVE EXAMINATION: ICD-10-CM

## 2021-10-01 LAB — SARS-COV-2 RNA PNL SPEC NAA+PROBE: DETECTED

## 2021-10-01 PROCEDURE — U0004 COV-19 TEST NON-CDC HGH THRU: HCPCS

## 2021-10-01 PROCEDURE — C9803 HOPD COVID-19 SPEC COLLECT: HCPCS

## 2021-10-08 RX ORDER — LISINOPRIL 10 MG/1
TABLET ORAL
Qty: 30 TABLET | Refills: 0 | Status: SHIPPED | OUTPATIENT
Start: 2021-10-08 | End: 2021-11-01

## 2021-10-27 ENCOUNTER — OFFICE VISIT (OUTPATIENT)
Dept: PSYCHIATRY | Facility: CLINIC | Age: 51
End: 2021-10-27

## 2021-10-27 VITALS
WEIGHT: 162.2 LBS | HEIGHT: 59 IN | TEMPERATURE: 98.2 F | DIASTOLIC BLOOD PRESSURE: 75 MMHG | HEART RATE: 62 BPM | BODY MASS INDEX: 32.7 KG/M2 | SYSTOLIC BLOOD PRESSURE: 136 MMHG | OXYGEN SATURATION: 100 %

## 2021-10-27 DIAGNOSIS — F31.81 BIPOLAR II DISORDER (HCC): Primary | ICD-10-CM

## 2021-10-27 DIAGNOSIS — F41.3 OTHER MIXED ANXIETY DISORDERS: ICD-10-CM

## 2021-10-27 PROCEDURE — 99213 OFFICE O/P EST LOW 20 MIN: CPT | Performed by: NURSE PRACTITIONER

## 2021-10-27 RX ORDER — LITHIUM CARBONATE 300 MG/1
300 CAPSULE ORAL EVERY EVENING
Qty: 30 CAPSULE | Refills: 2 | Status: SHIPPED | OUTPATIENT
Start: 2021-10-27 | End: 2022-02-16 | Stop reason: SDUPTHER

## 2021-10-27 NOTE — PROGRESS NOTES
"  Subjective   Tammi Levine is a 51 y.o. female is here today for medication management follow-up at the [facility], she presents to her appointment on time.    Chief Complaint: Bipolar Disorder    History of Present Illness She states that she is awake and moving this morning, she is trying to get things packed to move into a new apartment.  She states that there are about 6 apartments that are ready to be moved into, it has been about a year that this has been going on.  She states that she is doing \"pretty good\" with the lithium, she has been taking it as prescribed with no SE or problems.  She states that it still makes her sleepy which she sees as a positive, she use to take prozac that causes her to have NM.  She states that she worries a lot so she tries to keep her stress down.  She states that she can have \"gloomy\" days when it is overcast but feels better on becky days, she rates her mood about 7/10 with 10 being the worse.  She denies any periods of being hateful or agitated.  She has been spending time with her boyfriend who is looking for a job on the Internet, she states that she is \"not a \".  She rates her anxiety about ,  She rates her anxiety about 5/10 with 10 being the worse, she states that she gets anxious when she is in public situations like grocery.  She states that her sleep has been \"pretty good\", she can get an afternoon nap with her cat; she is getting about 6-7 hours per night with no NM.  Appetite has been good with stable weight.  She states that she had COVID at the beginning of September and states no residual symptoms, she states that she did not have the vaccinations.  Denies any other stressors, she has been getting along better with her mother especially since she has been sick. Denies any AV hallucinations, denies any SI/HI.  Denies any substance use.        The following portions of the patient's history were reviewed and updated as appropriate: allergies, " "current medications, past family history, past medical history, past social history, past surgical history and problem list.    Review of Systems   Musculoskeletal: Positive for arthralgias and gait problem.   Skin: Positive for wound.        Ulcer on left foot   Psychiatric/Behavioral: Positive for dysphoric mood. The patient is nervous/anxious.      Objective   Physical Exam   Constitutional: She appears well-developed. No distress.   Neurological: She is alert. Coordination and gait normal.   Vitals reviewed.    Blood pressure 136/75, pulse 62, temperature 98.2 °F (36.8 °C), temperature source Temporal, height 149.9 cm (59.02\"), weight 73.6 kg (162 lb 3.2 oz), SpO2 100 %.  Body mass index is 32.74 kg/m².    Medication List:   Current Outpatient Medications   Medication Sig Dispense Refill   • aspirin 81 MG EC tablet Take 81 mg by mouth Every Evening.     • atorvastatin (LIPITOR) 80 MG tablet Take 1 tablet by mouth Every Night. 30 tablet 3   • Canagliflozin (INVOKANA) 100 MG tablet Take 100 mg by mouth Every Evening.     • insulin aspart (NovoLOG) 100 UNIT/ML injection Novolog 75/25, take 20u in AM and 20u in PM     • levothyroxine (SYNTHROID, LEVOTHROID) 50 MCG tablet Take 50 mcg by mouth Every Evening.     • lisinopril (PRINIVIL,ZESTRIL) 10 MG tablet TAKE ONE TABLET BY MOUTH ONCE DAILY 30 tablet 0   • lithium carbonate 300 MG capsule Take 1 capsule by mouth Every Evening. 30 capsule 2   • Omeprazole Magnesium (PrilOSEC) 10 MG pack        No current facility-administered medications for this visit.     Mental Status Exam:   Hygiene:   good  Cooperation:  Cooperative  Eye Contact:  Fair  Psychomotor Behavior:  Appropriate  Affect:  Appropriate  Hopelessness: Denies  Speech:  Normal  Thought Process:  Goal directed and Linear  Thought Content:  Mood congruent  Suicidal:  None  Homicidal:  None  Hallucinations:  None  Delusion:  None  Memory:  Intact  Orientation:  Person, Place, Time and Situation  Reliability:  " fair  Insight:  Fair  Judgement:  Fair  Impulse Control:  Fair  Physical/Medical Issues:  No     Assessment/Plan   Problems Addressed this Visit     None      Visit Diagnoses     Bipolar II disorder (HCC)    -  Primary    Relevant Medications    lithium carbonate 300 MG capsule    Other mixed anxiety disorders        Relevant Medications    lithium carbonate 300 MG capsule      Diagnoses       Codes Comments    Bipolar II disorder (HCC)    -  Primary ICD-10-CM: F31.81  ICD-9-CM: 296.89     Other mixed anxiety disorders     ICD-10-CM: F41.3  ICD-9-CM: 300.09         Discussed medication options. Continue the lithium for mood.  Reviewed the risks, benefits, and side effects of the medications; patient acknowledged and verbally consented.  Patient is agreeable to call the [facility] with any worsening of symptoms.  Patient is aware to call 911 or go to the nearest ER should begin having SI/HI.  Reviewed medical planning with no changes needed in lithium at this time with stable mood.       Prognosis: Guarded dependent on medication, follow up appointment and treatment plan compliance     Functionality: Fair.  Symptoms are mostly under control with periodic episodes of increased anxiety.    Return in 12 weeks

## 2021-11-01 RX ORDER — LISINOPRIL 10 MG/1
TABLET ORAL
Qty: 30 TABLET | Refills: 0 | Status: SHIPPED | OUTPATIENT
Start: 2021-11-01 | End: 2021-12-13

## 2021-11-02 ENCOUNTER — OFFICE VISIT (OUTPATIENT)
Dept: CARDIOLOGY | Facility: CLINIC | Age: 51
End: 2021-11-02

## 2021-11-02 VITALS
DIASTOLIC BLOOD PRESSURE: 66 MMHG | HEIGHT: 59 IN | SYSTOLIC BLOOD PRESSURE: 100 MMHG | HEART RATE: 65 BPM | BODY MASS INDEX: 32.62 KG/M2 | RESPIRATION RATE: 16 BRPM | WEIGHT: 161.8 LBS | TEMPERATURE: 97.7 F

## 2021-11-02 DIAGNOSIS — I25.10 ARTERIOSCLEROTIC CARDIOVASCULAR DISEASE (ASCVD): Primary | ICD-10-CM

## 2021-11-02 DIAGNOSIS — I10 ESSENTIAL HYPERTENSION: ICD-10-CM

## 2021-11-02 DIAGNOSIS — E78.5 DYSLIPIDEMIA: ICD-10-CM

## 2021-11-02 PROBLEM — R07.9 CHEST PAIN: Status: RESOLVED | Noted: 2020-01-12 | Resolved: 2021-11-02

## 2021-11-02 PROBLEM — R10.84 GENERALIZED ABDOMINAL PAIN: Status: RESOLVED | Noted: 2018-11-08 | Resolved: 2021-11-02

## 2021-11-02 PROCEDURE — 93000 ELECTROCARDIOGRAM COMPLETE: CPT | Performed by: PHYSICIAN ASSISTANT

## 2021-11-02 PROCEDURE — 99213 OFFICE O/P EST LOW 20 MIN: CPT | Performed by: PHYSICIAN ASSISTANT

## 2021-11-02 NOTE — PROGRESS NOTES
Diane Rios  Tammi Levine  1970  11/02/2021    Patient Active Problem List   Diagnosis   • Arteriosclerotic cardiovascular disease (ASCVD), s/p MI in 2012, clinically stable.    • Type 2 diabetes mellitus with diabetic peripheral angiopathy without gangrene, with long-term current use of insulin (HCC)   • Essential hypertension   • Dyslipidemia   • Sleep apnea   • Migraine headache   • Abnormal CT scan   • Disease of thyroid gland   • Right-sided low back pain with right-sided sciatica   • Slow transit constipation       Dear Diane Rios:    Subjective     History of Present Illness:    Chief Complaint   Patient presents with   • Coronary Artery Disease     7 mos follow   • Med Management     verbal       Tammi Levine is a pleasant 51 y.o. female with a past medical history significant for ASCVD s/p MI in 2012, hypertension, and diabetes mellitus type 2. She presents today for routine cardiology follow up.     Tammi denies any symptoms today with open questions. When further questioned, she still denies any chest pains, shortness of breath, dizziness, syncope, or presyncope. Blood pressure is borderline low today but she denies any issues with this such as dizziness, weakness, or fatigue.     Allergies   Allergen Reactions   • Zoloft [Sertraline Hcl] Nausea And Vomiting   • Ciprofloxacin Rash     Also caused chest pain   :      Current Outpatient Medications:   •  aspirin 81 MG EC tablet, Take 81 mg by mouth Every Evening., Disp: , Rfl:   •  atorvastatin (LIPITOR) 80 MG tablet, Take 1 tablet by mouth Every Night., Disp: 30 tablet, Rfl: 3  •  Canagliflozin (INVOKANA) 100 MG tablet, Take 100 mg by mouth Every Evening., Disp: , Rfl:   •  insulin aspart (NovoLOG) 100 UNIT/ML injection, Novolog 75/25, take 20u in AM and 20u in PM, Disp: , Rfl:   •  levothyroxine (SYNTHROID, LEVOTHROID) 50 MCG tablet, Take 40 mcg by mouth Every Evening., Disp: , Rfl:   •  lisinopril (PRINIVIL,ZESTRIL) 10  "MG tablet, TAKE ONE TABLET BY MOUTH ONCE DAILY, Disp: 30 tablet, Rfl: 0  •  lithium carbonate 300 MG capsule, Take 1 capsule by mouth Every Evening., Disp: 30 capsule, Rfl: 2  •  Omeprazole Magnesium (PrilOSEC) 10 MG pack, , Disp: , Rfl:     The following portions of the patient's history were reviewed and updated as appropriate: allergies, current medications, past family history, past medical history, past social history, past surgical history and problem list.    Social History     Tobacco Use   • Smoking status: Never Smoker   • Smokeless tobacco: Never Used   Vaping Use   • Vaping Use: Never used   Substance Use Topics   • Alcohol use: No   • Drug use: No       Objective   Vitals:    11/02/21 1535   BP: 100/66   Pulse: 65   Resp: 16   Temp: 97.7 °F (36.5 °C)   Weight: 73.4 kg (161 lb 12.8 oz)   Height: 149.9 cm (59\")     Body mass index is 32.68 kg/m².    Constitutional:       General: Not in acute distress.     Appearance: Healthy appearance. Well-developed and not in distress. Not diaphoretic.   Eyes:      Conjunctiva/sclera: Conjunctivae normal.      Pupils: Pupils are equal, round, and reactive to light.   HENT:      Head: Normocephalic and atraumatic.   Neck:      Vascular: No carotid bruit or JVD.   Pulmonary:      Effort: Pulmonary effort is normal. No respiratory distress.      Breath sounds: Normal breath sounds.   Cardiovascular:      Normal rate. Regular rhythm.   Skin:     General: Skin is cool.   Neurological:      Mental Status: Alert, oriented to person, place, and time and oriented to person, place and time.         Lab Results   Component Value Date     (L) 09/20/2021    K 4.9 09/20/2021     09/20/2021    CO2 14.5 (L) 09/20/2021    BUN 13 09/20/2021    CREATININE 0.82 09/20/2021    GLUCOSE 242 (H) 09/20/2021    CALCIUM 9.5 09/20/2021    AST 21 09/20/2021    ALT 12 09/20/2021    ALKPHOS 82 09/20/2021    LABIL2 1.4 (L) 02/04/2016     Lab Results   Component Value Date    CKTOTAL 124 " 08/14/2020     Lab Results   Component Value Date    WBC 3.92 09/20/2021    HGB 11.8 (L) 09/20/2021    HCT 36.0 09/20/2021     09/20/2021     Lab Results   Component Value Date    INR 0.91 01/12/2020     Lab Results   Component Value Date    MG 2.4 08/14/2020     Lab Results   Component Value Date    TSH 1.070 04/13/2021    TRIG 106 08/14/2020    HDL 52 08/14/2020    LDL 63 08/14/2020      Lab Results   Component Value Date    BNP 36 09/16/2015       During this visit the following were done:  Labs Reviewed []    Labs Ordered []    Radiology Reports Reviewed []    Radiology Ordered []    PCP Records Reviewed []    Referring Provider Records Reviewed []    ER Records Reviewed []    Hospital Records Reviewed []    History Obtained From Family []    Radiology Images Reviewed []    Other Reviewed []    Records Requested []         ECG 12 Lead    Date/Time: 11/2/2021 3:38 PM  Performed by: Oscar Lewis PA-C  Authorized by: Oscar Lewis PA-C   Comparison: compared with previous ECG   Similar to previous ECG  Rhythm: sinus rhythm  Conduction: conduction normal  ST Segments: ST segments normal  T Waves: T waves normal    Clinical impression: normal ECG          Assessment/Plan    Diagnosis Plan   1. Arteriosclerotic cardiovascular disease (ASCVD), s/p MI in 2012, clinically stable.      2. Essential hypertension     3. Dyslipidemia            Recommendations:  1. ASCVD  1. Clinically stable with no recent anginal symptoms. I will continue lisinopril, lipitor, aspirin.   2. Essential hypertension  1. Well controlled borderline low but patient asymptomatic so will continue current therapy, encouraged her to monitor BP regularly.       Return in about 6 months (around 5/2/2022).    As always, I appreciate very much the opportunity to participate in the cardiovascular care of your patients.      With Best Regards,    Oscar Lewis PA-C

## 2021-11-23 ENCOUNTER — TRANSCRIBE ORDERS (OUTPATIENT)
Dept: ADMINISTRATIVE | Facility: HOSPITAL | Age: 51
End: 2021-11-23

## 2021-11-23 DIAGNOSIS — Z01.818 PRE-OPERATIVE CLEARANCE: Primary | ICD-10-CM

## 2021-11-26 ENCOUNTER — LAB (OUTPATIENT)
Dept: LAB | Facility: HOSPITAL | Age: 51
End: 2021-11-26

## 2021-11-26 DIAGNOSIS — Z01.818 PRE-OPERATIVE CLEARANCE: ICD-10-CM

## 2021-11-26 LAB — SARS-COV-2 RNA PNL SPEC NAA+PROBE: NOT DETECTED

## 2021-11-26 PROCEDURE — C9803 HOPD COVID-19 SPEC COLLECT: HCPCS

## 2021-11-26 PROCEDURE — U0004 COV-19 TEST NON-CDC HGH THRU: HCPCS | Performed by: INTERNAL MEDICINE

## 2021-12-13 RX ORDER — LISINOPRIL 10 MG/1
TABLET ORAL
Qty: 30 TABLET | Refills: 0 | Status: SHIPPED | OUTPATIENT
Start: 2021-12-13 | End: 2022-01-12

## 2021-12-14 ENCOUNTER — OFFICE VISIT (OUTPATIENT)
Dept: PSYCHIATRY | Facility: CLINIC | Age: 51
End: 2021-12-14

## 2021-12-14 DIAGNOSIS — F31.81 BIPOLAR II DISORDER (HCC): Primary | ICD-10-CM

## 2021-12-14 DIAGNOSIS — F41.3 OTHER MIXED ANXIETY DISORDERS: ICD-10-CM

## 2021-12-14 DIAGNOSIS — Z63.9 FAMILY DYNAMICS PROBLEM: ICD-10-CM

## 2021-12-14 PROCEDURE — 90837 PSYTX W PT 60 MINUTES: CPT | Performed by: SOCIAL WORKER

## 2021-12-14 SDOH — SOCIAL STABILITY - SOCIAL INSECURITY: PROBLEM RELATED TO PRIMARY SUPPORT GROUP, UNSPECIFIED: Z63.9

## 2021-12-21 NOTE — PROGRESS NOTES
-  Date of Service: December 14, 2021  Time In: 11:00 AM  Time Out: 12:00 PM    PROGRESS NOTE  Data:  Tammi Levine is a 51 y.o. female who met 1:1 with the undersigned for a regularly scheduled individual outpatient therapy session at [facility] for follow-up of bipolar disorder.  Patient and the undersigned wore masks throughout the session and maintained appropriate distancing.     HPI: Patient reports she and her mother are getting along somewhat better and states after her mother sold her home and built another small house in the area she grew up in she seem to be spending less time with her boyfriend which causes her to get along better with the patient.  Patient further reports she feels a great deal of anxiety and uncertainty due to various social issues at the time including whether or not to get the COVID-19 vaccine.  Patient continues to report she has not been vaccinated and continues to states she struggles with getting the vaccine.  The patient reports she continues to struggle with periods of depressed mood, anhedonia, anergia, and feeling hopeless.  Patient rates current symptoms at a 5 on a scale of 1-10 with 10 being most severe.  She denies any significant symptoms of hypomania other than having periods where she feels somewhat irritable and has a tendency to lash out at others.  Patient reports she is sleeping relatively well at this time.  The patient adamantly and convincingly denies suicidal ideation vehemently denies any substance use.      Clinical Maneuvering/Intervention:  Assisted patient in processing above session content; acknowledged and normalized patient’s thoughts, feelings, and concerns.   Validated the patient's belief that her mother and her are getting along better and praised her efforts.  Also utilize cognitive behavioral therapy to assist the patient in developing appropriate coping mechanisms to decrease the severity and frequency of symptoms.   Also continue to suggest the patient decrease the time she spends watching the news and being on social media.  Continue to focus on healthy skills of daily living and behavioral activation.    Allowed patient to freely discuss issues without interruption or judgment. Provided safe, confidential environment to facilitate the development of positive therapeutic relationship and encourage open, honest communication. Assisted patient in identifying risk factors which would indicate the need for higher level of care including thoughts to harm self or others and/or self-harming behavior and encouraged patient to contact this office, call 911, or present to the nearest emergency room should any of these events occur. Discussed crisis intervention services and means to access.  Patient adamantly and convincingly denies current suicidal or homicidal ideation or perceptual disturbance.      Assessment    Patient appears to currently be experiencing moderate exacerbation of symptomology due to the stress of her friend's illness and ongoing strained family dynamics and difficulties with her mother.  The patient's symptomology continue to cause impairment in important areas of functioning.  Patient can be reasonably expected to continue to benefit treatment and would likely be at increased risk for decompensation.    There are no diagnoses linked to this encounter.           Mental Status Exam  Hygiene: Good  Dress: Casual  Attitude:  Cooperative  Motor Activity: Appropriate  Speech:  Normal  Mood: Within normal limits  Affect: Tearful  Thought Processes:  Linear  Thought Content:  normal  Suicidal Thoughts:  denies  Homicidal Thoughts:  denies  Crisis Safety Plan: yes, to come to the emergency room.  Hallucinations:  denies    Patient's Support Network Includes:  mother and extended family    Progress toward goal: Not at goal    Functional Status: Mild impairment     Prognosis: Fair with Ongoing Treatment     Plan          Patient will continue in individual outpatient therapy session [facility] every 3 weeks and will continue and pharmacotherapy as scheduled with MARISELA Dixon.  Patient will adhere to medication regimen as prescribed and report any side effects. Patient will contact this office, call 911 or present to the nearest emergency room should suicidal or homicidal ideations occur. Provide Cognitive Behavioral Therapy and Integrative Therapy to improve functioning, maintain stability, and avoid decompensation and the need for higher level of care.          No follow-ups on file.      This document signed by Vidal Ortiz LCSW, [facility] December 21, 2021 17:33 EST

## 2022-01-12 RX ORDER — LISINOPRIL 10 MG/1
TABLET ORAL
Qty: 30 TABLET | Refills: 0 | Status: SHIPPED | OUTPATIENT
Start: 2022-01-12 | End: 2022-02-10

## 2022-01-18 NOTE — PROGRESS NOTES
"Date of Service: January 5, 2018  Time In: 1:10 PM  Time Out: 2:00 PM      PROGRESS NOTE  Data:  Tammi Levine is a 47 y.o. female who met with the undersigned for a regularly scheduled individual outpatient psychotherapy session at  [facility] for follow-up of bipolar 2 and ongoing grief concerning the death of her father.  Patient reports the holidays were \"okay\" but states she struggled with increased grief and thoughts of her father.  Patient also reports she continues to struggle with financial strain and states she continues to struggle to secure disability benefits.     HPI: Patient reports she is primarily struggling with depression as evidenced by depressed mood, anhedonia, irritability, anergia, feeling hopeless and helpless, low self-esteem, and periods of insomnia.  The patient rates current symptoms of depression at 5 on a scale 1-10 with 10 being most severe.  Patient denies any recent mood lability.  Patient also reports she continues to struggle with coping with the death of her father and states she continues to struggle with obsessively thinking about him and difficulty finding pleasure in other things.  Patient rates current symptoms of grief at a 4 on a scale of 1-10 with 10 being most severe.  Patient reports she continues to adhere to medication regimen as prescribed.  Patient adamantly convincingly denies suicidal ideation and vehemently denies any substance use.      Clinical Maneuvering/Intervention:  Assisted patient in processing above session content; acknowledged and normalized patient’s thoughts, feelings, and concerns.  Allow the patient to discuss/process her feelings of grief concerning the death of her father and validated her feelings along with providing positive support.  Also allow the patient to vent her feelings and frustrations concerning her difficulty with securing disability benefits.  Utilized cognitive behavioral therapy to assist the patient " Patient states she actually needs hydrochlorothiazide (HYDRODIURIL) 25 MG tablet sent to "[id number] - [place], [place] - [address] AT [facility] & [address] [phone] instead of [facility].    in developing appropriate coping mechanisms to decrease severity and frequency of symptoms including positive self talk, relaxation techniques, guided imagery, and thought blocking techniques.  Strongly encouraged the patient actively seek enjoyable activities she can engage in a regular basis.  Provided unconditional positive regard in a safe, secure environment.    Allowed patient to freely discuss issues without interruption or judgment. Provided safe, confidential environment to facilitate the development of positive therapeutic relationship and encourage open, honest communication. Assisted patient in identifying risk factors which would indicate the need for higher level of care including thoughts to harm self or others and/or self-harming behavior and encouraged patient to contact this office, call 911, or present to the nearest emergency room should any of these events occur. Discussed crisis intervention services and means to access.  Patient adamantly and convincingly denies current suicidal or homicidal ideation or perceptual disturbance.    Assessment    Patient continues to struggle primarily with depression at this time and ongoing grief reaction.  The patient's symptoms continue to cause impairment important areas of functioning.  As result, the patient can be reasonably expected to continue to benefit from treatment and would likely be at increased risk for decompensation otherwise.    Diagnoses and all orders for this visit:    Bipolar II disorder    Grief reaction               Mental Status Exam  Hygiene:  good  Dress:  casual  Attitude:  Cooperative  Motor Activity:  Appropriate  Speech:  Normal  Mood:  depressed  Affect:  depressed  Thought Processes:  Goal directed  Thought Content:  normal  Suicidal Thoughts:  denies  Homicidal Thoughts:  denies  Crisis Safety Plan: yes, to come to the emergency room.  Hallucinations:  denies    Patient's Support Network Includes:  mother    Progress toward  goal: Not at goal    Functional Status: Moderate impairment     Prognosis: Fair with Ongoing Treatment     Plan         Patient will continue in individual outpatient psychotherapy sessions every 3 weeks at [facility] and pharmacotherapy as scheduled MARISELA Madsen at the [facility].  Patient will adhere to medication regimen as prescribed and report any side effects. Patient will contact this office, call 911 or present to the nearest emergency room should suicidal or homicidal ideations occur. Provide Cognitive Behavioral Therapy and Integrative Therapy to improve functioning, maintain stability, and avoid decompensation and the need for higher level of care.          Return in about 3 weeks (around 01/26/2018) for Next scheduled follow up.      This document signed by Vidal Ortiz LCSW, [facility] January 5, 2018 4:44 PM

## 2022-02-10 RX ORDER — LISINOPRIL 10 MG/1
TABLET ORAL
Qty: 30 TABLET | Refills: 1 | Status: SHIPPED | OUTPATIENT
Start: 2022-02-10 | End: 2022-04-05

## 2022-02-16 ENCOUNTER — OFFICE VISIT (OUTPATIENT)
Dept: PSYCHIATRY | Facility: CLINIC | Age: 52
End: 2022-02-16

## 2022-02-16 VITALS
HEART RATE: 71 BPM | WEIGHT: 166 LBS | TEMPERATURE: 98 F | BODY MASS INDEX: 33.47 KG/M2 | SYSTOLIC BLOOD PRESSURE: 131 MMHG | HEIGHT: 59 IN | OXYGEN SATURATION: 98 % | DIASTOLIC BLOOD PRESSURE: 76 MMHG

## 2022-02-16 DIAGNOSIS — F41.3 OTHER MIXED ANXIETY DISORDERS: ICD-10-CM

## 2022-02-16 DIAGNOSIS — Z63.8 STRESS DUE TO FAMILY TENSION: ICD-10-CM

## 2022-02-16 DIAGNOSIS — Z79.899 MEDICATION MANAGEMENT: ICD-10-CM

## 2022-02-16 DIAGNOSIS — F31.81 BIPOLAR II DISORDER: Primary | ICD-10-CM

## 2022-02-16 PROCEDURE — 90792 PSYCH DIAG EVAL W/MED SRVCS: CPT | Performed by: NURSE PRACTITIONER

## 2022-02-16 RX ORDER — LITHIUM CARBONATE 300 MG/1
300 CAPSULE ORAL EVERY EVENING
Qty: 30 CAPSULE | Refills: 2 | Status: SHIPPED | OUTPATIENT
Start: 2022-02-16 | End: 2022-05-18 | Stop reason: SDUPTHER

## 2022-02-16 SDOH — SOCIAL STABILITY - SOCIAL INSECURITY: OTHER SPECIFIED PROBLEMS RELATED TO PRIMARY SUPPORT GROUP: Z63.8

## 2022-02-16 NOTE — PROGRESS NOTES
"Subjective   Tammi Levine is a 51 y.o. female who presents today for follow up    Chief Complaint:  Bipolar Disorder    History of Present Illness: This is the first encounter for this APRN with this patient.  The patient came to this APRN on their current medication regimen. Patient is a transfer of care from Sarah Nam. APRN.  Reports she has been out of medications for 10 days. States she has been feeling \"draggy and real down\". States she has been worried about having side effects from withdrawal.  States prior to being out of medications she was \"ok\". States she stays in a lot with her cat due to the pandemic. Reports her anxiety increases when she is around people in stores. States \"the world is crazy anymore\". Reports she deals with her mother a lot, states her mother's boyfriend told her to not talk to her anymore. States she talks to her mother some when he is not around. States she struggles with her mother allowing to \"tell her what to do\".  Rates anxiety 2/10; rates depression 6/10 with 10 being the worst. Reports sleep is good, states she has been having some shoulder pain that worsens at night. Reports appetite is \"awful\", and she \"eats all the time\"; states she has gained 3 pounds since last visit. She denies SI/HI/AVH.      The following portions of the patient's history were reviewed and updated as appropriate: allergies, current medications, past family history, past medical history, past social history, past surgical history and problem list.      Past Medical History:  Past Medical History:   Diagnosis Date   • Abnormal CT scan     ADRENAL GLAND FOCAL MASS LESION MULTIPLE, LEFT ONLY   • Anxiety    • ASCVD (arteriosclerotic cardiovascular disease)     \"status post MI in 2012, clinically stable\"   • Bipolar disorder (HCC)    • Depression    • Diabetes mellitus (HCC)    • Disease of thyroid gland    • Dyslipidemia    • Hypertension, well controlled    • Migraine headache    • Myocardial " infarction (HCC) 2012   • Panic disorder    • Right-sided low back pain with right-sided sciatica    • Sleep apnea        Social History:  Social History     Socioeconomic History   • Marital status: Single   Tobacco Use   • Smoking status: Never Smoker   • Smokeless tobacco: Never Used   Vaping Use   • Vaping Use: Never used   Substance and Sexual Activity   • Alcohol use: No   • Drug use: No   • Sexual activity: Never       Family History:  Family History   Problem Relation Age of Onset   • Diabetes Father    • Diabetes Brother    • Cancer Maternal Grandmother    • Heart disease Maternal Grandfather    • Diabetes Paternal Grandfather    • Cancer Other        Past Surgical History:  Past Surgical History:   Procedure Laterality Date   • CHOLECYSTECTOMY     • CORONARY ANGIOPLASTY WITH STENT PLACEMENT  2012    Dr. Denson   • ENDOSCOPY  March 2021   • HYSTERECTOMY  12/2014   • NECK SURGERY  2010   • TRANSESOPHAGEAL ECHOCARDIOGRAM (SARAN)  07/13/2015    EF 60-65%       Problem List:  Patient Active Problem List   Diagnosis   • Arteriosclerotic cardiovascular disease (ASCVD), s/p MI in 2012, clinically stable.    • Type 2 diabetes mellitus with diabetic peripheral angiopathy without gangrene, with long-term current use of insulin ([facility])   • Essential hypertension   • Dyslipidemia   • Sleep apnea   • Migraine headache   • Abnormal CT scan   • Disease of thyroid gland   • Right-sided low back pain with right-sided sciatica   • Slow transit constipation       Allergy:   Allergies   Allergen Reactions   • Zoloft [Sertraline Hcl] Nausea And Vomiting   • Ciprofloxacin Rash     Also caused chest pain        Current Medications:   Current Outpatient Medications   Medication Sig Dispense Refill   • aspirin 81 MG EC tablet Take 81 mg by mouth Every Evening.     • atorvastatin (LIPITOR) 80 MG tablet Take 1 tablet by mouth Every Night. 30 tablet 3   • Canagliflozin (INVOKANA) 100 MG tablet Take 100 mg by mouth Every Evening.     •  "insulin aspart (NovoLOG) 100 UNIT/ML injection Novolog 75/25, take 20u in AM and 20u in PM     • levothyroxine (SYNTHROID, LEVOTHROID) 50 MCG tablet Take 40 mcg by mouth Every Evening.     • lisinopril (PRINIVIL,ZESTRIL) 10 MG tablet TAKE ONE TABLET BY MOUTH ONCE DAILY 30 tablet 1   • lithium carbonate 300 MG capsule Take 1 capsule by mouth Every Evening. 30 capsule 2   • Omeprazole Magnesium (PrilOSEC) 10 MG pack        No current facility-administered medications for this visit.       Review of Symptoms:    Review of Systems   Constitutional: Positive for fatigue.   HENT: Negative.    Eyes: Negative.    Respiratory: Negative.    Cardiovascular: Negative.    Gastrointestinal: Negative.    Genitourinary: Negative.    Musculoskeletal: Positive for back pain and gait problem.   Skin: Negative.    Psychiatric/Behavioral: Positive for sleep disturbance and depressed mood. Negative for suicidal ideas. The patient is nervous/anxious.        Objective   Physical Exam:   Blood pressure 131/76, pulse 71, temperature 98 °F (36.7 °C), temperature source Temporal, height 149.9 cm (59.02\"), weight 75.3 kg (166 lb), SpO2 98 %.  Body mass index is 33.51 kg/m².    Appearance: Well nourished female, appropriately dressed, appears stated age and in no acute distress  Gait, Station, Strength: Unsteady gait, patient uses cane to ambulate    Mental Status Exam:   Hygiene:   good  Cooperation:  Cooperative  Eye Contact:  Good  Psychomotor Behavior:  Appropriate  Affect:  Appropriate  Mood: normal  Hopelessness: 4  Speech:  Normal  Thought Process:  Goal directed and Linear  Thought Content:  Normal and Mood congruent  Suicidal:  None  Homicidal:  None  Hallucinations:  None  Delusion:  None  Memory:  Intact  Orientation:  Person, Place, Time and Situation  Reliability:  good  Insight:  Fair  Judgement:  Fair  Impulse Control:  Good  Physical/Medical Issues:  No      PHQ-Score Total:  PHQ-9 Total Score: 7   Patient screened positive for " depression based on a PHQ-9 score of 7 on 2/16/2022. Follow-up recommendations include: Prescribed antidepressant medication treatment and Suicide Risk Assessment performed.        Lab Results:   No visits with results within 1 Month(s) from this visit.   Latest known visit with results is:   Lab on 11/26/2021   Component Date Value Ref Range Status   • COVID19 11/26/2021 Not Detected  Not Detected - Ref. Range Final       Assessment/Plan   Diagnoses and all orders for this visit:    1. Bipolar II disorder (HCC) (Primary)  -     lithium carbonate 300 MG capsule; Take 1 capsule by mouth Every Evening.  Dispense: 30 capsule; Refill: 2    2. Medication management    3. Other mixed anxiety disorders    4. Stress due to family tension        -Continue lithium 300 mg nightly for mood. This APRN has reviewed Lithium Toxicity symptoms with the patient including but not limited to: nausea, fine hand tremors, increased urination and thirst, weight gain, impaired thyroid functioning, fatigue, mental cloudiness, headaches, coarse hand tremors with toxicity, nystagmus, maculopapular rash, pruritis, acne, GI or stomach upset, diarrhea, vomiting, cramps, anorexia, diabetes insipidus, edema, microscopic tubular changes, t-wave inversion or abnormal cardiac rhythm, dysrhythmias, and leukocytosis.  The patient is advised if they experience any of these symptoms they are to contact this APRN/this office immediately or go to the Emergency Department immediately.  The patient verbalized understanding and agreement in their own words.  The patient is advised that taking Lithium with NSAID's, diuretics, ACE inhibitors, metronidazole, acetazolamide, methyldopa, carbamazepine, phenytoin, calcium channel blockers, and haloperidol may cause serious medication reactions including potentially fatal lithium toxicity.  The use of an SSRI with Lithium may raise the risk of dizziness, confusion, diarrhea, agitation, and tremor.  The patient is  advised to avoid these medications, or if they are taking or plan to start any of these medications, this APRN/or a Provider at this office, and the patient's PCP/or the Prescriber of the medication should be notified/aware so further testing and monitoring can be performed.  The patient is advised of the need for hydration during treatment with Lithium, and the risks of not staying hydrated - drinking water.  The patient verbalizes understanding and agreement of instructions in their own words.  Also, the patient is instructed to complete the ordered lithium level after taking the Lithium  for at least 14 days with no missed doses.  The patient is instructed not to take the lithium on the morning of lab draw (as levels should be drawn about 12 hours after the last dose taken), but to take the Lithium after the blood work/lab draw has been completed as taking it before having the blood/lab drawn will interfere with the results.  The patient verbalized understanding and agreement in their own words.  -Discussed with patient importance of medication compliance  -We discussed sleep hygiene including going to bed at the same time and getting up at the same time every day, decreased caffeine consumption, going to bed early enough to get 7 or 8 hours in bed, reading and relaxing before bedtime, and avoiding the TV, computer, phone, iPad close to bedtime.  -TOSHA reviewed and appropriate. Patient counseled on use of controlled substances.   -The benefits of a healthy diet and exercise were discussed with patient, especially the positive effects they have on mental health. Patient encouraged to consider lifestyle modification regarding  diet and exercise patterns to maximize results of mental health treatment.  -Reviewed previous available documentation  -Reviewed most recent available labs                  Visit Diagnoses:    ICD-10-CM ICD-9-CM   1. Bipolar II disorder (HCC)  F31.81 296.89   2. Medication management   Z79.899 V58.69   3. Other mixed anxiety disorders  F41.3 300.09   4. Stress due to family tension  Z63.8 V61.8         TREATMENT PLAN/GOALS: Continue supportive psychotherapy efforts and medications as indicated. Treatment and medication options discussed during today's visit. Patient acknowledged and verbally consented to continue with current treatment plan and was educated on the importance of compliance with treatment and follow-up appointments.    MEDICATION ISSUES:    Discussed medication options and treatment plan of prescribed medication as well as the risks, benefits, and side effects including potential falls, possible impaired driving and metabolic adversities among others. Patient is agreeable to call the office with any worsening of symptoms or onset of side effects. Patient is agreeable to call 911 or go to the nearest ER should he/she begin having SI/HI.     MEDS ORDERED DURING VISIT:  New Medications Ordered This Visit   Medications   • lithium carbonate 300 MG capsule     Sig: Take 1 capsule by mouth Every Evening.     Dispense:  30 capsule     Refill:  2       Return in about 8 weeks (around 4/13/2022), or if symptoms worsen or fail to improve.         Prognosis: Guarded dependent on medication/follow up and treatment plan compliance.  Functionality: pt showing improvements in important areas of daily functioning.     Short-term goals: Patient will adhere to medication regimen and note continued improvement in symptoms over the next 3 months.   Long-term goals: Patient will be adherent to medication management and psychotherapy with continued improvement in symptoms over the next 6 months          This document has been electronically signed by MARISELA Patton   February 16, 2022 13:37 EST    Part of this note may be an electronic transcription/translation of spoken language to printed text using the Dragon Dictation System.

## 2022-04-05 ENCOUNTER — OFFICE VISIT (OUTPATIENT)
Dept: PSYCHIATRY | Facility: CLINIC | Age: 52
End: 2022-04-05

## 2022-04-05 DIAGNOSIS — Z63.8 STRESS DUE TO FAMILY TENSION: ICD-10-CM

## 2022-04-05 DIAGNOSIS — F31.81 BIPOLAR II DISORDER: Primary | ICD-10-CM

## 2022-04-05 DIAGNOSIS — F41.3 OTHER MIXED ANXIETY DISORDERS: ICD-10-CM

## 2022-04-05 PROCEDURE — 90834 PSYTX W PT 45 MINUTES: CPT | Performed by: SOCIAL WORKER

## 2022-04-05 RX ORDER — LISINOPRIL 10 MG/1
TABLET ORAL
Qty: 30 TABLET | Refills: 1 | Status: SHIPPED | OUTPATIENT
Start: 2022-04-05 | End: 2022-06-02

## 2022-04-05 SDOH — SOCIAL STABILITY - SOCIAL INSECURITY: OTHER SPECIFIED PROBLEMS RELATED TO PRIMARY SUPPORT GROUP: Z63.8

## 2022-04-05 NOTE — PROGRESS NOTES
-  Date of Service: April 5, 2022  Time In: 11:00 AM  Time Out: 11:45 AM    PROGRESS NOTE  Data:  Tammi Levine is a 52 y.o. female who met 1:1 with the undersigned for a regularly scheduled individual outpatient therapy session at [facility] for follow-up of bipolar disorder.  Patient and the undersigned wore masks throughout the session and maintained appropriate distancing.     HPI: Patient states she has been struggling as of late due to worrying about various social issues including the current war in [place] and states she has been hearing from various people including members of the [employer] that [place] will create nuclear war and we will all die.  Patient states she was also told by her mother that a podcast that she listens to which utilizes [employer] has also predicted the end of time.  The patient reports she continues to struggle with periods of depressed mood, anhedonia, anergia, and feeling hopeless.  Patient rates current symptoms at a 4/5 on a scale of 1-10 with 10 being most severe.  She denies any significant symptoms of hypomania other than having periods where she feels somewhat irritable and has a tendency to lash out at others.  Patient reports she is sleeping relatively well at this time.  The patient adamantly and convincingly denies suicidal ideation vehemently denies any substance use.      Clinical Maneuvering/Intervention:  Assisted patient in processing above session content; acknowledged and normalized patient’s thoughts, feelings, and concerns.   Allow the patient to discuss/process her feelings and frustrations what she believes to be her mother's tendency to be susceptible to conspiracy theory and validated her feelings.  Also utilized motivational reviewing techniques including complex reflections to discussed the concept of things we can control things he cannot control and encourage patient to remind herself she cannot control the behavior of others,  not even family members.  Utilized cognitive behavioral therapy to assist the patient in developing appropriate coping mechanisms to decrease the severity frequency of symptoms.  Continue to focus on healthy skills of daily living and behavioral activation.  Provided unconditional positive regarding a safe, supportive environment.    Allowed patient to freely discuss issues without interruption or judgment. Provided safe, confidential environment to facilitate the development of positive therapeutic relationship and encourage open, honest communication. Assisted patient in identifying risk factors which would indicate the need for higher level of care including thoughts to harm self or others and/or self-harming behavior and encouraged patient to contact this office, call 911, or present to the nearest emergency room should any of these events occur. Discussed crisis intervention services and means to access.  Patient adamantly and convincingly denies current suicidal or homicidal ideation or perceptual disturbance.      Assessment    Patient appears to maintain relative stability as compared to her baseline.  However, she continues to be susceptible to an external locus of control and continues to be significantly affected by ongoing strained family dynamics.  The patient's symptomology continue to cause impairment in important areas of functioning.  Patient can be reasonably expected to continue to benefit treatment and would likely be at increased risk for decompensation.    Diagnoses and all orders for this visit:    1. Bipolar II disorder (HCC) (Primary)    2. Other mixed anxiety disorders    3. Stress due to family tension               Mental Status Exam  Hygiene: Good  Dress: Casual  Attitude:  Cooperative  Motor Activity: Appropriate  Speech:  Normal  Mood: Within normal limits  Affect: Tearful  Thought Processes:  Linear  Thought Content:  normal  Suicidal Thoughts:  denies  Homicidal Thoughts:   denies  Crisis Safety Plan: yes, to come to the emergency room.  Hallucinations:  denies    Patient's Support Network Includes:  mother and extended family    Progress toward goal: Not at goal    Functional Status: Mild impairment     Prognosis: Fair with Ongoing Treatment     Plan         Patient will continue in individual outpatient therapy session [facility] every 3 weeks and will continue and pharmacotherapy as scheduled with MARISELA Dixon.  Patient will adhere to medication regimen as prescribed and report any side effects. Patient will contact this office, call 911 or present to the nearest emergency room should suicidal or homicidal ideations occur. Provide Cognitive Behavioral Therapy and Integrative Therapy to improve functioning, maintain stability, and avoid decompensation and the need for higher level of care.          Return in about 3 weeks (around 4/26/2022) for Next scheduled follow up.      This document signed by Vidal Ortiz LCSW, [facility] April 5, 2022 12:46 EDT

## 2022-05-02 ENCOUNTER — OFFICE VISIT (OUTPATIENT)
Dept: CARDIOLOGY | Facility: CLINIC | Age: 52
End: 2022-05-02

## 2022-05-02 VITALS
SYSTOLIC BLOOD PRESSURE: 110 MMHG | BODY MASS INDEX: 33.79 KG/M2 | OXYGEN SATURATION: 98 % | HEIGHT: 59 IN | TEMPERATURE: 98.6 F | DIASTOLIC BLOOD PRESSURE: 68 MMHG | HEART RATE: 78 BPM | WEIGHT: 167.6 LBS

## 2022-05-02 DIAGNOSIS — E78.5 DYSLIPIDEMIA: ICD-10-CM

## 2022-05-02 DIAGNOSIS — I25.10 ARTERIOSCLEROTIC CARDIOVASCULAR DISEASE (ASCVD): Primary | ICD-10-CM

## 2022-05-02 DIAGNOSIS — I10 ESSENTIAL HYPERTENSION: ICD-10-CM

## 2022-05-02 PROCEDURE — 99213 OFFICE O/P EST LOW 20 MIN: CPT | Performed by: PHYSICIAN ASSISTANT

## 2022-05-02 PROCEDURE — 93000 ELECTROCARDIOGRAM COMPLETE: CPT | Performed by: PHYSICIAN ASSISTANT

## 2022-05-02 NOTE — PROGRESS NOTES
Diane Rios  Tammi Levine  1970  05/02/2022    Patient Active Problem List   Diagnosis   • Arteriosclerotic cardiovascular disease (ASCVD), s/p MI in 2012, clinically stable.    • Type 2 diabetes mellitus with diabetic peripheral angiopathy without gangrene, with long-term current use of insulin (HCC)   • Essential hypertension   • Dyslipidemia   • Sleep apnea   • Migraine headache   • Abnormal CT scan   • Disease of thyroid gland   • Right-sided low back pain with right-sided sciatica   • Slow transit constipation       Dear Diane Rios:    Subjective     History of Present Illness:    Chief Complaint   Patient presents with   • Follow-up     6 month    • Med Management     Verbal        Tammi Levine is a pleasant 52 y.o. female with a past medical history significant for ASCVD with history of myocardial infarction in 2012, essential hypertension, diabetes mellitus.  She comes in today for cardiology follow-up.    Tammi reports that she has been doing well. Clinically she reports she only gets chests pains when she has a panic attack she does follow with behavioral health for this. She denies being able to reproduce on physical exertion and otherwise does not occur. She denies shortness of breath, dizziness, or syncope, or near syncope.     Allergies   Allergen Reactions   • Zoloft [Sertraline Hcl] Nausea And Vomiting   • Ciprofloxacin Rash     Also caused chest pain   :      Current Outpatient Medications:   •  aspirin 81 MG EC tablet, Take 81 mg by mouth Every Evening., Disp: , Rfl:   •  atorvastatin (LIPITOR) 80 MG tablet, Take 1 tablet by mouth Every Night., Disp: 30 tablet, Rfl: 3  •  Canagliflozin (INVOKANA) 100 MG tablet tablet, Take 100 mg by mouth Every Evening., Disp: , Rfl:   •  insulin aspart (novoLOG) 100 UNIT/ML injection, Novolog 75/25, take 20u in AM and 20u in PM, Disp: , Rfl:   •  levothyroxine (SYNTHROID, LEVOTHROID) 50 MCG tablet, Take 40 mcg by mouth Every  "Evening., Disp: , Rfl:   •  lisinopril (PRINIVIL,ZESTRIL) 10 MG tablet, TAKE ONE TABLET BY MOUTH ONCE DAILY, Disp: 30 tablet, Rfl: 1  •  lithium carbonate 300 MG capsule, Take 1 capsule by mouth Every Evening., Disp: 30 capsule, Rfl: 2  •  Omeprazole Magnesium (PrilOSEC) 10 MG pack, , Disp: , Rfl:     The following portions of the patient's history were reviewed and updated as appropriate: allergies, current medications, past family history, past medical history, past social history, past surgical history and problem list.    Social History     Tobacco Use   • Smoking status: Never Smoker   • Smokeless tobacco: Never Used   Vaping Use   • Vaping Use: Never used   Substance Use Topics   • Alcohol use: No   • Drug use: No         Objective   Vitals:    05/02/22 1304   BP: 110/68   BP Location: Right arm   Patient Position: Sitting   Cuff Size: Adult   Pulse: 78   Temp: 98.6 °F (37 °C)   TempSrc: Temporal   SpO2: 98%   Weight: 76 kg (167 lb 9.6 oz)   Height: 149.9 cm (59.02\")     Body mass index is 33.83 kg/m².    Constitutional:       General: Not in acute distress.     Appearance: Healthy appearance. Well-developed and not in distress. Not diaphoretic.   Eyes:      Conjunctiva/sclera: Conjunctivae normal.      Pupils: Pupils are equal, round, and reactive to light.   HENT:      Head: Normocephalic and atraumatic.   Neck:      Vascular: No carotid bruit or JVD.   Pulmonary:      Effort: Pulmonary effort is normal. No respiratory distress.      Breath sounds: Normal breath sounds.   Cardiovascular:      Normal rate. Regular rhythm.      Comments: Walks with a cane. Has a orthopedic left good splint on  Skin:     General: Skin is cool.   Neurological:      Mental Status: Alert, oriented to person, place, and time and oriented to person, place and time.         Lab Results   Component Value Date     (L) 09/20/2021    K 4.9 09/20/2021     09/20/2021    CO2 14.5 (L) 09/20/2021    BUN 13 09/20/2021    CREATININE " 0.82 09/20/2021    GLUCOSE 242 (H) 09/20/2021    CALCIUM 9.5 09/20/2021    AST 21 09/20/2021    ALT 12 09/20/2021    ALKPHOS 82 09/20/2021    LABIL2 1.4 (L) 02/04/2016     Lab Results   Component Value Date    CKTOTAL 124 08/14/2020     Lab Results   Component Value Date    WBC 3.92 09/20/2021    HGB 11.8 (L) 09/20/2021    HCT 36.0 09/20/2021     09/20/2021     Lab Results   Component Value Date    INR 0.91 01/12/2020     Lab Results   Component Value Date    MG 2.4 08/14/2020     Lab Results   Component Value Date    TSH 1.070 04/13/2021    TRIG 106 08/14/2020    HDL 52 08/14/2020    LDL 63 08/14/2020      Lab Results   Component Value Date    BNP 36 09/16/2015       During this visit the following were done:  Labs Reviewed []    Labs Ordered []    Radiology Reports Reviewed []    Radiology Ordered []    PCP Records Reviewed []    Referring Provider Records Reviewed []    ER Records Reviewed []    Hospital Records Reviewed []    History Obtained From Family []    Radiology Images Reviewed []    Other Reviewed []    Records Requested []         ECG 12 Lead    Date/Time: 5/2/2022 1:01 PM  Performed by: Oscar Lewis PA-C  Authorized by: Oscar Lewis PA-C   Comparison: compared with previous ECG   Similar to previous ECG  Rhythm: sinus rhythm  Conduction: conduction normal  ST Segments: ST segments normal    Clinical impression: normal ECG            Assessment/Plan    Diagnosis Plan   1. Arteriosclerotic cardiovascular disease (ASCVD), s/p MI in 2012, clinically stable.      2. Essential hypertension     3. Dyslipidemia              Recommendations:  1. ASCVD  1. Patient symptoms are atypical and consistent with her panic attacks are nonreproducible on physical exertion.  I will for now continue GDMT with aspirin, Lipitor, lisinopril. I did ask her to let us know if symptoms worsen in any way.   2. Diabetes mellitus  1. Recommend changing Invokana to Jardiance/Farxiga for potential cardiovascular  benefit however will defer to endocrinologist.      No follow-ups on file.    As always, I appreciate very much the opportunity to participate in the cardiovascular care of your patients.      With Best Regards,    Oscar Lewis PA-C

## 2022-05-18 ENCOUNTER — OFFICE VISIT (OUTPATIENT)
Dept: PSYCHIATRY | Facility: CLINIC | Age: 52
End: 2022-05-18

## 2022-05-18 VITALS
TEMPERATURE: 98.6 F | OXYGEN SATURATION: 96 % | WEIGHT: 164.6 LBS | BODY MASS INDEX: 33.18 KG/M2 | HEIGHT: 59 IN | HEART RATE: 103 BPM | SYSTOLIC BLOOD PRESSURE: 119 MMHG | DIASTOLIC BLOOD PRESSURE: 70 MMHG

## 2022-05-18 DIAGNOSIS — Z79.899 MEDICATION MANAGEMENT: ICD-10-CM

## 2022-05-18 DIAGNOSIS — F31.81 BIPOLAR II DISORDER: Primary | ICD-10-CM

## 2022-05-18 DIAGNOSIS — F41.1 GENERALIZED ANXIETY DISORDER: ICD-10-CM

## 2022-05-18 PROCEDURE — 99214 OFFICE O/P EST MOD 30 MIN: CPT | Performed by: NURSE PRACTITIONER

## 2022-05-18 RX ORDER — LITHIUM CARBONATE 300 MG/1
300 CAPSULE ORAL EVERY EVENING
Qty: 30 CAPSULE | Refills: 2 | Status: SHIPPED | OUTPATIENT
Start: 2022-05-18 | End: 2022-08-01

## 2022-05-18 NOTE — PROGRESS NOTES
"Subjective   Tammi Levine is a 52 y.o. female who presents today for follow up    Chief Complaint:  Bipolar    History of Present Illness: Patient presents as follow up. Reports \"I'm doing alright\". States she has been frustrated due to construction at her apartment building. Reports her sidewalk was dug up and it rained resulting in her being unable to get into her apartment. States she had to climb through her bedroom window.   She reports anxiety and depression are \"good\". Rates depression 3/10; rates anxiety 2/10 with 10 being the worst. She reports sleep is good. Reports appetite is good. She denies SI/HI/AVH.    The following portions of the patient's history were reviewed and updated as appropriate: allergies, current medications, past family history, past medical history, past social history, past surgical history and problem list.      Past Medical History:  Past Medical History:   Diagnosis Date   • Abnormal CT scan     ADRENAL GLAND FOCAL MASS LESION MULTIPLE, LEFT ONLY   • Anxiety    • ASCVD (arteriosclerotic cardiovascular disease)     \"status post MI in 2012, clinically stable\"   • Bipolar disorder (HCC)    • Depression    • Diabetes mellitus (HCC)    • Disease of thyroid gland    • Dyslipidemia    • Hypertension, well controlled    • Migraine headache    • Myocardial infarction (HCC) 2012   • Panic disorder    • Right-sided low back pain with right-sided sciatica    • Sleep apnea        Social History:  Social History     Socioeconomic History   • Marital status: Single   Tobacco Use   • Smoking status: Never Smoker   • Smokeless tobacco: Never Used   Vaping Use   • Vaping Use: Never used   Substance and Sexual Activity   • Alcohol use: No   • Drug use: No   • Sexual activity: Never       Family History:  Family History   Problem Relation Age of Onset   • Diabetes Father    • Diabetes Brother    • Cancer Maternal Grandmother    • Heart disease Maternal Grandfather    • Diabetes Paternal " Grandfather    • Cancer Other        Past Surgical History:  Past Surgical History:   Procedure Laterality Date   • CHOLECYSTECTOMY     • CORONARY ANGIOPLASTY WITH STENT PLACEMENT  2012    Dr. Denson   • ENDOSCOPY  March 2021   • HYSTERECTOMY  12/2014   • NECK SURGERY  2010   • TRANSESOPHAGEAL ECHOCARDIOGRAM (SARAN)  07/13/2015    EF 60-65%       Problem List:  Patient Active Problem List   Diagnosis   • Arteriosclerotic cardiovascular disease (ASCVD), s/p MI in 2012, clinically stable.    • Type 2 diabetes mellitus with diabetic peripheral angiopathy without gangrene, with long-term current use of insulin (HCC)   • Essential hypertension   • Dyslipidemia   • Sleep apnea   • Migraine headache   • Abnormal CT scan   • Disease of thyroid gland   • Right-sided low back pain with right-sided sciatica   • Slow transit constipation       Allergy:   Allergies   Allergen Reactions   • Zoloft [Sertraline Hcl] Nausea And Vomiting   • Ciprofloxacin Rash     Also caused chest pain        Current Medications:   Current Outpatient Medications   Medication Sig Dispense Refill   • lithium carbonate 300 MG capsule Take 1 capsule by mouth Every Evening. 30 capsule 2   • aspirin 81 MG EC tablet Take 81 mg by mouth Every Evening.     • atorvastatin (LIPITOR) 80 MG tablet Take 1 tablet by mouth Every Night. 30 tablet 3   • Canagliflozin (INVOKANA) 100 MG tablet tablet Take 100 mg by mouth Every Evening.     • insulin aspart (novoLOG) 100 UNIT/ML injection Novolog 75/25, take 20u in AM and 20u in PM     • levothyroxine (SYNTHROID, LEVOTHROID) 50 MCG tablet Take 40 mcg by mouth Every Evening.     • lisinopril (PRINIVIL,ZESTRIL) 10 MG tablet TAKE ONE TABLET BY MOUTH ONCE DAILY 30 tablet 1   • Omeprazole Magnesium (PrilOSEC) 10 MG pack        No current facility-administered medications for this visit.       Review of Symptoms:    Review of Systems   Constitutional: Positive for fatigue.   HENT: Negative.    Eyes: Negative.    Respiratory:  "Negative.    Cardiovascular: Negative.    Gastrointestinal: Negative.    Endocrine: Negative.    Genitourinary: Negative.    Musculoskeletal: Positive for arthralgias, back pain and gait problem.   Skin: Negative.    Psychiatric/Behavioral: Positive for depressed mood. Negative for suicidal ideas. The patient is nervous/anxious.        Objective   Physical Exam:   Blood pressure 119/70, pulse 103, temperature 98.6 °F (37 °C), temperature source Temporal, height 149.9 cm (59.02\"), weight 74.7 kg (164 lb 9.6 oz), SpO2 96 %.  Body mass index is 33.23 kg/m².    Appearance: Well nourished female, appropriately dressed, appears stated age and in no acute distress  Gait, Station, Strength: Unsteady, patient uses cane to assist with ambulation    Mental Status Exam:   Hygiene:   good  Cooperation:  Cooperative  Eye Contact:  Good  Psychomotor Behavior:  Appropriate  Affect:  Appropriate  Mood: normal  Hopelessness: Denies  Speech:  Normal  Thought Process:  Goal directed and Linear  Thought Content:  Normal and Mood congruent  Suicidal:  None  Homicidal:  None  Hallucinations:  None  Delusion:  None  Memory:  Intact  Orientation:  Person, Place, Time and Situation  Reliability:  good  Insight:  Good  Judgement:  Good  Impulse Control:  Good  Physical/Medical Issues:  No      PHQ-Score Total:  PHQ-9 Total Score: 0         Lab Results:   No visits with results within 1 Month(s) from this visit.   Latest known visit with results is:   Lab on 11/26/2021   Component Date Value Ref Range Status   • COVID19 11/26/2021 Not Detected  Not Detected - Ref. Range Final       Assessment & Plan   Diagnoses and all orders for this visit:    1. Bipolar II disorder (HCC) -unchanged (Primary)  -     lithium carbonate 300 MG capsule; Take 1 capsule by mouth Every Evening.  Dispense: 30 capsule; Refill: 2    2. Medication management  -     Lithium Level    3. Generalized anxiety disorder -unchanged        -Will order lithium levels to be " completed before next visit. Discussed with patient to wait 12 hours after last dose before obtaining labs  -Continue lithium 300 mg nightly for mood. This APRN has reviewed Lithium Toxicity symptoms with the patient including but not limited to: nausea, fine hand tremors, increased urination and thirst, weight gain, impaired thyroid functioning, fatigue, mental cloudiness, headaches, coarse hand tremors with toxicity, nystagmus, maculopapular rash, pruritis, acne, GI or stomach upset, diarrhea, vomiting, cramps, anorexia, diabetes insipidus, edema, microscopic tubular changes, t-wave inversion or abnormal cardiac rhythm, dysrhythmias, and leukocytosis.  The patient is advised if they experience any of these symptoms they are to contact this APRN/this office immediately or go to the Emergency Department immediately.  The patient verbalized understanding and agreement in their own words.  The patient is advised that taking Lithium with NSAID's, diuretics, ACE inhibitors, metronidazole, acetazolamide, methyldopa, carbamazepine, phenytoin, calcium channel blockers, and haloperidol may cause serious medication reactions including potentially fatal lithium toxicity.  The use of an SSRI with Lithium may raise the risk of dizziness, confusion, diarrhea, agitation, and tremor.  The patient is advised to avoid these medications, or if they are taking or plan to start any of these medications, this APRN/or a Provider at this office, and the patient's PCP/or the Prescriber of the medication should be notified/aware so further testing and monitoring can be performed.  The patient is advised of the need for hydration during treatment with Lithium, and the risks of not staying hydrated - drinking water.  The patient verbalizes understanding and agreement of instructions in their own words.  Also, the patient is instructed to complete the ordered lithium level after taking the Lithium  for at least 14 days with no missed doses.   The patient is instructed not to take the lithium on the morning of lab draw (as levels should be drawn about 12 hours after the last dose taken), but to take the Lithium after the blood work/lab draw has been completed as taking it before having the blood/lab drawn will interfere with the results.  The patient verbalized understanding and agreement in their own words.  -Discussed with patient importance of medication compliance  -The benefits of a healthy diet and exercise were discussed with patient, especially the positive effects they have on mental health. Patient encouraged to consider lifestyle modification regarding  diet and exercise patterns to maximize results of mental health treatment.  -Reviewed previous available documentation  -Reviewed most recent available labs              Visit Diagnoses:    ICD-10-CM ICD-9-CM   1. Bipolar II disorder (HCC) -unchanged  F31.81 296.89   2. Medication management  Z79.899 V58.69   3. Generalized anxiety disorder -unchanged  F41.1 300.02         TREATMENT PLAN/GOALS: Continue supportive psychotherapy efforts and medications as indicated. Treatment and medication options discussed during today's visit. Patient acknowledged and verbally consented to continue with current treatment plan and was educated on the importance of compliance with treatment and follow-up appointments.    MEDICATION ISSUES:    Discussed medication options and treatment plan of prescribed medication as well as the risks, benefits, and side effects including potential falls, possible impaired driving and metabolic adversities among others. Patient is agreeable to call the office with any worsening of symptoms or onset of side effects. Patient is agreeable to call 911 or go to the nearest ER should he/she begin having SI/HI.     MEDS ORDERED DURING VISIT:  New Medications Ordered This Visit   Medications   • lithium carbonate 300 MG capsule     Sig: Take 1 capsule by mouth Every Evening.      Dispense:  30 capsule     Refill:  2       Return in about 3 months (around 8/18/2022), or if symptoms worsen or fail to improve.         Prognosis: Guarded dependent on medication/follow up and treatment plan compliance.  Functionality: pt showing improvements in important areas of daily functioning.     Short-term goals: Patient will adhere to medication regimen and note continued improvement in symptoms over the next 3 months.   Long-term goals: Patient will be adherent to medication management and psychotherapy with continued improvement in symptoms over the next 6 months          This document has been electronically signed by MARISELA Patton   May 18, 2022 15:55 EDT    Part of this note may be an electronic transcription/translation of spoken language to printed text using the Dragon Dictation System.

## 2022-06-02 RX ORDER — LISINOPRIL 10 MG/1
TABLET ORAL
Qty: 30 TABLET | Refills: 1 | Status: SHIPPED | OUTPATIENT
Start: 2022-06-02 | End: 2022-08-01

## 2022-07-19 ENCOUNTER — OFFICE VISIT (OUTPATIENT)
Dept: PSYCHIATRY | Facility: CLINIC | Age: 52
End: 2022-07-19

## 2022-07-19 DIAGNOSIS — Z63.9 FAMILY DYNAMICS PROBLEM: ICD-10-CM

## 2022-07-19 DIAGNOSIS — F31.81 BIPOLAR II DISORDER: Primary | ICD-10-CM

## 2022-07-19 DIAGNOSIS — Z63.8 STRESS DUE TO FAMILY TENSION: ICD-10-CM

## 2022-07-19 DIAGNOSIS — F41.1 GENERALIZED ANXIETY DISORDER: ICD-10-CM

## 2022-07-19 PROCEDURE — 90834 PSYTX W PT 45 MINUTES: CPT | Performed by: SOCIAL WORKER

## 2022-07-19 SDOH — SOCIAL STABILITY - SOCIAL INSECURITY: PROBLEM RELATED TO PRIMARY SUPPORT GROUP, UNSPECIFIED: Z63.9

## 2022-07-19 SDOH — SOCIAL STABILITY - SOCIAL INSECURITY: OTHER SPECIFIED PROBLEMS RELATED TO PRIMARY SUPPORT GROUP: Z63.8

## 2022-07-31 DIAGNOSIS — F31.81 BIPOLAR II DISORDER: ICD-10-CM

## 2022-08-01 RX ORDER — LITHIUM CARBONATE 300 MG/1
300 CAPSULE ORAL EVERY EVENING
Qty: 30 CAPSULE | Refills: 2 | Status: SHIPPED | OUTPATIENT
Start: 2022-08-01 | End: 2022-09-21 | Stop reason: SDUPTHER

## 2022-08-01 RX ORDER — LISINOPRIL 10 MG/1
TABLET ORAL
Qty: 30 TABLET | Refills: 1 | Status: SHIPPED | OUTPATIENT
Start: 2022-08-01 | End: 2022-10-10

## 2022-08-03 NOTE — PROGRESS NOTES
"-  Date of Service: July 19, 2022  Time In: 10:55 AM  Time Out: 11:45 AM    PROGRESS NOTE  Data:  Tammi Levine is a 52 y.o. female who met 1:1 with the undersigned for a regularly scheduled individual outpatient therapy session at [facility] for follow-up of bipolar disorder.  Patient and the undersigned wore masks throughout the session and maintained appropriate distancing.     HPI: Patient reports she continues to struggle with worrying about various social issues and states she continues to be told by various people of different calamities that will likely happen due to various social and political issues.  The patient reports she continues to struggle with her mother as her mother continues to be \"out there\" and states her mother continues to describe to various conspiracy theories.  Patient states she continues to limit the time she is exposed to her mother and states she tries to see her at least every couple of weeks.  The patient reports she continues to struggle with periods of depressed mood, anhedonia, anergia, and feeling hopeless.  Patient rates current symptoms at a 4/5 on a scale of 1-10 with 10 being most severe.  She denies any significant symptoms of hypomania other than having periods where she feels somewhat irritable and has a tendency to lash out at others.  Patient reports she is sleeping relatively well at this time.  The patient adamantly and convincingly denies suicidal ideation vehemently denies any substance use.      Clinical Maneuvering/Intervention:  Assisted patient in processing above session content; acknowledged and normalized patient’s thoughts, feelings, and concerns.   Allow the patient to discuss/process her feelings and frustrations what she believes to be her mother's tendency to be susceptible to conspiracy theory and validated her feelings.  Also utilized motivational reviewing techniques including complex reflections to discussed the concept " of things we can control things he cannot control and encourage patient to remind herself she cannot control the behavior of others, not even family members.  Utilized cognitive behavioral therapy to assist the patient in developing appropriate coping mechanisms to decrease the severity frequency of symptoms.  Continue to focus on healthy skills of daily living and behavioral activation.  Provided unconditional positive regarding a safe, supportive environment.    Allowed patient to freely discuss issues without interruption or judgment. Provided safe, confidential environment to facilitate the development of positive therapeutic relationship and encourage open, honest communication. Assisted patient in identifying risk factors which would indicate the need for higher level of care including thoughts to harm self or others and/or self-harming behavior and encouraged patient to contact this office, call 911, or present to the nearest emergency room should any of these events occur. Discussed crisis intervention services and means to access.  Patient adamantly and convincingly denies current suicidal or homicidal ideation or perceptual disturbance.      Assessment    Patient appears to maintain relative stability as compared to her baseline.  However, she continues to be susceptible to an external locus of control and continues to be significantly affected by ongoing strained family dynamics.  The patient's symptomology continue to cause impairment in important areas of functioning.  Patient can be reasonably expected to continue to benefit treatment and would likely be at increased risk for decompensation.    Diagnoses and all orders for this visit:    1. Bipolar II disorder (HCC) -unchanged (Primary)    2. Generalized anxiety disorder -unchanged    3. Stress due to family tension    4. Family dynamics problem               Mental Status Exam  Hygiene: Good  Dress: Casual  Attitude:  Cooperative  Motor Activity:  Appropriate  Speech:  Normal  Mood: Within normal limits  Affect: Tearful  Thought Processes:  Linear  Thought Content:  normal  Suicidal Thoughts:  denies  Homicidal Thoughts:  denies  Crisis Safety Plan: yes, to come to the emergency room.  Hallucinations:  denies    Patient's Support Network Includes:  mother and extended family    Progress toward goal: Not at goal    Functional Status: Mild impairment     Prognosis: Fair with Ongoing Treatment     Plan         Patient will continue in individual outpatient therapy session [facility] every 3 weeks and will continue and pharmacotherapy as scheduled with MARISELA Dixon.  Patient will adhere to medication regimen as prescribed and report any side effects. Patient will contact this office, call 911 or present to the nearest emergency room should suicidal or homicidal ideations occur. Provide Cognitive Behavioral Therapy and Integrative Therapy to improve functioning, maintain stability, and avoid decompensation and the need for higher level of care.          Return in about 3 weeks (around 8/9/2022) for Next scheduled follow up.      This document signed by Vidal Ortiz LCSW, [facility] August 3, 2022 07:28 EDT

## 2022-08-23 ENCOUNTER — OFFICE VISIT (OUTPATIENT)
Dept: PSYCHIATRY | Facility: CLINIC | Age: 52
End: 2022-08-23

## 2022-08-23 DIAGNOSIS — F41.1 GENERALIZED ANXIETY DISORDER: ICD-10-CM

## 2022-08-23 DIAGNOSIS — Z63.8 STRESS DUE TO FAMILY TENSION: ICD-10-CM

## 2022-08-23 DIAGNOSIS — Z63.9 FAMILY DYNAMICS PROBLEM: ICD-10-CM

## 2022-08-23 DIAGNOSIS — F31.81 BIPOLAR II DISORDER: Primary | ICD-10-CM

## 2022-08-23 PROCEDURE — 90837 PSYTX W PT 60 MINUTES: CPT | Performed by: SOCIAL WORKER

## 2022-08-23 SDOH — SOCIAL STABILITY - SOCIAL INSECURITY: PROBLEM RELATED TO PRIMARY SUPPORT GROUP, UNSPECIFIED: Z63.9

## 2022-08-23 SDOH — SOCIAL STABILITY - SOCIAL INSECURITY: OTHER SPECIFIED PROBLEMS RELATED TO PRIMARY SUPPORT GROUP: Z63.8

## 2022-09-09 NOTE — PROGRESS NOTES
"-  Date of Service: August 23, 2022  Time In: 11:00 AM  Time Out: 12:00 PM    PROGRESS NOTE  Data:  Tammi Levine is a 52 y.o. female who met 1:1 with the undersigned for a regularly scheduled individual outpatient therapy session at [facility] for follow-up of bipolar disorder.  Patient and the undersigned wore masks throughout the session and maintained appropriate distancing.     HPI: Patient states things are \"about the same\" states she continues to live in her apartment.  The patient states she continues to hear various social and political issues from various people including the fact she believes her mother continues to describe to various conspiracy theories.    Patient states she continues to limit the time she is exposed to her mother and states she tries to see her at least every couple of weeks.  The patient reports she continues to struggle with periods of depressed mood, anhedonia, anergia, and feeling hopeless.  Patient rates current symptoms at a 4/5 on a scale of 1-10 with 10 being most severe.  She denies any significant symptoms of hypomania other than having periods where she feels somewhat irritable and has a tendency to lash out at others.  Patient reports she is sleeping relatively well at this time.  The patient adamantly and convincingly denies suicidal ideation vehemently denies any substance use.      Clinical Maneuvering/Intervention:  Assisted patient in processing above session content; acknowledged and normalized patient’s thoughts, feelings, and concerns.   Allow the patient to discuss/process her feelings and frustrations what she believes to be her mother's tendency to be susceptible to conspiracy theory and validated her feelings.  Also utilized motivational reviewing techniques including complex reflections to discussed the concept of things we can control things he cannot control and encourage patient to remind herself she cannot control the behavior " of others, not even family members.  Utilized cognitive behavioral therapy to assist the patient in developing appropriate coping mechanisms to decrease the severity frequency of symptoms.  Continue to focus on healthy skills of daily living and behavioral activation.  Provided unconditional positive regarding a safe, supportive environment.    Allowed patient to freely discuss issues without interruption or judgment. Provided safe, confidential environment to facilitate the development of positive therapeutic relationship and encourage open, honest communication. Assisted patient in identifying risk factors which would indicate the need for higher level of care including thoughts to harm self or others and/or self-harming behavior and encouraged patient to contact this office, call 911, or present to the nearest emergency room should any of these events occur. Discussed crisis intervention services and means to access.  Patient adamantly and convincingly denies current suicidal or homicidal ideation or perceptual disturbance.      Assessment    Patient appears to maintain relative stability as compared to her baseline.  However, she continues to be susceptible to an external locus of control and continues to be significantly affected by ongoing strained family dynamics.  The patient's symptomology continue to cause impairment in important areas of functioning.  Patient can be reasonably expected to continue to benefit treatment and would likely be at increased risk for decompensation.    Diagnoses and all orders for this visit:    1. Bipolar II disorder (HCC) -unchanged (Primary)    2. Generalized anxiety disorder -unchanged    3. Stress due to family tension    4. Family dynamics problem               Mental Status Exam  Hygiene: Good  Dress: Casual  Attitude:  Cooperative  Motor Activity: Appropriate  Speech:  Normal  Mood: Within normal limits  Affect: Tearful  Thought Processes:  Linear  Thought Content:   normal  Suicidal Thoughts:  denies  Homicidal Thoughts:  denies  Crisis Safety Plan: yes, to come to the emergency room.  Hallucinations:  denies    Patient's Support Network Includes:  mother and extended family    Progress toward goal: Not at goal    Functional Status: Mild impairment     Prognosis: Fair with Ongoing Treatment     Plan         Patient will continue in individual outpatient therapy session [facility] [place] every 3 weeks and will continue and pharmacotherapy as scheduled with MARISELA Dixon.  Patient will adhere to medication regimen as prescribed and report any side effects. Patient will contact this office, call 911 or present to the nearest emergency room should suicidal or homicidal ideations occur. Provide Cognitive Behavioral Therapy and Integrative Therapy to improve functioning, maintain stability, and avoid decompensation and the need for higher level of care.          Return in about 4 weeks (around 9/20/2022) for Next scheduled follow up.      This document signed by Vidal Ortiz LCSW, [facility] September 9, 2022 17:24 EDT

## 2022-09-21 ENCOUNTER — OFFICE VISIT (OUTPATIENT)
Dept: PSYCHIATRY | Facility: CLINIC | Age: 52
End: 2022-09-21

## 2022-09-21 VITALS
DIASTOLIC BLOOD PRESSURE: 69 MMHG | HEART RATE: 84 BPM | BODY MASS INDEX: 34.72 KG/M2 | OXYGEN SATURATION: 97 % | WEIGHT: 172.2 LBS | HEIGHT: 59 IN | SYSTOLIC BLOOD PRESSURE: 115 MMHG | TEMPERATURE: 98.2 F

## 2022-09-21 DIAGNOSIS — Z63.8 STRESS DUE TO FAMILY TENSION: ICD-10-CM

## 2022-09-21 DIAGNOSIS — Z79.899 MEDICATION MANAGEMENT: ICD-10-CM

## 2022-09-21 DIAGNOSIS — F31.81 BIPOLAR II DISORDER: ICD-10-CM

## 2022-09-21 DIAGNOSIS — F41.1 GENERALIZED ANXIETY DISORDER: Primary | ICD-10-CM

## 2022-09-21 PROCEDURE — 99214 OFFICE O/P EST MOD 30 MIN: CPT | Performed by: NURSE PRACTITIONER

## 2022-09-21 RX ORDER — LITHIUM CARBONATE 300 MG/1
300 CAPSULE ORAL EVERY EVENING
Qty: 30 CAPSULE | Refills: 2 | Status: SHIPPED | OUTPATIENT
Start: 2022-09-21 | End: 2022-12-21 | Stop reason: SDUPTHER

## 2022-09-21 RX ORDER — ROSUVASTATIN CALCIUM 20 MG/1
20 TABLET, COATED ORAL DAILY
COMMUNITY

## 2022-09-21 SDOH — SOCIAL STABILITY - SOCIAL INSECURITY: OTHER SPECIFIED PROBLEMS RELATED TO PRIMARY SUPPORT GROUP: Z63.8

## 2022-09-21 NOTE — PROGRESS NOTES
"Subjective   Tammi Levine is a 52 y.o. female who presents today for follow up    Chief Complaint:  Bipolar disorder    History of Present Illness: Patient presents as follow up. States she has been having difficulty with her neighbors. States she has been under a large amount of stress due to her neighbor. States her and some friends were sitting outside until 11 pm one evening. States after returning to her apartment, a  knocked on her door informing her of a noise complaint. States she was written up by her landlord. States her friend that was with her, passed away a few weeks later from COVID. States she has been keeping to herself and trying to avoid neighbors as much as possible.   She reports sleep is good, she denies nightmares. Reports she often takes naps during the day. Reports appetite is good. She denies SI/HI/AVH.     The following portions of the patient's history were reviewed and updated as appropriate: allergies, current medications, past family history, past medical history, past social history, past surgical history and problem list.      Past Medical History:  Past Medical History:   Diagnosis Date   • Abnormal CT scan     ADRENAL GLAND FOCAL MASS LESION MULTIPLE, LEFT ONLY   • Anxiety    • ASCVD (arteriosclerotic cardiovascular disease)     \"status post MI in 2012, clinically stable\"   • Bipolar disorder (HCC)    • Depression    • Diabetes mellitus (HCC)    • Disease of thyroid gland    • Dyslipidemia    • Hypertension, well controlled    • Migraine headache    • Myocardial infarction (HCC) 2012   • Panic disorder    • Right-sided low back pain with right-sided sciatica    • Sleep apnea        Social History:  Social History     Socioeconomic History   • Marital status: Single   Tobacco Use   • Smoking status: Never Smoker   • Smokeless tobacco: Never Used   Vaping Use   • Vaping Use: Never used   Substance and Sexual Activity   • Alcohol use: No   • Drug use: No   • Sexual " activity: Never       Family History:  Family History   Problem Relation Age of Onset   • Diabetes Father    • Diabetes Brother    • Cancer Maternal Grandmother    • Heart disease Maternal Grandfather    • Diabetes Paternal Grandfather    • Cancer Other        Past Surgical History:  Past Surgical History:   Procedure Laterality Date   • CHOLECYSTECTOMY     • CORONARY ANGIOPLASTY WITH STENT PLACEMENT  2012    Dr. Denson   • ENDOSCOPY  March 2021   • HYSTERECTOMY  12/2014   • NECK SURGERY  2010   • TRANSESOPHAGEAL ECHOCARDIOGRAM (SARAN)  07/13/2015    EF 60-65%       Problem List:  Patient Active Problem List   Diagnosis   • Arteriosclerotic cardiovascular disease (ASCVD), s/p MI in 2012, clinically stable.    • Type 2 diabetes mellitus with diabetic peripheral angiopathy without gangrene, with long-term current use of insulin (HCC)   • Essential hypertension   • Dyslipidemia   • Sleep apnea   • Migraine headache   • Abnormal CT scan   • Disease of thyroid gland   • Right-sided low back pain with right-sided sciatica   • Slow transit constipation       Allergy:   Allergies   Allergen Reactions   • Zoloft [Sertraline Hcl] Nausea And Vomiting   • Ciprofloxacin Rash     Also caused chest pain        Current Medications:   Current Outpatient Medications   Medication Sig Dispense Refill   • aspirin 81 MG EC tablet Take 81 mg by mouth Every Evening.     • insulin lispro protamine-insulin lispro (humaLOG 75-25) (75-25) 100 UNIT/ML suspension injection Inject 20 Units under the skin into the appropriate area as directed 2 (Two) Times a Day With Meals.     • levothyroxine (SYNTHROID, LEVOTHROID) 50 MCG tablet Take 40 mcg by mouth Every Evening.     • lisinopril (PRINIVIL,ZESTRIL) 10 MG tablet TAKE 1 TABLET BY MOUTH ONCE DAILY 30 tablet 1   • lithium carbonate 300 MG capsule Take 1 capsule by mouth Every Evening. 30 capsule 2   • Omeprazole Magnesium (PrilOSEC) 10 MG pack      • rosuvastatin (CRESTOR) 20 MG tablet Take 20 mg by  "mouth Daily.     • Canagliflozin (INVOKANA) 100 MG tablet tablet Take 100 mg by mouth Every Evening.       No current facility-administered medications for this visit.       Review of Symptoms:    Review of Systems   Constitutional: Negative.    HENT: Negative.    Eyes: Negative.    Respiratory: Negative.    Cardiovascular: Negative.    Gastrointestinal: Negative.    Endocrine: Negative.    Genitourinary: Negative.    Musculoskeletal: Positive for arthralgias, gait problem and joint swelling.   Skin: Negative.    Psychiatric/Behavioral: Positive for depressed mood and stress. Negative for suicidal ideas. The patient is nervous/anxious.        Objective   Physical Exam:   Blood pressure 115/69, pulse 84, temperature 98.2 °F (36.8 °C), temperature source Temporal, height 149.9 cm (59.02\"), weight 78.1 kg (172 lb 3.2 oz), SpO2 97 %.  Body mass index is 34.76 kg/m².    Appearance: Well nourished female, appropriately dressed, appears stated age and in no acute distress  Gait, Station, Strength: Unsteady gait, uses wheelchair    Mental Status Exam:   Hygiene:   good  Cooperation:  Cooperative  Eye Contact:  Good  Psychomotor Behavior:  Appropriate  Affect:  Appropriate  Mood: normal  Hopelessness: Denies  Speech:  Normal  Thought Process:  Linear  Thought Content:  Normal and Mood congruent  Suicidal:  None  Homicidal:  None  Hallucinations:  None  Delusion:  None  Memory:  Intact  Orientation:  Person, Place, Time and Situation  Reliability:  good  Insight:  Good  Judgement:  Good  Impulse Control:  Good  Physical/Medical Issues:  Yes DM     PHQ-Score Total:  PHQ-9 Total Score: 3          Lab Results:   No visits with results within 1 Month(s) from this visit.   Latest known visit with results is:   Lab on 11/26/2021   Component Date Value Ref Range Status   • COVID19 11/26/2021 Not Detected  Not Detected - Ref. Range Final       Assessment & Plan   Diagnoses and all orders for this visit:    1. Generalized anxiety " disorder -unchanged (Primary)    2. Bipolar II disorder (HCC) -unchanged  -     lithium carbonate 300 MG capsule; Take 1 capsule by mouth Every Evening.  Dispense: 30 capsule; Refill: 2    3. Medication management    4. Stress due to family tension        -Continue lithium 300 mg nightly for mood. This APRN has reviewed Lithium Toxicity symptoms with the patient including but not limited to: nausea, fine hand tremors, increased urination and thirst, weight gain, impaired thyroid functioning, fatigue, mental cloudiness, headaches, coarse hand tremors with toxicity, nystagmus, maculopapular rash, pruritis, acne, GI or stomach upset, diarrhea, vomiting, cramps, anorexia, diabetes insipidus, edema, microscopic tubular changes, t-wave inversion or abnormal cardiac rhythm, dysrhythmias, and leukocytosis.  The patient is advised if they experience any of these symptoms they are to contact this APRN/this office immediately or go to the Emergency Department immediately.  The patient verbalized understanding and agreement in their own words.  The patient is advised that taking Lithium with NSAID's, diuretics, ACE inhibitors, metronidazole, acetazolamide, methyldopa, carbamazepine, phenytoin, calcium channel blockers, and haloperidol may cause serious medication reactions including potentially fatal lithium toxicity.  The use of an SSRI with Lithium may raise the risk of dizziness, confusion, diarrhea, agitation, and tremor.  The patient is advised to avoid these medications, or if they are taking or plan to start any of these medications, this APRN/or a Provider at this office, and the patient's PCP/or the Prescriber of the medication should be notified/aware so further testing and monitoring can be performed.  The patient is advised of the need for hydration during treatment with Lithium, and the risks of not staying hydrated - drinking water.  The patient verbalizes understanding and agreement of instructions in their own  words.  Also, the patient is instructed to complete the ordered lithium level after taking the Lithium  for at least 14 days with no missed doses.  The patient is instructed not to take the lithium on the morning of lab draw (as levels should be drawn about 12 hours after the last dose taken), but to take the Lithium after the blood work/lab draw has been completed as taking it before having the blood/lab drawn will interfere with the results.  The patient verbalized understanding and agreement in their own words.  -Encouraged patient to have lithium levels drawn with last dose being at least 12 hours prior  -Discussed with patient importance of medication compliance  -The benefits of a healthy diet and exercise were discussed with patient, especially the positive effects they have on mental health. Patient encouraged to consider lifestyle modification regarding  diet and exercise patterns to maximize results of mental health treatment.  -Reviewed previous available documentation  -Reviewed most recent available labs                Visit Diagnoses:    ICD-10-CM ICD-9-CM   1. Generalized anxiety disorder -unchanged  F41.1 300.02   2. Bipolar II disorder (HCC) -unchanged  F31.81 296.89   3. Medication management  Z79.899 V58.69   4. Stress due to family tension  Z63.8 V61.8         TREATMENT PLAN/GOALS: Continue supportive psychotherapy efforts and medications as indicated. Treatment and medication options discussed during today's visit. Patient acknowledged and verbally consented to continue with current treatment plan and was educated on the importance of compliance with treatment and follow-up appointments.    MEDICATION ISSUES:    Discussed medication options and treatment plan of prescribed medication as well as the risks, benefits, and side effects including potential falls, possible impaired driving and metabolic adversities among others. Patient is agreeable to call the office with any worsening of symptoms or  onset of side effects. Patient is agreeable to call 911 or go to the nearest ER should he/she begin having SI/HI.     MEDS ORDERED DURING VISIT:  New Medications Ordered This Visit   Medications   • lithium carbonate 300 MG capsule     Sig: Take 1 capsule by mouth Every Evening.     Dispense:  30 capsule     Refill:  2     This prescription was filled on 7/11/2022. Any refills authorized will be placed on file.       Return in about 3 months (around 12/21/2022), or if symptoms worsen or fail to improve.         Prognosis: Guarded dependent on medication/follow up and treatment plan compliance.  Functionality: pt showing improvements in important areas of daily functioning.     Short-term goals: Patient will adhere to medication regimen and note continued improvement in symptoms over the next 3 months.   Long-term goals: Patient will be adherent to medication management and psychotherapy with continued improvement in symptoms over the next 6 months          This document has been electronically signed by MARISELA Patton   September 21, 2022 14:23 EDT    Part of this note may be an electronic transcription/translation of spoken language to printed text using the Dragon Dictation System.

## 2022-10-10 RX ORDER — LISINOPRIL 10 MG/1
TABLET ORAL
Qty: 30 TABLET | Refills: 1 | Status: SHIPPED | OUTPATIENT
Start: 2022-10-10 | End: 2022-12-09

## 2022-11-03 ENCOUNTER — OFFICE VISIT (OUTPATIENT)
Dept: CARDIOLOGY | Facility: CLINIC | Age: 52
End: 2022-11-03

## 2022-11-03 VITALS
WEIGHT: 173 LBS | SYSTOLIC BLOOD PRESSURE: 120 MMHG | HEART RATE: 75 BPM | DIASTOLIC BLOOD PRESSURE: 66 MMHG | BODY MASS INDEX: 34.88 KG/M2 | OXYGEN SATURATION: 98 % | HEIGHT: 59 IN

## 2022-11-03 DIAGNOSIS — I10 ESSENTIAL HYPERTENSION: ICD-10-CM

## 2022-11-03 DIAGNOSIS — E11.51 TYPE 2 DIABETES MELLITUS WITH DIABETIC PERIPHERAL ANGIOPATHY WITHOUT GANGRENE, WITH LONG-TERM CURRENT USE OF INSULIN: ICD-10-CM

## 2022-11-03 DIAGNOSIS — Z79.4 TYPE 2 DIABETES MELLITUS WITH DIABETIC PERIPHERAL ANGIOPATHY WITHOUT GANGRENE, WITH LONG-TERM CURRENT USE OF INSULIN: ICD-10-CM

## 2022-11-03 DIAGNOSIS — I25.10 ARTERIOSCLEROTIC CARDIOVASCULAR DISEASE (ASCVD): Primary | ICD-10-CM

## 2022-11-03 PROCEDURE — 93000 ELECTROCARDIOGRAM COMPLETE: CPT | Performed by: PHYSICIAN ASSISTANT

## 2022-11-03 PROCEDURE — 99213 OFFICE O/P EST LOW 20 MIN: CPT | Performed by: PHYSICIAN ASSISTANT

## 2022-11-03 NOTE — PROGRESS NOTES
Diane Rios  Tammi Levine  1970  11/03/2022    Patient Active Problem List   Diagnosis   • Arteriosclerotic cardiovascular disease (ASCVD), s/p MI in 2012, clinically stable.    • Type 2 diabetes mellitus with diabetic peripheral angiopathy without gangrene, with long-term current use of insulin (HCC)   • Essential hypertension   • Dyslipidemia   • Sleep apnea   • Migraine headache   • Abnormal CT scan   • Disease of thyroid gland   • Right-sided low back pain with right-sided sciatica   • Slow transit constipation       Dear Diane Rios:    Subjective     History of Present Illness:    Chief Complaint   Patient presents with   • Med Management     Reviewed on mychart.    • Dizziness       Tammi Levine is a pleasant 52 y.o. female with a past medical history significant for ASCVD with history of myocardial infarction in 2012, essential hypertension, diabetes mellitus.  She comes in today for cardiology follow-up.     Jyoti reports she has been well since she was last seen.  She denies any further episodes of chest pains.  At last visit she did report some chest pains when she had panic attacks however these have since resolved.  She also denies any shortness of breath, palpitations, dizziness, or syncope.  She has not had any recent labs    Allergies   Allergen Reactions   • Zoloft [Sertraline Hcl] Nausea And Vomiting   • Ciprofloxacin Rash     Also caused chest pain   :      Current Outpatient Medications:   •  aspirin 81 MG EC tablet, Take 81 mg by mouth Every Evening., Disp: , Rfl:   •  Canagliflozin (INVOKANA) 100 MG tablet tablet, Take 100 mg by mouth Every Evening., Disp: , Rfl:   •  insulin lispro protamine-insulin lispro (humaLOG 75-25) (75-25) 100 UNIT/ML suspension injection, Inject 20 Units under the skin into the appropriate area as directed 2 (Two) Times a Day With Meals., Disp: , Rfl:   •  levothyroxine (SYNTHROID, LEVOTHROID) 50 MCG tablet, Take 40 mcg by mouth Every  "Evening., Disp: , Rfl:   •  lisinopril (PRINIVIL,ZESTRIL) 10 MG tablet, TAKE 1 TABLET BY MOUTH ONCE DAILY, Disp: 30 tablet, Rfl: 1  •  lithium carbonate 300 MG capsule, Take 1 capsule by mouth Every Evening., Disp: 30 capsule, Rfl: 2  •  Omeprazole Magnesium (PrilOSEC) 10 MG pack, , Disp: , Rfl:   •  rosuvastatin (CRESTOR) 20 MG tablet, Take 20 mg by mouth Daily., Disp: , Rfl:     The following portions of the patient's history were reviewed and updated as appropriate: allergies, current medications, past family history, past medical history, past social history, past surgical history and problem list.    Social History     Tobacco Use   • Smoking status: Never   • Smokeless tobacco: Never   Vaping Use   • Vaping Use: Never used   Substance Use Topics   • Alcohol use: No   • Drug use: No         Objective   Vitals:    11/03/22 1356   BP: 120/66   BP Location: Left arm   Patient Position: Sitting   Cuff Size: Adult   Pulse: 75   SpO2: 98%   Weight: 78.5 kg (173 lb)   Height: 149.9 cm (59\")     Body mass index is 34.94 kg/m².    Constitutional:       General: Not in acute distress.     Appearance: Healthy appearance. Well-developed and not in distress. Not diaphoretic.   Eyes:      Conjunctiva/sclera: Conjunctivae normal.      Pupils: Pupils are equal, round, and reactive to light.   HENT:      Head: Normocephalic and atraumatic.   Neck:      Vascular: No carotid bruit or JVD.   Pulmonary:      Effort: Pulmonary effort is normal. No respiratory distress.      Breath sounds: Normal breath sounds.   Cardiovascular:      Normal rate. Regular rhythm.   Skin:     General: Skin is cool.   Neurological:      Mental Status: Alert, oriented to person, place, and time and oriented to person, place and time.         Lab Results   Component Value Date     (L) 09/20/2021    K 4.9 09/20/2021     09/20/2021    CO2 14.5 (L) 09/20/2021    BUN 13 09/20/2021    CREATININE 0.82 09/20/2021    GLUCOSE 242 (H) 09/20/2021    " CALCIUM 9.5 09/20/2021    AST 21 09/20/2021    ALT 12 09/20/2021    ALKPHOS 82 09/20/2021    LABIL2 1.4 (L) 02/04/2016     Lab Results   Component Value Date    CKTOTAL 124 08/14/2020     Lab Results   Component Value Date    WBC 3.92 09/20/2021    HGB 11.8 (L) 09/20/2021    HCT 36.0 09/20/2021     09/20/2021     Lab Results   Component Value Date    INR 0.91 01/12/2020     Lab Results   Component Value Date    MG 2.4 08/14/2020     Lab Results   Component Value Date    TSH 1.070 04/13/2021    TRIG 106 08/14/2020    HDL 52 08/14/2020    LDL 63 08/14/2020      Lab Results   Component Value Date    BNP 36 09/16/2015       During this visit the following were done:  Labs Reviewed []    Labs Ordered []    Radiology Reports Reviewed []    Radiology Ordered []    PCP Records Reviewed []    Referring Provider Records Reviewed []    ER Records Reviewed []    Hospital Records Reviewed []    History Obtained From Family []    Radiology Images Reviewed []    Other Reviewed []    Records Requested []         ECG 12 Lead    Date/Time: 11/3/2022 1:52 PM  Performed by: Oscar Lewis PA-C  Authorized by: Oscar Lewis PA-C   Comparison: compared with previous ECG   Similar to previous ECG  Rhythm: sinus rhythm  Conduction: conduction normal    Clinical impression: normal ECG            Assessment & Plan    Diagnosis Plan   1. Arteriosclerotic cardiovascular disease (ASCVD), s/p MI in 2012, clinically stable.   Comprehensive Metabolic Panel    Lipid Panel    CBC (No Diff)      2. Essential hypertension        3. Type 2 diabetes mellitus with diabetic peripheral angiopathy without gangrene, with long-term current use of insulin ([facility])                 Recommendations:  1. ASCVD  a. No recent anginal symptoms currently chest pain-free.  For now continue with medical therapy with aspirin, lisinopril, and Crestor.  b. I did order CMP, lipid panel, and CBC.    No follow-ups on file.    As always, I appreciate very much the  opportunity to participate in the cardiovascular care of your patients.      With Best Regards,    Oscar Lewis PA-C

## 2022-11-08 ENCOUNTER — OFFICE VISIT (OUTPATIENT)
Dept: PSYCHIATRY | Facility: CLINIC | Age: 52
End: 2022-11-08

## 2022-11-08 DIAGNOSIS — F31.81 BIPOLAR II DISORDER: Primary | ICD-10-CM

## 2022-11-08 DIAGNOSIS — F41.1 GENERALIZED ANXIETY DISORDER: ICD-10-CM

## 2022-11-08 DIAGNOSIS — Z63.9 FAMILY DYNAMICS PROBLEM: ICD-10-CM

## 2022-11-08 PROCEDURE — 90837 PSYTX W PT 60 MINUTES: CPT | Performed by: SOCIAL WORKER

## 2022-11-08 SDOH — SOCIAL STABILITY - SOCIAL INSECURITY: PROBLEM RELATED TO PRIMARY SUPPORT GROUP, UNSPECIFIED: Z63.9

## 2022-11-09 NOTE — PROGRESS NOTES
"-  Date of Service:   Time In: 11:00 AM  Time Out: 12:00 PM    PROGRESS NOTE  Data:  Tammi Levine is a 52 y.o. female who met 1:1 with the undersigned for a regularly scheduled individual outpatient therapy session at [facility] for follow-up of bipolar disorder.  Patient and the undersigned wore masks throughout the session and maintained appropriate distancing.     HPI: The patient immediately begins discussing the fact that she believes her mother has \"went off the deep end\" and states that she continues to believe her mother gets deeper into various conspiracy theories.  The patient states her mother recently told her that the [place] was being drained to find children who were being hidden there and abused and molested by \"Democrats\".  Patient states her mother also told her there was no point in going to vote as the day of the session was election day as the \"Republicans\" were going to cancel the midterm elections and reinstall Eufemia as president.  She states her mother also told her that \"they\" were going to close down all the [place] and take everyone's money.  Patient states her mother encouraged her to go buy all the groceries she could because \"they\" would not allow anyone to purchase food in the coming days.  Patient states she becomes very frustrated and irritable with her mother and states she feels as though her mother's boyfriend has caused this significant change in her mother.  Patient also states she continues to limit her time with her mother and states she simply cannot tolerate having to sit and listen to all of this \"craziness\".  The patient reports she continues to struggle with mood instability including periods of depressed mood, anhedonia, anergia, and feeling hopeless.  Patient also reports she struggles with periods of hypomania including feeling on edge, irritability, increased energy, impulsive behavior, and decreased need for sleep.  However, the " patient states she primarily struggles with depression and anxiety.  Patient rates current symptoms at a 4/5 on a scale of 1-10 with 10 being most severe.   Patient reports she is sleeping relatively well at this time.  The patient adamantly and convincingly denies suicidal ideation vehemently denies any substance use.      Clinical Maneuvering/Intervention:  Assisted patient in processing above session content; acknowledged and normalized patient’s thoughts, feelings, and concerns.   Allow the patient to discuss/process her feelings and frustrations what she believes to be her mother's tendency to be susceptible to conspiracy theory and validated her feelings.  Also utilized motivational reviewing techniques including complex reflections to discussed the concept of things we can control things he cannot control and encourage patient to remind herself she cannot control the behavior of others, not even family members.  However, also validated the patient's right to limit her time with her mother and to protect herself from the emotional harm.  Utilized cognitive behavioral therapy to assist the patient in developing appropriate coping mechanisms to decrease the severity frequency of symptoms.  Continue to focus on healthy skills of daily living and behavioral activation.  Provided unconditional positive regarding a safe, supportive environment.    Allowed patient to freely discuss issues without interruption or judgment. Provided safe, confidential environment to facilitate the development of positive therapeutic relationship and encourage open, honest communication. Assisted patient in identifying risk factors which would indicate the need for higher level of care including thoughts to harm self or others and/or self-harming behavior and encouraged patient to contact this office, call 911, or present to the nearest emergency room should any of these events occur. Discussed crisis intervention services and means to  access.  Patient adamantly and convincingly denies current suicidal or homicidal ideation or perceptual disturbance.      Assessment    Patient appears to maintain relative stability as compared to her baseline.  However, she continues to be susceptible to an external locus of control and continues to be significantly affected by ongoing strained family dynamics.  The patient's symptomology continue to cause impairment in important areas of functioning.  Patient can be reasonably expected to continue to benefit treatment and would likely be at increased risk for decompensation.    Diagnoses and all orders for this visit:    1. Bipolar II disorder (HCC) -unchanged (Primary)    2. Generalized anxiety disorder -unchanged    3. Family dynamics problem               Mental Status Exam  Hygiene: Good  Dress: Casual  Attitude:  Cooperative  Motor Activity: Appropriate  Speech:  Normal  Mood: Within normal limits  Affect: Tearful  Thought Processes:  Linear  Thought Content:  normal  Suicidal Thoughts:  denies  Homicidal Thoughts:  denies  Crisis Safety Plan: yes, to come to the emergency room.  Hallucinations:  denies    Patient's Support Network Includes:  mother and extended family    Progress toward goal: Not at goal    Functional Status: Mild impairment     Prognosis: Fair with Ongoing Treatment     Plan         Patient will continue in individual outpatient therapy session [facility] every 3 weeks and will continue and pharmacotherapy as scheduled with MARISELA Dixon.  Patient will adhere to medication regimen as prescribed and report any side effects. Patient will contact this office, call 911 or present to the nearest emergency room should suicidal or homicidal ideations occur. Provide Cognitive Behavioral Therapy and Integrative Therapy to improve functioning, maintain stability, and avoid decompensation and the need for higher level of care.          Return in about 3 weeks (around  11/29/2022) for Next scheduled follow up.      This document signed by Vidal Ortiz LCSW, [facility] November 9, 2022 07:14 EST

## 2022-12-09 RX ORDER — LISINOPRIL 10 MG/1
TABLET ORAL
Qty: 30 TABLET | Refills: 1 | Status: SHIPPED | OUTPATIENT
Start: 2022-12-09 | End: 2023-02-08

## 2022-12-10 ENCOUNTER — HOSPITAL ENCOUNTER (EMERGENCY)
Facility: HOSPITAL | Age: 52
Discharge: HOME | End: 2022-12-11
Attending: EMERGENCY MEDICINE | Admitting: EMERGENCY MEDICINE

## 2022-12-10 DIAGNOSIS — N30.00 ACUTE CYSTITIS WITHOUT HEMATURIA: Primary | ICD-10-CM

## 2022-12-10 DIAGNOSIS — J10.1 INFLUENZA A: ICD-10-CM

## 2022-12-10 LAB
ALBUMIN SERPL-MCNC: 4.26 G/DL (ref 3.5–5.2)
ALBUMIN/GLOB SERPL: 1.1 G/DL
ALP SERPL-CCNC: 94 U/L (ref 39–117)
ALT SERPL W P-5'-P-CCNC: 14 U/L (ref 1–33)
ANION GAP SERPL CALCULATED.3IONS-SCNC: 15.2 MMOL/L (ref 5–15)
AST SERPL-CCNC: 15 U/L (ref 1–32)
BASOPHILS # BLD AUTO: 0 10*3/MM3 (ref 0–0.2)
BASOPHILS NFR BLD AUTO: 0 % (ref 0–1.5)
BILIRUB SERPL-MCNC: 0.2 MG/DL (ref 0–1.2)
BUN SERPL-MCNC: 19 MG/DL (ref 6–20)
BUN/CREAT SERPL: 19.6 (ref 7–25)
CALCIUM SPEC-SCNC: 9.8 MG/DL (ref 8.6–10.5)
CHLORIDE SERPL-SCNC: 102 MMOL/L (ref 98–107)
CO2 SERPL-SCNC: 18.8 MMOL/L (ref 22–29)
CREAT SERPL-MCNC: 0.97 MG/DL (ref 0.57–1)
CRP SERPL-MCNC: 2.98 MG/DL (ref 0–0.5)
DEPRECATED RDW RBC AUTO: 43.3 FL (ref 37–54)
EGFRCR SERPLBLD CKD-EPI 2021: 70 ML/MIN/1.73
EOSINOPHIL # BLD AUTO: 0 10*3/MM3 (ref 0–0.4)
EOSINOPHIL NFR BLD AUTO: 0 % (ref 0.3–6.2)
ERYTHROCYTE [DISTWIDTH] IN BLOOD BY AUTOMATED COUNT: 13.3 % (ref 12.3–15.4)
GLOBULIN UR ELPH-MCNC: 3.7 GM/DL
GLUCOSE SERPL-MCNC: 275 MG/DL (ref 65–99)
HCT VFR BLD AUTO: 38.3 % (ref 34–46.6)
HGB BLD-MCNC: 12.7 G/DL (ref 12–15.9)
IMM GRANULOCYTES # BLD AUTO: 0.03 10*3/MM3 (ref 0–0.05)
IMM GRANULOCYTES NFR BLD AUTO: 0.6 % (ref 0–0.5)
LIPASE SERPL-CCNC: 22 U/L (ref 13–60)
LYMPHOCYTES # BLD AUTO: 0.34 10*3/MM3 (ref 0.7–3.1)
LYMPHOCYTES NFR BLD AUTO: 6.4 % (ref 19.6–45.3)
MCH RBC QN AUTO: 29.3 PG (ref 26.6–33)
MCHC RBC AUTO-ENTMCNC: 33.2 G/DL (ref 31.5–35.7)
MCV RBC AUTO: 88.5 FL (ref 79–97)
MONOCYTES # BLD AUTO: 0.28 10*3/MM3 (ref 0.1–0.9)
MONOCYTES NFR BLD AUTO: 5.3 % (ref 5–12)
NEUTROPHILS NFR BLD AUTO: 4.65 10*3/MM3 (ref 1.7–7)
NEUTROPHILS NFR BLD AUTO: 87.7 % (ref 42.7–76)
NRBC BLD AUTO-RTO: 0 /100 WBC (ref 0–0.2)
PLATELET # BLD AUTO: 235 10*3/MM3 (ref 140–450)
PMV BLD AUTO: 9.5 FL (ref 6–12)
POTASSIUM SERPL-SCNC: 5.2 MMOL/L (ref 3.5–5.2)
PROT SERPL-MCNC: 8 G/DL (ref 6–8.5)
RBC # BLD AUTO: 4.33 10*6/MM3 (ref 3.77–5.28)
SODIUM SERPL-SCNC: 136 MMOL/L (ref 136–145)
WBC NRBC COR # BLD: 5.3 10*3/MM3 (ref 3.4–10.8)

## 2022-12-10 PROCEDURE — 99284 EMERGENCY DEPT VISIT MOD MDM: CPT

## 2022-12-10 PROCEDURE — 80053 COMPREHEN METABOLIC PANEL: CPT | Performed by: PHYSICIAN ASSISTANT

## 2022-12-10 PROCEDURE — 93005 ELECTROCARDIOGRAM TRACING: CPT | Performed by: EMERGENCY MEDICINE

## 2022-12-10 PROCEDURE — 96375 TX/PRO/DX INJ NEW DRUG ADDON: CPT

## 2022-12-10 PROCEDURE — 85025 COMPLETE CBC W/AUTO DIFF WBC: CPT | Performed by: PHYSICIAN ASSISTANT

## 2022-12-10 PROCEDURE — 83690 ASSAY OF LIPASE: CPT | Performed by: PHYSICIAN ASSISTANT

## 2022-12-10 PROCEDURE — 96365 THER/PROPH/DIAG IV INF INIT: CPT

## 2022-12-10 PROCEDURE — 36415 COLL VENOUS BLD VENIPUNCTURE: CPT

## 2022-12-10 PROCEDURE — 86140 C-REACTIVE PROTEIN: CPT | Performed by: PHYSICIAN ASSISTANT

## 2022-12-11 ENCOUNTER — APPOINTMENT (OUTPATIENT)
Dept: CT IMAGING | Facility: HOSPITAL | Age: 52
End: 2022-12-11

## 2022-12-11 VITALS
SYSTOLIC BLOOD PRESSURE: 174 MMHG | RESPIRATION RATE: 18 BRPM | WEIGHT: 174 LBS | HEART RATE: 82 BPM | DIASTOLIC BLOOD PRESSURE: 84 MMHG | HEIGHT: 59 IN | OXYGEN SATURATION: 100 % | BODY MASS INDEX: 35.08 KG/M2 | TEMPERATURE: 98.3 F

## 2022-12-11 LAB
BACTERIA UR QL AUTO: ABNORMAL /HPF
BILIRUB UR QL STRIP: NEGATIVE
CLARITY UR: CLEAR
COLOR UR: YELLOW
GLUCOSE UR STRIP-MCNC: ABNORMAL MG/DL
HGB UR QL STRIP.AUTO: ABNORMAL
HOLD SPECIMEN: NORMAL
HOLD SPECIMEN: NORMAL
HYALINE CASTS UR QL AUTO: ABNORMAL /LPF
KETONES UR QL STRIP: ABNORMAL
LEUKOCYTE ESTERASE UR QL STRIP.AUTO: ABNORMAL
NITRITE UR QL STRIP: NEGATIVE
PH UR STRIP.AUTO: <=5 [PH] (ref 5–8)
PROT UR QL STRIP: ABNORMAL
QT INTERVAL: 380 MS
QTC INTERVAL: 430 MS
RBC # UR STRIP: ABNORMAL /HPF
REF LAB TEST METHOD: ABNORMAL
SP GR UR STRIP: >1.03 (ref 1–1.03)
SQUAMOUS #/AREA URNS HPF: ABNORMAL /HPF
UROBILINOGEN UR QL STRIP: ABNORMAL
WBC # UR STRIP: ABNORMAL /HPF
WHOLE BLOOD HOLD COAG: NORMAL
WHOLE BLOOD HOLD SPECIMEN: NORMAL

## 2022-12-11 PROCEDURE — 74177 CT ABD & PELVIS W/CONTRAST: CPT

## 2022-12-11 PROCEDURE — 25010000002 CEFTRIAXONE PER 250 MG: Performed by: EMERGENCY MEDICINE

## 2022-12-11 PROCEDURE — 25010000002 ONDANSETRON PER 1 MG: Performed by: EMERGENCY MEDICINE

## 2022-12-11 PROCEDURE — 25010000002 IOPAMIDOL 61 % SOLUTION: Performed by: EMERGENCY MEDICINE

## 2022-12-11 PROCEDURE — 25010000002 MORPHINE PER 10 MG: Performed by: EMERGENCY MEDICINE

## 2022-12-11 PROCEDURE — 81001 URINALYSIS AUTO W/SCOPE: CPT | Performed by: PHYSICIAN ASSISTANT

## 2022-12-11 RX ORDER — KETOROLAC TROMETHAMINE 30 MG/ML
30 INJECTION, SOLUTION INTRAMUSCULAR; INTRAVENOUS EVERY 6 HOURS PRN
Status: DISCONTINUED | OUTPATIENT
Start: 2022-12-11 | End: 2022-12-11

## 2022-12-11 RX ORDER — SULFAMETHOXAZOLE AND TRIMETHOPRIM 800; 160 MG/1; MG/1
1 TABLET ORAL 2 TIMES DAILY
Qty: 20 TABLET | Refills: 0 | Status: SHIPPED | OUTPATIENT
Start: 2022-12-11 | End: 2022-12-21

## 2022-12-11 RX ORDER — ONDANSETRON 2 MG/ML
4 INJECTION INTRAMUSCULAR; INTRAVENOUS ONCE
Status: COMPLETED | OUTPATIENT
Start: 2022-12-11 | End: 2022-12-11

## 2022-12-11 RX ADMIN — ONDANSETRON 4 MG: 2 INJECTION INTRAMUSCULAR; INTRAVENOUS at 01:10

## 2022-12-11 RX ADMIN — MORPHINE SULFATE 4 MG: 4 INJECTION, SOLUTION INTRAMUSCULAR; INTRAVENOUS at 01:10

## 2022-12-11 RX ADMIN — SODIUM CHLORIDE 1000 ML: 9 INJECTION, SOLUTION INTRAVENOUS at 01:07

## 2022-12-11 RX ADMIN — CEFTRIAXONE SODIUM 1 G: 1 INJECTION, POWDER, FOR SOLUTION INTRAMUSCULAR; INTRAVENOUS at 04:47

## 2022-12-11 RX ADMIN — SODIUM CHLORIDE 1000 ML: 9 INJECTION, SOLUTION INTRAVENOUS at 04:47

## 2022-12-11 RX ADMIN — IOPAMIDOL 70 ML: 612 INJECTION, SOLUTION INTRAVENOUS at 01:01

## 2022-12-11 NOTE — DISCHARGE INSTRUCTIONS
Please drink plenty of fluids, take the antibiotics prescribed as directed, alternate Motrin with Tylenol as needed for pain control, follow-up with PCP on Monday if not better.    If any new/acute symptoms or worsening of current presentation please go to the closest urgent care or emergency room for prompt reevaluation.

## 2022-12-11 NOTE — ED NOTES
MEDICAL SCREENING:    Reason for Visit: abd pain, flu +    Patient initially seen in triage.  The patient was advised further evaluation and diagnostic testing will be needed, some of the treatment and testing will be initiated in the lobby in order to begin the process.  The patient will be returned to the waiting area for the time being and possibly be re-assessed by a subsequent ED provider.  The patient will be brought back to the treatment area in as timely manner as possible.         Carmen Betancourt PA  12/10/22 [id number]

## 2022-12-11 NOTE — ED PROVIDER NOTES
"Subjective   History of Present Illness  Tammi Levine is a 52-year-old female that was diagnosed with the flu yesterday, reporting to the emergency room tonight with abdominal pain, nausea and vomiting.  Patient is a poor historian, agitated, in distress secondary to her abdominal pain.  Denies recent travel, denies recent exposure to sick contacts.  Denies fever or diarrhea.  No dysuria.        History provided by:  Patient   used: No        Review of Systems   Gastrointestinal: Positive for abdominal pain, nausea and vomiting.   All other systems reviewed and are negative.      Past Medical History:   Diagnosis Date   • Abnormal CT scan     ADRENAL GLAND FOCAL MASS LESION MULTIPLE, LEFT ONLY   • Anxiety    • ASCVD (arteriosclerotic cardiovascular disease)     \"status post MI in 2012, clinically stable\"   • Bipolar disorder (HCC)    • Depression    • Diabetes mellitus (HCC)    • Disease of thyroid gland    • Dyslipidemia    • Hypertension, well controlled    • Migraine headache    • Myocardial infarction (HCC) 2012   • Panic disorder    • Right-sided low back pain with right-sided sciatica    • Sleep apnea        Allergies   Allergen Reactions   • Zoloft [Sertraline Hcl] Nausea And Vomiting   • Ciprofloxacin Rash     Also caused chest pain       Past Surgical History:   Procedure Laterality Date   • CHOLECYSTECTOMY     • CORONARY ANGIOPLASTY WITH STENT PLACEMENT  2012    Dr. Denson   • ENDOSCOPY  March 2021   • HYSTERECTOMY  12/2014   • NECK SURGERY  2010   • TRANSESOPHAGEAL ECHOCARDIOGRAM (SARAN)  07/13/2015    EF 60-65%       Family History   Problem Relation Age of Onset   • Diabetes Father    • Diabetes Brother    • Cancer Maternal Grandmother    • Heart disease Maternal Grandfather    • Diabetes Paternal Grandfather    • Cancer Other        Social History     Socioeconomic History   • Marital status: Single   Tobacco Use   • Smoking status: Never   • Smokeless tobacco: Never   Vaping Use "   • Vaping Use: Never used   Substance and Sexual Activity   • Alcohol use: No   • Drug use: No   • Sexual activity: Never           Objective   Physical Exam  Vitals and nursing note reviewed.   Constitutional:       General: She is in acute distress.      Appearance: She is well-developed and normal weight. She is ill-appearing. She is not toxic-appearing or diaphoretic.   HENT:      Head: Normocephalic and atraumatic.   Eyes:      Pupils: Pupils are equal, round, and reactive to light.   Cardiovascular:      Rate and Rhythm: Regular rhythm. Tachycardia present.      Heart sounds: Normal heart sounds.   Pulmonary:      Effort: Pulmonary effort is normal.      Breath sounds: Normal breath sounds.   Abdominal:      General: Abdomen is flat. Bowel sounds are normal. There is no distension or abdominal bruit. There are no signs of injury.      Palpations: Abdomen is soft. There is no shifting dullness or hepatomegaly.      Tenderness: There is generalized abdominal tenderness. There is no guarding or rebound.      Hernia: No hernia is present. There is no hernia in the umbilical area.   Musculoskeletal:         General: Normal range of motion.      Cervical back: Normal range of motion and neck supple.   Skin:     General: Skin is warm and dry.      Capillary Refill: Capillary refill takes 2 to 3 seconds.   Neurological:      General: No focal deficit present.      Mental Status: She is alert and oriented to person, place, and time. Mental status is at baseline.      GCS: GCS eye subscore is 4. GCS verbal subscore is 5. GCS motor subscore is 6.      Cranial Nerves: No cranial nerve deficit.      Sensory: No sensory deficit.      Motor: No weakness.   Psychiatric:         Mood and Affect: Mood normal.         Speech: Speech normal.         Behavior: Behavior normal.         Procedures           ED Course  ED Course as of 12/11/22 0429   Sat Dec 10, 2022   1910 EKG normal sinus rhythm at rate 77  QRS 82  no  ST elevation. Electronically signed by Kenneth Dykes MD, 12/10/22, 7:11 PM EST.   [BB]      ED Course User Index  [BB] Kenneth Dykes MD      04;10 -patient examined, found asleep, in no acute distress, with no further nausea, vomiting or abdominal pain.  Advised patient results obtained, need to drink plenty of fluids and take antibiotics prescribed as directed.  For pain she can alternate Motrin/Tylenol but if, not better, she can follow-up with PCP on Monday.    Exam was completed in the setting of the COVID-19 pandemic.  Counseling: Discussed today's findings, in addition to providing specific details for the plan of care.  Questions are answered and there is agreement with the plan.  Counseling was provided regarding the diagnosis and prognosis. Discussed supportive care, the specific conditions for return, as well as the importance of follow-up. Advised to recheck here immediately with new or worsening symptoms.                                         MDM  Number of Diagnoses or Management Options  Acute cystitis without hematuria: new and requires workup  Influenza A: new and requires workup     Amount and/or Complexity of Data Reviewed  Clinical lab tests: reviewed and ordered  Tests in the medicine section of CPT®: reviewed and ordered  Decide to obtain previous medical records or to obtain history from someone other than the patient: yes  Review and summarize past medical records: yes  Independent visualization of images, tracings, or specimens: yes    Risk of Complications, Morbidity, and/or Mortality  Presenting problems: moderate  Diagnostic procedures: moderate  Management options: moderate    Patient Progress  Patient progress: improved      Final diagnoses:   Acute cystitis without hematuria   Influenza A       ED Disposition  ED Disposition     ED Disposition   Discharge    Condition   Stable    Comment   --             Diane Rios  [address]  [facility]  [address] [phone]    Schedule an appointment as soon as possible for a visit in 2 days  If symptoms worsen, As needed    [facility] Emergency Department  [address] [id number] [phone]    if unable to see PCP         Medication List      New Prescriptions    sulfamethoxazole-trimethoprim 800-160 MG per tablet  Commonly known as: BACTRIM DS,SEPTRA DS  Take 1 tablet by mouth 2 (Two) Times a Day for 10 days.           Where to Get Your Medications      These medications were sent to [facility] - [place], [place] - [address] - [phone]  - [phone]   [address] [address]    Phone: [phone]   · sulfamethoxazole-trimethoprim 800-160 MG per tablet          Valeriano Dalton MD  12/11/22 0427

## 2022-12-21 ENCOUNTER — OFFICE VISIT (OUTPATIENT)
Dept: PSYCHIATRY | Facility: CLINIC | Age: 52
End: 2022-12-21

## 2022-12-21 VITALS
SYSTOLIC BLOOD PRESSURE: 160 MMHG | OXYGEN SATURATION: 99 % | HEIGHT: 59 IN | WEIGHT: 173.8 LBS | HEART RATE: 73 BPM | TEMPERATURE: 97.5 F | DIASTOLIC BLOOD PRESSURE: 92 MMHG | BODY MASS INDEX: 35.04 KG/M2

## 2022-12-21 DIAGNOSIS — Z79.899 MEDICATION MANAGEMENT: ICD-10-CM

## 2022-12-21 DIAGNOSIS — F41.3 OTHER MIXED ANXIETY DISORDERS: ICD-10-CM

## 2022-12-21 DIAGNOSIS — F31.81 BIPOLAR II DISORDER: Primary | ICD-10-CM

## 2022-12-21 DIAGNOSIS — F41.1 GENERALIZED ANXIETY DISORDER: ICD-10-CM

## 2022-12-21 PROCEDURE — 99214 OFFICE O/P EST MOD 30 MIN: CPT | Performed by: NURSE PRACTITIONER

## 2022-12-21 PROCEDURE — 90833 PSYTX W PT W E/M 30 MIN: CPT | Performed by: NURSE PRACTITIONER

## 2022-12-21 PROCEDURE — 80178 ASSAY OF LITHIUM: CPT | Performed by: NURSE PRACTITIONER

## 2022-12-21 PROCEDURE — 36415 COLL VENOUS BLD VENIPUNCTURE: CPT | Performed by: NURSE PRACTITIONER

## 2022-12-21 RX ORDER — LITHIUM CARBONATE 300 MG/1
300 CAPSULE ORAL EVERY EVENING
Qty: 90 CAPSULE | Refills: 0 | Status: SHIPPED | OUTPATIENT
Start: 2022-12-21 | End: 2023-03-22 | Stop reason: SDUPTHER

## 2022-12-21 NOTE — PROGRESS NOTES
"Subjective   Tammi Levine is a 52 y.o. female who presents today for follow up    Chief Complaint:  Bipolar disorder    History of Present Illness: Patient presents as follow up. States she has been worried about the potential colder weather and snow this weekend. States she is planning on staying with her mother over the weekend.  States she has broken up with her boyfriend due to his inability to keep a job. States he has applied to several places. States he has been frequently fired due to him missing work. Reports she feels he wants her to support him.  Reports sleep is good, denies nightmares. Reports appetite is good. She denies SI/HI/AVH.    The following portions of the patient's history were reviewed and updated as appropriate: allergies, current medications, past family history, past medical history, past social history, past surgical history and problem list.      Past Medical History:  Past Medical History:   Diagnosis Date   • Abnormal CT scan     ADRENAL GLAND FOCAL MASS LESION MULTIPLE, LEFT ONLY   • Anxiety    • ASCVD (arteriosclerotic cardiovascular disease)     \"status post MI in 2012, clinically stable\"   • Bipolar disorder (HCC)    • Depression    • Diabetes mellitus (HCC)    • Disease of thyroid gland    • Dyslipidemia    • Hypertension, well controlled    • Migraine headache    • Myocardial infarction (HCC) 2012   • Panic disorder    • Right-sided low back pain with right-sided sciatica    • Sleep apnea        Social History:  Social History     Socioeconomic History   • Marital status: Single   Tobacco Use   • Smoking status: Never   • Smokeless tobacco: Never   Vaping Use   • Vaping Use: Never used   Substance and Sexual Activity   • Alcohol use: No   • Drug use: No   • Sexual activity: Never       Family History:  Family History   Problem Relation Age of Onset   • Diabetes Father    • Diabetes Brother    • Cancer Maternal Grandmother    • Heart disease Maternal Grandfather    • Diabetes " Paternal Grandfather    • Cancer Other        Past Surgical History:  Past Surgical History:   Procedure Laterality Date   • CHOLECYSTECTOMY     • CORONARY ANGIOPLASTY WITH STENT PLACEMENT  2012    Dr. Denson   • ENDOSCOPY  March 2021   • HYSTERECTOMY  12/2014   • NECK SURGERY  2010   • TRANSESOPHAGEAL ECHOCARDIOGRAM (SARAN)  07/13/2015    EF 60-65%       Problem List:  Patient Active Problem List   Diagnosis   • Arteriosclerotic cardiovascular disease (ASCVD), s/p MI in 2012, clinically stable.    • Type 2 diabetes mellitus with diabetic peripheral angiopathy without gangrene, with long-term current use of insulin (HCC)   • Essential hypertension   • Dyslipidemia   • Sleep apnea   • Migraine headache   • Abnormal CT scan   • Disease of thyroid gland   • Right-sided low back pain with right-sided sciatica   • Slow transit constipation       Allergy:   Allergies   Allergen Reactions   • Zoloft [Sertraline Hcl] Nausea And Vomiting   • Ciprofloxacin Rash     Also caused chest pain        Current Medications:   Current Outpatient Medications   Medication Sig Dispense Refill   • aspirin 81 MG EC tablet Take 81 mg by mouth Every Evening.     • Canagliflozin (INVOKANA) 100 MG tablet tablet Take 100 mg by mouth Every Evening.     • insulin lispro protamine-insulin lispro (humaLOG 75-25) (75-25) 100 UNIT/ML suspension injection Inject 20 Units under the skin into the appropriate area as directed 2 (Two) Times a Day With Meals.     • levothyroxine (SYNTHROID, LEVOTHROID) 50 MCG tablet Take 40 mcg by mouth Every Evening.     • lisinopril (PRINIVIL,ZESTRIL) 10 MG tablet TAKE 1 TABLET BY MOUTH ONCE DAILY 30 tablet 1   • lithium carbonate 300 MG capsule Take 1 capsule by mouth Every Evening. 90 capsule 0   • Omeprazole Magnesium (PrilOSEC) 10 MG pack      • rosuvastatin (CRESTOR) 20 MG tablet Take 20 mg by mouth Daily.     • sulfamethoxazole-trimethoprim (BACTRIM DS,SEPTRA DS) 800-160 MG per tablet Take 1 tablet by mouth 2 (Two)  "Times a Day for 10 days. 20 tablet 0     No current facility-administered medications for this visit.       Review of Symptoms:    Review of Systems   Constitutional: Positive for fatigue.   HENT: Negative.    Eyes: Negative.    Respiratory: Negative.    Cardiovascular: Negative.    Gastrointestinal: Negative.    Genitourinary: Negative.    Musculoskeletal: Positive for arthralgias, back pain and gait problem.   Psychiatric/Behavioral: Positive for depressed mood and stress. Negative for suicidal ideas. The patient is nervous/anxious.        Objective   Physical Exam:   Blood pressure 160/92, pulse 73, temperature 97.5 °F (36.4 °C), temperature source Temporal, height 149.9 cm (59.02\"), weight 78.8 kg (173 lb 12.8 oz), SpO2 99 %.  Body mass index is 35.08 kg/m².    Appearance: Well nourished female, appropriately dressed, appears stated age and in no acute distress  Gait, Station, Strength: Unsteady gait, uses cane to assist with ambulation    Mental Status Exam:   Hygiene:   good  Cooperation:  Cooperative  Eye Contact:  Good  Psychomotor Behavior:  Appropriate  Affect:  Appropriate  Mood: normal  Hopelessness: Denies  Speech:  Normal  Thought Process:  Linear  Thought Content:  Mood congruent  Suicidal:  None  Homicidal:  None  Hallucinations:  None  Delusion:  None  Memory:  Intact  Orientation:  Person, Place, Time and Situation  Reliability:  good  Insight:  Good  Judgement:  Good  Impulse Control:  Good  Physical/Medical Issues:  Yes See med hx     PHQ-Score Total:  PHQ-9 Total Score: 1           Lab Results:   Admission on 12/10/2022, Discharged on 12/11/2022   Component Date Value Ref Range Status   • QT Interval 12/10/2022 380  ms Final   • QTC Interval 12/10/2022 430  ms Final   • Glucose 12/10/2022 275 (H)  65 - 99 mg/dL Final   • BUN 12/10/2022 19  6 - 20 mg/dL Final   • Creatinine 12/10/2022 0.97  0.57 - 1.00 mg/dL Final   • Sodium 12/10/2022 136  136 - 145 mmol/L Final   • Potassium 12/10/2022 5.2  3.5 " - 5.2 mmol/L Final   • Chloride 12/10/2022 102  98 - 107 mmol/L Final   • CO2 12/10/2022 18.8 (L)  22.0 - 29.0 mmol/L Final   • Calcium 12/10/2022 9.8  8.6 - 10.5 mg/dL Final   • Total Protein 12/10/2022 8.0  6.0 - 8.5 g/dL Final   • Albumin 12/10/2022 4.26  3.50 - 5.20 g/dL Final   • ALT (SGPT) 12/10/2022 14  1 - 33 U/L Final   • AST (SGOT) 12/10/2022 15  1 - 32 U/L Final   • Alkaline Phosphatase 12/10/2022 94  39 - 117 U/L Final   • Total Bilirubin 12/10/2022 0.2  0.0 - 1.2 mg/dL Final   • Globulin 12/10/2022 3.7  gm/dL Final   • A/G Ratio 12/10/2022 1.1  g/dL Final   • BUN/Creatinine Ratio 12/10/2022 19.6  7.0 - 25.0 Final   • Anion Gap 12/10/2022 15.2 (H)  5.0 - 15.0 mmol/L Final   • eGFR 12/10/2022 70.0  >60.0 mL/min/1.73 Final    National Kidney Foundation and American Society of Nephrology (ASN) Task Force recommended calculation based on the Chronic Kidney Disease Epidemiology Collaboration (CKD-EPI) equation refit without adjustment for race.   • Lipase 12/10/2022 22  13 - 60 U/L Final   • Color, UA 12/11/2022 Yellow  Yellow, Straw Final   • Appearance, UA 12/11/2022 Clear  Clear Final   • pH, UA 12/11/2022 <=5.0  5.0 - 8.0 Final   • Specific Gravity, UA 12/11/2022 >1.030 (H)  1.005 - 1.030 Final   • Glucose, UA 12/11/2022 >=1000 mg/dL (3+) (A)  Negative Final   • Ketones, UA 12/11/2022 15 mg/dL (1+) (A)  Negative Final   • Bilirubin, UA 12/11/2022 Negative  Negative Final   • Blood, UA 12/11/2022 Large (3+) (A)  Negative Final   • Protein, UA 12/11/2022 Trace (A)  Negative Final   • Leuk Esterase, UA 12/11/2022 Small (1+) (A)  Negative Final   • Nitrite, UA 12/11/2022 Negative  Negative Final   • Urobilinogen, UA 12/11/2022 0.2 E.U./dL  0.2 - 1.0 E.U./dL Final   • C-Reactive Protein 12/10/2022 2.98 (H)  0.00 - 0.50 mg/dL Final   • WBC 12/10/2022 5.30  3.40 - 10.80 10*3/mm3 Final   • RBC 12/10/2022 4.33  3.77 - 5.28 10*6/mm3 Final   • Hemoglobin 12/10/2022 12.7  12.0 - 15.9 g/dL Final   • Hematocrit  12/10/2022 38.3  34.0 - 46.6 % Final   • MCV 12/10/2022 88.5  79.0 - 97.0 fL Final   • MCH 12/10/2022 29.3  26.6 - 33.0 pg Final   • MCHC 12/10/2022 33.2  31.5 - 35.7 g/dL Final   • RDW 12/10/2022 13.3  12.3 - 15.4 % Final   • RDW-SD 12/10/2022 43.3  37.0 - 54.0 fl Final   • MPV 12/10/2022 9.5  6.0 - 12.0 fL Final   • Platelets 12/10/2022 235  140 - 450 10*3/mm3 Final   • Neutrophil % 12/10/2022 87.7 (H)  42.7 - 76.0 % Final   • Lymphocyte % 12/10/2022 6.4 (L)  19.6 - 45.3 % Final   • Monocyte % 12/10/2022 5.3  5.0 - 12.0 % Final   • Eosinophil % 12/10/2022 0.0 (L)  0.3 - 6.2 % Final   • Basophil % 12/10/2022 0.0  0.0 - 1.5 % Final   • Immature Grans % 12/10/2022 0.6 (H)  0.0 - 0.5 % Final   • Neutrophils, Absolute 12/10/2022 4.65  1.70 - 7.00 10*3/mm3 Final   • Lymphocytes, Absolute 12/10/2022 0.34 (L)  0.70 - 3.10 10*3/mm3 Final   • Monocytes, Absolute 12/10/2022 0.28  0.10 - 0.90 10*3/mm3 Final   • Eosinophils, Absolute 12/10/2022 0.00  0.00 - 0.40 10*3/mm3 Final   • Basophils, Absolute 12/10/2022 0.00  0.00 - 0.20 10*3/mm3 Final   • Immature Grans, Absolute 12/10/2022 0.03  0.00 - 0.05 10*3/mm3 Final   • nRBC 12/10/2022 0.0  0.0 - 0.2 /100 WBC Final   • Extra Tube 12/10/2022 Hold for add-ons.   Final    Auto resulted.   • Extra Tube 12/10/2022 hold for add-on   Final    Auto resulted   • Extra Tube 12/10/2022 Hold for add-ons.   Final    Auto resulted.   • Extra Tube 12/10/2022 Hold for add-ons.   Final    Auto resulted   • RBC, UA 12/11/2022 3-5 (A)  None Seen, 0-2 /HPF Final   • WBC, UA 12/11/2022 6-12 (A)  None Seen, 0-2 /HPF Final   • Bacteria, UA 12/11/2022 Trace (A)  None Seen /HPF Final   • Squamous Epithelial Cells, UA 12/11/2022 0-2  None Seen, 0-2 /HPF Final   • Hyaline Casts, UA 12/11/2022 None Seen  None Seen /LPF Final   • Methodology 12/11/2022 Manual Light Microscopy   Final       Assessment & Plan   Diagnoses and all orders for this visit:    1. Bipolar II disorder (HCC) -unchanged (Primary)  -      lithium carbonate 300 MG capsule; Take 1 capsule by mouth Every Evening.  Dispense: 90 capsule; Refill: 0    2. Generalized anxiety disorder -unchanged    3. Medication management  -     Lithium Level; Future    4. Other mixed anxiety disorders        -Will have lithium levels drawn today   -Continue lithium 300 mg nightly for mood. This APRN has reviewed Lithium Toxicity symptoms with the patient including but not limited to: nausea, fine hand tremors, increased urination and thirst, weight gain, impaired thyroid functioning, fatigue, mental cloudiness, headaches, coarse hand tremors with toxicity, nystagmus, maculopapular rash, pruritis, acne, GI or stomach upset, diarrhea, vomiting, cramps, anorexia, diabetes insipidus, edema, microscopic tubular changes, t-wave inversion or abnormal cardiac rhythm, dysrhythmias, and leukocytosis.  The patient is advised if they experience any of these symptoms they are to contact this APRN/this office immediately or go to the Emergency Department immediately.  The patient verbalized understanding and agreement in their own words.  The patient is advised that taking Lithium with NSAID's, diuretics, ACE inhibitors, metronidazole, acetazolamide, methyldopa, carbamazepine, phenytoin, calcium channel blockers, and haloperidol may cause serious medication reactions including potentially fatal lithium toxicity.  The use of an SSRI with Lithium may raise the risk of dizziness, confusion, diarrhea, agitation, and tremor.  The patient is advised to avoid these medications, or if they are taking or plan to start any of these medications, this APRN/or a Provider at this office, and the patient's PCP/or the Prescriber of the medication should be notified/aware so further testing and monitoring can be performed.  The patient is advised of the need for hydration during treatment with Lithium, and the risks of not staying hydrated - drinking water.  The patient verbalizes understanding and  agreement of instructions in their own words.  Also, the patient is instructed to complete the ordered lithium level after taking the Lithium  for at least 14 days with no missed doses.  The patient is instructed not to take the lithium on the morning of lab draw (as levels should be drawn about 12 hours after the last dose taken), but to take the Lithium after the blood work/lab draw has been completed as taking it before having the blood/lab drawn will interfere with the results.  The patient verbalized understanding and agreement in their own words.  -Discussed with patient importance of medication compliance  -Encouraged patient to begin therapy  -TOSHA reviewed and appropriate. Patient counseled on use of controlled substances.   -The benefits of a healthy diet and exercise were discussed with patient, especially the positive effects they have on mental health. Patient encouraged to consider lifestyle modification regarding  diet and exercise patterns to maximize results of mental health treatment.  -Reviewed previous available documentation  -Reviewed most recent available labs       -In/Start Time: 1 PM.  Out/Stop Time: 1:30 PM.  30 minutes of face to face direct patient care with patient was spent for supportive psychotherapy including promoting the therapeutic alliance, strengthening self awareness and insights, strengthening coping skills, discussing relaxation techniques, counseling the patient regarding diagnoses, utilizing cognitive behavioral therapy to assist the patient in recognizing more appropriate coping mechanisms which are proven effective in reducing the severity of frequency of symptoms and and in coordination of care.  This APRN assisted the patient in processing the patient's diagnoses including life stressors and relationship, and also acknowledged and normalized the patient's thoughts, feelings, and concerns  The patient is also strongly urged to eat healthy well balanced foods in order  to reduce further health risks, and exercise as tolerated and in guidance with the patient's PCP's recommendations. This APRN allowed the patient to freely discuss issues without interruption or judgment.  This APRN answered any questions the patient had regarding medication and treatment plan.                   Visit Diagnoses:    ICD-10-CM ICD-9-CM   1. Bipolar II disorder (HCC) -unchanged  F31.81 296.89   2. Generalized anxiety disorder -unchanged  F41.1 300.02   3. Medication management  Z79.899 V58.69   4. Other mixed anxiety disorders  F41.3 300.09         TREATMENT PLAN/GOALS: Continue supportive psychotherapy efforts and medications as indicated. Treatment and medication options discussed during today's visit. Patient acknowledged and verbally consented to continue with current treatment plan and was educated on the importance of compliance with treatment and follow-up appointments.    MEDICATION ISSUES:    Discussed medication options and treatment plan of prescribed medication as well as the risks, benefits, and side effects including potential falls, possible impaired driving and metabolic adversities among others. Patient is agreeable to call the office with any worsening of symptoms or onset of side effects. Patient is agreeable to call 911 or go to the nearest ER should he/she begin having SI/HI.     MEDS ORDERED DURING VISIT:  New Medications Ordered This Visit   Medications   • lithium carbonate 300 MG capsule     Sig: Take 1 capsule by mouth Every Evening.     Dispense:  90 capsule     Refill:  0       Return in about 3 months (around 3/21/2023), or if symptoms worsen or fail to improve.         Prognosis: Guarded dependent on medication/follow up and treatment plan compliance.  Functionality: pt showing improvements in important areas of daily functioning.     Short-term goals: Patient will adhere to medication regimen and note continued improvement in symptoms over the next 3 months.   Long-term  goals: Patient will be adherent to medication management and psychotherapy with continued improvement in symptoms over the next 6 months          This document has been electronically signed by MARISELA Patton   December 21, 2022 13:57 EST    Part of this note may be an electronic transcription/translation of spoken language to printed text using the Dragon Dictation System.

## 2022-12-21 NOTE — PROGRESS NOTES
Venipuncture Blood Specimen Collection  Venipuncture performed in Right arm by Kellen Brooks MA with good hemostasis. Patient tolerated the procedure well without complications.   12/21/22   Kellen Brooks MA

## 2022-12-22 LAB — LITHIUM SERPL-SCNC: 0.6 MMOL/L (ref 0.6–1.2)

## 2023-01-24 ENCOUNTER — OFFICE VISIT (OUTPATIENT)
Dept: PSYCHIATRY | Facility: CLINIC | Age: 53
End: 2023-01-24
Payer: MEDICAID

## 2023-01-24 DIAGNOSIS — F41.1 GENERALIZED ANXIETY DISORDER: ICD-10-CM

## 2023-01-24 DIAGNOSIS — F31.81 BIPOLAR II DISORDER: Primary | ICD-10-CM

## 2023-01-24 DIAGNOSIS — Z63.9 FAMILY DYNAMICS PROBLEM: ICD-10-CM

## 2023-01-24 PROCEDURE — 90834 PSYTX W PT 45 MINUTES: CPT | Performed by: SOCIAL WORKER

## 2023-01-24 SDOH — SOCIAL STABILITY - SOCIAL INSECURITY: PROBLEM RELATED TO PRIMARY SUPPORT GROUP, UNSPECIFIED: Z63.9

## 2023-02-08 RX ORDER — LISINOPRIL 10 MG/1
TABLET ORAL
Qty: 30 TABLET | Refills: 1 | Status: SHIPPED | OUTPATIENT
Start: 2023-02-08

## 2023-02-12 NOTE — PROGRESS NOTES
-  Date of Service: January 24, 2023  Time In: 1:15 PM  Time Out: 2:00 PM    PROGRESS NOTE  Data:  Tammi Levine is a 52 y.o. female who met 1:1 with the undersigned for a regularly scheduled individual outpatient therapy session at [facility] for follow-up of bipolar disorder.  Patient and the undersigned wore masks throughout the session and maintained appropriate distancing.     HPI:The patient reports she continues to struggle with mood instability including periods of depressed mood, anhedonia, anergia, and feeling hopeless.  Patient also reports she struggles with periods of hypomania including feeling on edge, irritability, increased energy, impulsive behavior, and decreased need for sleep.  However, the patient states she primarily struggles with depression and anxiety.  Patient rates current symptoms at a 4/5 on a scale of 1-10 with 10 being most severe.   Patient reports she is sleeping relatively well at this time.  The patient adamantly and convincingly denies suicidal ideation vehemently denies any substance use.      Clinical Maneuvering/Intervention:  Assisted patient in processing above session content; acknowledged and normalized patient’s thoughts, feelings, and concerns.   Also utilized motivational reviewing techniques including complex reflections to discussed the concept of things we can control things he cannot control and encourage patient to remind herself she cannot control the behavior of others, not even family members.  However, also validated the patient's right to limit her time with her mother and to protect herself from the emotional harm.  Utilized cognitive behavioral therapy to assist the patient in developing appropriate coping mechanisms to decrease the severity frequency of symptoms.  Continue to focus on healthy skills of daily living and behavioral activation.  Provided unconditional positive regarding a safe, supportive environment.    Allowed  patient to freely discuss issues without interruption or judgment. Provided safe, confidential environment to facilitate the development of positive therapeutic relationship and encourage open, honest communication. Assisted patient in identifying risk factors which would indicate the need for higher level of care including thoughts to harm self or others and/or self-harming behavior and encouraged patient to contact this office, call 911, or present to the nearest emergency room should any of these events occur. Discussed crisis intervention services and means to access.  Patient adamantly and convincingly denies current suicidal or homicidal ideation or perceptual disturbance.      Assessment    Patient appears to maintain relative stability as compared to her baseline.  However, she continues to be susceptible to an external locus of control and continues to be significantly affected by ongoing strained family dynamics.  The patient's symptomology continue to cause impairment in important areas of functioning.  Patient can be reasonably expected to continue to benefit treatment and would likely be at increased risk for decompensation.    Diagnoses and all orders for this visit:    1. Bipolar II disorder (HCC) -unchanged (Primary)    2. Generalized anxiety disorder -unchanged    3. Family dynamics problem               Mental Status Exam  Hygiene: Good  Dress: Casual  Attitude:  Cooperative  Motor Activity: Appropriate  Speech:  Normal  Mood: Within normal limits  Affect: Tearful  Thought Processes:  Linear  Thought Content:  normal  Suicidal Thoughts:  denies  Homicidal Thoughts:  denies  Crisis Safety Plan: yes, to come to the emergency room.  Hallucinations:  denies    Patient's Support Network Includes:  mother and extended family    Progress toward goal: Not at goal    Functional Status: Mild impairment     Prognosis: Fair with Ongoing Treatment     Plan         Patient will continue in individual outpatient  therapy session [facility] every 3 weeks and will continue and pharmacotherapy as scheduled with MARISELA Dixon.  Patient will adhere to medication regimen as prescribed and report any side effects. Patient will contact this office, call 911 or present to the nearest emergency room should suicidal or homicidal ideations occur. Provide Cognitive Behavioral Therapy and Integrative Therapy to improve functioning, maintain stability, and avoid decompensation and the need for higher level of care.          Return in about 3 weeks (around 2/14/2023) for Next scheduled follow up.      This document signed by Vidal Ortiz LCSW, [facility] February 12, 2023 14:10 EST

## 2023-02-21 ENCOUNTER — OFFICE VISIT (OUTPATIENT)
Dept: PSYCHIATRY | Facility: CLINIC | Age: 53
End: 2023-02-21
Payer: MEDICAID

## 2023-02-21 DIAGNOSIS — Z63.9 FAMILY DYNAMICS PROBLEM: ICD-10-CM

## 2023-02-21 DIAGNOSIS — F31.81 BIPOLAR II DISORDER: Primary | ICD-10-CM

## 2023-02-21 DIAGNOSIS — F41.1 GENERALIZED ANXIETY DISORDER: ICD-10-CM

## 2023-02-21 PROCEDURE — 90832 PSYTX W PT 30 MINUTES: CPT | Performed by: SOCIAL WORKER

## 2023-02-21 SDOH — SOCIAL STABILITY - SOCIAL INSECURITY: PROBLEM RELATED TO PRIMARY SUPPORT GROUP, UNSPECIFIED: Z63.9

## 2023-02-22 NOTE — PROGRESS NOTES
"-  Date of Service: February 21, 2023  Time In: 2:30 PM  Time Out: 3:00 PM    PROGRESS NOTE  Data:  Tammi Levine is a 52 y.o. female who met 1:1 with the undersigned for a regularly scheduled individual outpatient therapy session at [facility] for follow-up of bipolar disorder.  Patient and the undersigned wore masks throughout the session and maintained appropriate distancing.    Chief Complaint: Bipolar II disorder; anxiety; family dynamics problem     HPI: Patient continues to report difficulty with her mother and states \"she keeps bringing up these crazy conspiracy theories\".  Patient reports when she is at her mother's home she simply goes to her room or puts in her headphones when her mother began speaking of what the patient considers to be conspiracy theories.  The patient reports she continues to struggle with mood instability including periods of depressed mood, anhedonia, anergia, and feeling hopeless.  Patient also reports she struggles with periods of hypomania including feeling on edge, irritability, increased energy, impulsive behavior, and decreased need for sleep.  However, the patient states she primarily struggles with depression and anxiety.  Patient rates current symptoms at a 4 on a scale of 1-10 with 10 being most severe.   Patient reports she is sleeping relatively well at this time.  The patient adamantly and convincingly denies suicidal ideation vehemently denies any substance use.      Clinical Maneuvering/Intervention:  Assisted patient in processing above session content; acknowledged and normalized patient’s thoughts, feelings, and concerns.   Also utilized motivational reviewing techniques including complex reflections to discussed the concept of things we can control things he cannot control and encourage patient to remind herself she cannot control the behavior of others, not even family members.  However, also validated the patient's right to limit her " time with her mother and to protect herself from the emotional harm.  Utilized cognitive behavioral therapy to assist the patient in developing appropriate coping mechanisms to decrease the severity frequency of symptoms.  Continue to focus on healthy skills of daily living and behavioral activation.  Provided unconditional positive regarding a safe, supportive environment.    Allowed patient to freely discuss issues without interruption or judgment. Provided safe, confidential environment to facilitate the development of positive therapeutic relationship and encourage open, honest communication. Assisted patient in identifying risk factors which would indicate the need for higher level of care including thoughts to harm self or others and/or self-harming behavior and encouraged patient to contact this office, call 911, or present to the nearest emergency room should any of these events occur. Discussed crisis intervention services and means to access.  Patient adamantly and convincingly denies current suicidal or homicidal ideation or perceptual disturbance.      Assessment    Patient appears to maintain relative stability as compared to her baseline.  However, she continues to be susceptible to an external locus of control and continues to be significantly affected by ongoing strained family dynamics.  The patient's symptomology continue to cause impairment in important areas of functioning.  Patient can be reasonably expected to continue to benefit treatment and would likely be at increased risk for decompensation.    Diagnoses and all orders for this visit:    1. Bipolar II disorder (HCC) -unchanged (Primary)    2. Generalized anxiety disorder -unchanged    3. Family dynamics problem               Mental Status Exam  Hygiene: Good  Dress: Casual  Attitude:  Cooperative  Motor Activity: Appropriate  Speech:  Normal  Mood: Within normal limits  Affect: Tearful  Thought Processes:  Linear  Thought Content:   normal  Suicidal Thoughts:  denies  Homicidal Thoughts:  denies  Crisis Safety Plan: yes, to come to the emergency room.  Hallucinations:  denies    Patient's Support Network Includes:  mother and extended family    Progress toward goal: Not at goal    Functional Status: Mild impairment     Prognosis: Fair with Ongoing Treatment     Plan         Patient will continue in individual outpatient therapy session [facility] [place] every 3 weeks and will continue and pharmacotherapy as scheduled with MARISELA Dixon.  Patient will adhere to medication regimen as prescribed and report any side effects. Patient will contact this office, call 911 or present to the nearest emergency room should suicidal or homicidal ideations occur. Provide Cognitive Behavioral Therapy and Integrative Therapy to improve functioning, maintain stability, and avoid decompensation and the need for higher level of care.          Return in about 4 weeks (around 3/21/2023) for Next scheduled follow up.      This document signed by Vidal Ortiz LCSW, [facility] February 22, 2023 07:13 EST

## 2023-03-22 ENCOUNTER — OFFICE VISIT (OUTPATIENT)
Dept: PSYCHIATRY | Facility: CLINIC | Age: 53
End: 2023-03-22
Payer: MEDICAID

## 2023-03-22 VITALS
HEIGHT: 59 IN | HEART RATE: 80 BPM | BODY MASS INDEX: 35.24 KG/M2 | SYSTOLIC BLOOD PRESSURE: 118 MMHG | DIASTOLIC BLOOD PRESSURE: 84 MMHG | TEMPERATURE: 97.5 F | WEIGHT: 174.8 LBS | OXYGEN SATURATION: 99 %

## 2023-03-22 DIAGNOSIS — F41.3 OTHER MIXED ANXIETY DISORDERS: ICD-10-CM

## 2023-03-22 DIAGNOSIS — F41.1 GENERALIZED ANXIETY DISORDER: ICD-10-CM

## 2023-03-22 DIAGNOSIS — Z79.899 MEDICATION MANAGEMENT: ICD-10-CM

## 2023-03-22 DIAGNOSIS — G47.9 SLEEPING DIFFICULTIES: ICD-10-CM

## 2023-03-22 DIAGNOSIS — F31.81 BIPOLAR II DISORDER: Primary | ICD-10-CM

## 2023-03-22 PROCEDURE — 1160F RVW MEDS BY RX/DR IN RCRD: CPT | Performed by: NURSE PRACTITIONER

## 2023-03-22 PROCEDURE — 99214 OFFICE O/P EST MOD 30 MIN: CPT | Performed by: NURSE PRACTITIONER

## 2023-03-22 PROCEDURE — 3074F SYST BP LT 130 MM HG: CPT | Performed by: NURSE PRACTITIONER

## 2023-03-22 PROCEDURE — 3079F DIAST BP 80-89 MM HG: CPT | Performed by: NURSE PRACTITIONER

## 2023-03-22 PROCEDURE — 1159F MED LIST DOCD IN RCRD: CPT | Performed by: NURSE PRACTITIONER

## 2023-03-22 RX ORDER — FLUTICASONE PROPIONATE 50 MCG
SPRAY, SUSPENSION (ML) NASAL
COMMUNITY
Start: 2023-03-03

## 2023-03-22 RX ORDER — INSULIN ASPART 100 [IU]/ML
INJECTION, SUSPENSION SUBCUTANEOUS
COMMUNITY
Start: 2023-03-20

## 2023-03-22 RX ORDER — OMEPRAZOLE 20 MG/1
1 CAPSULE, DELAYED RELEASE ORAL DAILY
COMMUNITY
Start: 2023-03-20

## 2023-03-22 RX ORDER — LITHIUM CARBONATE 300 MG/1
300 CAPSULE ORAL EVERY EVENING
Qty: 90 CAPSULE | Refills: 1 | Status: SHIPPED | OUTPATIENT
Start: 2023-03-22

## 2023-03-22 RX ORDER — TRAZODONE HYDROCHLORIDE 50 MG/1
50 TABLET ORAL NIGHTLY
Qty: 90 TABLET | Refills: 1 | Status: SHIPPED | OUTPATIENT
Start: 2023-03-22

## 2023-03-22 NOTE — PROGRESS NOTES
"Subjective   Tammi Levine is a 53 y.o. female who presents today for follow up    Chief Complaint:  Bipolar disorder    History of Present Illness: Patient presents as follow up. She reports medication is working well. Reviewed lab results with patient that indicated lithium levels are within a therapeutic range. She reports having issues at her apartment complex due to her neighbor having multiple people staying at her house. Reports she tries to stay to herself. Reports she broke up with her boyfriend before [place] due to him not working.   She reports sleep is poor with difficulty falling asleep. States she has been having nightmares recently. Reports appetite is good. She denies SI/HI/AVH.     The following portions of the patient's history were reviewed and updated as appropriate: allergies, current medications, past family history, past medical history, past social history, past surgical history and problem list.      Past Medical History:  Past Medical History:   Diagnosis Date   • Abnormal CT scan     ADRENAL GLAND FOCAL MASS LESION MULTIPLE, LEFT ONLY   • Anxiety    • ASCVD (arteriosclerotic cardiovascular disease)     \"status post MI in 2012, clinically stable\"   • Bipolar disorder (HCC)    • Depression    • Diabetes mellitus (HCC)    • Disease of thyroid gland    • Dyslipidemia    • Hypertension, well controlled    • Migraine headache    • Myocardial infarction (HCC) 2012   • Panic disorder    • Right-sided low back pain with right-sided sciatica    • Sleep apnea        Social History:  Social History     Socioeconomic History   • Marital status: Single   Tobacco Use   • Smoking status: Never   • Smokeless tobacco: Never   Vaping Use   • Vaping Use: Never used   Substance and Sexual Activity   • Alcohol use: No   • Drug use: No   • Sexual activity: Never       Family History:  Family History   Problem Relation Age of Onset   • Diabetes Father    • Diabetes Brother    • Cancer Maternal Grandmother  "   • Heart disease Maternal Grandfather    • Diabetes Paternal Grandfather    • Cancer Other        Past Surgical History:  Past Surgical History:   Procedure Laterality Date   • CHOLECYSTECTOMY     • CORONARY ANGIOPLASTY WITH STENT PLACEMENT  2012    Dr. Denson   • ENDOSCOPY  March 2021   • HYSTERECTOMY  12/2014   • NECK SURGERY  2010   • TRANSESOPHAGEAL ECHOCARDIOGRAM (SARAN)  07/13/2015    EF 60-65%       Problem List:  Patient Active Problem List   Diagnosis   • Arteriosclerotic cardiovascular disease (ASCVD), s/p MI in 2012, clinically stable.    • Type 2 diabetes mellitus with diabetic peripheral angiopathy without gangrene, with long-term current use of insulin (HCC)   • Essential hypertension   • Dyslipidemia   • Sleep apnea   • Migraine headache   • Abnormal CT scan   • Disease of thyroid gland   • Right-sided low back pain with right-sided sciatica   • Slow transit constipation       Allergy:   Allergies   Allergen Reactions   • Zoloft [Sertraline Hcl] Nausea And Vomiting   • Ciprofloxacin Rash     Also caused chest pain        Current Medications:   Current Outpatient Medications   Medication Sig Dispense Refill   • aspirin 81 MG EC tablet Take 1 tablet by mouth Every Evening.     • Canagliflozin (INVOKANA) 100 MG tablet tablet Take 1 tablet by mouth Every Evening.     • fluticasone (FLONASE) 50 MCG/ACT nasal spray USE 1 SPRAY IN EACH NOSTRIL ONCE DAILY - SHAKE BOTTLE GENTLY BEFORE EACH USE     • insulin lispro protamine-insulin lispro (humaLOG 75-25) (75-25) 100 UNIT/ML suspension injection Inject 20 Units under the skin into the appropriate area as directed 2 (Two) Times a Day With Meals.     • levothyroxine (SYNTHROID, LEVOTHROID) 50 MCG tablet Take 40 mcg by mouth Every Evening.     • lisinopril (PRINIVIL,ZESTRIL) 10 MG tablet TAKE 1 TABLET BY MOUTH ONCE DAILY 30 tablet 1   • lithium carbonate 300 MG capsule Take 1 capsule by mouth Every Evening. 90 capsule 1   • NovoLOG Mix 70/30 FlexPen (70-30) 100  "UNIT/ML suspension pen-injector injection INJECT 24 UNITS TWICE A DAY, BREAKFAST & SUPPER     • omeprazole (priLOSEC) 20 MG capsule Take 1 capsule by mouth Daily.     • Omeprazole Magnesium (PrilOSEC) 10 MG pack      • rosuvastatin (CRESTOR) 20 MG tablet Take 1 tablet by mouth Daily.     • traZODone (DESYREL) 50 MG tablet Take 1 tablet by mouth Every Night. 90 tablet 1     No current facility-administered medications for this visit.       Review of Symptoms:    Review of Systems   Constitutional: Positive for fatigue.   HENT: Negative.    Eyes: Negative.    Respiratory: Negative.    Cardiovascular: Negative.    Gastrointestinal: Negative.    Genitourinary: Negative.    Musculoskeletal: Positive for arthralgias, back pain and gait problem.   Psychiatric/Behavioral: Positive for depressed mood and stress. Negative for suicidal ideas. The patient is nervous/anxious.        Objective   Physical Exam:   Blood pressure 118/84, pulse 80, temperature 97.5 °F (36.4 °C), temperature source Temporal, height 149.9 cm (59.02\"), weight 79.3 kg (174 lb 12.8 oz), SpO2 99 %.  Body mass index is 35.29 kg/m².    Appearance: Well nourished female, appropriately dressed, appears stated age and in no acute distress  Gait, Station, Strength: Slow, unsteady gait    Mental Status Exam:   Hygiene:   good  Cooperation:  Cooperative  Eye Contact:  Good  Psychomotor Behavior:  Appropriate  Affect:  Appropriate  Mood: anxious  Hopelessness: 3  Speech:  Normal  Thought Process:  Linear  Thought Content:  Normal and Mood congruent  Suicidal:  None  Homicidal:  None  Hallucinations:  None  Delusion:  None  Memory:  Intact  Orientation:  Person, Place, Time and Situation  Reliability:  good  Insight:  Good  Judgement:  Fair  Impulse Control:  Good  Physical/Medical Issues:  Yes see med hx     PHQ-Score Total:  PHQ-9 Total Score: 3         Lab Results:   No visits with results within 1 Month(s) from this visit.   Latest known visit with results is: "   Orders Only on 12/21/2022   Component Date Value Ref Range Status   • Lithium 12/21/2022 0.6  0.6 - 1.2 mmol/L Final       Assessment & Plan   Diagnoses and all orders for this visit:    1. Bipolar II disorder (HCC) -unchanged (Primary)  -     lithium carbonate 300 MG capsule; Take 1 capsule by mouth Every Evening.  Dispense: 90 capsule; Refill: 1    2. Generalized anxiety disorder -unchanged  -     lithium carbonate 300 MG capsule; Take 1 capsule by mouth Every Evening.  Dispense: 90 capsule; Refill: 1    3. Medication management    4. Other mixed anxiety disorders  -     lithium carbonate 300 MG capsule; Take 1 capsule by mouth Every Evening.  Dispense: 90 capsule; Refill: 1    5. Sleeping difficulties  -     traZODone (DESYREL) 50 MG tablet; Take 1 tablet by mouth Every Night.  Dispense: 90 tablet; Refill: 1          -Begin trazodone 50 mg nightly for sleep  -Continue lithium 300 mg nightly for mood. This APRN has reviewed Lithium Toxicity symptoms with the patient including but not limited to: nausea, fine hand tremors, increased urination and thirst, weight gain, impaired thyroid functioning, fatigue, mental cloudiness, headaches, coarse hand tremors with toxicity, nystagmus, maculopapular rash, pruritis, acne, GI or stomach upset, diarrhea, vomiting, cramps, anorexia, diabetes insipidus, edema, microscopic tubular changes, t-wave inversion or abnormal cardiac rhythm, dysrhythmias, and leukocytosis.  The patient is advised if they experience any of these symptoms they are to contact this APRN/this office immediately or go to the Emergency Department immediately.  The patient verbalized understanding and agreement in their own words.  The patient is advised that taking Lithium with NSAID's, diuretics, ACE inhibitors, metronidazole, acetazolamide, methyldopa, carbamazepine, phenytoin, calcium channel blockers, and haloperidol may cause serious medication reactions including potentially fatal lithium toxicity.   The use of an SSRI with Lithium may raise the risk of dizziness, confusion, diarrhea, agitation, and tremor.  The patient is advised to avoid these medications, or if they are taking or plan to start any of these medications, this APRN/or a Provider at this office, and the patient's PCP/or the Prescriber of the medication should be notified/aware so further testing and monitoring can be performed.  The patient is advised of the need for hydration during treatment with Lithium, and the risks of not staying hydrated - drinking water.  The patient verbalizes understanding and agreement of instructions in their own words.  Also, the patient is instructed to complete the ordered lithium level after taking the Lithium  for at least 14 days with no missed doses.  The patient is instructed not to take the lithium on the morning of lab draw (as levels should be drawn about 12 hours after the last dose taken), but to take the Lithium after the blood work/lab draw has been completed as taking it before having the blood/lab drawn will interfere with the results.  The patient verbalized understanding and agreement in their own words.  -Discussed with patient importance of medication compliance  -Encouraged patient to begin therapy  -TOSHA reviewed and appropriate. Patient counseled on use of controlled substances.   -The benefits of a healthy diet and exercise were discussed with patient, especially the positive effects they have on mental health. Patient encouraged to consider lifestyle modification regarding  diet and exercise patterns to maximize results of mental health treatment.  -Reviewed previous available documentation  -Reviewed most recent available labs                  Visit Diagnoses:    ICD-10-CM ICD-9-CM   1. Bipolar II disorder (HCC) -unchanged  F31.81 296.89   2. Generalized anxiety disorder -unchanged  F41.1 300.02   3. Medication management  Z79.899 V58.69   4. Other mixed anxiety disorders  F41.3 300.09    5. Sleeping difficulties  G47.9 780.50         TREATMENT PLAN/GOALS: Continue supportive psychotherapy efforts and medications as indicated. Treatment and medication options discussed during today's visit. Patient acknowledged and verbally consented to continue with current treatment plan and was educated on the importance of compliance with treatment and follow-up appointments.    MEDICATION ISSUES:    Discussed medication options and treatment plan of prescribed medication as well as the risks, benefits, and side effects including potential falls, possible impaired driving and metabolic adversities among others. Patient is agreeable to call the office with any worsening of symptoms or onset of side effects. Patient is agreeable to call 911 or go to the nearest ER should he/she begin having SI/HI.     MEDS ORDERED DURING VISIT:  New Medications Ordered This Visit   Medications   • lithium carbonate 300 MG capsule     Sig: Take 1 capsule by mouth Every Evening.     Dispense:  90 capsule     Refill:  1   • traZODone (DESYREL) 50 MG tablet     Sig: Take 1 tablet by mouth Every Night.     Dispense:  90 tablet     Refill:  1       Return in about 3 months (around 6/22/2023), or if symptoms worsen or fail to improve.         Prognosis: Guarded dependent on medication/follow up and treatment plan compliance.  Functionality: pt showing improvements in important areas of daily functioning.     Short-term goals: Patient will adhere to medication regimen and note continued improvement in symptoms over the next 3 months.   Long-term goals: Patient will be adherent to medication management and psychotherapy with continued improvement in symptoms over the next 6 months          This document has been electronically signed by MARISELA Patton   March 22, 2023 15:18 EDT    Part of this note may be an electronic transcription/translation of spoken language to printed text using the Dragon Dictation System.

## 2023-04-10 RX ORDER — LISINOPRIL 10 MG/1
TABLET ORAL
Qty: 30 TABLET | Refills: 1 | Status: SHIPPED | OUTPATIENT
Start: 2023-04-10

## 2023-04-11 ENCOUNTER — OFFICE VISIT (OUTPATIENT)
Dept: PSYCHIATRY | Facility: CLINIC | Age: 53
End: 2023-04-11
Payer: MEDICAID

## 2023-04-11 DIAGNOSIS — F41.1 GENERALIZED ANXIETY DISORDER: ICD-10-CM

## 2023-04-11 DIAGNOSIS — F31.81 BIPOLAR II DISORDER: Primary | ICD-10-CM

## 2023-04-11 DIAGNOSIS — Z63.9 FAMILY DYNAMICS PROBLEM: ICD-10-CM

## 2023-04-11 PROCEDURE — 90837 PSYTX W PT 60 MINUTES: CPT | Performed by: SOCIAL WORKER

## 2023-04-11 SDOH — SOCIAL STABILITY - SOCIAL INSECURITY: PROBLEM RELATED TO PRIMARY SUPPORT GROUP, UNSPECIFIED: Z63.9

## 2023-04-12 NOTE — PROGRESS NOTES
"-  Date of Service: April 11, 2023  Time In: 1:35 PM  Time Out: 2:30 PM    PROGRESS NOTE  Data:  Tammi Levine is a 53 y.o. female who met 1:1 with the undersigned for a regularly scheduled individual outpatient therapy session at [facility] for follow-up of bipolar disorder.  Patient and the undersigned wore masks throughout the session and maintained appropriate distancing.    Chief Complaint: Bipolar II disorder; anxiety; family dynamics problem     HPI: Patient reports several family members including her mother and some of her cousins recently went on a trip to [facility] and states she really enjoyed her trip.  She also states that her surprise her mother did not discuss what she considers to be conspiracy theories throughout the trip.  However, the patient does continue to discuss what she believes to be related to issues including current social issues of the LGBTQ transcommunity and describes these things as \"sins\".  In fact, the patient appears to interpret these things as an existential threat.  The patient reports she continues to struggle with mood instability including periods of depressed mood, anhedonia, anergia, and feeling hopeless.  Patient also reports she struggles with periods of hypomania including feeling on edge, irritability, increased energy, impulsive behavior, and decreased need for sleep.  However, the patient states she primarily struggles with depression and anxiety.  Patient rates current symptoms at a 4 on a scale of 1-10 with 10 being most severe.   Patient reports she is sleeping relatively well at this time.  The patient adamantly and convincingly denies suicidal ideation vehemently denies any substance use.      Clinical Maneuvering/Intervention:  Assisted patient in processing above session content; acknowledged and normalized patient’s thoughts, feelings, and concerns.   Utilize motivational reviewing techniques " including complex reflections to assist the patient in recognizing the importance of her recent trip and praised her efforts to utilize appropriate coping mechanisms to control her mood instability.  Utilized cognitive behavioral therapy to assist the patient in developing appropriate coping mechanisms to decrease the severity frequency of symptoms.  Continue to focus on healthy skills of daily living and behavioral activation.  Provided unconditional positive regarding a safe, supportive environment.    Allowed patient to freely discuss issues without interruption or judgment. Provided safe, confidential environment to facilitate the development of positive therapeutic relationship and encourage open, honest communication. Assisted patient in identifying risk factors which would indicate the need for higher level of care including thoughts to harm self or others and/or self-harming behavior and encouraged patient to contact this office, call 911, or present to the nearest emergency room should any of these events occur. Discussed crisis intervention services and means to access.  Patient adamantly and convincingly denies current suicidal or homicidal ideation or perceptual disturbance.      Assessment    Patient appears to maintain relative stability as compared to her baseline.  However, she continues to be susceptible to an external locus of control and continues to be significantly affected by ongoing strained family dynamics.  The patient's symptomology continue to cause impairment in important areas of functioning.  Patient can be reasonably expected to continue to benefit treatment and would likely be at increased risk for decompensation.    Diagnoses and all orders for this visit:    1. Bipolar II disorder (HCC) -unchanged (Primary)    2. Generalized anxiety disorder -unchanged    3. Family dynamics problem               Mental Status Exam  Hygiene: Good  Dress: Casual  Attitude:  Cooperative  Motor  Activity: Appropriate  Speech:  Normal  Mood: Within normal limits  Affect: Tearful  Thought Processes:  Linear  Thought Content:  normal  Suicidal Thoughts:  denies  Homicidal Thoughts:  denies  Crisis Safety Plan: yes, to come to the emergency room.  Hallucinations:  denies    Patient's Support Network Includes:  mother and extended family    Progress toward goal: Not at goal    Functional Status: Mild impairment     Prognosis: Fair with Ongoing Treatment     Plan         Patient will continue in individual outpatient therapy session [facility] every 3 weeks and will continue and pharmacotherapy as scheduled with MARISELA Dixon.  Patient will adhere to medication regimen as prescribed and report any side effects. Patient will contact this office, call 911 or present to the nearest emergency room should suicidal or homicidal ideations occur. Provide Cognitive Behavioral Therapy and Integrative Therapy to improve functioning, maintain stability, and avoid decompensation and the need for higher level of care.          Return in about 4 weeks (around 5/9/2023) for Next scheduled follow up.      This document signed by Vidal Ortiz LCSW, SATISH April 12, 2023 06:57 EDT

## 2023-05-02 ENCOUNTER — OFFICE VISIT (OUTPATIENT)
Dept: PSYCHIATRY | Facility: CLINIC | Age: 53
End: 2023-05-02
Payer: MEDICAID

## 2023-05-02 DIAGNOSIS — Z63.9 FAMILY DYNAMICS PROBLEM: ICD-10-CM

## 2023-05-02 DIAGNOSIS — F41.1 GENERALIZED ANXIETY DISORDER: ICD-10-CM

## 2023-05-02 DIAGNOSIS — F31.81 BIPOLAR II DISORDER: Primary | ICD-10-CM

## 2023-05-02 SDOH — SOCIAL STABILITY - SOCIAL INSECURITY: PROBLEM RELATED TO PRIMARY SUPPORT GROUP, UNSPECIFIED: Z63.9

## 2023-05-03 NOTE — PROGRESS NOTES
"-  Date of Service: May 2, 2023  Time In: 1:10 PM  Time Out: 2:06 PM    PROGRESS NOTE  Data:  Tammi Levine is a 53 y.o. female who met 1:1 with the undersigned for a regularly scheduled individual outpatient therapy session at [facility] for follow-up of bipolar disorder.  Patient and the undersigned wore masks throughout the session and maintained appropriate distancing.    Chief Complaint: Bipolar II disorder; anxiety; family dynamics problem     HPI: P patient immediately states she has been struggling with her \"ex-boyfriend\" who continues to call her and tell her she is \"crazy\" for not moving in with him and paying his bills.  In fact, he tells her she should tell her \"therapist\" they need to tell her she should do this.  Patient becomes visibly upset and asked the undersigned if she is crazy.  The patient reports she continues to struggle with mood instability including periods of depressed mood, anhedonia, anergia, and feeling hopeless.  Patient also reports she struggles with periods of hypomania including feeling on edge, irritability, increased energy, impulsive behavior, and decreased need for sleep.  However, the patient states she primarily struggles with depression and anxiety.  Patient rates current symptoms at a 4 on a scale of 1-10 with 10 being most severe.   Patient reports she is sleeping relatively well at this time.  The patient adamantly and convincingly denies suicidal ideation vehemently denies any substance use.      Clinical Maneuvering/Intervention:  Assisted patient in processing above session content; acknowledged and normalized patient’s thoughts, feelings, and concerns.   Utilize motivational reviewing techniques including complex reflections to assist the patient in recognizing the fact she is not \"crazy\" and defined clinical parameters of psychosis and provided clinical argument for the fact the patient does not meet that criteria.  Also validated the " patient's right to decide who she wants to be with and who she does not and her personal autonomy.  Challenged the patient to consider if she does not wish to be with this person and has determined contact with him is harmful to her she certainly has the option to discontinue all contact.  Utilized cognitive behavioral therapy to assist the patient in developing appropriate coping mechanisms to decrease the severity frequency of symptoms.  Continue to focus on healthy skills of daily living and behavioral activation.  Provided unconditional positive regarding a safe, supportive environment.    Allowed patient to freely discuss issues without interruption or judgment. Provided safe, confidential environment to facilitate the development of positive therapeutic relationship and encourage open, honest communication. Assisted patient in identifying risk factors which would indicate the need for higher level of care including thoughts to harm self or others and/or self-harming behavior and encouraged patient to contact this office, call 911, or present to the nearest emergency room should any of these events occur. Discussed crisis intervention services and means to access.  Patient adamantly and convincingly denies current suicidal or homicidal ideation or perceptual disturbance.      Assessment    Patient appears to maintain relative stability as compared to her baseline.  However, she continues to be susceptible to an external locus of control and continues to be significantly affected by ongoing strained family dynamics.  The patient's symptomology continue to cause impairment in important areas of functioning.  Patient can be reasonably expected to continue to benefit treatment and would likely be at increased risk for decompensation.    Diagnoses and all orders for this visit:    1. Bipolar II disorder (HCC) -unchanged (Primary)    2. Generalized anxiety disorder -unchanged    3. Family dynamics problem                Mental Status Exam  Hygiene: Good  Dress: Casual  Attitude:  Cooperative  Motor Activity: Appropriate  Speech:  Normal  Mood: Within normal limits  Affect: Tearful  Thought Processes:  Linear  Thought Content:  normal  Suicidal Thoughts:  denies  Homicidal Thoughts:  denies  Crisis Safety Plan: yes, to come to the emergency room.  Hallucinations:  denies    Patient's Support Network Includes:  mother and extended family    Progress toward goal: Not at goal    Functional Status: Mild impairment     Prognosis: Fair with Ongoing Treatment     Plan         Patient will continue in individual outpatient therapy session [facility] [place] every 3 weeks and will continue and pharmacotherapy as scheduled with MARISELA Dixon.  Patient will adhere to medication regimen as prescribed and report any side effects. Patient will contact this office, call 911 or present to the nearest emergency room should suicidal or homicidal ideations occur. Provide Cognitive Behavioral Therapy and Integrative Therapy to improve functioning, maintain stability, and avoid decompensation and the need for higher level of care.          Return in about 4 weeks (around 5/30/2023) for Next scheduled follow up.      This document signed by Vidal Ortiz LCSW, SATISH May 3, 2023 07:09 EDT

## 2023-05-08 ENCOUNTER — OFFICE VISIT (OUTPATIENT)
Dept: CARDIOLOGY | Facility: CLINIC | Age: 53
End: 2023-05-08
Payer: MEDICAID

## 2023-05-08 VITALS
SYSTOLIC BLOOD PRESSURE: 125 MMHG | HEIGHT: 59 IN | OXYGEN SATURATION: 98 % | HEART RATE: 64 BPM | DIASTOLIC BLOOD PRESSURE: 71 MMHG | WEIGHT: 177 LBS | BODY MASS INDEX: 35.68 KG/M2

## 2023-05-08 DIAGNOSIS — I10 ESSENTIAL HYPERTENSION: ICD-10-CM

## 2023-05-08 DIAGNOSIS — I25.10 ARTERIOSCLEROTIC CARDIOVASCULAR DISEASE (ASCVD): Primary | ICD-10-CM

## 2023-05-08 DIAGNOSIS — E78.5 DYSLIPIDEMIA: ICD-10-CM

## 2023-05-08 PROCEDURE — 3074F SYST BP LT 130 MM HG: CPT | Performed by: PHYSICIAN ASSISTANT

## 2023-05-08 PROCEDURE — 3078F DIAST BP <80 MM HG: CPT | Performed by: PHYSICIAN ASSISTANT

## 2023-05-08 PROCEDURE — 1159F MED LIST DOCD IN RCRD: CPT | Performed by: PHYSICIAN ASSISTANT

## 2023-05-08 PROCEDURE — 1160F RVW MEDS BY RX/DR IN RCRD: CPT | Performed by: PHYSICIAN ASSISTANT

## 2023-05-08 PROCEDURE — 99213 OFFICE O/P EST LOW 20 MIN: CPT | Performed by: PHYSICIAN ASSISTANT

## 2023-05-08 RX ORDER — ASPIRIN 81 MG/1
81 TABLET ORAL EVERY EVENING
Qty: 90 TABLET | Refills: 2 | Status: SHIPPED | OUTPATIENT
Start: 2023-05-08

## 2023-05-08 RX ORDER — LISINOPRIL 10 MG/1
10 TABLET ORAL DAILY
Qty: 90 TABLET | Refills: 3 | Status: SHIPPED | OUTPATIENT
Start: 2023-05-08

## 2023-05-08 RX ORDER — ATORVASTATIN CALCIUM 80 MG/1
80 TABLET, FILM COATED ORAL DAILY
COMMUNITY
End: 2023-05-08

## 2023-05-08 NOTE — PROGRESS NOTES
Diane Rios  Tammi Levine  1970  05/08/2023    Patient Active Problem List   Diagnosis   • Arteriosclerotic cardiovascular disease (ASCVD), s/p MI in 2012, clinically stable.    • Type 2 diabetes mellitus with diabetic peripheral angiopathy without gangrene, with long-term current use of insulin   • Essential hypertension   • Dyslipidemia   • Sleep apnea   • Migraine headache   • Abnormal CT scan   • Disease of thyroid gland   • Right-sided low back pain with right-sided sciatica   • Slow transit constipation       Dear Diane Rios:    Subjective     History of Present Illness:    Chief Complaint   Patient presents with   • Med Management       Tammi Levine is a pleasant 53 y.o. female with a past medical history significant for ASCVD with history of myocardial infarction in 2012, essential hypertension, diabetes mellitus.  She comes in today for cardiology follow-up.     Patient denies any chest pain, shortness of breath, palpitations, dizziness, syncope or near syncope. Blood pressure is well controlled.     Allergies   Allergen Reactions   • Zoloft [Sertraline Hcl] Nausea And Vomiting   • Ciprofloxacin Rash     Also caused chest pain   :      Current Outpatient Medications:   •  aspirin 81 MG EC tablet, Take 1 tablet by mouth Every Evening., Disp: 90 tablet, Rfl: 2  •  Canagliflozin (INVOKANA) 100 MG tablet tablet, Take 1 tablet by mouth Every Evening., Disp: , Rfl:   •  fluticasone (FLONASE) 50 MCG/ACT nasal spray, USE 1 SPRAY IN EACH NOSTRIL ONCE DAILY - SHAKE BOTTLE GENTLY BEFORE EACH USE, Disp: , Rfl:   •  insulin lispro protamine-insulin lispro (humaLOG 75-25) (75-25) 100 UNIT/ML suspension injection, Inject 20 Units under the skin into the appropriate area as directed 2 (Two) Times a Day With Meals., Disp: , Rfl:   •  levothyroxine (SYNTHROID, LEVOTHROID) 50 MCG tablet, Take 40 mcg by mouth Every Evening., Disp: , Rfl:   •  lisinopril (PRINIVIL,ZESTRIL) 10 MG tablet, Take 1  "tablet by mouth Daily., Disp: 90 tablet, Rfl: 3  •  lithium carbonate 300 MG capsule, Take 1 capsule by mouth Every Evening., Disp: 90 capsule, Rfl: 1  •  omeprazole (priLOSEC) 20 MG capsule, Take 1 capsule by mouth Daily., Disp: , Rfl:   •  rosuvastatin (CRESTOR) 20 MG tablet, Take 1 tablet by mouth Daily., Disp: , Rfl:   •  traZODone (DESYREL) 50 MG tablet, Take 1 tablet by mouth Every Night., Disp: 90 tablet, Rfl: 1    The following portions of the patient's history were reviewed and updated as appropriate: allergies, current medications, past family history, past medical history, past social history, past surgical history and problem list.    Social History     Tobacco Use   • Smoking status: Never   • Smokeless tobacco: Never   Vaping Use   • Vaping Use: Never used   Substance Use Topics   • Alcohol use: No   • Drug use: No         Objective   Vitals:    05/08/23 1252   BP: 125/71   BP Location: Right arm   Patient Position: Sitting   Cuff Size: Adult   Pulse: 64   SpO2: 98%   Weight: 80.3 kg (177 lb)   Height: 149.9 cm (59\")     Body mass index is 35.75 kg/m².    Constitutional:       General: Not in acute distress.     Appearance: Healthy appearance. Well-developed and not in distress. Not diaphoretic.   Eyes:      Conjunctiva/sclera: Conjunctivae normal.      Pupils: Pupils are equal, round, and reactive to light.   HENT:      Head: Normocephalic and atraumatic.   Neck:      Vascular: No carotid bruit or JVD.   Pulmonary:      Effort: Pulmonary effort is normal. No respiratory distress.      Breath sounds: Normal breath sounds.   Cardiovascular:      Normal rate. Regular rhythm.   Skin:     General: Skin is cool.   Neurological:      Mental Status: Alert, oriented to person, place, and time and oriented to person, place and time.       Lab Results   Component Value Date     12/10/2022    K 5.2 12/10/2022     12/10/2022    CO2 18.8 (L) 12/10/2022    BUN 19 12/10/2022    CREATININE 0.97 12/10/2022 "    GLUCOSE 275 (H) 12/10/2022    CALCIUM 9.8 12/10/2022    AST 15 12/10/2022    ALT 14 12/10/2022    ALKPHOS 94 12/10/2022    LABIL2 1.4 (L) 02/04/2016     Lab Results   Component Value Date    CKTOTAL 124 08/14/2020     Lab Results   Component Value Date    WBC 5.30 12/10/2022    HGB 12.7 12/10/2022    HCT 38.3 12/10/2022     12/10/2022     Lab Results   Component Value Date    INR 0.91 01/12/2020     Lab Results   Component Value Date    MG 2.4 08/14/2020     Lab Results   Component Value Date    TSH 1.070 04/13/2021    TRIG 106 08/14/2020    HDL 52 08/14/2020    LDL 63 08/14/2020      Lab Results   Component Value Date    BNP 36 09/16/2015       During this visit the following were done:  Labs Reviewed []    Labs Ordered []    Radiology Reports Reviewed []    Radiology Ordered []    PCP Records Reviewed []    Referring Provider Records Reviewed []    ER Records Reviewed []    Hospital Records Reviewed []    History Obtained From Family []    Radiology Images Reviewed []    Other Reviewed []    Records Requested []       Procedures    Assessment & Plan    Diagnosis Plan   1. Arteriosclerotic cardiovascular disease (ASCVD), s/p MI in 2012, clinically stable.         2. Essential hypertension        3. Dyslipidemia                 Recommendations:  1. ASCVD  a. No recent anginal symptoms continue lisinopril, Crestor.  b. She also has Lipitor listed in her medication list I will go ahead and stop this.  c. Continue aspirin 81 mg  2. Essential hypertension  a. Well-controlled continue current regimen.    Return in about 6 months (around 11/8/2023).    As always, I appreciate very much the opportunity to participate in the cardiovascular care of your patients.      With Best Regards,    Oscar Lewis PA-C

## 2023-05-08 NOTE — Clinical Note
Can we make sure she is taking aspirin 81 mg, if not I want her to take this daily. Also she has lipitor and crestor on her medication list. I will stop lipitor.

## 2023-05-09 ENCOUNTER — TELEPHONE (OUTPATIENT)
Dept: CARDIOLOGY | Facility: CLINIC | Age: 53
End: 2023-05-09
Payer: MEDICAID

## 2023-05-09 NOTE — TELEPHONE ENCOUNTER
----- Message from Oscar Lewis PA-C sent at 5/8/2023  1:18 PM EDT -----  Can we make sure she is taking aspirin 81 mg, if not I want her to take this daily. Also she has lipitor and crestor on her medication list. I will stop lipitor.

## 2023-05-09 NOTE — TELEPHONE ENCOUNTER
Spoke with pt and she picked up her aspirin 81 mg and I did tell her to stop taking her Lipitor and she was ok with this

## 2023-05-09 NOTE — TELEPHONE ENCOUNTER
Called pt no answer LM.     ----- Message from Oscar Lewis PA-C sent at 5/8/2023  1:18 PM EDT -----  Can we make sure she is taking aspirin 81 mg, if not I want her to take this daily. Also she has lipitor and crestor on her medication list. I will stop lipitor.

## 2023-05-23 ENCOUNTER — OFFICE VISIT (OUTPATIENT)
Dept: PSYCHIATRY | Facility: CLINIC | Age: 53
End: 2023-05-23
Payer: MEDICAID

## 2023-05-23 DIAGNOSIS — Z63.9 FAMILY DYNAMICS PROBLEM: ICD-10-CM

## 2023-05-23 DIAGNOSIS — Z65.8 SPIRITUAL DISTRESS: ICD-10-CM

## 2023-05-23 DIAGNOSIS — F41.1 GENERALIZED ANXIETY DISORDER: ICD-10-CM

## 2023-05-23 DIAGNOSIS — F31.81 BIPOLAR II DISORDER: Primary | ICD-10-CM

## 2023-05-23 SDOH — SOCIAL STABILITY - SOCIAL INSECURITY: PROBLEM RELATED TO PRIMARY SUPPORT GROUP, UNSPECIFIED: Z63.9

## 2023-05-24 NOTE — PROGRESS NOTES
-  Date of Service: May 23, 2023  Time In: 2:35 PM  Time Out: 3:15 PM    PROGRESS NOTE  Data:  Tammi Levine is a 53 y.o. female who met 1:1 with the undersigned for a regularly scheduled individual outpatient therapy session at [facility] for follow-up of bipolar disorder.  Patient and the undersigned wore masks throughout the session and maintained appropriate distancing.    Chief Complaint: Bipolar II disorder; anxiety; family dynamics problem; spiritual distress     HPI: Patient immediately reports she has been struggling with issues at her [employer] and states she feels as though stress and strife has been instituted and to the [employer].  The patient reports one particular family has 2 teenage children who are attempting to become members of the [employer] but states they have refused to attend required classes and have been told by the  they cannot be members but have caused problems within the [employer] as they feel as though they should be exempted.  Patient also reports on 1 occasion she and another member rearrange some furniture at the request of the  and states the 2 teenage children simply came into the [employer] and rearranged the furniture to its original position which also caused significant stress in the [employer].  Patient states she feels as though this is affecting her spiritual life and her personal relationship with God as she understands that and has been struggling as of late.  The patient reports she continues to struggle with mood instability including periods of depressed mood, anhedonia, anergia, and feeling hopeless.  Patient also reports she struggles with periods of hypomania including feeling on edge, irritability, increased energy, impulsive behavior, and decreased need for sleep.  However, the patient states she primarily struggles with depression and anxiety.  Patient rates current symptoms at a 4 on a scale of 1-10 with 10 being most severe.    Continuity of Care Document

 Created on: 2017



[facility] [place]

External Reference #: [id number]

: 1974

Sex: Female



Demographics







 Address  [address]

[place]  [address]

 

 Home Phone  [phone]

 

 Preferred Language  English

 

 Marital Status  Unknown

 

 [employer] Affiliation  Unknown

 

 Race  Unknown

 

 Ethnic Group  Unknown





Author







 Author  [facility]

 

 Organization  [facility]

 

 Address  Unknown

 

 Phone  Unavailable







Support







 Name  Relationship  Address  Phone

 

 SANTOSDYANA FOURNIERKIRSTIEAUGIE PINEDO DO  Caregiver  [address]

[place]  [address] [phone]

 

 BRYON GRIGGS MD  Caregiver  [id number]

[place]  [address] [phone]

 

 [place]  Next Of Kin  [address]

[place]  [address] [phone]







Care Team Providers







 Care Team Member Name  Role  Phone

 

 SANTOSIRLANDAKIRSTIE DO  PCP  [phone]







Insurance Providers







 Payer Name  Policy Number  Subscriber Name  Relationship

 

 [employer]  [id number]  [place] Self / Same As Patient







Advance Directives







 Directive  Response  Recorded Date/Time

 

 Advance Directives  No  17 7:43am

 

 Resuscitation Status  Full Code  17 7:43am







Chief Complaint and Reason for Visit







 Chief Complaint  Abdominal/GI Problems

 

 Reason for Visit  [id number]

Left sided abdominal pain

[id number]







Problems

Active Problems







 Medical Problem  Onset Date  Status

 

 Left sided abdominal pain  Unknown  Acute

 

 Left ureteral stone  Unknown  Acute

 

 Ovarian cyst, left  Unknown  Acute







Medications

Current Home Medications







 Medication  Dose  Units  Route  Directions  Days/Qty  Instructions  Start Date

 

 Ciprofloxacin Hcl 250 Mg  1  Cap  Oral  Twice A Day  17

 

 Methenam/Me Blue/Ba/Salicy/Hyo 1 Each  1  Each  Oral  As Directed        

 

 Oxycodone Hcl/Acetaminophen 1 Each  1-2  Each  Oral  Every 6 Hours as needed 
for Pain  30     17

 

 Phenazopyridine Hcl 200 Mg  1  Tab  Oral  Three Times A Day as needed for Pain
  10     17

 

 Cephalexin 500 Mg  500  Mg  Oral  Twice A Day  20     17





Past Home Medications







 Medication  Directions  Ordered  Status

 

 Oxycodone Hcl/Acetaminophen 1 Each Tablet, 1 Each Oral  Every 4HRS  11  
Discontinued

 

 Omeprazole 20 Mg Tablet.dr, 20 Mg Oral  Twice A Day  11  Discontinued

 

 Famotidine (Pepcid) 20 Mg Tablet, 1 Each Oral  Twice A Day  11  
Discontinued







Social History







 Social History Problem  Response  Recorded Date/Time

 

 Alcohol Use  Occasionally Uses  2017 7:43am

 

 Recreational Drug Use  No  2017 7:43am

 

 Recent Foreign Travel  No  2017 7:39am

 

 Recent Infectious Disease Exposure  No  2017 7:39am

 

 Smoking Status  Former Smoker  2017 7:43am

 

 Type Used  Cigarettes  2017 7:43am

 

 Recent Hopitalizations  No  2017 7:43am









 Query  Response  Start Date  Stop Date

 

 Smoking Status  Former Smoker      







Hospital Discharge Instructions

No hospital discharge instructions.



Plan of Care







 Discharge Date  17 11:21am

 

 Disposition  01 HOME, SELF-CARE

 

 Condition at Discharge  Improved

 

 Instructions/Education Provided  Kidney Stones in Adults

Urinary Tract Infection, Adult (DC)

Acute Abdomen (Belly Pain), Adult (DC)

Ovarian Cyst (DC)

 

 Prescriptions  See Medication Section

 

 Referrals  SOFIA KNOX JACQUELINE S DO - Primary Care Physician





KIRSTIE CASTANEDA DO - Primary Care Physician





TAWIL,ELIAS A MD - 

 

 Additional Instructions/Education  All discharge instructions reviewed with 
patient and/or family. Voiced 

understanding.  

  

Take medications as directed.  Follow-up with Dr. Knox's office today after 

discharge from here for getting set up for ultrasound tomorrow.  Follow-up with 

your doctor next week.  Keep appointment and outpatient requirements for the 

procedure that you're due to get with Dr. Tawil next week.  Return for worse 

pain, fever, vomiting, weakness, breathing problems or other concerns as needed.







Functional Status

No functional status results.



Allergies, Adverse Reactions, Alerts

No known allergies.



Immunizations

No immunization records.



Vital Signs

Acute Vital Signs





 Vital  Response  Date/Time

 

 Temperature (Fahrenheit)  96.6 degrees F (97.6 - 99.5)  2017 7:39am

 

 Temperature (Calculated Celsius)  35.54827 degrees C (36.4 - 37.5)  2017 
7:39am

 

 Temperature Source  Temporal  2017 7:39am

 

 Pulse Rate (adult)  77 bpm (60 - 90)  2017 7:39am

 

 Respiratory Rate  22 bpm (12 - 24)  2017 7:39am

 

 O2 Sat by Pulse Oximetry  97 % (88 - 100)  2017 7:39am

 

 Blood Pressure  140/85 mm Hg  2017 7:39am

 

    Blood Pressure Mean  103 mm Hg  2017 7:39am

 

 Pain      

 

    Numeric Pain Scale  5-Moderate Pain  2017 8:55am

 

 Pain      

 

    Numeric Pain Scale  5-Moderate Pain  2017 8:55am

 

 Height (Feet)  5 feet  2017 7:39am

 

 Height (Inches)  5 inches  2017 7:39am

 

 Height (Calculated Centimeters)  165.362649 cm  2017 7:39am

 

 Weight (Pounds)  250 pounds  2017 7:39am

 

 Weight (Calculated Grams)  515959.094 gm  2017 7:39am

 

 Weight (Calculated Kilograms)  113.720509 kilograms  2017 7:39am

 

 Capillary Refill      

 

    Capillary Refill  Less Than 3 Seconds  2017 7:39am

 

 Height  5 ft 5 in   

 

 Weight  250 lb   

 

 Body Mass Index  41.6 kg/m^2   







Results

Laboratory Results







 Test Name  Result  Units  Flags  Reference  Collection Date/Time  Result Date/
Time  Comments

 

 White Blood Count  15.0  10^3/uL  H  4.3-11.0  2017 1:07am  2017 1:
22am   

 

 Red Blood Count  4.55  10^6/uL     4.35-5.85  2017 1:07am  2017 1:
22am   

 

 Hemoglobin  12.7  G/DL     11.5-16.0  2017 1:072017 1:22am   

 

 Hematocrit  37  %     35-52  2017 1:07am  2017 1:22am   

 

 Mean Corpuscular Volume  82  FL     80-99  2017 1:07am  2017 1:
22am   

 

 Mean Corpuscular Hemoglobin  28  PG     25-34  2017 1:07am  2017 1:
22am   

 

 Mean Corpuscular Hemoglobin Concent  34  G/DL     32-36  2017 1:07am   1:22am   

 

 Red Cell Distribution Width  14.4  %     10.0-14.5  2017 1:2017 1:22am   

 

 Platelet Count  381  10^3/uL     130-400  2017 1:2017 1:22am
   

 

 Mean Platelet Volume  9.5  FL     7.4-10.4  2017 1:072017 1:
22am   

 

 Neutrophils (%) (Auto)  76  %  H  42-75  2017 1:2017 1:22am 
  

 

 Lymphocytes (%) (Auto)  16  %     12-44  2017 1:2017 1:22am 
  

 

 Monocytes (%) (Auto)  7  %     0-12  2017 1:2017 1:22am   

 

 Eosinophils (%) (Auto)  1  %     0-10  2017 1:072017 1:22am   

 

 Basophils (%) (Auto)  0  %     0-10  2017 1:2017 1:22am   

 

 Neutrophils # (Auto)  11.3  X 10^3  H  1.8-7.8  2017 1:072017 1
:22am   

 

 Lymphocytes # (Auto)  2.5  X 10^3     1.0-4.0  2017 1:2017 1:
22am   

 

 Monocytes # (Auto)  1.0  X 10^3     0.0-1.0  2017 1:2017 1:
22am   

 

 Eosinophils # (Auto)  0.2  10^3/uL     0.0-0.3  2017 1:07am  2017 1
:22am   

 

 Basophils # (Auto)  0.1  10^3/uL     0.0-0.1  2017 1:2017 1:
22am   

 

 Neutrophils % (Manual)  70  %        2017 1:2017 1:36am   

 

 Band Neutrophils  0  %        2017 1:2017 1:36am   

 

 Lymphocytes % (Manual)  25  %        2017 1:2017 1:36am   

 

 Monocytes % (Manual)  4  %        2017 1:072017 1:36am   

 

 Eosinophils % (Manual)  1  %        2017 1:07am  2017 1:36am   

 

 Basophils % (Manual)  0  %        2017 1:07am  2017 1:36am   

 

 Blood Morphology Comment  NORMAL           2017 1:07am  2017 1:
36am   

 

 Urine Color  OTHER     *     2017 12:2017 12:50am  SPECIMEN 
IS BLUE/TEAL IN COLOR WHICH MAY CAUSE COLOR 

INTERFERENCE WITH DIPSTICK ANALYSIS.

 

 Urine Clarity  CLEAR           2017 12:25am  2017 12:50am   

 

 Urine pH  6.5        5-9  2017 12:am  2017 12:50am   

 

 Urine Specific Gravity  1.025     *  1.016-1.022  2017 12:25am  2017 12:50am   

 

 Urine Protein  2+     *  NEGATIVE  2017 12:25am  2017 12:50am   

 

 Urine Glucose (UA)  NEGATIVE        NEGATIVE  2017 12:25am  2017 12
:50am   

 

 Urine RBC (Auto)  2+     *  NEGATIVE  2017 12:25am  2017 12:50am   

 

 Urine Ketones  1+     *  NEGATIVE  2017 12:25am  2017 12:50am   

 

 Urine Nitrite  NEGATIVE        NEGATIVE  2017 12:25am  2017 12:
50am   

 

 Urine Bilirubin  1+     *  NEGATIVE  2017 12:25am  2017 12:50am  
CONFIRMATORY ICTOTEST IS NEGATIVE.

 

 Urine Urobilinogen  NORMAL  MG/DL     NORMAL  2017 12:25am  2017 12
:50am   

 

 Urine Leukocyte Esterase  1+     *  NEGATIVE  2017 12:25am  2017 12
:50am   

 

 Urine RBC  NONE  /HPF        2017 12:25am  2017 12:50am  
MICROSCOPIC ANALYSIS PERFORMED ON 1 ML OF UNSPUN SPECIMEN 

DUE TO LOW VOLUME.

 

 Urine WBC  NONE  /HPF        2017 12:25am  2017 12:50am   

 

 Urine Bacteria  TRACE  /HPF        2017 12:25am  2017 12:50am   

 

 Urine Squamous Epithelial Cells  5-10  /HPF        2017 12:25am  2017 12:50am   

 

 Urine Crystals  PRESENT  /LPF  *     2017 12:252017 12:50am   

 

 Urine Calcium Oxalate Crystals  MODERATE  /LPF  *     2017 12:25am   12:50am   

 

 Urine Casts  NONE  /LPF        2017 12:25am  2017 12:50am   

 

 Urine Mucus  NEGATIVE  /LPF        2017 12:252017 12:50am   

 

 Urine Culture Indicated  YES           2017 12:2017 12:50am 
  

 

 Sodium Level  138  MMOL/L     135-145  2017 1:2017 1:36am   

 

 Potassium Level  3.8  MMOL/L     3.6-5.0  2017 1:2017 1:36am
   

 

 Chloride Level  104  MMOL/L       2017 1:2017 1:36am   

 

 Carbon Dioxide Level  21  MMOL/L     21-32  2017 1:2017 1:
36am   

 

 Anion Gap  13  MMOL/L     5-14  2017 1:2017 1:36am   

 

 Blood Urea Nitrogen  12  MG/DL     7-18  2017 1:2017 1:36am 
  

 

 Creatinine  1.12  MG/DL     0.60-1.30  2017 1:2017 1:36am   

 

 BUN/Creatinine Ratio  11           2017 1:2017 1:36am   

 

 Estimat Glomerular Filtration Rate  53           2017 1:2017 
1:36am                    GFR INTERPRETIVE DATA  

  

         UNITS FOR ESTIMATED GFR (eGFR): mL/min/1.73 M2  

         REFERENCE RANGE FOR ESTIMATED GFR (eGFR)  

                                          eGFR  

         NORMAL eGFR                       >60  

         MODERATELY DECREASED eGFR          30-59  

         SEVERLY DECREASED eGFR             15-29  

         KIDNEY FAILURE                    <15 (OR DIALYSIS)  

 

 Glucose Level  109  MG/DL  H    2017 1:072017 1:36am   

 

 Calcium Level  9.1  MG/DL     8.5-10.1  2017 1:072017 1:36am   

 

 Total Bilirubin  0.3  MG/DL     0.1-1.0  2017 1:072017 1:36am 
  

 

 Alkaline Phosphatase  76  U/L       2017 1:07am  2017 1:36am
   

 

 Aspartate Amino Transf (AST/SGOT)  17  U/L     5-34  2017 1:072017 1:36am   

 

 Alanine Aminotransferase (ALT/SGPT)  22  U/L     0-55  2017 1:07 1:36am   

 

 Total Protein  7.3  G/DL     6.4-8.2  2017 1:072017 1:36am   

 

 Albumin  4.0  G/DL     3.2-4.5  2017 1:07am  2017 1:36am   

 

 Lipase  14  U/L     8-78  2017 1:072017 1:36am   

 

 C-Reactive Protein High Sensitivity  4.26  MG/DL  H  0.00-0.50  2017 1:
07am  2017 1:36am   





Pending Laboratory Results







 Test Name  Collection Date/Time

 

     

 

     

 

     

 

     

 

     

 

     

 

     

 

     

 

     

 

     

 

     

 

     

 

     

 

     

 

     

 

     

 

     

 

     

 

     

 

     

 

     

 

     

 

     

 

     

 

     

 

     

 

     

 

     

 

     

 

     

 

     

 

     

 

     

 

     

 

     

 

     

 

     

 

     

 

     

 

     

 

     

 

     

 

     

 

     

 

     

 

     

 

     

 

     

 

     

 

     

 

     

 

     

 

     

 

     

 

     

 

     

 

     







Procedures

No known history of procedures.



Encounters







 Encounter  Location  Arrival/Admit Date  Discharge/Depart Date  Attending 
Provider

 

 Departed Emergency Room  [facility]  17 7:34am   11:21am  BRYON GRIGGS MD

 

 Registered Clinic  [facility]  17 5:37am     TAWIL, ELIAS A MD

 

 Departed Emergency Room  [facility]  17 12:17am   3:07am  SHANNAN DAVID MD











 Recent Diagnosis Patient reports she is sleeping relatively well at this time.  The patient adamantly and convincingly denies suicidal ideation vehemently denies any substance use.      Clinical Maneuvering/Intervention:  Assisted patient in processing above session content; acknowledged and normalized patient’s thoughts, feelings, and concerns.  Allowed the patient to discuss/process her feelings concerning recent incidences in her [employer] and validated her feelings.  Also utilized cognitive behavioral therapy to assist the patient in recognizing her decision to avoid becoming involved in the situation was likely appropriate and encouraged her to simply go to [employer] to get what she feels she needs and to focus on her own spiritual life and to allow these things to play out.  However, also validated the patient's belief that a [employer] member should be allowed to ask elders of the [employer] and the  to address this issue at a as it is causing strife in the [employer].  Utilized cognitive behavioral therapy to assist the patient in developing appropriate coping mechanisms to decrease the severity frequency of symptoms.  Continue to focus on healthy skills of daily living and behavioral activation.  Provided unconditional positive regarding a safe, supportive environment.    Allowed patient to freely discuss issues without interruption or judgment. Provided safe, confidential environment to facilitate the development of positive therapeutic relationship and encourage open, honest communication. Assisted patient in identifying risk factors which would indicate the need for higher level of care including thoughts to harm self or others and/or self-harming behavior and encouraged patient to contact this office, call 911, or present to the nearest emergency room should any of these events occur. Discussed crisis intervention services and means to access.  Patient adamantly and convincingly denies current suicidal or homicidal ideation or  perceptual disturbance.      Assessment    Patient appears to be struggling with increased mood instability at this time with a feeling of uncertainty as her spiritual life has been negatively affected by strain in her [employer] witness.  In addition, she continues to be susceptible to an external locus of control and continues to be significantly affected by ongoing strained family dynamics.  The patient's symptomology continue to cause impairment in important areas of functioning.  Patient can be reasonably expected to continue to benefit treatment and would likely be at increased risk for decompensation.    Diagnoses and all orders for this visit:    1. Bipolar II disorder (HCC) -unchanged (Primary)    2. Generalized anxiety disorder -unchanged    3. Family dynamics problem    4. Spiritual distress               Mental Status Exam  Hygiene: Good  Dress: Casual  Attitude:  Cooperative  Motor Activity: Appropriate  Speech:  Normal  Mood: Within normal limits  Affect: Tearful  Thought Processes:  Linear  Thought Content:  normal  Suicidal Thoughts:  denies  Homicidal Thoughts:  denies  Crisis Safety Plan: yes, to come to the emergency room.  Hallucinations:  denies    Patient's Support Network Includes:  mother and extended family    Progress toward goal: Not at goal    Functional Status: Mild impairment     Prognosis: Fair with Ongoing Treatment     Plan         Patient will continue in individual outpatient therapy session [facility] every 3 weeks and will continue and pharmacotherapy as scheduled with MARISELA Dixon.  Patient will adhere to medication regimen as prescribed and report any side effects. Patient will contact this office, call 911 or present to the nearest emergency room should suicidal or homicidal ideations occur. Provide Cognitive Behavioral Therapy and Integrative Therapy to improve functioning, maintain stability, and avoid decompensation and the need for higher level of  care.          Return in about 4 weeks (around 6/20/2023) for Next scheduled follow up.      This document signed by Vidal Ortiz LCSW, [facility] May 24, 2023 07:11 EDT

## 2023-07-25 ENCOUNTER — OFFICE VISIT (OUTPATIENT)
Dept: PSYCHIATRY | Facility: CLINIC | Age: 53
End: 2023-07-25
Payer: MEDICAID

## 2023-07-25 DIAGNOSIS — F41.1 GENERALIZED ANXIETY DISORDER: ICD-10-CM

## 2023-07-25 DIAGNOSIS — F31.81 BIPOLAR II DISORDER: Primary | ICD-10-CM

## 2023-07-25 DIAGNOSIS — F32.9 REACTIVE DEPRESSION (SITUATIONAL): ICD-10-CM

## 2023-07-25 DIAGNOSIS — G47.9 SLEEPING DIFFICULTIES: ICD-10-CM
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
## 2023-08-15 ENCOUNTER — OFFICE VISIT (OUTPATIENT)
Dept: PSYCHIATRY | Facility: CLINIC | Age: 53
End: 2023-08-15
Payer: MEDICAID

## 2023-08-15 DIAGNOSIS — G47.9 SLEEPING DIFFICULTIES: ICD-10-CM

## 2023-08-15 DIAGNOSIS — Z63.9 FAMILY DYNAMICS PROBLEM: ICD-10-CM

## 2023-08-15 DIAGNOSIS — F31.81 BIPOLAR II DISORDER: Primary | ICD-10-CM

## 2023-08-15 DIAGNOSIS — F41.1 GENERALIZED ANXIETY DISORDER: ICD-10-CM

## 2023-08-15 SDOH — SOCIAL STABILITY - SOCIAL INSECURITY: PROBLEM RELATED TO PRIMARY SUPPORT GROUP, UNSPECIFIED: Z63.9

## 2023-08-16 NOTE — PROGRESS NOTES
"-  Date of Service: August 15, 2023  Time In: 10:55 AM  Time Out: 11:55 AM    PROGRESS NOTE  Data:  Tammi Levine is a 53 y.o. female who met 1:1 with the undersigned for a regularly scheduled individual outpatient therapy session at [facility] for follow-up of bipolar disorder.  Patient and the undersigned wore masks throughout the session and maintained appropriate distancing.    Chief Complaint: Bipolar II disorder; anxiety; family dynamics problem; reactive depression     HPI: P patient reports things have gotten better at her apartment complex and after she returned home after staying with her mother for several days she had discovered that a neighbor who was causing problems had abruptly moved out which alleviated all her issues.  However, the patient states while staying several days with her mother it became apparent that her mother had \"gone down the rabbit hole of conspiracy theories\" and states she became very frustrated and concerned with her mother due to the severity of her belief and \"crazy things\".  In fact, the patient states she simply has difficulty being around her mother as this is all she focuses on.  The patient reports she continues to struggle with mood instability including periods of depressed mood, anhedonia, anergia, and feeling hopeless.  Patient also reports she struggles with periods of hypomania including feeling on edge, irritability, increased energy, impulsive behavior, and decreased need for sleep.  However, the patient states she primarily struggles with depression and anxiety.  Patient rates current symptoms at a 4 on a scale of 1-10 with 10 being most severe.   Patient reports she is sleeping relatively well at this time.  The patient adamantly and convincingly denies suicidal ideation vehemently denies any substance use.      Clinical Maneuvering/Intervention:  Assisted patient in processing above session content; acknowledged and normalized " patient's thoughts, feelings, and concerns.  Allowed the patient to discuss/process her feelings, concerns, and frustrations concerning her perception that her mother has become susceptible to an believes in various conspiracy theories and validated her feelings.  However, also utilized motivational interviewing techniques including complex reflections to discussed the concept of things we can control things he cannot control and strongly urged the patient to remind herself she cannot control the behavior or decisions of others.  Further validated the patient's right to remove herself from the stressful situations and to decrease the time she spends with her mother.  Utilized cognitive behavioral therapy to assist the patient in developing appropriate coping mechanisms to decrease the severity frequency of symptoms.  Continue to focus on healthy skills of daily living and behavioral activation.  Provided unconditional positive regarding a safe, supportive environment.    Allowed patient to freely discuss issues without interruption or judgment. Provided safe, confidential environment to facilitate the development of positive therapeutic relationship and encourage open, honest communication. Assisted patient in identifying risk factors which would indicate the need for higher level of care including thoughts to harm self or others and/or self-harming behavior and encouraged patient to contact this office, call 911, or present to the nearest emergency room should any of these events occur. Discussed crisis intervention services and means to access.  Patient adamantly and convincingly denies current suicidal or homicidal ideation or perceptual disturbance.      Assessment    Patient appears to be struggling with increased mood instability at this time with a feeling of uncertainty due to difficulty with a neighbor in her apartment complex.  In addition, she continues to be susceptible to an external locus of control  and continues to be significantly affected by ongoing strained family dynamics.  The patient's symptomology continue to cause impairment in important areas of functioning.  Patient can be reasonably expected to continue to benefit treatment and would likely be at increased risk for decompensation.    Diagnoses and all orders for this visit:    1. Bipolar II disorder (HCC) -unchanged (Primary)    2. Generalized anxiety disorder -unchanged    3. Sleeping difficulties    4. Family dynamics problem               Mental Status Exam  Hygiene: Good  Dress: Casual  Attitude:  Cooperative  Motor Activity: Appropriate  Speech:  Normal  Mood: Within normal limits  Affect: Tearful  Thought Processes:  Linear  Thought Content:  normal  Suicidal Thoughts:  denies  Homicidal Thoughts:  denies  Crisis Safety Plan: yes, to come to the emergency room.  Hallucinations:  denies    Patient's Support Network Includes:  mother and extended family    Progress toward goal: Not at goal    Functional Status: Mild impairment     Prognosis: Fair with Ongoing Treatment     Plan         Patient will continue in individual outpatient therapy session [facility] every 3 weeks and will continue and pharmacotherapy as scheduled with MARISELA Dixon.  Patient will adhere to medication regimen as prescribed and report any side effects. Patient will contact this office, call 911 or present to the nearest emergency room should suicidal or homicidal ideations occur. Provide Cognitive Behavioral Therapy and Integrative Therapy to improve functioning, maintain stability, and avoid decompensation and the need for higher level of care.          Return in about 3 weeks (around 9/5/2023) for Next scheduled follow up.      This document signed by Vidal Ortiz LCSW, [facility] August 16, 2023 06:31 EDT

## 2023-09-27 ENCOUNTER — OFFICE VISIT (OUTPATIENT)
Dept: PSYCHIATRY | Facility: CLINIC | Age: 53
End: 2023-09-27
Payer: MEDICAID

## 2023-09-27 VITALS
WEIGHT: 174 LBS | DIASTOLIC BLOOD PRESSURE: 82 MMHG | HEART RATE: 62 BPM | TEMPERATURE: 97.3 F | HEIGHT: 59 IN | SYSTOLIC BLOOD PRESSURE: 130 MMHG | OXYGEN SATURATION: 100 % | BODY MASS INDEX: 35.08 KG/M2

## 2023-09-27 DIAGNOSIS — F41.3 OTHER MIXED ANXIETY DISORDERS: ICD-10-CM

## 2023-09-27 DIAGNOSIS — Z79.899 MEDICATION MANAGEMENT: ICD-10-CM

## 2023-09-27 DIAGNOSIS — F41.1 GENERALIZED ANXIETY DISORDER: ICD-10-CM

## 2023-09-27 DIAGNOSIS — G47.9 SLEEPING DIFFICULTIES: ICD-10-CM

## 2023-09-27 DIAGNOSIS — F31.81 BIPOLAR II DISORDER: Primary | ICD-10-CM

## 2023-09-27 PROCEDURE — 3079F DIAST BP 80-89 MM HG: CPT | Performed by: NURSE PRACTITIONER

## 2023-09-27 PROCEDURE — 1160F RVW MEDS BY RX/DR IN RCRD: CPT | Performed by: NURSE PRACTITIONER

## 2023-09-27 PROCEDURE — 3075F SYST BP GE 130 - 139MM HG: CPT | Performed by: NURSE PRACTITIONER

## 2023-09-27 PROCEDURE — 1159F MED LIST DOCD IN RCRD: CPT | Performed by: NURSE PRACTITIONER

## 2023-09-27 PROCEDURE — 99214 OFFICE O/P EST MOD 30 MIN: CPT | Performed by: NURSE PRACTITIONER

## 2023-09-27 RX ORDER — LITHIUM CARBONATE 300 MG/1
300 CAPSULE ORAL EVERY EVENING
Qty: 90 CAPSULE | Refills: 1 | Status: SHIPPED | OUTPATIENT
Start: 2023-09-27

## 2023-09-27 RX ORDER — TRAZODONE HYDROCHLORIDE 50 MG/1
50 TABLET ORAL NIGHTLY
Qty: 90 TABLET | Refills: 1 | Status: SHIPPED | OUTPATIENT
Start: 2023-09-27

## 2023-09-27 NOTE — PROGRESS NOTES
"Subjective   Tammi Levine is a 53 y.o. female who presents today for follow up    Chief Complaint:  Bipolar disorder    History of Present Illness: Patient presents as follow up. She reports medications are working well. Reports she had to block her ex boyfriend from calling her due to insistence that she come see him. States she has been staying mostly to herself at her apartments to not argue with anyone. She reports sleep is good. Reports appetite is good. Labs completed in June within normal range. Will repeat at next visit. She denies SI/HI/AVH.    The following portions of the patient's history were reviewed and updated as appropriate: allergies, current medications, past family history, past medical history, past social history, past surgical history and problem list.      Past Medical History:  Past Medical History:   Diagnosis Date    Abnormal CT scan     ADRENAL GLAND FOCAL MASS LESION MULTIPLE, LEFT ONLY    Anxiety     ASCVD (arteriosclerotic cardiovascular disease)     \"status post MI in 2012, clinically stable\"    Bipolar disorder     Depression     Diabetes mellitus     Disease of thyroid gland     Dyslipidemia     Hypertension, well controlled     Migraine headache     Myocardial infarction 2012    Panic disorder     Right-sided low back pain with right-sided sciatica     Sleep apnea        Social History:  Social History     Socioeconomic History    Marital status: Single   Tobacco Use    Smoking status: Never    Smokeless tobacco: Never   Vaping Use    Vaping Use: Never used   Substance and Sexual Activity    Alcohol use: No    Drug use: No    Sexual activity: Never       Family History:  Family History   Problem Relation Age of Onset    Diabetes Father     Diabetes Brother     Cancer Maternal Grandmother     Heart disease Maternal Grandfather     Diabetes Paternal Grandfather     Cancer Other        Past Surgical History:  Past Surgical History:   Procedure Laterality Date    CHOLECYSTECTOMY   "    CORONARY ANGIOPLASTY WITH STENT PLACEMENT  2012    Dr. Denson    ENDOSCOPY  March 2021    HYSTERECTOMY  12/2014    NECK SURGERY  2010    TRANSESOPHAGEAL ECHOCARDIOGRAM (SARAN)  07/13/2015    EF 60-65%       Problem List:  Patient Active Problem List   Diagnosis    Arteriosclerotic cardiovascular disease (ASCVD), s/p MI in 2012, clinically stable.     Type 2 diabetes mellitus with diabetic peripheral angiopathy without gangrene, with long-term current use of insulin    Essential hypertension    Dyslipidemia    Sleep apnea    Migraine headache    Abnormal CT scan    Disease of thyroid gland    Right-sided low back pain with right-sided sciatica    Slow transit constipation       Allergy:   Allergies   Allergen Reactions    Zoloft [Sertraline Hcl] Nausea And Vomiting    Ciprofloxacin Rash     Also caused chest pain        Current Medications:   Current Outpatient Medications   Medication Sig Dispense Refill    aspirin 81 MG EC tablet Take 1 tablet by mouth Every Evening. 90 tablet 2    Canagliflozin (INVOKANA) 100 MG tablet tablet Take 1 tablet by mouth Every Evening.      fluticasone (FLONASE) 50 MCG/ACT nasal spray USE 1 SPRAY IN EACH NOSTRIL ONCE DAILY - SHAKE BOTTLE GENTLY BEFORE EACH USE      insulin lispro protamine-insulin lispro (humaLOG 75-25) (75-25) 100 UNIT/ML suspension injection Inject 20 Units under the skin into the appropriate area as directed 2 (Two) Times a Day With Meals.      levothyroxine (SYNTHROID, LEVOTHROID) 50 MCG tablet Take 40 mcg by mouth Every Evening.      lisinopril (PRINIVIL,ZESTRIL) 10 MG tablet Take 1 tablet by mouth Daily. 90 tablet 3    lithium carbonate 300 MG capsule Take 1 capsule by mouth Every Evening. 90 capsule 1    omeprazole (priLOSEC) 20 MG capsule Take 1 capsule by mouth Daily.      rosuvastatin (CRESTOR) 40 MG tablet Take 1 tablet by mouth Daily.      traZODone (DESYREL) 50 MG tablet Take 1 tablet by mouth Every Night. 90 tablet 1     No current facility-administered  "medications for this visit.       Review of Symptoms:    Review of Systems   Constitutional:  Positive for fatigue.   HENT: Negative.     Eyes: Negative.    Respiratory: Negative.     Cardiovascular: Negative.    Gastrointestinal: Negative.    Musculoskeletal:  Positive for arthralgias and back pain.   Skin: Negative.    Neurological: Negative.    Psychiatric/Behavioral:  Positive for depressed mood and stress. Negative for suicidal ideas. The patient is nervous/anxious.      Objective   Physical Exam:   Blood pressure 130/82, pulse 62, temperature 97.3 °F (36.3 °C), temperature source Temporal, height 149.9 cm (59.02\"), weight 78.9 kg (174 lb), SpO2 100 %.  Body mass index is 35.12 kg/m².    Appearance: Well nourished female, appropriately dressed, appears stated age and in no acute distress  Gait, Station, Strength: Slow, steady gait, uses cane to assist with ambulation    Mental Status Exam:   Hygiene:   good  Cooperation:  Cooperative  Eye Contact:  Good  Psychomotor Behavior:  Appropriate  Affect:  Appropriate  Mood: anxious  Hopelessness: Denies  Speech:  Normal  Thought Process:  Linear  Thought Content:  Mood congruent  Suicidal:  None  Homicidal:  None  Hallucinations:  None  Delusion:  None  Memory:  Intact  Orientation:  Person, Place, Time, and Situation  Reliability:  good  Insight:  Fair  Judgement:  Good  Impulse Control:  Good  Physical/Medical Issues:  No      PHQ-Score Total:  PHQ-9 Total Score: 3         Lab Results:   No visits with results within 1 Month(s) from this visit.   Latest known visit with results is:   Office Visit on 06/28/2023   Component Date Value Ref Range Status    Lithium 06/28/2023 0.6  0.6 - 1.2 mmol/L Final    TSH 06/28/2023 0.837  0.270 - 4.200 uIU/mL Final    T Uptake 06/28/2023 1.06  0.80 - 1.30 TBI Final    T4, Total 06/28/2023 6.55  4.50 - 11.70 mcg/dL Final    T4 results may be falsely increased if patient taking Biotin.    Glucose 06/28/2023 223 (H)  65 - 99 mg/dL " Final    BUN 06/28/2023 13  6 - 20 mg/dL Final    Creatinine 06/28/2023 1.00  0.57 - 1.00 mg/dL Final    Sodium 06/28/2023 136  136 - 145 mmol/L Final    Potassium 06/28/2023 4.4  3.5 - 5.2 mmol/L Final    Chloride 06/28/2023 105  98 - 107 mmol/L Final    CO2 06/28/2023 21.0 (L)  22.0 - 29.0 mmol/L Final    Calcium 06/28/2023 9.7  8.6 - 10.5 mg/dL Final    Total Protein 06/28/2023 6.9  6.0 - 8.5 g/dL Final    Albumin 06/28/2023 4.3  3.5 - 5.2 g/dL Final    ALT (SGPT) 06/28/2023 11  1 - 33 U/L Final    AST (SGOT) 06/28/2023 12  1 - 32 U/L Final    Alkaline Phosphatase 06/28/2023 87  39 - 117 U/L Final    Total Bilirubin 06/28/2023 0.2  0.0 - 1.2 mg/dL Final    Globulin 06/28/2023 2.6  gm/dL Final    A/G Ratio 06/28/2023 1.7  g/dL Final    BUN/Creatinine Ratio 06/28/2023 13.0  7.0 - 25.0 Final    Anion Gap 06/28/2023 10.0  5.0 - 15.0 mmol/L Final    eGFR 06/28/2023 67.1  >60.0 mL/min/1.73 Final    WBC 06/28/2023 9.62  3.40 - 10.80 10*3/mm3 Final    RBC 06/28/2023 3.90  3.77 - 5.28 10*6/mm3 Final    Hemoglobin 06/28/2023 11.4 (L)  12.0 - 15.9 g/dL Final    Hematocrit 06/28/2023 33.8 (L)  34.0 - 46.6 % Final    MCV 06/28/2023 86.7  79.0 - 97.0 fL Final    MCH 06/28/2023 29.2  26.6 - 33.0 pg Final    MCHC 06/28/2023 33.7  31.5 - 35.7 g/dL Final    RDW 06/28/2023 13.2  12.3 - 15.4 % Final    RDW-SD 06/28/2023 41.4  37.0 - 54.0 fl Final    MPV 06/28/2023 10.8  6.0 - 12.0 fL Final    Platelets 06/28/2023 253  140 - 450 10*3/mm3 Final    Neutrophil % 06/28/2023 75.3  42.7 - 76.0 % Final    Lymphocyte % 06/28/2023 17.6 (L)  19.6 - 45.3 % Final    Monocyte % 06/28/2023 5.5  5.0 - 12.0 % Final    Eosinophil % 06/28/2023 1.0  0.3 - 6.2 % Final    Basophil % 06/28/2023 0.2  0.0 - 1.5 % Final    Immature Grans % 06/28/2023 0.4  0.0 - 0.5 % Final    Neutrophils, Absolute 06/28/2023 7.24 (H)  1.70 - 7.00 10*3/mm3 Final    Lymphocytes, Absolute 06/28/2023 1.69  0.70 - 3.10 10*3/mm3 Final    Monocytes, Absolute 06/28/2023 0.53  0.10 -  0.90 10*3/mm3 Final    Eosinophils, Absolute 06/28/2023 0.10  0.00 - 0.40 10*3/mm3 Final    Basophils, Absolute 06/28/2023 0.02  0.00 - 0.20 10*3/mm3 Final    Immature Grans, Absolute 06/28/2023 0.04  0.00 - 0.05 10*3/mm3 Final    nRBC 06/28/2023 0.0  0.0 - 0.2 /100 WBC Final       Assessment & Plan   Diagnoses and all orders for this visit:    1. Bipolar II disorder (HCC) -unchanged (Primary)  -     lithium carbonate 300 MG capsule; Take 1 capsule by mouth Every Evening.  Dispense: 90 capsule; Refill: 1    2. Generalized anxiety disorder -unchanged  -     lithium carbonate 300 MG capsule; Take 1 capsule by mouth Every Evening.  Dispense: 90 capsule; Refill: 1    3. Other mixed anxiety disorders  -     lithium carbonate 300 MG capsule; Take 1 capsule by mouth Every Evening.  Dispense: 90 capsule; Refill: 1    4. Sleeping difficulties  -     traZODone (DESYREL) 50 MG tablet; Take 1 tablet by mouth Every Night.  Dispense: 90 tablet; Refill: 1    5. Medication management        -Continue trazodone 50 mg nightly for sleep  -Continue lithium 300 mg nightly for mood. This APRN has reviewed Lithium Toxicity symptoms with the patient including but not limited to: nausea, fine hand tremors, increased urination and thirst, weight gain, impaired thyroid functioning, fatigue, mental cloudiness, headaches, coarse hand tremors with toxicity, nystagmus, maculopapular rash, pruritis, acne, GI or stomach upset, diarrhea, vomiting, cramps, anorexia, diabetes insipidus, edema, microscopic tubular changes, t-wave inversion or abnormal cardiac rhythm, dysrhythmias, and leukocytosis.  The patient is advised if they experience any of these symptoms they are to contact this APRN/this office immediately or go to the Emergency Department immediately.  The patient verbalized understanding and agreement in their own words.  The patient is advised that taking Lithium with NSAID's, diuretics, ACE inhibitors, metronidazole, acetazolamide,  methyldopa, carbamazepine, phenytoin, calcium channel blockers, and haloperidol may cause serious medication reactions including potentially fatal lithium toxicity.  The use of an SSRI with Lithium may raise the risk of dizziness, confusion, diarrhea, agitation, and tremor.  The patient is advised to avoid these medications, or if they are taking or plan to start any of these medications, this APRN/or a Provider at this office, and the patient's PCP/or the Prescriber of the medication should be notified/aware so further testing and monitoring can be performed.  The patient is advised of the need for hydration during treatment with Lithium, and the risks of not staying hydrated - drinking water.  The patient verbalizes understanding and agreement of instructions in their own words.  Also, the patient is instructed to complete the ordered lithium level after taking the Lithium  for at least 14 days with no missed doses.  The patient is instructed not to take the lithium on the morning of lab draw (as levels should be drawn about 12 hours after the last dose taken), but to take the Lithium after the blood work/lab draw has been completed as taking it before having the blood/lab drawn will interfere with the results.  The patient verbalized understanding and agreement in their own words.  -Discussed with patient importance of medication compliance  -Encouraged patient to begin therapy  -TOSHA reviewed and appropriate. Patient counseled on use of controlled substances.  -The benefits of a healthy diet and exercise were discussed with patient, especially the positive effects they have on mental health. Patient encouraged to consider lifestyle modification regarding  diet and exercise patterns to maximize results of mental health treatment.  -Reviewed previous available documentation  -Reviewed most recent available labs                  Visit Diagnoses:    ICD-10-CM ICD-9-CM   1. Bipolar II disorder (HCC) -unchanged   F31.81 296.89   2. Generalized anxiety disorder -unchanged  F41.1 300.02   3. Other mixed anxiety disorders  F41.3 300.09   4. Sleeping difficulties  G47.9 780.50   5. Medication management  Z79.899 V58.69         TREATMENT PLAN/GOALS: Continue supportive psychotherapy efforts and medications as indicated. Treatment and medication options discussed during today's visit. Patient acknowledged and verbally consented to continue with current treatment plan and was educated on the importance of compliance with treatment and follow-up appointments.    MEDICATION ISSUES:    Discussed medication options and treatment plan of prescribed medication as well as the risks, benefits, and side effects including potential falls, possible impaired driving and metabolic adversities among others. Patient is agreeable to call the office with any worsening of symptoms or onset of side effects. Patient is agreeable to call 911 or go to the nearest ER should he/she begin having SI/HI.     MEDS ORDERED DURING VISIT:  New Medications Ordered This Visit   Medications    lithium carbonate 300 MG capsule     Sig: Take 1 capsule by mouth Every Evening.     Dispense:  90 capsule     Refill:  1    traZODone (DESYREL) 50 MG tablet     Sig: Take 1 tablet by mouth Every Night.     Dispense:  90 tablet     Refill:  1       Return in about 3 months (around 12/27/2023), or if symptoms worsen or fail to improve.         Prognosis: Guarded dependent on medication/follow up and treatment plan compliance.  Functionality: pt showing improvements in important areas of daily functioning.     Short-term goals: Patient will adhere to medication regimen and note continued improvement in symptoms over the next 3 months.   Long-term goals: Patient will be adherent to medication management and psychotherapy with continued improvement in symptoms over the next 6 months          This document has been electronically signed by MARISELA Mccrary   September 27,  2023 13:45 EDT    Part of this note may be an electronic transcription/translation of spoken language to printed text using the Dragon Dictation System.

## 2023-10-17 ENCOUNTER — OFFICE VISIT (OUTPATIENT)
Dept: PSYCHIATRY | Facility: CLINIC | Age: 53
End: 2023-10-17
Payer: MEDICAID

## 2023-10-17 DIAGNOSIS — G47.9 SLEEPING DIFFICULTIES: ICD-10-CM

## 2023-10-17 DIAGNOSIS — Z63.9 FAMILY DYNAMICS PROBLEM: ICD-10-CM

## 2023-10-17 DIAGNOSIS — F41.1 GENERALIZED ANXIETY DISORDER: ICD-10-CM

## 2023-10-17 DIAGNOSIS — F31.81 BIPOLAR II DISORDER: Primary | ICD-10-CM

## 2023-10-17 SDOH — SOCIAL STABILITY - SOCIAL INSECURITY: PROBLEM RELATED TO PRIMARY SUPPORT GROUP, UNSPECIFIED: Z63.9

## 2023-10-22 NOTE — PROGRESS NOTES
"Date of Service: October 17, 2023  Time In: 1:40 PM  Time Out: 2:20 PM    PROGRESS NOTE  Data:  Tammi Levine is a 53 y.o. female who met 1:1 with the undersigned for a regularly scheduled individual outpatient therapy session at [facility] for follow-up of bipolar disorder.  Patient and the undersigned wore masks throughout the session and maintained appropriate distancing.    Chief Complaint: Bipolar II disorder; anxiety; family dynamics problem; reactive depression     HPI: Patient reports things have gotten better around her apartment complex and also states things have gotten better at her [employer] and she feels much better from a spiritual perspective.  Patient also reports she is attempting to spend more time around her mother although her mother continues to describe to what the patient describes as \"crazy conspiracy theories\".  Patient also reports she continues to have difficulty spending time around her mother when her mother's boyfriend is there as she feels he is the one who influenced her mother and continues to aggressively push these conspiracy theories.  And the patient reports she continues to struggle with mood instability including periods of depressed mood, anhedonia, anergia, and feeling hopeless.  Patient also reports she struggles with periods of hypomania including feeling on edge, irritability, increased energy, impulsive behavior, and decreased need for sleep.  However, the patient states she primarily struggles with depression and anxiety.  Patient rates current symptoms at a 4 on a scale of 1-10 with 10 being most severe.   Patient reports she is sleeping relatively well at this time.  The patient adamantly and convincingly denies suicidal ideation vehemently denies any substance use.      Clinical Maneuvering/Intervention:  Assisted patient in processing above session content; acknowledged and normalized patient’s thoughts, feelings, and concerns.  Allowed the " patient to discuss/process her feelings, concerns, and frustrations concerning her perception that her mother has become susceptible to and believes in various conspiracy theories and validated her feelings.  However, also utilized motivational interviewing techniques including complex reflections to discussed the concept of things we can control things he cannot control and strongly urged the patient to remind herself she cannot control the behavior or decisions of others.  Further validated the patient's right to remove herself from the stressful situations and to decrease the time she spends with her mother.  Utilized cognitive behavioral therapy to assist the patient in developing appropriate coping mechanisms to decrease the severity frequency of symptoms.  Continue to focus on healthy skills of daily living and behavioral activation.  Provided unconditional positive regarding a safe, supportive environment.    Allowed patient to freely discuss issues without interruption or judgment. Provided safe, confidential environment to facilitate the development of positive therapeutic relationship and encourage open, honest communication. Assisted patient in identifying risk factors which would indicate the need for higher level of care including thoughts to harm self or others and/or self-harming behavior and encouraged patient to contact this office, call 911, or present to the nearest emergency room should any of these events occur. Discussed crisis intervention services and means to access.  Patient adamantly and convincingly denies current suicidal or homicidal ideation or perceptual disturbance.      Assessment    Patient appears to be struggling with increased mood instability at this time with a feeling of uncertainty due to difficulty with a neighbor in her apartment complex.  In addition, she continues to be susceptible to an external locus of control and continues to be significantly affected by ongoing  strained family dynamics.  The patient's symptomology continue to cause impairment in important areas of functioning.  Patient can be reasonably expected to continue to benefit treatment and would likely be at increased risk for decompensation.    Diagnoses and all orders for this visit:    1. Bipolar II disorder (HCC) -unchanged (Primary)    2. Generalized anxiety disorder -unchanged    3. Sleeping difficulties    4. Family dynamics problem               Mental Status Exam  Hygiene: Good  Dress: Casual  Attitude:  Cooperative  Motor Activity: Appropriate  Speech:  Normal  Mood: Within normal limits  Affect: Tearful  Thought Processes:  Linear  Thought Content:  normal  Suicidal Thoughts:  denies  Homicidal Thoughts:  denies  Crisis Safety Plan: yes, to come to the emergency room.  Hallucinations:  denies    Patient's Support Network Includes:  mother and extended family    Progress toward goal: Not at goal    Functional Status: Mild impairment     Prognosis: Fair with Ongoing Treatment     Plan         Patient will continue in individual outpatient therapy session [facility] every 3 weeks and will continue and pharmacotherapy as scheduled with MARISELA Dixon.  Patient will adhere to medication regimen as prescribed and report any side effects. Patient will contact this office, call 911 or present to the nearest emergency room should suicidal or homicidal ideations occur. Provide Cognitive Behavioral Therapy and Integrative Therapy to improve functioning, maintain stability, and avoid decompensation and the need for higher level of care.          Return in about 3 weeks (around 11/7/2023) for Next scheduled follow up.      This document signed by Vidal Ortiz LCSW, [facility] October 22, 2023 07:45 EDT

## 2023-11-09 ENCOUNTER — OFFICE VISIT (OUTPATIENT)
Dept: CARDIOLOGY | Facility: CLINIC | Age: 53
End: 2023-11-09
Payer: MEDICAID

## 2023-11-09 VITALS
WEIGHT: 177 LBS | SYSTOLIC BLOOD PRESSURE: 121 MMHG | HEART RATE: 66 BPM | RESPIRATION RATE: 18 BRPM | HEIGHT: 59 IN | DIASTOLIC BLOOD PRESSURE: 61 MMHG | BODY MASS INDEX: 35.68 KG/M2 | OXYGEN SATURATION: 100 %

## 2023-11-09 DIAGNOSIS — E78.5 DYSLIPIDEMIA: ICD-10-CM

## 2023-11-09 DIAGNOSIS — I10 ESSENTIAL HYPERTENSION: ICD-10-CM

## 2023-11-09 DIAGNOSIS — I25.10 ARTERIOSCLEROTIC CARDIOVASCULAR DISEASE (ASCVD): Primary | ICD-10-CM

## 2023-11-09 PROCEDURE — 3074F SYST BP LT 130 MM HG: CPT | Performed by: PHYSICIAN ASSISTANT

## 2023-11-09 PROCEDURE — 99213 OFFICE O/P EST LOW 20 MIN: CPT | Performed by: PHYSICIAN ASSISTANT

## 2023-11-09 PROCEDURE — 3078F DIAST BP <80 MM HG: CPT | Performed by: PHYSICIAN ASSISTANT

## 2023-11-09 RX ORDER — LISINOPRIL 10 MG/1
10 TABLET ORAL DAILY
Qty: 90 TABLET | Refills: 3 | Status: SHIPPED | OUTPATIENT
Start: 2023-11-09

## 2023-11-09 RX ORDER — ROSUVASTATIN CALCIUM 40 MG/1
40 TABLET, COATED ORAL DAILY
Qty: 90 TABLET | Refills: 3 | Status: SHIPPED | OUTPATIENT
Start: 2023-11-09

## 2023-11-09 NOTE — PROGRESS NOTES
Diane Rios  Tammi Levine  1970  11/09/2023    Patient Active Problem List   Diagnosis    Arteriosclerotic cardiovascular disease (ASCVD), s/p MI in 2012, clinically stable.     Type 2 diabetes mellitus with diabetic peripheral angiopathy without gangrene, with long-term current use of insulin    Essential hypertension    Dyslipidemia    Sleep apnea    Migraine headache    Abnormal CT scan    Disease of thyroid gland    Right-sided low back pain with right-sided sciatica    Slow transit constipation       Dear Diane Rios:    Subjective     History of Present Illness:    Chief Complaint   Patient presents with    Follow-up     6 mo       Tammi Levine is a pleasant 53 y.o. female with a past medical history significant for ASCVD with history of myocardial infarction in 2012, essential hypertension, diabetes mellitus.  She comes in today for cardiology follow-up.      Tammi reports that she has been doing well and has no open complaints.  Upon further questioning still denies any chest pains, shortness of breath, palpitations, dizziness, or syncope.  She does report she is having some more anxiety lately as she is trying to find a new apartment to live.  Blood pressure still well controlled    Allergies   Allergen Reactions    Zoloft [Sertraline Hcl] Nausea And Vomiting    Ciprofloxacin Rash     Also caused chest pain   :      Current Outpatient Medications:     aspirin 81 MG EC tablet, Take 1 tablet by mouth Every Evening., Disp: 90 tablet, Rfl: 2    Canagliflozin (INVOKANA) 100 MG tablet tablet, Take 1 tablet by mouth Every Evening., Disp: , Rfl:     fluticasone (FLONASE) 50 MCG/ACT nasal spray, USE 1 SPRAY IN EACH NOSTRIL ONCE DAILY - SHAKE BOTTLE GENTLY BEFORE EACH USE, Disp: , Rfl:     insulin lispro protamine-insulin lispro (humaLOG 75-25) (75-25) 100 UNIT/ML suspension injection, Inject 20 Units under the skin into the appropriate area as directed 2 (Two) Times a Day With  "Meals., Disp: , Rfl:     levothyroxine (SYNTHROID, LEVOTHROID) 50 MCG tablet, Take 40 mcg by mouth Every Evening., Disp: , Rfl:     lisinopril (PRINIVIL,ZESTRIL) 10 MG tablet, Take 1 tablet by mouth Daily., Disp: 90 tablet, Rfl: 3    lithium carbonate 300 MG capsule, Take 1 capsule by mouth Every Evening., Disp: 90 capsule, Rfl: 1    omeprazole (priLOSEC) 20 MG capsule, Take 1 capsule by mouth Daily., Disp: , Rfl:     rosuvastatin (CRESTOR) 40 MG tablet, Take 1 tablet by mouth Daily., Disp: 90 tablet, Rfl: 3    traZODone (DESYREL) 50 MG tablet, Take 1 tablet by mouth Every Night., Disp: 90 tablet, Rfl: 1    The following portions of the patient's history were reviewed and updated as appropriate: allergies, current medications, past family history, past medical history, past social history, past surgical history and problem list.    Social History     Tobacco Use    Smoking status: Never    Smokeless tobacco: Never   Vaping Use    Vaping Use: Never used   Substance Use Topics    Alcohol use: No    Drug use: No         Objective   Vitals:    11/09/23 1304   BP: 121/61   BP Location: Right arm   Patient Position: Sitting   Cuff Size: Adult   Pulse: 66   Resp: 18   SpO2: 100%   Weight: 80.3 kg (177 lb)   Height: 149.9 cm (59\")     Body mass index is 35.75 kg/m².    ROS    Constitutional:       General: Not in acute distress.     Appearance: Healthy appearance. Well-developed and not in distress. Not diaphoretic.   Eyes:      Conjunctiva/sclera: Conjunctivae normal.      Pupils: Pupils are equal, round, and reactive to light.   HENT:      Head: Normocephalic and atraumatic.   Neck:      Vascular: No carotid bruit or JVD.   Pulmonary:      Effort: Pulmonary effort is normal. No respiratory distress.      Breath sounds: Normal breath sounds.   Cardiovascular:      Normal rate. Regular rhythm.   Edema:     Peripheral edema absent.   Skin:     General: Skin is cool.   Neurological:      Mental Status: Alert, oriented to " person, place, and time and oriented to person, place and time.         Lab Results   Component Value Date     06/28/2023    K 4.4 06/28/2023     06/28/2023    CO2 21.0 (L) 06/28/2023    BUN 13 06/28/2023    CREATININE 1.00 06/28/2023    GLUCOSE 223 (H) 06/28/2023    CALCIUM 9.7 06/28/2023    AST 12 06/28/2023    ALT 11 06/28/2023    ALKPHOS 87 06/28/2023    LABIL2 1.4 (L) 02/04/2016     Lab Results   Component Value Date    CKTOTAL 124 08/14/2020     Lab Results   Component Value Date    WBC 9.62 06/28/2023    HGB 11.4 (L) 06/28/2023    HCT 33.8 (L) 06/28/2023     06/28/2023     Lab Results   Component Value Date    INR 0.91 01/12/2020     Lab Results   Component Value Date    MG 2.4 08/14/2020     Lab Results   Component Value Date    TSH 0.837 06/28/2023    TRIG 106 08/14/2020    HDL 52 08/14/2020    LDL 63 08/14/2020      Lab Results   Component Value Date    BNP 36 09/16/2015       During this visit the following were done:  Labs Reviewed []    Labs Ordered []    Radiology Reports Reviewed []    Radiology Ordered []    PCP Records Reviewed []    Referring Provider Records Reviewed []    ER Records Reviewed []    Hospital Records Reviewed []    History Obtained From Family []    Radiology Images Reviewed []    Other Reviewed []    Records Requested []       Procedures    Assessment & Plan    Diagnosis Plan   1. Arteriosclerotic cardiovascular disease (ASCVD), s/p MI in 2012, clinically stable.   Lipid Panel      2. Dyslipidemia        3. Essential hypertension                 Recommendations:  ASCVD  No recent anginal symptoms continue aspirin, lisinopril, Crestor.  Recommend changing Invokana to Farxiga or Jardiance  Essential hypertension  Well-controlled.  Dyslipidemia  We will check lipid panel    No follow-ups on file.    As always, I appreciate very much the opportunity to participate in the cardiovascular care of your patients.      With Best Regards,    Oscar Lewis PA-C

## 2023-11-15 ENCOUNTER — TELEPHONE (OUTPATIENT)
Dept: CARDIOLOGY | Facility: CLINIC | Age: 53
End: 2023-11-15
Payer: MEDICAID

## 2023-11-15 NOTE — TELEPHONE ENCOUNTER
Caller: [facility]    Relationship: PROVIDER    Best call back number: [phone]    What orders are you requesting (i.e. lab or imaging): LABS    In what timeframe would the patient need to come in: FRIDAY-11.17.23    Where will you receive your lab/imaging services: IN DR. CARDENAS OFFICE    Additional notes: [phone]

## 2023-11-28 ENCOUNTER — OFFICE VISIT (OUTPATIENT)
Dept: PSYCHIATRY | Facility: CLINIC | Age: 53
End: 2023-11-28
Payer: MEDICAID

## 2023-11-28 DIAGNOSIS — Z63.9 FAMILY DYNAMICS PROBLEM: ICD-10-CM

## 2023-11-28 DIAGNOSIS — G47.9 SLEEPING DIFFICULTIES: ICD-10-CM

## 2023-11-28 DIAGNOSIS — F31.81 BIPOLAR II DISORDER: Primary | ICD-10-CM

## 2023-11-28 DIAGNOSIS — F41.1 GENERALIZED ANXIETY DISORDER: ICD-10-CM

## 2023-11-28 SDOH — SOCIAL STABILITY - SOCIAL INSECURITY: PROBLEM RELATED TO PRIMARY SUPPORT GROUP, UNSPECIFIED: Z63.9

## 2023-12-03 NOTE — PROGRESS NOTES
Date of Service: November 28, 2023  Time In: 1:45 PM  Time Out: 2:40 PM    PROGRESS NOTE  Data:  Tammi Levine is a 53 y.o. female who met 1:1 with the undersigned for a regularly scheduled individual outpatient therapy session at [facility] for follow-up of bipolar disorder.  Patient and the undersigned wore masks throughout the session and maintained appropriate distancing.    Chief Complaint: Bipolar II disorder; anxiety; family dynamics problem; reactive depression     HPI: Patient reports she was able to spend time with her mother on Thanksgiving but states it is also difficult as her mother continues to describe the various conspiracy theories.  Patient states this is much worse when her mother's boyfriend is present.  Patient reports she continues to struggle with mood instability including periods of depressed mood, anhedonia, anergia, and feeling hopeless.  Patient also reports she struggles with periods of hypomania including feeling on edge, irritability, increased energy, impulsive behavior, and decreased need for sleep.  However, the patient states she primarily struggles with depression and anxiety.  Patient rates current symptoms at a 3/4 on a scale of 1-10 with 10 being most severe.   Patient reports she is sleeping relatively well at this time.  The patient adamantly and convincingly denies suicidal ideation vehemently denies any substance use.      Clinical Maneuvering/Intervention:  Assisted patient in processing above session content; acknowledged and normalized patient’s thoughts, feelings, and concerns.  Utilize motivational interviewing techniques including complex reflections to assist the patient in recognizing her ability to spend time with her mother during the Thanksgiving holiday and validated her efforts.  Also continue to utilize solution focused therapy to assist the patient in recognizing activities she can engage in on a regular basis to increase her  quality of life and decrease symptomology including taking a walk around her neighborhood at least 3 days weekly, continuing to go to [employer] on a weekly basis, and engaging in positive self talk daily.  Utilized cognitive behavioral therapy to assist the patient in developing appropriate coping mechanisms to decrease the severity frequency of symptoms.  Continue to focus on healthy skills of daily living and behavioral activation.  Provided unconditional positive regarding a safe, supportive environment.    Allowed patient to freely discuss issues without interruption or judgment. Provided safe, confidential environment to facilitate the development of positive therapeutic relationship and encourage open, honest communication. Assisted patient in identifying risk factors which would indicate the need for higher level of care including thoughts to harm self or others and/or self-harming behavior and encouraged patient to contact this office, call 911, or present to the nearest emergency room should any of these events occur. Discussed crisis intervention services and means to access.  Patient adamantly and convincingly denies current suicidal or homicidal ideation or perceptual disturbance.      Assessment    Patient appears to maintain relative stability as compared to her baseline.  However, she continues to be susceptible to an external locus of control and continues to be significantly affected by ongoing strained family dynamics.  The patient's symptomology continue to cause impairment in important areas of functioning.  Patient can be reasonably expected to continue to benefit treatment and would likely be at increased risk for decompensation.    Diagnoses and all orders for this visit:    1. Bipolar II disorder (HCC) -unchanged (Primary)    2. Generalized anxiety disorder -unchanged    3. Sleeping difficulties    4. Family dynamics problem               Mental Status Exam  Hygiene: Good  Dress:  Casual  Attitude:  Cooperative  Motor Activity: Appropriate  Speech:  Normal  Mood: Within normal limits  Affect: Tearful  Thought Processes:  Linear  Thought Content:  normal  Suicidal Thoughts:  denies  Homicidal Thoughts:  denies  Crisis Safety Plan: yes, to come to the emergency room.  Hallucinations:  denies    Patient's Support Network Includes:  mother and extended family    Progress toward goal: Not at goal    Functional Status: Mild impairment     Prognosis: Fair with Ongoing Treatment     Plan         Patient will continue in individual outpatient therapy session [facility] every 3 weeks and will continue and pharmacotherapy as scheduled with MARISELA Dixon.  Patient will adhere to medication regimen as prescribed and report any side effects. Patient will contact this office, call 911 or present to the nearest emergency room should suicidal or homicidal ideations occur. Provide Cognitive Behavioral Therapy and Integrative Therapy to improve functioning, maintain stability, and avoid decompensation and the need for higher level of care.          Return in about 4 weeks (around 12/26/2023) for Next scheduled follow up.      This document signed by Vidal Ortiz LCSW, [facility] December 3, 2023 15:35 EST

## 2023-12-19 ENCOUNTER — OFFICE VISIT (OUTPATIENT)
Dept: PSYCHIATRY | Facility: CLINIC | Age: 53
End: 2023-12-19
Payer: MEDICAID

## 2023-12-19 DIAGNOSIS — F31.81 BIPOLAR II DISORDER: Primary | ICD-10-CM

## 2023-12-19 DIAGNOSIS — Z63.8 STRESS DUE TO FAMILY TENSION: ICD-10-CM

## 2023-12-19 DIAGNOSIS — Z63.9 FAMILY DYNAMICS PROBLEM: ICD-10-CM

## 2023-12-19 DIAGNOSIS — G47.9 SLEEPING DIFFICULTIES: ICD-10-CM

## 2023-12-19 DIAGNOSIS — F41.1 GENERALIZED ANXIETY DISORDER: ICD-10-CM

## 2023-12-19 SDOH — SOCIAL STABILITY - SOCIAL INSECURITY: OTHER SPECIFIED PROBLEMS RELATED TO PRIMARY SUPPORT GROUP: Z63.8

## 2023-12-19 SDOH — SOCIAL STABILITY - SOCIAL INSECURITY: PROBLEM RELATED TO PRIMARY SUPPORT GROUP, UNSPECIFIED: Z63.9

## 2023-12-27 ENCOUNTER — OFFICE VISIT (OUTPATIENT)
Dept: PSYCHIATRY | Facility: CLINIC | Age: 53
End: 2023-12-27
Payer: MEDICAID

## 2023-12-27 VITALS
OXYGEN SATURATION: 100 % | BODY MASS INDEX: 35.31 KG/M2 | WEIGHT: 174.8 LBS | SYSTOLIC BLOOD PRESSURE: 110 MMHG | TEMPERATURE: 97.5 F | DIASTOLIC BLOOD PRESSURE: 74 MMHG | HEART RATE: 64 BPM

## 2023-12-27 DIAGNOSIS — Z79.899 MEDICATION MANAGEMENT: ICD-10-CM

## 2023-12-27 DIAGNOSIS — I25.10 ARTERIOSCLEROTIC CARDIOVASCULAR DISEASE (ASCVD): ICD-10-CM

## 2023-12-27 DIAGNOSIS — F41.3 OTHER MIXED ANXIETY DISORDERS: ICD-10-CM

## 2023-12-27 DIAGNOSIS — G47.9 SLEEPING DIFFICULTIES: ICD-10-CM

## 2023-12-27 DIAGNOSIS — F41.1 GENERALIZED ANXIETY DISORDER: ICD-10-CM

## 2023-12-27 DIAGNOSIS — F31.81 BIPOLAR II DISORDER: Primary | ICD-10-CM

## 2023-12-27 PROCEDURE — 99214 OFFICE O/P EST MOD 30 MIN: CPT | Performed by: NURSE PRACTITIONER

## 2023-12-27 PROCEDURE — 3078F DIAST BP <80 MM HG: CPT | Performed by: NURSE PRACTITIONER

## 2023-12-27 PROCEDURE — 80053 COMPREHEN METABOLIC PANEL: CPT | Performed by: NURSE PRACTITIONER

## 2023-12-27 PROCEDURE — 85025 COMPLETE CBC W/AUTO DIFF WBC: CPT | Performed by: NURSE PRACTITIONER

## 2023-12-27 PROCEDURE — 80061 LIPID PANEL: CPT | Performed by: NURSE PRACTITIONER

## 2023-12-27 PROCEDURE — 83036 HEMOGLOBIN GLYCOSYLATED A1C: CPT | Performed by: NURSE PRACTITIONER

## 2023-12-27 PROCEDURE — 3074F SYST BP LT 130 MM HG: CPT | Performed by: NURSE PRACTITIONER

## 2023-12-27 PROCEDURE — 84479 ASSAY OF THYROID (T3 OR T4): CPT | Performed by: NURSE PRACTITIONER

## 2023-12-27 PROCEDURE — 84443 ASSAY THYROID STIM HORMONE: CPT | Performed by: NURSE PRACTITIONER

## 2023-12-27 PROCEDURE — 1159F MED LIST DOCD IN RCRD: CPT | Performed by: NURSE PRACTITIONER

## 2023-12-27 PROCEDURE — 84436 ASSAY OF TOTAL THYROXINE: CPT | Performed by: NURSE PRACTITIONER

## 2023-12-27 PROCEDURE — 1160F RVW MEDS BY RX/DR IN RCRD: CPT | Performed by: NURSE PRACTITIONER

## 2023-12-27 PROCEDURE — 80178 ASSAY OF LITHIUM: CPT | Performed by: NURSE PRACTITIONER

## 2023-12-27 RX ORDER — LITHIUM CARBONATE 300 MG/1
300 CAPSULE ORAL EVERY EVENING
Qty: 90 CAPSULE | Refills: 0 | Status: SHIPPED | OUTPATIENT
Start: 2023-12-27

## 2023-12-27 RX ORDER — TRAZODONE HYDROCHLORIDE 50 MG/1
50 TABLET ORAL NIGHTLY
Qty: 90 TABLET | Refills: 0 | Status: SHIPPED | OUTPATIENT
Start: 2023-12-27

## 2023-12-27 NOTE — PROGRESS NOTES
"Subjective   Tammi Levine is a 53 y.o. female who presents today for follow up    Chief Complaint:  Bipolar disorder    History of Present Illness: Patient presents as follow up. Reports medications are working well. States she enjoyed Thanksgiving and [place] with her mother. Reports she had to tell her ex boyfriend again that the relationship was over. Reports overall moods have been stable. Reports sleep is good with trazodone. Reports appetite is good. She denies SI/HI/AVH.    The following portions of the patient's history were reviewed and updated as appropriate: allergies, current medications, past family history, past medical history, past social history, past surgical history and problem list.      Past Medical History:  Past Medical History:   Diagnosis Date    Abnormal CT scan     ADRENAL GLAND FOCAL MASS LESION MULTIPLE, LEFT ONLY    Anxiety     ASCVD (arteriosclerotic cardiovascular disease)     \"status post MI in 2012, clinically stable\"    Bipolar disorder     Depression     Diabetes mellitus     Disease of thyroid gland     Dyslipidemia     Hypertension, well controlled     Migraine headache     Myocardial infarction 2012    Panic disorder     Right-sided low back pain with right-sided sciatica     Sleep apnea        Social History:  Social History     Socioeconomic History    Marital status: Single   Tobacco Use    Smoking status: Never    Smokeless tobacco: Never   Vaping Use    Vaping Use: Never used   Substance and Sexual Activity    Alcohol use: No    Drug use: No    Sexual activity: Never       Family History:  Family History   Problem Relation Age of Onset    Diabetes Father     Diabetes Brother     Cancer Maternal Grandmother     Heart disease Maternal Grandfather     Diabetes Paternal Grandfather     Cancer Other        Past Surgical History:  Past Surgical History:   Procedure Laterality Date    CHOLECYSTECTOMY      CORONARY ANGIOPLASTY WITH STENT PLACEMENT  2012    Dr. Denson    " ENDOSCOPY  03/2021    HYSTERECTOMY  12/2014    NECK SURGERY  2010    TRANSESOPHAGEAL ECHOCARDIOGRAM (SARAN)  07/13/2015    EF 60-65%       Problem List:  Patient Active Problem List   Diagnosis    Arteriosclerotic cardiovascular disease (ASCVD), s/p MI in 2012, clinically stable.     Type 2 diabetes mellitus with diabetic peripheral angiopathy without gangrene, with long-term current use of insulin    Essential hypertension    Dyslipidemia    Sleep apnea    Migraine headache    Abnormal CT scan    Disease of thyroid gland    Right-sided low back pain with right-sided sciatica    Slow transit constipation       Allergy:   Allergies   Allergen Reactions    Zoloft [Sertraline Hcl] Nausea And Vomiting    Ciprofloxacin Rash     Also caused chest pain        Current Medications:   Current Outpatient Medications   Medication Sig Dispense Refill    aspirin 81 MG EC tablet Take 1 tablet by mouth Every Evening. 90 tablet 2    Canagliflozin (INVOKANA) 100 MG tablet tablet Take 1 tablet by mouth Every Evening.      fluticasone (FLONASE) 50 MCG/ACT nasal spray USE 1 SPRAY IN EACH NOSTRIL ONCE DAILY - SHAKE BOTTLE GENTLY BEFORE EACH USE      insulin lispro protamine-insulin lispro (humaLOG 75-25) (75-25) 100 UNIT/ML suspension injection Inject 20 Units under the skin into the appropriate area as directed 2 (Two) Times a Day With Meals.      levothyroxine (SYNTHROID, LEVOTHROID) 50 MCG tablet Take 40 mcg by mouth Every Evening.      lisinopril (PRINIVIL,ZESTRIL) 10 MG tablet Take 1 tablet by mouth Daily. 90 tablet 3    lithium carbonate 300 MG capsule Take 1 capsule by mouth Every Evening. 90 capsule 0    omeprazole (priLOSEC) 20 MG capsule Take 1 capsule by mouth Daily.      rosuvastatin (CRESTOR) 40 MG tablet Take 1 tablet by mouth Daily. 90 tablet 3    traZODone (DESYREL) 50 MG tablet Take 1 tablet by mouth Every Night. 90 tablet 0     No current facility-administered medications for this visit.       Review of Symptoms:     Review of Systems   Constitutional: Negative.    HENT: Negative.     Eyes: Negative.    Gastrointestinal: Negative.    Musculoskeletal:  Positive for arthralgias.   Skin: Negative.    Neurological: Negative.    Psychiatric/Behavioral:  Positive for sleep disturbance and depressed mood. Negative for suicidal ideas. The patient is nervous/anxious.        Objective   Physical Exam:   Blood pressure 110/74, pulse 64, temperature 97.5 °F (36.4 °C), temperature source Temporal, weight 79.3 kg (174 lb 12.8 oz), SpO2 100%.  Body mass index is 35.31 kg/m².    Appearance: Well nourished female, appropriately dressed, appears stated age and in no acute distress  Gait, Station, Strength: Unsteady gait, uses cane to assist with ambulation    Mental Status Exam:   Hygiene:   good  Cooperation:  Cooperative  Eye Contact:  Good  Psychomotor Behavior:  Appropriate  Affect:  Appropriate  Mood: normal  Hopelessness: Denies  Speech:  Normal  Thought Process:  Linear  Thought Content:  Mood congruent  Suicidal:  None  Homicidal:  None  Hallucinations:  None  Delusion:  None  Memory:  Intact  Orientation:  Person, Place, Time, and Situation  Reliability:  good  Insight:  Good  Judgement:  Good  Impulse Control:  Good  Physical/Medical Issues:  Yes See med hx      PHQ-Score Total:  PHQ-9 Total Score: 2          Lab Results:   No visits with results within 1 Month(s) from this visit.   Latest known visit with results is:   Office Visit on 06/28/2023   Component Date Value Ref Range Status    Lithium 06/28/2023 0.6  0.6 - 1.2 mmol/L Final    TSH 06/28/2023 0.837  0.270 - 4.200 uIU/mL Final    T Uptake 06/28/2023 1.06  0.80 - 1.30 TBI Final    T4, Total 06/28/2023 6.55  4.50 - 11.70 mcg/dL Final    T4 results may be falsely increased if patient taking Biotin.    Glucose 06/28/2023 223 (H)  65 - 99 mg/dL Final    BUN 06/28/2023 13  6 - 20 mg/dL Final    Creatinine 06/28/2023 1.00  0.57 - 1.00 mg/dL Final    Sodium 06/28/2023 136  136 - 145  mmol/L Final    Potassium 06/28/2023 4.4  3.5 - 5.2 mmol/L Final    Chloride 06/28/2023 105  98 - 107 mmol/L Final    CO2 06/28/2023 21.0 (L)  22.0 - 29.0 mmol/L Final    Calcium 06/28/2023 9.7  8.6 - 10.5 mg/dL Final    Total Protein 06/28/2023 6.9  6.0 - 8.5 g/dL Final    Albumin 06/28/2023 4.3  3.5 - 5.2 g/dL Final    ALT (SGPT) 06/28/2023 11  1 - 33 U/L Final    AST (SGOT) 06/28/2023 12  1 - 32 U/L Final    Alkaline Phosphatase 06/28/2023 87  39 - 117 U/L Final    Total Bilirubin 06/28/2023 0.2  0.0 - 1.2 mg/dL Final    Globulin 06/28/2023 2.6  gm/dL Final    A/G Ratio 06/28/2023 1.7  g/dL Final    BUN/Creatinine Ratio 06/28/2023 13.0  7.0 - 25.0 Final    Anion Gap 06/28/2023 10.0  5.0 - 15.0 mmol/L Final    eGFR 06/28/2023 67.1  >60.0 mL/min/1.73 Final    WBC 06/28/2023 9.62  3.40 - 10.80 10*3/mm3 Final    RBC 06/28/2023 3.90  3.77 - 5.28 10*6/mm3 Final    Hemoglobin 06/28/2023 11.4 (L)  12.0 - 15.9 g/dL Final    Hematocrit 06/28/2023 33.8 (L)  34.0 - 46.6 % Final    MCV 06/28/2023 86.7  79.0 - 97.0 fL Final    MCH 06/28/2023 29.2  26.6 - 33.0 pg Final    MCHC 06/28/2023 33.7  31.5 - 35.7 g/dL Final    RDW 06/28/2023 13.2  12.3 - 15.4 % Final    RDW-SD 06/28/2023 41.4  37.0 - 54.0 fl Final    MPV 06/28/2023 10.8  6.0 - 12.0 fL Final    Platelets 06/28/2023 253  140 - 450 10*3/mm3 Final    Neutrophil % 06/28/2023 75.3  42.7 - 76.0 % Final    Lymphocyte % 06/28/2023 17.6 (L)  19.6 - 45.3 % Final    Monocyte % 06/28/2023 5.5  5.0 - 12.0 % Final    Eosinophil % 06/28/2023 1.0  0.3 - 6.2 % Final    Basophil % 06/28/2023 0.2  0.0 - 1.5 % Final    Immature Grans % 06/28/2023 0.4  0.0 - 0.5 % Final    Neutrophils, Absolute 06/28/2023 7.24 (H)  1.70 - 7.00 10*3/mm3 Final    Lymphocytes, Absolute 06/28/2023 1.69  0.70 - 3.10 10*3/mm3 Final    Monocytes, Absolute 06/28/2023 0.53  0.10 - 0.90 10*3/mm3 Final    Eosinophils, Absolute 06/28/2023 0.10  0.00 - 0.40 10*3/mm3 Final    Basophils, Absolute 06/28/2023 0.02  0.00 -  0.20 10*3/mm3 Final    Immature Grans, Absolute 06/28/2023 0.04  0.00 - 0.05 10*3/mm3 Final    nRBC 06/28/2023 0.0  0.0 - 0.2 /100 WBC Final       Assessment & Plan   Diagnoses and all orders for this visit:    1. Bipolar II disorder (HCC) -unchanged (Primary)  -     lithium carbonate 300 MG capsule; Take 1 capsule by mouth Every Evening.  Dispense: 90 capsule; Refill: 0    2. Generalized anxiety disorder -unchanged  -     lithium carbonate 300 MG capsule; Take 1 capsule by mouth Every Evening.  Dispense: 90 capsule; Refill: 0    3. Other mixed anxiety disorders  -     lithium carbonate 300 MG capsule; Take 1 capsule by mouth Every Evening.  Dispense: 90 capsule; Refill: 0    4. Sleeping difficulties  -     traZODone (DESYREL) 50 MG tablet; Take 1 tablet by mouth Every Night.  Dispense: 90 tablet; Refill: 0    5. Medication management  -     Lithium Level  -     CBC Auto Differential; Future  -     Comprehensive Metabolic Panel; Future  -     Hemoglobin A1c; Future  -     Thyroid Panel With TSH; Future  -     Thyroid Panel With TSH  -     Hemoglobin A1c  -     Comprehensive Metabolic Panel  -     CBC Auto Differential    6. Arteriosclerotic cardiovascular disease (ASCVD), s/p MI in 2012, clinically stable.   -     Lipid Panel          -Continue trazodone 50 mg nightly for sleep  -Continue lithium 300 mg nightly for mood. This APRN has reviewed Lithium Toxicity symptoms with the patient including but not limited to: nausea, fine hand tremors, increased urination and thirst, weight gain, impaired thyroid functioning, fatigue, mental cloudiness, headaches, coarse hand tremors with toxicity, nystagmus, maculopapular rash, pruritis, acne, GI or stomach upset, diarrhea, vomiting, cramps, anorexia, diabetes insipidus, edema, microscopic tubular changes, t-wave inversion or abnormal cardiac rhythm, dysrhythmias, and leukocytosis.  The patient is advised if they experience any of these symptoms they are to contact this  APRN/this office immediately or go to the Emergency Department immediately.  The patient verbalized understanding and agreement in their own words.  The patient is advised that taking Lithium with NSAID's, diuretics, ACE inhibitors, metronidazole, acetazolamide, methyldopa, carbamazepine, phenytoin, calcium channel blockers, and haloperidol may cause serious medication reactions including potentially fatal lithium toxicity.  The use of an SSRI with Lithium may raise the risk of dizziness, confusion, diarrhea, agitation, and tremor.  The patient is advised to avoid these medications, or if they are taking or plan to start any of these medications, this APRN/or a Provider at this office, and the patient's PCP/or the Prescriber of the medication should be notified/aware so further testing and monitoring can be performed.  The patient is advised of the need for hydration during treatment with Lithium, and the risks of not staying hydrated - drinking water.  The patient verbalizes understanding and agreement of instructions in their own words.  Also, the patient is instructed to complete the ordered lithium level after taking the Lithium  for at least 14 days with no missed doses.  The patient is instructed not to take the lithium on the morning of lab draw (as levels should be drawn about 12 hours after the last dose taken), but to take the Lithium after the blood work/lab draw has been completed as taking it before having the blood/lab drawn will interfere with the results.  The patient verbalized understanding and agreement in their own words.  -Will obtain labs before next follow up  -Encouraged patient to begin therapy  -TOSHA reviewed and appropriate. Patient counseled on use of controlled substances.  -The benefits of a healthy diet and exercise were discussed with patient, especially the positive effects they have on mental health. Patient encouraged to consider lifestyle modification regarding  diet and  exercise patterns to maximize results of mental health treatment.  -Reviewed previous available documentation  -Reviewed most recent available labs                  Visit Diagnoses:    ICD-10-CM ICD-9-CM   1. Bipolar II disorder (HCC) -unchanged  F31.81 296.89   2. Generalized anxiety disorder -unchanged  F41.1 300.02   3. Other mixed anxiety disorders  F41.3 300.09   4. Sleeping difficulties  G47.9 780.50   5. Medication management  Z79.899 V58.69   6. Arteriosclerotic cardiovascular disease (ASCVD), s/p MI in 2012, clinically stable.   I25.10 429.2     440.9         TREATMENT PLAN/GOALS: Continue supportive psychotherapy efforts and medications as indicated. Treatment and medication options discussed during today's visit. Patient acknowledged and verbally consented to continue with current treatment plan and was educated on the importance of compliance with treatment and follow-up appointments.    MEDICATION ISSUES:    Discussed medication options and treatment plan of prescribed medication as well as the risks, benefits, and side effects including potential falls, possible impaired driving and metabolic adversities among others. Patient is agreeable to call the office with any worsening of symptoms or onset of side effects. Patient is agreeable to call 911 or go to the nearest ER should he/she begin having SI/HI.     MEDS ORDERED DURING VISIT:  New Medications Ordered This Visit   Medications    lithium carbonate 300 MG capsule     Sig: Take 1 capsule by mouth Every Evening.     Dispense:  90 capsule     Refill:  0    traZODone (DESYREL) 50 MG tablet     Sig: Take 1 tablet by mouth Every Night.     Dispense:  90 tablet     Refill:  0       Return in about 3 months (around 3/27/2024), or if symptoms worsen or fail to improve.         Prognosis: Guarded dependent on medication/follow up and treatment plan compliance.  Functionality: pt showing improvements in important areas of daily functioning.     Short-term  goals: Patient will adhere to medication regimen and note continued improvement in symptoms over the next 3 months.   Long-term goals: Patient will be adherent to medication management and psychotherapy with continued improvement in symptoms over the next 6 months          This document has been electronically signed by MARISELA Mccrary   December 27, 2023 14:10 EST    Part of this note may be an electronic transcription/translation of spoken language to printed text using the Dragon Dictation System.

## 2023-12-27 NOTE — PROGRESS NOTES
Venipuncture Blood Specimen Collection  Venipuncture performed in Right arm by Kellen Brooks MA with good hemostasis. Patient tolerated the procedure well without complications.   12/27/23   Kellen Brooks MA

## 2023-12-28 LAB
ALBUMIN SERPL-MCNC: 4.6 G/DL (ref 3.5–5.2)
ALBUMIN/GLOB SERPL: 1.8 G/DL
ALP SERPL-CCNC: 92 U/L (ref 39–117)
ALT SERPL W P-5'-P-CCNC: 11 U/L (ref 1–33)
ANION GAP SERPL CALCULATED.3IONS-SCNC: 8 MMOL/L (ref 5–15)
AST SERPL-CCNC: 12 U/L (ref 1–32)
BASOPHILS # BLD AUTO: 0.02 10*3/MM3 (ref 0–0.2)
BASOPHILS NFR BLD AUTO: 0.3 % (ref 0–1.5)
BILIRUB SERPL-MCNC: 0.3 MG/DL (ref 0–1.2)
BUN SERPL-MCNC: 16 MG/DL (ref 6–20)
BUN/CREAT SERPL: 16.2 (ref 7–25)
CALCIUM SPEC-SCNC: 9.8 MG/DL (ref 8.6–10.5)
CHLORIDE SERPL-SCNC: 104 MMOL/L (ref 98–107)
CHOLEST SERPL-MCNC: 224 MG/DL (ref 0–200)
CO2 SERPL-SCNC: 21 MMOL/L (ref 22–29)
CREAT SERPL-MCNC: 0.99 MG/DL (ref 0.57–1)
DEPRECATED RDW RBC AUTO: 42.3 FL (ref 37–54)
EGFRCR SERPLBLD CKD-EPI 2021: 67.9 ML/MIN/1.73
EOSINOPHIL # BLD AUTO: 0.07 10*3/MM3 (ref 0–0.4)
EOSINOPHIL NFR BLD AUTO: 0.9 % (ref 0.3–6.2)
ERYTHROCYTE [DISTWIDTH] IN BLOOD BY AUTOMATED COUNT: 13.3 % (ref 12.3–15.4)
GLOBULIN UR ELPH-MCNC: 2.5 GM/DL
GLUCOSE SERPL-MCNC: 175 MG/DL (ref 65–99)
HBA1C MFR BLD: 7.7 % (ref 4.8–5.6)
HCT VFR BLD AUTO: 34.2 % (ref 34–46.6)
HDLC SERPL-MCNC: 51 MG/DL (ref 40–60)
HGB BLD-MCNC: 11.9 G/DL (ref 12–15.9)
IMM GRANULOCYTES # BLD AUTO: 0.02 10*3/MM3 (ref 0–0.05)
IMM GRANULOCYTES NFR BLD AUTO: 0.3 % (ref 0–0.5)
LDLC SERPL CALC-MCNC: 151 MG/DL (ref 0–100)
LDLC/HDLC SERPL: 2.91 {RATIO}
LITHIUM SERPL-SCNC: 0.7 MMOL/L (ref 0.6–1.2)
LYMPHOCYTES # BLD AUTO: 1.51 10*3/MM3 (ref 0.7–3.1)
LYMPHOCYTES NFR BLD AUTO: 19.6 % (ref 19.6–45.3)
MCH RBC QN AUTO: 30.3 PG (ref 26.6–33)
MCHC RBC AUTO-ENTMCNC: 34.8 G/DL (ref 31.5–35.7)
MCV RBC AUTO: 87 FL (ref 79–97)
MONOCYTES # BLD AUTO: 0.46 10*3/MM3 (ref 0.1–0.9)
MONOCYTES NFR BLD AUTO: 6 % (ref 5–12)
NEUTROPHILS NFR BLD AUTO: 5.64 10*3/MM3 (ref 1.7–7)
NEUTROPHILS NFR BLD AUTO: 72.9 % (ref 42.7–76)
NRBC BLD AUTO-RTO: 0 /100 WBC (ref 0–0.2)
PLATELET # BLD AUTO: 267 10*3/MM3 (ref 140–450)
PMV BLD AUTO: 10.8 FL (ref 6–12)
POTASSIUM SERPL-SCNC: 5 MMOL/L (ref 3.5–5.2)
PROT SERPL-MCNC: 7.1 G/DL (ref 6–8.5)
RBC # BLD AUTO: 3.93 10*6/MM3 (ref 3.77–5.28)
SODIUM SERPL-SCNC: 133 MMOL/L (ref 136–145)
T-UPTAKE NFR SERPL: 1.07 TBI (ref 0.8–1.3)
T4 SERPL-MCNC: 6.31 MCG/DL (ref 4.5–11.7)
TRIGL SERPL-MCNC: 124 MG/DL (ref 0–150)
TSH SERPL DL<=0.05 MIU/L-ACNC: 3.7 UIU/ML (ref 0.27–4.2)
VLDLC SERPL-MCNC: 22 MG/DL (ref 5–40)
WBC NRBC COR # BLD AUTO: 7.72 10*3/MM3 (ref 3.4–10.8)

## 2024-01-07 NOTE — PROGRESS NOTES
"Date of Service: December 19, 2023  Time In: 1:05 PM  Time Out: 2:00 PM    PROGRESS NOTE  Data:  Tammi Levine is a 53 y.o. female who met 1:1 with the undersigned for a regularly scheduled individual outpatient therapy session at [facility] for follow-up of bipolar disorder.  Patient and the undersigned wore masks throughout the session and maintained appropriate distancing.    Chief Complaint: Bipolar II disorder; anxiety; family dynamics problem     HPI: Patient continues to report significant difficulty spending time with her mother and states her mother continues to be obsessed with various \"conspiracy theories\".  In fact, the patient states she simply cannot be around her mother when her mother's boyfriend is present.  Patient reports she is not sure whether she will be able to spend time with her mother during the holidays.  Patient reports she continues to struggle with mood instability including periods of depressed mood, anhedonia, anergia, and feeling hopeless.  Patient also reports she struggles with periods of hypomania including feeling on edge, irritability, increased energy, impulsive behavior, and decreased need for sleep.  However, the patient states she primarily struggles with depression and anxiety.  Patient rates current symptoms at a 4 on a scale of 1-10 with 10 being most severe. Patient reports she is sleeping relatively well at this time.  The patient adamantly and convincingly denies suicidal ideation vehemently denies any substance use.      Clinical Maneuvering/Intervention:  Assisted patient in processing above session content; acknowledged and normalized patient’s thoughts, feelings, and concerns.  Allowed the patient to discuss/process her feelings and frustration concerning ongoing difficulty with spending time with her mother and validated her feelings.  Also utilized solution focused therapy to assist the patient with developing a strategy to be able to " spend time with her mother and doing the Christmas holiday and encouraged her to ask her mother to at least go out with her to lunch or to find some other way to spend time with just the patient and her mother.  Utilized cognitive behavioral therapy to assist the patient in developing appropriate coping mechanisms to decrease the severity frequency of symptoms.  Continue to focus on healthy skills of daily living and behavioral activation.  Provided unconditional positive regarding a safe, supportive environment.    Allowed patient to freely discuss issues without interruption or judgment. Provided safe, confidential environment to facilitate the development of positive therapeutic relationship and encourage open, honest communication. Assisted patient in identifying risk factors which would indicate the need for higher level of care including thoughts to harm self or others and/or self-harming behavior and encouraged patient to contact this office, call 911, or present to the nearest emergency room should any of these events occur. Discussed crisis intervention services and means to access.  Patient adamantly and convincingly denies current suicidal or homicidal ideation or perceptual disturbance.      Assessment    Patient appears to maintain relative stability as compared to her baseline.  However, she continues to be susceptible to an external locus of control and continues to be significantly affected by ongoing strained family dynamics.  The patient's symptomology continue to cause impairment in important areas of functioning.  Patient can be reasonably expected to continue to benefit treatment and would likely be at increased risk for decompensation.    Diagnoses and all orders for this visit:    1. Bipolar II disorder (HCC) -unchanged (Primary)    2. Generalized anxiety disorder -unchanged    3. Sleeping difficulties    4. Family dynamics problem    5. Stress due to family tension               Mental Status  Exam  Hygiene: Good  Dress: Casual  Attitude:  Cooperative  Motor Activity: Appropriate  Speech:  Normal  Mood: Within normal limits  Affect: Tearful  Thought Processes:  Linear  Thought Content:  normal  Suicidal Thoughts:  denies  Homicidal Thoughts:  denies  Crisis Safety Plan: yes, to come to the emergency room.  Hallucinations:  denies    Patient's Support Network Includes:  mother and extended family    Progress toward goal: Not at goal    Functional Status: Mild impairment     Prognosis: Fair with Ongoing Treatment     Plan         Patient will continue in individual outpatient therapy session [facility] every 3 weeks and will continue and pharmacotherapy as scheduled with MARISELA Dixon.  Patient will adhere to medication regimen as prescribed and report any side effects. Patient will contact this office, call 911 or present to the nearest emergency room should suicidal or homicidal ideations occur. Provide Cognitive Behavioral Therapy and Integrative Therapy to improve functioning, maintain stability, and avoid decompensation and the need for higher level of care.          Return in about 3 weeks (around 1/9/2024) for Next scheduled follow up.      This document signed by Vidal Ortiz LCSW, SATISH January 7, 2024 10:01 EST

## 2024-01-30 ENCOUNTER — OFFICE VISIT (OUTPATIENT)
Dept: PSYCHIATRY | Facility: CLINIC | Age: 54
End: 2024-01-30
Payer: MEDICAID

## 2024-01-30 DIAGNOSIS — F41.1 GENERALIZED ANXIETY DISORDER: ICD-10-CM

## 2024-01-30 DIAGNOSIS — Z63.9 FAMILY DYNAMICS PROBLEM: ICD-10-CM

## 2024-01-30 DIAGNOSIS — F31.81 BIPOLAR II DISORDER: Primary | ICD-10-CM

## 2024-01-30 SDOH — SOCIAL STABILITY - SOCIAL INSECURITY: PROBLEM RELATED TO PRIMARY SUPPORT GROUP, UNSPECIFIED: Z63.9

## 2024-02-20 ENCOUNTER — OFFICE VISIT (OUTPATIENT)
Dept: PSYCHIATRY | Facility: CLINIC | Age: 54
End: 2024-02-20
Payer: MEDICAID

## 2024-02-20 DIAGNOSIS — G47.9 SLEEPING DIFFICULTIES: ICD-10-CM

## 2024-02-20 DIAGNOSIS — F41.1 GENERALIZED ANXIETY DISORDER: ICD-10-CM

## 2024-02-20 DIAGNOSIS — Z63.9 FAMILY DYNAMICS PROBLEM: ICD-10-CM

## 2024-02-20 DIAGNOSIS — F31.81 BIPOLAR II DISORDER: Primary | ICD-10-CM

## 2024-02-20 SDOH — SOCIAL STABILITY - SOCIAL INSECURITY: PROBLEM RELATED TO PRIMARY SUPPORT GROUP, UNSPECIFIED: Z63.9

## 2024-02-21 NOTE — PROGRESS NOTES
"Date of Service: February 20, 2024  Time In: 1:20 PM  Time Out: 2:10 PM    PROGRESS NOTE  Data:  Tammi Levine is a 53 y.o. female who met 1:1 with the undersigned for a regularly scheduled individual outpatient therapy session at [facility] for follow-up of bipolar disorder.  Patient and the undersigned wore masks throughout the session and maintained appropriate distancing.    Chief Complaint: Bipolar II disorder; anxiety; family dynamics problem     HPI: Patient reports she has had increased difficulty as of late being around her mother and states her mother is \"stuck on\" believing that people are digging tunnels in [place] so that they can kidnap children into the sex trafficking trade.  Patient states she simply cannot be around her mother especially when her mother's boyfriend is present.  Patient reports she continues to struggle with mood instability including periods of depressed mood, anhedonia, anergia, and feeling hopeless.  Patient also reports she struggles with periods of hypomania including feeling on edge, irritability, increased energy, impulsive behavior, and decreased need for sleep.  However, the patient states she primarily struggles with depression and anxiety.  Patient rates current symptoms at a 4 on a scale of 1-10 with 10 being most severe. Patient reports she is sleeping relatively well at this time.  The patient adamantly and convincingly denies suicidal ideation vehemently denies any substance use.      Clinical Maneuvering/Intervention:  Assisted patient in processing above session content; acknowledged and normalized patient’s thoughts, feelings, and concerns.  Allowed the patient to discuss/process her ongoing difficulties with her relationship with her mother and validated her feelings.  Also utilize motivational interviewing techniques including complex reflections to assist the patient in recognizing she does not have to pay attention to her " mother's beliefs but can still spend time with her as much as possible.  Utilized cognitive behavioral therapy to assist the patient in developing appropriate coping mechanisms to decrease the severity frequency of symptoms.  Continue to focus on healthy skills of daily living and behavioral activation.  Provided unconditional positive regarding a safe, supportive environment.    Allowed patient to freely discuss issues without interruption or judgment. Provided safe, confidential environment to facilitate the development of positive therapeutic relationship and encourage open, honest communication. Assisted patient in identifying risk factors which would indicate the need for higher level of care including thoughts to harm self or others and/or self-harming behavior and encouraged patient to contact this office, call 911, or present to the nearest emergency room should any of these events occur. Discussed crisis intervention services and means to access.  Patient adamantly and convincingly denies current suicidal or homicidal ideation or perceptual disturbance.      Assessment    Patient appears to maintain relative stability as compared to her baseline.  However, she continues to be susceptible to an external locus of control and continues to be significantly affected by ongoing strained family dynamics.  The patient's symptomology continue to cause impairment in important areas of functioning.  Patient can be reasonably expected to continue to benefit treatment and would likely be at increased risk for decompensation.    Diagnoses and all orders for this visit:    1. Bipolar II disorder (HCC) -unchanged (Primary)    2. Generalized anxiety disorder -unchanged    3. Family dynamics problem    4. Sleeping difficulties               Mental Status Exam  Hygiene: Good  Dress: Casual  Attitude:  Cooperative  Motor Activity: Appropriate  Speech:  Normal  Mood: Within normal limits  Affect: Tearful  Thought Processes:   Linear  Thought Content:  normal  Suicidal Thoughts:  denies  Homicidal Thoughts:  denies  Crisis Safety Plan: yes, to come to the emergency room.  Hallucinations:  denies    Patient's Support Network Includes:  mother and extended family    Progress toward goal: Not at goal    Functional Status: Mild impairment     Prognosis: Fair with Ongoing Treatment     Plan         Patient will continue in individual outpatient therapy session [facility] every 3 weeks and will continue and pharmacotherapy as scheduled with MARISELA Dixon.  Patient will adhere to medication regimen as prescribed and report any side effects. Patient will contact this office, call 911 or present to the nearest emergency room should suicidal or homicidal ideations occur. Provide Cognitive Behavioral Therapy and Integrative Therapy to improve functioning, maintain stability, and avoid decompensation and the need for higher level of care.          Return in about 3 weeks (around 3/12/2024) for Next scheduled follow up.      This document signed by Vidal Ortiz LCSW, SATISH February 21, 2024 07:03 EST

## 2024-04-02 ENCOUNTER — OFFICE VISIT (OUTPATIENT)
Dept: PSYCHIATRY | Facility: CLINIC | Age: 54
End: 2024-04-02
Payer: MEDICAID

## 2024-04-02 DIAGNOSIS — F41.1 GENERALIZED ANXIETY DISORDER: ICD-10-CM

## 2024-04-02 DIAGNOSIS — F31.81 BIPOLAR II DISORDER: Primary | ICD-10-CM

## 2024-04-02 DIAGNOSIS — G47.9 SLEEPING DIFFICULTIES: ICD-10-CM

## 2024-04-02 DIAGNOSIS — Z63.8 STRESS DUE TO FAMILY TENSION: ICD-10-CM

## 2024-04-02 DIAGNOSIS — Z63.9 FAMILY DYNAMICS PROBLEM: ICD-10-CM

## 2024-04-02 SDOH — SOCIAL STABILITY - SOCIAL INSECURITY: PROBLEM RELATED TO PRIMARY SUPPORT GROUP, UNSPECIFIED: Z63.9

## 2024-04-02 SDOH — SOCIAL STABILITY - SOCIAL INSECURITY: OTHER SPECIFIED PROBLEMS RELATED TO PRIMARY SUPPORT GROUP: Z63.8

## 2024-04-03 NOTE — PROGRESS NOTES
"Date of Service: April 2, 2024  Time In: 1:30 PM  Time Out: 2:00 PM    PROGRESS NOTE  Data:  Tammi Levine is a 54 y.o. female who met 1:1 with the undersigned for a regularly scheduled individual outpatient therapy session at [facility] for follow-up of bipolar disorder.  Patient and the undersigned wore masks throughout the session and maintained appropriate distancing.    Chief Complaint: Bipolar II disorder; anxiety; family dynamics problem     HPI: Patient reports she recently went to the beach with her mother and various other family members.  She reports it was stressful as her mother was very \"hateful\" and states she was even criticized for how she washed dishes although no one offered to assist her.  Patient states at this time she is simply avoiding her mother and states she feels as though she simply cannot be around her.  Patient reports she continues to struggle with mood instability including periods of depressed mood, anhedonia, anergia, and feeling hopeless.  Patient also reports she struggles with periods of hypomania including feeling on edge, irritability, increased energy, impulsive behavior, and decreased need for sleep.  However, the patient states she primarily struggles with depression and anxiety.  Patient rates current symptoms at a 5 on a scale of 1-10 with 10 being most severe. Patient reports she is sleeping relatively well at this time.  The patient adamantly and convincingly denies suicidal ideation vehemently denies any substance use.      Clinical Maneuvering/Intervention:  Assisted patient in processing above session content; acknowledged and normalized patient’s thoughts, feelings, and concerns.  Allowed the patient to discuss/process her ongoing difficulties with her relationship with her mother and validated her feelings.  Also utilized motivational interviewing techniques including complex reflections to assist the patient in discussing the concept " of things we can control things we cannot control and strongly urged her to remind herself she cannot be defined by others.  Utilized cognitive behavioral therapy to assist the patient in developing appropriate coping mechanisms to decrease the severity frequency of symptoms.  Continue to focus on healthy skills of daily living and behavioral activation.  Provided unconditional positive regarding a safe, supportive environment.    Allowed patient to freely discuss issues without interruption or judgment. Provided safe, confidential environment to facilitate the development of positive therapeutic relationship and encourage open, honest communication. Assisted patient in identifying risk factors which would indicate the need for higher level of care including thoughts to harm self or others and/or self-harming behavior and encouraged patient to contact this office, call 911, or present to the nearest emergency room should any of these events occur. Discussed crisis intervention services and means to access.  Patient adamantly and convincingly denies current suicidal or homicidal ideation or perceptual disturbance.      Assessment    Patient appears to maintain relative stability as compared to her baseline.  However, she continues to be susceptible to an external locus of control and continues to be significantly affected by ongoing strained family dynamics.  The patient's symptomology continue to cause impairment in important areas of functioning.  Patient can be reasonably expected to continue to benefit treatment and would likely be at increased risk for decompensation.    Diagnoses and all orders for this visit:    1. Bipolar II disorder (HCC) -unchanged (Primary)    2. Generalized anxiety disorder -unchanged    3. Family dynamics problem    4. Sleeping difficulties    5. Stress due to family tension               Mental Status Exam  Hygiene: Good  Dress: Casual  Attitude:  Cooperative  Motor Activity:  Appropriate  Speech:  Normal  Mood: Within normal limits  Affect: Tearful  Thought Processes:  Linear  Thought Content:  normal  Suicidal Thoughts:  denies  Homicidal Thoughts:  denies  Crisis Safety Plan: yes, to come to the emergency room.  Hallucinations:  denies    Patient's Support Network Includes:  mother and extended family    Progress toward goal: Not at goal    Functional Status: Mild impairment     Prognosis: Fair with Ongoing Treatment     Plan         Patient will continue in individual outpatient therapy session [facility] [place] every 3 weeks and will continue and pharmacotherapy as scheduled with MARISELA Dixon.  Patient will adhere to medication regimen as prescribed and report any side effects. Patient will contact this office, call 911 or present to the nearest emergency room should suicidal or homicidal ideations occur. Provide Cognitive Behavioral Therapy and Integrative Therapy to improve functioning, maintain stability, and avoid decompensation and the need for higher level of care.          Return in about 3 weeks (around 4/23/2024) for Next scheduled follow up.      This document signed by Vidal Ortiz LCSW, SATISH April 3, 2024 07:25 EDT

## 2024-05-09 ENCOUNTER — OFFICE VISIT (OUTPATIENT)
Dept: CARDIOLOGY | Facility: CLINIC | Age: 54
End: 2024-05-09
Payer: MEDICAID

## 2024-05-09 VITALS
HEART RATE: 65 BPM | WEIGHT: 172.2 LBS | HEIGHT: 59 IN | OXYGEN SATURATION: 99 % | BODY MASS INDEX: 34.72 KG/M2 | DIASTOLIC BLOOD PRESSURE: 77 MMHG | SYSTOLIC BLOOD PRESSURE: 127 MMHG

## 2024-05-09 DIAGNOSIS — E78.5 DYSLIPIDEMIA: Primary | ICD-10-CM

## 2024-05-09 DIAGNOSIS — I25.10 ARTERIOSCLEROTIC CARDIOVASCULAR DISEASE (ASCVD): ICD-10-CM

## 2024-05-09 PROCEDURE — 3078F DIAST BP <80 MM HG: CPT | Performed by: PHYSICIAN ASSISTANT

## 2024-05-09 PROCEDURE — 3074F SYST BP LT 130 MM HG: CPT | Performed by: PHYSICIAN ASSISTANT

## 2024-05-09 PROCEDURE — 99214 OFFICE O/P EST MOD 30 MIN: CPT | Performed by: PHYSICIAN ASSISTANT

## 2024-05-09 PROCEDURE — 93000 ELECTROCARDIOGRAM COMPLETE: CPT | Performed by: PHYSICIAN ASSISTANT

## 2024-05-09 RX ORDER — DAPAGLIFLOZIN 10 MG/1
10 TABLET, FILM COATED ORAL DAILY
Qty: 30 TABLET | Refills: 2 | Status: SHIPPED | OUTPATIENT
Start: 2024-05-09

## 2024-05-09 RX ORDER — DAPAGLIFLOZIN 10 MG/1
10 TABLET, FILM COATED ORAL DAILY
Qty: 14 TABLET | Refills: 0 | COMMUNITY
Start: 2024-05-09

## 2024-05-09 RX ORDER — DAPAGLIFLOZIN 10 MG/1
10 TABLET, FILM COATED ORAL DAILY
Qty: 14 TABLET | Refills: 0 | Status: SHIPPED | OUTPATIENT
Start: 2024-05-09

## 2024-05-14 ENCOUNTER — OFFICE VISIT (OUTPATIENT)
Dept: PSYCHIATRY | Facility: CLINIC | Age: 54
End: 2024-05-14
Payer: MEDICAID

## 2024-05-14 DIAGNOSIS — G47.9 SLEEPING DIFFICULTIES: ICD-10-CM

## 2024-05-14 DIAGNOSIS — Z63.9 FAMILY DYNAMICS PROBLEM: ICD-10-CM

## 2024-05-14 DIAGNOSIS — F31.81 BIPOLAR II DISORDER: Primary | ICD-10-CM

## 2024-05-14 DIAGNOSIS — F41.1 GENERALIZED ANXIETY DISORDER: ICD-10-CM

## 2024-05-14 SDOH — SOCIAL STABILITY - SOCIAL INSECURITY: PROBLEM RELATED TO PRIMARY SUPPORT GROUP, UNSPECIFIED: Z63.9

## 2024-06-19 ENCOUNTER — OFFICE VISIT (OUTPATIENT)
Dept: PSYCHIATRY | Facility: CLINIC | Age: 54
End: 2024-06-19
Payer: MEDICAID

## 2024-06-19 VITALS
DIASTOLIC BLOOD PRESSURE: 80 MMHG | HEART RATE: 78 BPM | BODY MASS INDEX: 35.4 KG/M2 | TEMPERATURE: 98.4 F | SYSTOLIC BLOOD PRESSURE: 112 MMHG | OXYGEN SATURATION: 100 % | HEIGHT: 59 IN | WEIGHT: 175.6 LBS

## 2024-06-19 DIAGNOSIS — F41.1 GENERALIZED ANXIETY DISORDER: ICD-10-CM

## 2024-06-19 DIAGNOSIS — F41.3 OTHER MIXED ANXIETY DISORDERS: ICD-10-CM

## 2024-06-19 DIAGNOSIS — Z79.899 MEDICATION MANAGEMENT: ICD-10-CM

## 2024-06-19 DIAGNOSIS — F31.77 BIPOLAR DISORDER, IN PARTIAL REMISSION, MOST RECENT EPISODE MIXED: Primary | ICD-10-CM

## 2024-06-19 DIAGNOSIS — G47.9 SLEEPING DIFFICULTIES: ICD-10-CM

## 2024-06-19 PROCEDURE — 1159F MED LIST DOCD IN RCRD: CPT | Performed by: NURSE PRACTITIONER

## 2024-06-19 PROCEDURE — 99214 OFFICE O/P EST MOD 30 MIN: CPT | Performed by: NURSE PRACTITIONER

## 2024-06-19 PROCEDURE — 3074F SYST BP LT 130 MM HG: CPT | Performed by: NURSE PRACTITIONER

## 2024-06-19 PROCEDURE — 1160F RVW MEDS BY RX/DR IN RCRD: CPT | Performed by: NURSE PRACTITIONER

## 2024-06-19 PROCEDURE — 3079F DIAST BP 80-89 MM HG: CPT | Performed by: NURSE PRACTITIONER

## 2024-06-19 RX ORDER — TRAZODONE HYDROCHLORIDE 50 MG/1
50 TABLET ORAL NIGHTLY
Qty: 90 TABLET | Refills: 0 | Status: SHIPPED | OUTPATIENT
Start: 2024-06-19

## 2024-06-19 RX ORDER — LITHIUM CARBONATE 300 MG/1
300 CAPSULE ORAL EVERY EVENING
Qty: 90 CAPSULE | Refills: 0 | Status: SHIPPED | OUTPATIENT
Start: 2024-06-19

## 2024-06-19 RX ORDER — DOXYCYCLINE HYCLATE 100 MG
100 TABLET ORAL DAILY
COMMUNITY
Start: 2024-06-11

## 2024-06-19 RX ORDER — ATORVASTATIN CALCIUM 80 MG/1
80 TABLET, FILM COATED ORAL DAILY
COMMUNITY

## 2024-06-19 RX ORDER — HYDROCODONE BITARTRATE AND ACETAMINOPHEN 5; 325 MG/1; MG/1
1 TABLET ORAL EVERY 6 HOURS PRN
COMMUNITY
Start: 2024-06-11

## 2024-06-19 NOTE — PROGRESS NOTES
"Subjective   Tammi Levine is a 54 y.o. female who presents today for follow up    Chief Complaint:  Bipolar disorder    History of Present Illness: Patient presents as follow up.  Reports overall moods have been stable. States she recently had surgery on her great toe but has not had positive results that is going to require a second surgery with a possible amputation if results are not achieved. States she has been stressed over the surgery as she is less mobile. Reports sleep is good with trazodone. Reports appetite is good. She denies SI/HI/AVH.    The following portions of the patient's history were reviewed and updated as appropriate: allergies, current medications, past family history, past medical history, past social history, past surgical history and problem list.      Past Medical History:  Past Medical History:   Diagnosis Date    Abnormal CT scan     ADRENAL GLAND FOCAL MASS LESION MULTIPLE, LEFT ONLY    Anxiety     ASCVD (arteriosclerotic cardiovascular disease)     \"status post MI in 2012, clinically stable\"    Bipolar disorder     Depression     Diabetes mellitus     Disease of thyroid gland     Dyslipidemia     Hypertension, well controlled     Migraine headache     Myocardial infarction 2012    Panic disorder     Right-sided low back pain with right-sided sciatica     Sleep apnea        Social History:  Social History     Socioeconomic History    Marital status: Single   Tobacco Use    Smoking status: Never    Smokeless tobacco: Never   Vaping Use    Vaping status: Never Used   Substance and Sexual Activity    Alcohol use: No    Drug use: No    Sexual activity: Never       Family History:  Family History   Problem Relation Age of Onset    Diabetes Father     Diabetes Brother     Cancer Maternal Grandmother     Heart disease Maternal Grandfather     Diabetes Paternal Grandfather     Cancer Other        Past Surgical History:  Past Surgical History:   Procedure Laterality Date    CHOLECYSTECTOMY   "    CORONARY ANGIOPLASTY WITH STENT PLACEMENT  2012    Dr. Denson    ENDOSCOPY  03/2021    HYSTERECTOMY  12/2014    NECK SURGERY  2010    TOE SURGERY Left     TRANSESOPHAGEAL ECHOCARDIOGRAM (SARAN)  07/13/2015    EF 60-65%       Problem List:  Patient Active Problem List   Diagnosis    Arteriosclerotic cardiovascular disease (ASCVD), s/p MI in 2012, clinically stable.     Type 2 diabetes mellitus with diabetic peripheral angiopathy without gangrene, with long-term current use of insulin    Essential hypertension    Dyslipidemia    Sleep apnea    Migraine headache    Abnormal CT scan    Disease of thyroid gland    Right-sided low back pain with right-sided sciatica    Slow transit constipation       Allergy:   Allergies   Allergen Reactions    Zoloft [Sertraline Hcl] Nausea And Vomiting    Ciprofloxacin Rash     Also caused chest pain        Current Medications:   Current Outpatient Medications   Medication Sig Dispense Refill    aspirin 81 MG EC tablet Take 1 tablet by mouth Every Evening. 90 tablet 2    cholecalciferol (VITAMIN D3) 1.25 MG (02632 UT) capsule Take 1 capsule by mouth Every 7 (Seven) Days.      dapagliflozin Propanediol (Farxiga) 10 MG tablet Take 10 mg by mouth Daily. 30 tablet 2    doxycycline (VIBRAMYICN) 100 MG tablet Take 1 tablet by mouth Daily.      fluticasone (FLONASE) 50 MCG/ACT nasal spray USE 1 SPRAY IN EACH NOSTRIL ONCE DAILY - SHAKE BOTTLE GENTLY BEFORE EACH USE      HYDROcodone-acetaminophen (NORCO) 5-325 MG per tablet Take 1 tablet by mouth Every 6 (Six) Hours As Needed. for pain      insulin aspart (novoLOG FLEXPEN) 100 UNIT/ML solution pen-injector sc pen EVENING      insulin lispro protamine-insulin lispro (humaLOG 75-25) (75-25) 100 UNIT/ML suspension injection Inject 20 Units under the skin into the appropriate area as directed 2 (Two) Times a Day With Meals.      levothyroxine (SYNTHROID, LEVOTHROID) 50 MCG tablet Take 40 mcg by mouth Every Evening.      lisinopril  "(PRINIVIL,ZESTRIL) 10 MG tablet Take 1 tablet by mouth Daily. 90 tablet 3    lithium carbonate 300 MG capsule Take 1 capsule by mouth Every Evening. 90 capsule 0    omeprazole (priLOSEC) 20 MG capsule Take 1 capsule by mouth Daily.      traZODone (DESYREL) 50 MG tablet Take 1 tablet by mouth Every Night. 90 tablet 0    atorvastatin (LIPITOR) 80 MG tablet Take 1 tablet by mouth Daily.      glucose blood test strip TEST BLOOD SUGAR 3 TIMES A DAY       No current facility-administered medications for this visit.       Review of Symptoms:    Review of Systems   Constitutional: Negative.    HENT: Negative.     Eyes: Negative.    Musculoskeletal:  Positive for arthralgias, gait problem and joint swelling.   Psychiatric/Behavioral:  Positive for sleep disturbance, depressed mood and stress. Negative for suicidal ideas. The patient is nervous/anxious.        Objective   Physical Exam:   Blood pressure 112/80, pulse 78, temperature 98.4 °F (36.9 °C), temperature source Oral, height 149.9 cm (59.02\"), weight 79.7 kg (175 lb 9.6 oz), SpO2 100%.  Body mass index is 35.45 kg/m².    Appearance: Well nourished female, appropriately dressed, appears stated age and in no acute distress  Gait, Station, Strength: Unsteady gait, wearing boot to L foot, using knee scooter    Mental Status Exam:   Hygiene:   good  Cooperation:  Cooperative  Eye Contact:  Good  Psychomotor Behavior:  Appropriate  Affect:  Appropriate  Mood: normal  Hopelessness: Denies  Speech:  Normal  Thought Process:  Linear  Thought Content:  Mood congruent  Suicidal:  None  Homicidal:  None  Hallucinations:  None  Delusion:  None  Memory:  Intact  Orientation:  Person, Place, Time, and Situation  Reliability:  good  Insight:  Good  Judgement:  Good  Impulse Control:  Good  Physical/Medical Issues:  Yes See med hx      PHQ-Score Total:  PHQ-9 Total Score: 2          Lab Results:   No visits with results within 1 Month(s) from this visit.   Latest known visit with " results is:   Office Visit on 12/27/2023   Component Date Value Ref Range Status    Lithium 12/27/2023 0.7  0.6 - 1.2 mmol/L Final    TSH 12/27/2023 3.700  0.270 - 4.200 uIU/mL Final    T Uptake 12/27/2023 1.07  0.80 - 1.30 TBI Final    T4, Total 12/27/2023 6.31  4.50 - 11.70 mcg/dL Final    T4 results may be falsely increased if patient taking Biotin.    Hemoglobin A1C 12/27/2023 7.70 (H)  4.80 - 5.60 % Final    Glucose 12/27/2023 175 (H)  65 - 99 mg/dL Final    BUN 12/27/2023 16  6 - 20 mg/dL Final    Creatinine 12/27/2023 0.99  0.57 - 1.00 mg/dL Final    Sodium 12/27/2023 133 (L)  136 - 145 mmol/L Final    Potassium 12/27/2023 5.0  3.5 - 5.2 mmol/L Final    Chloride 12/27/2023 104  98 - 107 mmol/L Final    CO2 12/27/2023 21.0 (L)  22.0 - 29.0 mmol/L Final    Calcium 12/27/2023 9.8  8.6 - 10.5 mg/dL Final    Total Protein 12/27/2023 7.1  6.0 - 8.5 g/dL Final    Albumin 12/27/2023 4.6  3.5 - 5.2 g/dL Final    ALT (SGPT) 12/27/2023 11  1 - 33 U/L Final    AST (SGOT) 12/27/2023 12  1 - 32 U/L Final    Alkaline Phosphatase 12/27/2023 92  39 - 117 U/L Final    Total Bilirubin 12/27/2023 0.3  0.0 - 1.2 mg/dL Final    Globulin 12/27/2023 2.5  gm/dL Final    A/G Ratio 12/27/2023 1.8  g/dL Final    BUN/Creatinine Ratio 12/27/2023 16.2  7.0 - 25.0 Final    Anion Gap 12/27/2023 8.0  5.0 - 15.0 mmol/L Final    eGFR 12/27/2023 67.9  >60.0 mL/min/1.73 Final    WBC 12/27/2023 7.72  3.40 - 10.80 10*3/mm3 Final    RBC 12/27/2023 3.93  3.77 - 5.28 10*6/mm3 Final    Hemoglobin 12/27/2023 11.9 (L)  12.0 - 15.9 g/dL Final    Hematocrit 12/27/2023 34.2  34.0 - 46.6 % Final    MCV 12/27/2023 87.0  79.0 - 97.0 fL Final    MCH 12/27/2023 30.3  26.6 - 33.0 pg Final    MCHC 12/27/2023 34.8  31.5 - 35.7 g/dL Final    RDW 12/27/2023 13.3  12.3 - 15.4 % Final    RDW-SD 12/27/2023 42.3  37.0 - 54.0 fl Final    MPV 12/27/2023 10.8  6.0 - 12.0 fL Final    Platelets 12/27/2023 267  140 - 450 10*3/mm3 Final    Neutrophil % 12/27/2023 72.9  42.7 -  76.0 % Final    Lymphocyte % 12/27/2023 19.6  19.6 - 45.3 % Final    Monocyte % 12/27/2023 6.0  5.0 - 12.0 % Final    Eosinophil % 12/27/2023 0.9  0.3 - 6.2 % Final    Basophil % 12/27/2023 0.3  0.0 - 1.5 % Final    Immature Grans % 12/27/2023 0.3  0.0 - 0.5 % Final    Neutrophils, Absolute 12/27/2023 5.64  1.70 - 7.00 10*3/mm3 Final    Lymphocytes, Absolute 12/27/2023 1.51  0.70 - 3.10 10*3/mm3 Final    Monocytes, Absolute 12/27/2023 0.46  0.10 - 0.90 10*3/mm3 Final    Eosinophils, Absolute 12/27/2023 0.07  0.00 - 0.40 10*3/mm3 Final    Basophils, Absolute 12/27/2023 0.02  0.00 - 0.20 10*3/mm3 Final    Immature Grans, Absolute 12/27/2023 0.02  0.00 - 0.05 10*3/mm3 Final    nRBC 12/27/2023 0.0  0.0 - 0.2 /100 WBC Final    Total Cholesterol 12/27/2023 224 (H)  0 - 200 mg/dL Final    Triglycerides 12/27/2023 124  0 - 150 mg/dL Final    HDL Cholesterol 12/27/2023 51  40 - 60 mg/dL Final    LDL Cholesterol  12/27/2023 151 (H)  0 - 100 mg/dL Final    VLDL Cholesterol 12/27/2023 22  5 - 40 mg/dL Final    LDL/HDL Ratio 12/27/2023 2.91   Final       Assessment & Plan   Diagnoses and all orders for this visit:    1. Bipolar disorder, in partial remission, most recent episode mixed (Primary)    2. Generalized anxiety disorder -unchanged  -     lithium carbonate 300 MG capsule; Take 1 capsule by mouth Every Evening.  Dispense: 90 capsule; Refill: 0    3. Other mixed anxiety disorders  -     lithium carbonate 300 MG capsule; Take 1 capsule by mouth Every Evening.  Dispense: 90 capsule; Refill: 0    4. Sleeping difficulties  -     traZODone (DESYREL) 50 MG tablet; Take 1 tablet by mouth Every Night.  Dispense: 90 tablet; Refill: 0    5. Medication management            -Continue trazodone 50 mg nightly for sleep  -Continue lithium 300 mg nightly for mood. This APRN has reviewed Lithium Toxicity symptoms with the patient including but not limited to: nausea, fine hand tremors, increased urination and thirst, weight gain, impaired  thyroid functioning, fatigue, mental cloudiness, headaches, coarse hand tremors with toxicity, nystagmus, maculopapular rash, pruritis, acne, GI or stomach upset, diarrhea, vomiting, cramps, anorexia, diabetes insipidus, edema, microscopic tubular changes, t-wave inversion or abnormal cardiac rhythm, dysrhythmias, and leukocytosis.  The patient is advised if they experience any of these symptoms they are to contact this APRN/this office immediately or go to the Emergency Department immediately.  The patient verbalized understanding and agreement in their own words.  The patient is advised that taking Lithium with NSAID's, diuretics, ACE inhibitors, metronidazole, acetazolamide, methyldopa, carbamazepine, phenytoin, calcium channel blockers, and haloperidol may cause serious medication reactions including potentially fatal lithium toxicity.  The use of an SSRI with Lithium may raise the risk of dizziness, confusion, diarrhea, agitation, and tremor.  The patient is advised to avoid these medications, or if they are taking or plan to start any of these medications, this APRN/or a Provider at this office, and the patient's PCP/or the Prescriber of the medication should be notified/aware so further testing and monitoring can be performed.  The patient is advised of the need for hydration during treatment with Lithium, and the risks of not staying hydrated - drinking water.  The patient verbalizes understanding and agreement of instructions in their own words.  Also, the patient is instructed to complete the ordered lithium level after taking the Lithium  for at least 14 days with no missed doses.  The patient is instructed not to take the lithium on the morning of lab draw (as levels should be drawn about 12 hours after the last dose taken), but to take the Lithium after the blood work/lab draw has been completed as taking it before having the blood/lab drawn will interfere with the results.  The patient verbalized  understanding and agreement in their own words.  -Will obtain labs before next follow up  -Encouraged patient to begin therapy  -TOSHA reviewed and appropriate. Patient counseled on use of controlled substances.  -The benefits of a healthy diet and exercise were discussed with patient, especially the positive effects they have on mental health. Patient encouraged to consider lifestyle modification regarding  diet and exercise patterns to maximize results of mental health treatment.  -Reviewed previous available documentation  -Reviewed most recent available labs                  Visit Diagnoses:    ICD-10-CM ICD-9-CM   1. Bipolar disorder, in partial remission, most recent episode mixed  F31.77 296.65   2. Generalized anxiety disorder -unchanged  F41.1 300.02   3. Other mixed anxiety disorders  F41.3 300.09   4. Sleeping difficulties  G47.9 780.50   5. Medication management  Z79.899 V58.69           TREATMENT PLAN/GOALS: Continue supportive psychotherapy efforts and medications as indicated. Treatment and medication options discussed during today's visit. Patient acknowledged and verbally consented to continue with current treatment plan and was educated on the importance of compliance with treatment and follow-up appointments.    MEDICATION ISSUES:    Discussed medication options and treatment plan of prescribed medication as well as the risks, benefits, and side effects including potential falls, possible impaired driving and metabolic adversities among others. Patient is agreeable to call the office with any worsening of symptoms or onset of side effects. Patient is agreeable to call 911 or go to the nearest ER should he/she begin having SI/HI.     MEDS ORDERED DURING VISIT:  New Medications Ordered This Visit   Medications    lithium carbonate 300 MG capsule     Sig: Take 1 capsule by mouth Every Evening.     Dispense:  90 capsule     Refill:  0    traZODone (DESYREL) 50 MG tablet     Sig: Take 1 tablet by  mouth Every Night.     Dispense:  90 tablet     Refill:  0       Return in about 3 months (around 9/19/2024), or if symptoms worsen or fail to improve.         Prognosis: Guarded dependent on medication/follow up and treatment plan compliance.  Functionality: pt showing improvements in important areas of daily functioning.     Short-term goals: Patient will adhere to medication regimen and note continued improvement in symptoms over the next 3 months.   Long-term goals: Patient will be adherent to medication management and psychotherapy with continued improvement in symptoms over the next 6 months          This document has been electronically signed by MARISELA Mccrary   June 24, 2024 13:26 EDT    Part of this note may be an electronic transcription/translation of spoken language to printed text using the Dragon Dictation System.

## 2024-07-16 ENCOUNTER — OFFICE VISIT (OUTPATIENT)
Dept: PSYCHIATRY | Facility: CLINIC | Age: 54
End: 2024-07-16
Payer: MEDICAID

## 2024-07-16 DIAGNOSIS — G47.9 SLEEPING DIFFICULTIES: ICD-10-CM

## 2024-07-16 DIAGNOSIS — F41.1 GENERALIZED ANXIETY DISORDER: ICD-10-CM

## 2024-07-16 DIAGNOSIS — F31.77 BIPOLAR DISORDER, IN PARTIAL REMISSION, MOST RECENT EPISODE MIXED: Primary | ICD-10-CM

## 2024-07-18 NOTE — PROGRESS NOTES
Date of Service: July 16, 2024  Time In: 1:00 PM  Time Out: 1:40 PM    PROGRESS NOTE  Data:  Tammi Levine is a 54 y.o. female who met 1:1 with the undersigned for a regularly scheduled individual outpatient therapy session at [facility] for follow-up of bipolar disorder.  Patient and the undersigned wore masks throughout the session and maintained appropriate distancing.    Chief Complaint: Bipolar II disorder; anxiety; family dynamics problem     HPI:   Patient reports she continues to struggle with mood instability including periods of depressed mood, anhedonia, anergia, and feeling hopeless.  Patient also reports she struggles with periods of hypomania including feeling on edge, irritability, increased energy, impulsive behavior, and decreased need for sleep.  However, the patient states she primarily struggles with depression and anxiety.  Patient rates current symptoms at a/5 on a scale of 1-10 with 10 being most severe. Patient reports she is sleeping relatively well at this time.  The patient adamantly and convincingly denies suicidal ideation vehemently denies any substance use.      Clinical Maneuvering/Intervention:  Assisted patient in processing above session content; acknowledged and normalized patient’s thoughts, feelings, and concerns.  A  Also utilized motivational interviewing techniques including complex reflections to assist the patient in discussing the concept of things we can control things we cannot control and strongly urged her to remind herself she cannot be defined by others.  Utilized cognitive behavioral therapy to assist the patient in developing appropriate coping mechanisms to decrease the severity frequency of symptoms.  Continue to focus on healthy skills of daily living and behavioral activation.  Provided unconditional positive regarding a safe, supportive environment.    Allowed patient to freely discuss issues without interruption or judgment.  Provided safe, confidential environment to facilitate the development of positive therapeutic relationship and encourage open, honest communication. Assisted patient in identifying risk factors which would indicate the need for higher level of care including thoughts to harm self or others and/or self-harming behavior and encouraged patient to contact this office, call 911, or present to the nearest emergency room should any of these events occur. Discussed crisis intervention services and means to access.  Patient adamantly and convincingly denies current suicidal or homicidal ideation or perceptual disturbance.      Assessment    Patient appears to maintain relative stability as compared to her baseline.  However, she continues to be susceptible to an external locus of control and continues to be significantly affected by ongoing strained family dynamics.  The patient's symptomology continue to cause impairment in important areas of functioning.  Patient can be reasonably expected to continue to benefit treatment and would likely be at increased risk for decompensation.    Diagnoses and all orders for this visit:    1. Bipolar disorder, in partial remission, most recent episode mixed (Primary)    2. Generalized anxiety disorder -unchanged    3. Sleeping difficulties               Mental Status Exam  Hygiene: Good  Dress: Casual  Attitude:  Cooperative  Motor Activity: Appropriate  Speech:  Normal  Mood: Within normal limits  Affect: Tearful  Thought Processes:  Linear  Thought Content:  normal  Suicidal Thoughts:  denies  Homicidal Thoughts:  denies  Crisis Safety Plan: yes, to come to the emergency room.  Hallucinations:  denies    Patient's Support Network Includes:  mother and extended family    Progress toward goal: Not at goal    Functional Status: Mild impairment     Prognosis: Fair with Ongoing Treatment     Plan         Patient will continue in individual outpatient therapy session [facility] every 3 weeks and will continue and pharmacotherapy as scheduled with MARISELA Dixon.  Patient will adhere to medication regimen as prescribed and report any side effects. Patient will contact this office, call 911 or present to the nearest emergency room should suicidal or homicidal ideations occur. Provide Cognitive Behavioral Therapy and Integrative Therapy to improve functioning, maintain stability, and avoid decompensation and the need for higher level of care.          Return in about 4 weeks (around 8/13/2024) for Next scheduled follow up.      This document signed by Vidal Ortiz, KAYLEIGH, [facility] July 18, 2024 06:48 EDT

## 2024-08-27 ENCOUNTER — HOSPITAL ENCOUNTER (INPATIENT)
Facility: HOSPITAL | Age: 54
LOS: 7 days | Discharge: HOME | DRG: 885 | End: 2024-09-03
Attending: PSYCHIATRY & NEUROLOGY | Admitting: PSYCHIATRY & NEUROLOGY
Payer: MEDICAID

## 2024-08-27 ENCOUNTER — OFFICE VISIT (OUTPATIENT)
Dept: PSYCHIATRY | Facility: CLINIC | Age: 54
End: 2024-08-27
Payer: MEDICAID

## 2024-08-27 ENCOUNTER — HOSPITAL ENCOUNTER (EMERGENCY)
Facility: HOSPITAL | Age: 54
Discharge: OTHER INSTITUTION | DRG: 885 | End: 2024-08-27
Attending: STUDENT IN AN ORGANIZED HEALTH CARE EDUCATION/TRAINING PROGRAM
Payer: MEDICAID

## 2024-08-27 VITALS
WEIGHT: 175.71 LBS | OXYGEN SATURATION: 98 % | TEMPERATURE: 98.5 F | HEIGHT: 59 IN | BODY MASS INDEX: 35.42 KG/M2 | HEART RATE: 76 BPM | DIASTOLIC BLOOD PRESSURE: 62 MMHG | SYSTOLIC BLOOD PRESSURE: 139 MMHG | RESPIRATION RATE: 20 BRPM

## 2024-08-27 DIAGNOSIS — F31.81 CHRONIC BIPOLAR II DISORDER, MOST RECENT EPISODE MAJOR DEPRESSIVE: Primary | ICD-10-CM

## 2024-08-27 DIAGNOSIS — N39.0 ACUTE UTI: ICD-10-CM

## 2024-08-27 DIAGNOSIS — R45.851 PASSIVE SUICIDAL IDEATIONS: ICD-10-CM

## 2024-08-27 DIAGNOSIS — R45.851 SUICIDAL IDEATION: Primary | ICD-10-CM

## 2024-08-27 DIAGNOSIS — F41.1 GENERALIZED ANXIETY DISORDER: ICD-10-CM

## 2024-08-27 DIAGNOSIS — G47.9 SLEEPING DIFFICULTIES: ICD-10-CM

## 2024-08-27 LAB
ALBUMIN SERPL-MCNC: 4.3 G/DL (ref 3.5–5.2)
ALBUMIN/GLOB SERPL: 1.3 G/DL
ALP SERPL-CCNC: 98 U/L (ref 39–117)
ALT SERPL W P-5'-P-CCNC: 11 U/L (ref 1–33)
AMPHET+METHAMPHET UR QL: NEGATIVE
AMPHETAMINES UR QL: NEGATIVE
ANION GAP SERPL CALCULATED.3IONS-SCNC: 12.8 MMOL/L (ref 5–15)
AST SERPL-CCNC: 14 U/L (ref 1–32)
BACTERIA UR QL AUTO: ABNORMAL /HPF
BARBITURATES UR QL SCN: NEGATIVE
BASOPHILS # BLD AUTO: 0.03 10*3/MM3 (ref 0–0.2)
BASOPHILS NFR BLD AUTO: 0.2 % (ref 0–1.5)
BENZODIAZ UR QL SCN: NEGATIVE
BILIRUB SERPL-MCNC: 0.2 MG/DL (ref 0–1.2)
BILIRUB UR QL STRIP: NEGATIVE
BUN SERPL-MCNC: 15 MG/DL (ref 6–20)
BUN/CREAT SERPL: 17 (ref 7–25)
BUPRENORPHINE SERPL-MCNC: NEGATIVE NG/ML
CALCIUM SPEC-SCNC: 9.5 MG/DL (ref 8.6–10.5)
CANNABINOIDS SERPL QL: NEGATIVE
CHLORIDE SERPL-SCNC: 102 MMOL/L (ref 98–107)
CLARITY UR: ABNORMAL
CO2 SERPL-SCNC: 21.2 MMOL/L (ref 22–29)
COCAINE UR QL: NEGATIVE
COLOR UR: YELLOW
CREAT SERPL-MCNC: 0.88 MG/DL (ref 0.57–1)
DEPRECATED RDW RBC AUTO: 45.8 FL (ref 37–54)
EGFRCR SERPLBLD CKD-EPI 2021: 77.7 ML/MIN/1.73
EOSINOPHIL # BLD AUTO: 0.12 10*3/MM3 (ref 0–0.4)
EOSINOPHIL NFR BLD AUTO: 1 % (ref 0.3–6.2)
ERYTHROCYTE [DISTWIDTH] IN BLOOD BY AUTOMATED COUNT: 13.5 % (ref 12.3–15.4)
ETHANOL BLD-MCNC: <10 MG/DL (ref 0–10)
ETHANOL UR QL: <0.01 %
FENTANYL UR-MCNC: NEGATIVE NG/ML
GLOBULIN UR ELPH-MCNC: 3.3 GM/DL
GLUCOSE BLDC GLUCOMTR-MCNC: 213 MG/DL (ref 70–130)
GLUCOSE BLDC GLUCOMTR-MCNC: 235 MG/DL (ref 70–130)
GLUCOSE SERPL-MCNC: 266 MG/DL (ref 65–99)
GLUCOSE UR STRIP-MCNC: NEGATIVE MG/DL
HAV IGM SERPL QL IA: NORMAL
HBV CORE IGM SERPL QL IA: NORMAL
HBV SURFACE AG SERPL QL IA: NORMAL
HCT VFR BLD AUTO: 37 % (ref 34–46.6)
HCV AB SER QL: NORMAL
HGB BLD-MCNC: 11.8 G/DL (ref 12–15.9)
HGB UR QL STRIP.AUTO: NEGATIVE
HOLD SPECIMEN: NORMAL
HYALINE CASTS UR QL AUTO: ABNORMAL /LPF
IMM GRANULOCYTES # BLD AUTO: 0.05 10*3/MM3 (ref 0–0.05)
IMM GRANULOCYTES NFR BLD AUTO: 0.4 % (ref 0–0.5)
KETONES UR QL STRIP: NEGATIVE
LEUKOCYTE ESTERASE UR QL STRIP.AUTO: ABNORMAL
LITHIUM SERPL-SCNC: 1 MMOL/L (ref 0.6–1.2)
LYMPHOCYTES # BLD AUTO: 2.16 10*3/MM3 (ref 0.7–3.1)
LYMPHOCYTES NFR BLD AUTO: 17.4 % (ref 19.6–45.3)
MAGNESIUM SERPL-MCNC: 2.1 MG/DL (ref 1.6–2.6)
MCH RBC QN AUTO: 29.6 PG (ref 26.6–33)
MCHC RBC AUTO-ENTMCNC: 31.9 G/DL (ref 31.5–35.7)
MCV RBC AUTO: 93 FL (ref 79–97)
METHADONE UR QL SCN: NEGATIVE
MONOCYTES # BLD AUTO: 0.7 10*3/MM3 (ref 0.1–0.9)
MONOCYTES NFR BLD AUTO: 5.6 % (ref 5–12)
NEUTROPHILS NFR BLD AUTO: 75.4 % (ref 42.7–76)
NEUTROPHILS NFR BLD AUTO: 9.33 10*3/MM3 (ref 1.7–7)
NITRITE UR QL STRIP: NEGATIVE
NRBC BLD AUTO-RTO: 0 /100 WBC (ref 0–0.2)
OPIATES UR QL: NEGATIVE
OXYCODONE UR QL SCN: NEGATIVE
PCP UR QL SCN: NEGATIVE
PH UR STRIP.AUTO: 6 [PH] (ref 5–8)
PLATELET # BLD AUTO: 289 10*3/MM3 (ref 140–450)
PMV BLD AUTO: 9.8 FL (ref 6–12)
POTASSIUM SERPL-SCNC: 4 MMOL/L (ref 3.5–5.2)
PROT SERPL-MCNC: 7.6 G/DL (ref 6–8.5)
PROT UR QL STRIP: ABNORMAL
RBC # BLD AUTO: 3.98 10*6/MM3 (ref 3.77–5.28)
RBC # UR STRIP: ABNORMAL /HPF
REF LAB TEST METHOD: ABNORMAL
SODIUM SERPL-SCNC: 136 MMOL/L (ref 136–145)
SP GR UR STRIP: 1.02 (ref 1–1.03)
SQUAMOUS #/AREA URNS HPF: ABNORMAL /HPF
TRICYCLICS UR QL SCN: NEGATIVE
UROBILINOGEN UR QL STRIP: ABNORMAL
WBC # UR STRIP: ABNORMAL /HPF
WBC NRBC COR # BLD AUTO: 12.39 10*3/MM3 (ref 3.4–10.8)

## 2024-08-27 PROCEDURE — 96372 THER/PROPH/DIAG INJ SC/IM: CPT

## 2024-08-27 PROCEDURE — 63710000001 INSULIN LISPRO (HUMAN) PER 5 UNITS: Performed by: PSYCHIATRY & NEUROLOGY

## 2024-08-27 PROCEDURE — 99285 EMERGENCY DEPT VISIT HI MDM: CPT

## 2024-08-27 PROCEDURE — 83735 ASSAY OF MAGNESIUM: CPT | Performed by: STUDENT IN AN ORGANIZED HEALTH CARE EDUCATION/TRAINING PROGRAM

## 2024-08-27 PROCEDURE — 36415 COLL VENOUS BLD VENIPUNCTURE: CPT

## 2024-08-27 PROCEDURE — 80307 DRUG TEST PRSMV CHEM ANLYZR: CPT | Performed by: STUDENT IN AN ORGANIZED HEALTH CARE EDUCATION/TRAINING PROGRAM

## 2024-08-27 PROCEDURE — 82077 ASSAY SPEC XCP UR&BREATH IA: CPT | Performed by: STUDENT IN AN ORGANIZED HEALTH CARE EDUCATION/TRAINING PROGRAM

## 2024-08-27 PROCEDURE — 81001 URINALYSIS AUTO W/SCOPE: CPT | Performed by: STUDENT IN AN ORGANIZED HEALTH CARE EDUCATION/TRAINING PROGRAM

## 2024-08-27 PROCEDURE — 80178 ASSAY OF LITHIUM: CPT | Performed by: STUDENT IN AN ORGANIZED HEALTH CARE EDUCATION/TRAINING PROGRAM

## 2024-08-27 PROCEDURE — 85025 COMPLETE CBC W/AUTO DIFF WBC: CPT | Performed by: STUDENT IN AN ORGANIZED HEALTH CARE EDUCATION/TRAINING PROGRAM

## 2024-08-27 PROCEDURE — 82948 REAGENT STRIP/BLOOD GLUCOSE: CPT

## 2024-08-27 PROCEDURE — 93005 ELECTROCARDIOGRAM TRACING: CPT | Performed by: PSYCHIATRY & NEUROLOGY

## 2024-08-27 PROCEDURE — 80074 ACUTE HEPATITIS PANEL: CPT | Performed by: PSYCHIATRY & NEUROLOGY

## 2024-08-27 PROCEDURE — 25010000002 CEFTRIAXONE PER 250 MG: Performed by: NURSE PRACTITIONER

## 2024-08-27 PROCEDURE — 80053 COMPREHEN METABOLIC PANEL: CPT | Performed by: STUDENT IN AN ORGANIZED HEALTH CARE EDUCATION/TRAINING PROGRAM

## 2024-08-27 RX ORDER — GLUCAGON 1 MG/ML
1 KIT INJECTION
Status: DISCONTINUED | OUTPATIENT
Start: 2024-08-27 | End: 2024-09-03 | Stop reason: HOSPADM

## 2024-08-27 RX ORDER — ALUMINA, MAGNESIA, AND SIMETHICONE 2400; 2400; 240 MG/30ML; MG/30ML; MG/30ML
15 SUSPENSION ORAL EVERY 6 HOURS PRN
Status: DISCONTINUED | OUTPATIENT
Start: 2024-08-27 | End: 2024-09-03 | Stop reason: HOSPADM

## 2024-08-27 RX ORDER — ONDANSETRON 4 MG/1
4 TABLET, ORALLY DISINTEGRATING ORAL EVERY 6 HOURS PRN
Status: DISCONTINUED | OUTPATIENT
Start: 2024-08-27 | End: 2024-09-03 | Stop reason: HOSPADM

## 2024-08-27 RX ORDER — BENZONATATE 100 MG/1
100 CAPSULE ORAL 3 TIMES DAILY PRN
Status: DISCONTINUED | OUTPATIENT
Start: 2024-08-27 | End: 2024-09-03 | Stop reason: HOSPADM

## 2024-08-27 RX ORDER — ROSUVASTATIN CALCIUM 10 MG/1
10 TABLET, COATED ORAL DAILY
Status: DISCONTINUED | OUTPATIENT
Start: 2024-08-28 | End: 2024-09-03 | Stop reason: HOSPADM

## 2024-08-27 RX ORDER — IBUPROFEN 400 MG/1
400 TABLET, FILM COATED ORAL EVERY 6 HOURS PRN
Status: DISCONTINUED | OUTPATIENT
Start: 2024-08-27 | End: 2024-09-03 | Stop reason: HOSPADM

## 2024-08-27 RX ORDER — HYDROXYZINE HYDROCHLORIDE 25 MG/1
50 TABLET, FILM COATED ORAL EVERY 6 HOURS PRN
Status: DISCONTINUED | OUTPATIENT
Start: 2024-08-27 | End: 2024-09-03 | Stop reason: HOSPADM

## 2024-08-27 RX ORDER — DEXTROSE MONOHYDRATE 25 G/50ML
25 INJECTION, SOLUTION INTRAVENOUS
Status: DISCONTINUED | OUTPATIENT
Start: 2024-08-27 | End: 2024-09-03 | Stop reason: HOSPADM

## 2024-08-27 RX ORDER — POLYETHYLENE GLYCOL 3350 17 G/17G
17 POWDER, FOR SOLUTION ORAL DAILY PRN
Status: DISCONTINUED | OUTPATIENT
Start: 2024-08-27 | End: 2024-09-03 | Stop reason: HOSPADM

## 2024-08-27 RX ORDER — ROSUVASTATIN CALCIUM 10 MG/1
10 TABLET, COATED ORAL DAILY
COMMUNITY

## 2024-08-27 RX ORDER — LISINOPRIL 10 MG/1
10 TABLET ORAL DAILY
Status: DISCONTINUED | OUTPATIENT
Start: 2024-08-28 | End: 2024-09-03 | Stop reason: HOSPADM

## 2024-08-27 RX ORDER — BENZTROPINE MESYLATE 1 MG/ML
1 INJECTION, SOLUTION INTRAMUSCULAR; INTRAVENOUS ONCE AS NEEDED
Status: DISCONTINUED | OUTPATIENT
Start: 2024-08-27 | End: 2024-09-03 | Stop reason: HOSPADM

## 2024-08-27 RX ORDER — LITHIUM CARBONATE 300 MG/1
300 CAPSULE ORAL EVERY EVENING
Status: DISCONTINUED | OUTPATIENT
Start: 2024-08-27 | End: 2024-09-03 | Stop reason: HOSPADM

## 2024-08-27 RX ORDER — ECHINACEA PURPUREA EXTRACT 125 MG
2 TABLET ORAL AS NEEDED
Status: DISCONTINUED | OUTPATIENT
Start: 2024-08-27 | End: 2024-09-03 | Stop reason: HOSPADM

## 2024-08-27 RX ORDER — FAMOTIDINE 20 MG/1
20 TABLET, FILM COATED ORAL 2 TIMES DAILY PRN
Status: DISCONTINUED | OUTPATIENT
Start: 2024-08-27 | End: 2024-09-03 | Stop reason: HOSPADM

## 2024-08-27 RX ORDER — BENZTROPINE MESYLATE 1 MG/1
2 TABLET ORAL ONCE AS NEEDED
Status: DISCONTINUED | OUTPATIENT
Start: 2024-08-27 | End: 2024-09-03 | Stop reason: HOSPADM

## 2024-08-27 RX ORDER — CEFDINIR 300 MG/1
300 CAPSULE ORAL 2 TIMES DAILY
Qty: 14 CAPSULE | Refills: 0 | Status: SHIPPED | OUTPATIENT
Start: 2024-08-27 | End: 2024-09-04

## 2024-08-27 RX ORDER — NICOTINE POLACRILEX 4 MG
15 LOZENGE BUCCAL
Status: DISCONTINUED | OUTPATIENT
Start: 2024-08-27 | End: 2024-09-03 | Stop reason: HOSPADM

## 2024-08-27 RX ORDER — ASPIRIN 81 MG/1
81 TABLET ORAL EVERY EVENING
Status: DISCONTINUED | OUTPATIENT
Start: 2024-08-27 | End: 2024-09-03 | Stop reason: HOSPADM

## 2024-08-27 RX ORDER — LEVOTHYROXINE SODIUM 50 UG/1
50 TABLET ORAL EVERY EVENING
Status: DISCONTINUED | OUTPATIENT
Start: 2024-08-27 | End: 2024-09-03 | Stop reason: HOSPADM

## 2024-08-27 RX ORDER — ACETAMINOPHEN 325 MG/1
650 TABLET ORAL EVERY 6 HOURS PRN
Status: DISCONTINUED | OUTPATIENT
Start: 2024-08-27 | End: 2024-09-03 | Stop reason: HOSPADM

## 2024-08-27 RX ORDER — NICOTINE 21 MG/24HR
1 PATCH, TRANSDERMAL 24 HOURS TRANSDERMAL
Status: DISCONTINUED | OUTPATIENT
Start: 2024-08-27 | End: 2024-09-03 | Stop reason: HOSPADM

## 2024-08-27 RX ORDER — TRAZODONE HYDROCHLORIDE 50 MG/1
50 TABLET, FILM COATED ORAL NIGHTLY PRN
Status: DISCONTINUED | OUTPATIENT
Start: 2024-08-27 | End: 2024-09-03 | Stop reason: HOSPADM

## 2024-08-27 RX ORDER — LOPERAMIDE HCL 2 MG
2 CAPSULE ORAL
Status: DISCONTINUED | OUTPATIENT
Start: 2024-08-27 | End: 2024-09-03 | Stop reason: HOSPADM

## 2024-08-27 RX ORDER — INSULIN LISPRO 100 [IU]/ML
3-14 INJECTION, SOLUTION INTRAVENOUS; SUBCUTANEOUS
Status: DISCONTINUED | OUTPATIENT
Start: 2024-08-27 | End: 2024-09-03 | Stop reason: HOSPADM

## 2024-08-27 RX ADMIN — OFLOXACIN 50000 UNITS: 300 TABLET, COATED ORAL at 21:12

## 2024-08-27 RX ADMIN — LITHIUM CARBONATE 300 MG: 300 CAPSULE, GELATIN COATED ORAL at 21:12

## 2024-08-27 RX ADMIN — LIDOCAINE HYDROCHLORIDE 1 G: 10 INJECTION, SOLUTION EPIDURAL; INFILTRATION; INTRACAUDAL; PERINEURAL at 16:31

## 2024-08-27 RX ADMIN — TRAZODONE HYDROCHLORIDE 50 MG: 50 TABLET ORAL at 21:12

## 2024-08-27 RX ADMIN — INSULIN LISPRO 5 UNITS: 100 INJECTION, SOLUTION INTRAVENOUS; SUBCUTANEOUS at 21:12

## 2024-08-27 RX ADMIN — LEVOTHYROXINE SODIUM 50 MCG: 0.05 TABLET ORAL at 21:12

## 2024-08-27 RX ADMIN — INSULIN LISPRO 5 UNITS: 100 INJECTION, SOLUTION INTRAVENOUS; SUBCUTANEOUS at 17:41

## 2024-08-27 RX ADMIN — ASPIRIN 81 MG: 81 TABLET, COATED ORAL at 21:12

## 2024-08-27 NOTE — ED PROVIDER NOTES
"Subjective   History of Present Illness  Patient is a 54-year-old female with significant past medical history positive for anxiety, bipolar disorder, depression, hypertension, panic disorder, sleep apnea, chronic low back pain, suicidal ideation presenting to the ER for psychiatric evaluation for suicidal ideation. Patient presents reporting suicidal ideation with a plan to overdose on pain pills. She denies hi and avh. She reports life has been \"pressing down\" on her. She identifies her stressor as a recent break up, health problems, and her living situation. She reports her landlord is trying to kick her out because her cat keeps scratching the walls. She reports poor sleep and poor appetite. She rates anxiety and depression 6/10        History provided by:  Patient   used: No        Review of Systems   Constitutional: Negative.  Negative for fever.   HENT: Negative.     Respiratory: Negative.     Cardiovascular: Negative.  Negative for chest pain.   Gastrointestinal: Negative.  Negative for abdominal pain.   Endocrine: Negative.    Genitourinary: Negative.  Negative for dysuria.   Skin: Negative.    Neurological: Negative.    Psychiatric/Behavioral:  Positive for suicidal ideas.    All other systems reviewed and are negative.      Past Medical History:   Diagnosis Date    Abnormal CT scan     ADRENAL GLAND FOCAL MASS LESION MULTIPLE, LEFT ONLY    Anxiety     ASCVD (arteriosclerotic cardiovascular disease)     \"status post MI in 2012, clinically stable\"    Bipolar disorder     Depression     Dyslipidemia     Hypertension, well controlled     Hypothyroidism     Migraine headache     Myocardial infarction 2012    Panic disorder     Right-sided low back pain with right-sided sciatica     Sleep apnea     Suicidal thoughts     Type 2 diabetes mellitus        Allergies   Allergen Reactions    Zoloft [Sertraline Hcl] Nausea And Vomiting    Ciprofloxacin Rash     Also caused chest pain       Past " Surgical History:   Procedure Laterality Date    CHOLECYSTECTOMY      CORONARY ANGIOPLASTY WITH STENT PLACEMENT  2012    Dr. Denson    ENDOSCOPY  03/2021    HYSTERECTOMY  12/2014    NECK SURGERY  2010    TOE SURGERY Left     TRANSESOPHAGEAL ECHOCARDIOGRAM (SARAN)  07/13/2015    EF 60-65%       Family History   Problem Relation Age of Onset    Diabetes Father     Diabetes Brother     Cancer Maternal Grandmother     Heart disease Maternal Grandfather     Diabetes Paternal Grandfather     Cancer Other        Social History     Socioeconomic History    Marital status: Single    Number of children: 0    Highest education level: High school graduate   Tobacco Use    Smoking status: Never    Smokeless tobacco: Never   Vaping Use    Vaping status: Never Used   Substance and Sexual Activity    Alcohol use: No    Drug use: No    Sexual activity: Defer           Objective   Physical Exam  Vitals and nursing note reviewed.   Constitutional:       General: She is not in acute distress.     Appearance: She is well-developed. She is not diaphoretic.   HENT:      Head: Normocephalic and atraumatic.      Right Ear: External ear normal.      Left Ear: External ear normal.      Nose: Nose normal.   Eyes:      Conjunctiva/sclera: Conjunctivae normal.      Pupils: Pupils are equal, round, and reactive to light.   Neck:      Vascular: No JVD.      Trachea: No tracheal deviation.   Cardiovascular:      Rate and Rhythm: Normal rate and regular rhythm.      Heart sounds: Normal heart sounds. No murmur heard.  Pulmonary:      Effort: Pulmonary effort is normal. No respiratory distress.      Breath sounds: Normal breath sounds. No wheezing.   Abdominal:      General: Bowel sounds are normal.      Palpations: Abdomen is soft.      Tenderness: There is no abdominal tenderness.   Musculoskeletal:         General: No deformity. Normal range of motion.      Cervical back: Normal range of motion and neck supple.   Skin:     General: Skin is warm and  dry.      Coloration: Skin is not pale.      Findings: No erythema or rash.   Neurological:      Mental Status: She is alert and oriented to person, place, and time.      Cranial Nerves: No cranial nerve deficit.   Psychiatric:         Attention and Perception: She is inattentive.         Mood and Affect: Mood is anxious.         Thought Content: Thought content is not paranoid or delusional. Thought content includes suicidal ideation. Thought content does not include homicidal ideation. Thought content includes suicidal plan. Thought content does not include homicidal plan.         Judgment: Judgment is impulsive.         Procedures       Results for orders placed or performed during the hospital encounter of 08/27/24   Comprehensive Metabolic Panel    Specimen: Blood   Result Value Ref Range    Glucose 266 (H) 65 - 99 mg/dL    BUN 15 6 - 20 mg/dL    Creatinine 0.88 0.57 - 1.00 mg/dL    Sodium 136 136 - 145 mmol/L    Potassium 4.0 3.5 - 5.2 mmol/L    Chloride 102 98 - 107 mmol/L    CO2 21.2 (L) 22.0 - 29.0 mmol/L    Calcium 9.5 8.6 - 10.5 mg/dL    Total Protein 7.6 6.0 - 8.5 g/dL    Albumin 4.3 3.5 - 5.2 g/dL    ALT (SGPT) 11 1 - 33 U/L    AST (SGOT) 14 1 - 32 U/L    Alkaline Phosphatase 98 39 - 117 U/L    Total Bilirubin 0.2 0.0 - 1.2 mg/dL    Globulin 3.3 gm/dL    A/G Ratio 1.3 g/dL    BUN/Creatinine Ratio 17.0 7.0 - 25.0    Anion Gap 12.8 5.0 - 15.0 mmol/L    eGFR 77.7 >60.0 mL/min/1.73   Urinalysis With Microscopic If Indicated (No Culture) - Urine, Clean Catch    Specimen: Urine, Clean Catch   Result Value Ref Range    Color, UA Yellow Yellow, Straw    Appearance, UA Cloudy (A) Clear    pH, UA 6.0 5.0 - 8.0    Specific Gravity, UA 1.020 1.005 - 1.030    Glucose, UA Negative Negative    Ketones, UA Negative Negative    Bilirubin, UA Negative Negative    Blood, UA Negative Negative    Protein, UA Trace (A) Negative    Leuk Esterase, UA Large (3+) (A) Negative    Nitrite, UA Negative Negative    Urobilinogen, UA  0.2 E.U./dL 0.2 - 1.0 E.U./dL   Urine Drug Screen - Urine, Clean Catch    Specimen: Urine, Clean Catch   Result Value Ref Range    THC, Screen, Urine Negative Negative    Phencyclidine (PCP), Urine Negative Negative    Cocaine Screen, Urine Negative Negative    Methamphetamine, Ur Negative Negative    Opiate Screen Negative Negative    Amphetamine Screen, Urine Negative Negative    Benzodiazepine Screen, Urine Negative Negative    Tricyclic Antidepressants Screen Negative Negative    Methadone Screen, Urine Negative Negative    Barbiturates Screen, Urine Negative Negative    Oxycodone Screen, Urine Negative Negative    Buprenorphine, Screen, Urine Negative Negative   Magnesium    Specimen: Blood   Result Value Ref Range    Magnesium 2.1 1.6 - 2.6 mg/dL   Ethanol    Specimen: Blood   Result Value Ref Range    Ethanol <10 0 - 10 mg/dL    Ethanol % <0.010 %   Lithium Level    Specimen: Blood   Result Value Ref Range    Lithium 1.0 0.6 - 1.2 mmol/L   CBC Auto Differential    Specimen: Blood   Result Value Ref Range    WBC 12.39 (H) 3.40 - 10.80 10*3/mm3    RBC 3.98 3.77 - 5.28 10*6/mm3    Hemoglobin 11.8 (L) 12.0 - 15.9 g/dL    Hematocrit 37.0 34.0 - 46.6 %    MCV 93.0 79.0 - 97.0 fL    MCH 29.6 26.6 - 33.0 pg    MCHC 31.9 31.5 - 35.7 g/dL    RDW 13.5 12.3 - 15.4 %    RDW-SD 45.8 37.0 - 54.0 fl    MPV 9.8 6.0 - 12.0 fL    Platelets 289 140 - 450 10*3/mm3    Neutrophil % 75.4 42.7 - 76.0 %    Lymphocyte % 17.4 (L) 19.6 - 45.3 %    Monocyte % 5.6 5.0 - 12.0 %    Eosinophil % 1.0 0.3 - 6.2 %    Basophil % 0.2 0.0 - 1.5 %    Immature Grans % 0.4 0.0 - 0.5 %    Neutrophils, Absolute 9.33 (H) 1.70 - 7.00 10*3/mm3    Lymphocytes, Absolute 2.16 0.70 - 3.10 10*3/mm3    Monocytes, Absolute 0.70 0.10 - 0.90 10*3/mm3    Eosinophils, Absolute 0.12 0.00 - 0.40 10*3/mm3    Basophils, Absolute 0.03 0.00 - 0.20 10*3/mm3    Immature Grans, Absolute 0.05 0.00 - 0.05 10*3/mm3    nRBC 0.0 0.0 - 0.2 /100 WBC   Fentanyl, Urine - Urine, Clean  "Catch    Specimen: Urine, Clean Catch   Result Value Ref Range    Fentanyl, Urine Negative Negative   Urinalysis, Microscopic Only - Urine, Clean Catch    Specimen: Urine, Clean Catch   Result Value Ref Range    RBC, UA 0-2 None Seen, 0-2 /HPF    WBC, UA Too Numerous to Count (A) None Seen, 0-2 /HPF    Bacteria, UA 1+ (A) None Seen /HPF    Squamous Epithelial Cells, UA 3-6 (A) None Seen, 0-2 /HPF    Hyaline Casts, UA 0-2 None Seen /LPF    Methodology Automated Microscopy    [place] Urine Culture Tube - Urine, Clean Catch    Specimen: Urine, Clean Catch   Result Value Ref Range    Extra Tube Hold for add-ons.        No orders to display         ED Course  ED Course as of 08/27/24 1906   Tue Aug 27, 2024   1647 Presented pt to Dr. Lion and all abnormal labs. New order to admit patient. SP3. Routine orders.Order for pt to use cane. Rbovx2 [SM]      ED Course User Index  [SM] Na Lieberman, MARISELA                                             Medical Decision Making  Patient is a 54-year-old female with significant past medical history positive for anxiety, bipolar disorder, depression, hypertension, panic disorder, sleep apnea, chronic low back pain, suicidal ideation presenting to the ER for psychiatric evaluation for suicidal ideation. Patient presents reporting suicidal ideation with a plan to overdose on pain pills. She denies hi and avh. She reports life has been \"pressing down\" on her. She identifies her stressor as a recent break up, health problems, and her living situation. She reports her landlord is trying to kick her out because her cat keeps scratching the walls. She reports poor sleep and poor appetite. She rates anxiety and depression 6/10      Patient to be admitted for further evaluation and treatment    Amount and/or Complexity of Data Reviewed  Labs: ordered.        Final diagnoses:   Suicidal ideation   Acute UTI       ED Disposition  ED Disposition       ED Disposition   DC/Transfer to " Behavioral Health    Condition   --    Comment   --               No follow-up provider specified.       Medication List        New Prescriptions      cefdinir 300 MG capsule  Commonly known as: OMNICEF  Take 1 capsule by mouth 2 (Two) Times a Day for 7 days.               Where to Get Your Medications        These medications were sent to [address] [address]      Hours: Monday to Friday 7 AM to 6 PM Phone: [phone]   cefdinir 300 MG capsule            Na Lieberman, APRN  08/27/24 1907

## 2024-08-27 NOTE — NURSING NOTE
"Patient presents reporting suicidal ideation with a plan to overdose on pain pills. She denies hi and avh. She reports life has been \"pressing down\" on her. She identifies her stressor as a recent break up, health problems, and her living situation. She reports her landlord is trying to kick her out because her cat keeps scratching the walls. She reports poor sleep and poor appetite. She rates anxiety and depression 6/10.   "

## 2024-08-27 NOTE — NURSING NOTE
Presented pt to Dr. Lion and all abnormal labs. New order to admit patient. SP3. Routine orders.Order for pt to use cane. Rbovx2.

## 2024-08-28 LAB
GLUCOSE BLDC GLUCOMTR-MCNC: 101 MG/DL (ref 70–130)
GLUCOSE BLDC GLUCOMTR-MCNC: 118 MG/DL (ref 70–130)
GLUCOSE BLDC GLUCOMTR-MCNC: 119 MG/DL (ref 70–130)
GLUCOSE BLDC GLUCOMTR-MCNC: 219 MG/DL (ref 70–130)
GLUCOSE BLDC GLUCOMTR-MCNC: 288 MG/DL (ref 70–130)
QT INTERVAL: 410 MS
QTC INTERVAL: 395 MS

## 2024-08-28 PROCEDURE — 99223 1ST HOSP IP/OBS HIGH 75: CPT | Performed by: PSYCHIATRY & NEUROLOGY

## 2024-08-28 PROCEDURE — 82948 REAGENT STRIP/BLOOD GLUCOSE: CPT

## 2024-08-28 PROCEDURE — 63710000001 INSULIN LISPRO (HUMAN) PER 5 UNITS: Performed by: PSYCHIATRY & NEUROLOGY

## 2024-08-28 RX ORDER — CEFDINIR 300 MG/1
300 CAPSULE ORAL 2 TIMES DAILY
Status: DISCONTINUED | OUTPATIENT
Start: 2024-08-28 | End: 2024-09-03 | Stop reason: HOSPADM

## 2024-08-28 RX ADMIN — INSULIN LISPRO 8 UNITS: 100 INJECTION, SOLUTION INTRAVENOUS; SUBCUTANEOUS at 12:05

## 2024-08-28 RX ADMIN — CEFDINIR 300 MG: 300 CAPSULE ORAL at 20:58

## 2024-08-28 RX ADMIN — INSULIN LISPRO 5 UNITS: 100 INJECTION, SOLUTION INTRAVENOUS; SUBCUTANEOUS at 20:58

## 2024-08-28 RX ADMIN — LEVOTHYROXINE SODIUM 50 MCG: 0.05 TABLET ORAL at 17:07

## 2024-08-28 RX ADMIN — LITHIUM CARBONATE 300 MG: 300 CAPSULE, GELATIN COATED ORAL at 17:07

## 2024-08-28 RX ADMIN — LISINOPRIL 10 MG: 10 TABLET ORAL at 09:53

## 2024-08-28 RX ADMIN — TRAZODONE HYDROCHLORIDE 50 MG: 50 TABLET ORAL at 20:58

## 2024-08-28 RX ADMIN — EMPAGLIFLOZIN 25 MG: 25 TABLET, FILM COATED ORAL at 09:53

## 2024-08-28 RX ADMIN — CEFDINIR 300 MG: 300 CAPSULE ORAL at 09:53

## 2024-08-28 RX ADMIN — ASPIRIN 81 MG: 81 TABLET, COATED ORAL at 17:07

## 2024-08-28 RX ADMIN — ROSUVASTATIN CALCIUM 10 MG: 10 TABLET, FILM COATED ORAL at 09:53

## 2024-08-28 NOTE — H&P
"      INITIAL PSYCHIATRIC HISTORY & PHYSICAL    Patient Identification:  Name:  Tammi Levine  Age:  54 y.o.  Sex:  female  :  1970  MRN:  [id number]   Visit Number:  [id number]  Primary Care Physician:  Diane Rios    CC/Focus of Exam: dperession, SI    HPI: Tammi Levine is a 54 y.o. female who was admitted on 2024 with complaints of worsening depression and suicidal ideations. The patient has a history of bipolar I disorder and she reports she started feeling depressed and hopeless three days ago. It was precipitated by feeling down because some of her neighbors were making fun of her and she felt she couldn't do anything right, and was disappointed that she thought they were her friends but they were laughing at her behind her back and she felt very upset. She started thinking negative things and it went downhill from there and she felt she would be better off dead. She states went down to stay with her mother and he mother was not happy at the patient for feeling down and patient felt her mother was yelling at her and she thought about taking pills to kill herself after that.     PAST PSYCHIATRIC HX: The patient has been in treatment for bipolar disorder for the last ten years at least. Has been admitted here once ten years ago.     SUBSTANCE USE HX: None reported.     SOCIAL HX:   Social History     Socioeconomic History    Marital status: Single    Number of children: 0    Highest education level: High school graduate   Tobacco Use    Smoking status: Never    Smokeless tobacco: Never   Vaping Use    Vaping status: Never Used   Substance and Sexual Activity    Alcohol use: No    Drug use: No    Sexual activity: Defer         Past Medical History:   Diagnosis Date    Abnormal CT scan     ADRENAL GLAND FOCAL MASS LESION MULTIPLE, LEFT ONLY    Anxiety     ASCVD (arteriosclerotic cardiovascular disease)     \"status post MI in , clinically stable\"    Bipolar disorder  "    Depression     Dyslipidemia     Hypertension, well controlled     Hypothyroidism     Migraine headache     Myocardial infarction 2012    Panic disorder     Right-sided low back pain with right-sided sciatica     Sleep apnea     Suicidal thoughts     Type 2 diabetes mellitus           Past Surgical History:   Procedure Laterality Date    CHOLECYSTECTOMY      CORONARY ANGIOPLASTY WITH STENT PLACEMENT  2012    Dr. Denson    ENDOSCOPY  03/2021    HYSTERECTOMY  12/2014    NECK SURGERY  2010    TOE SURGERY Left     TRANSESOPHAGEAL ECHOCARDIOGRAM (SARAN)  07/13/2015    EF 60-65%       Family History   Problem Relation Age of Onset    Diabetes Father     Diabetes Brother     Cancer Maternal Grandmother     Heart disease Maternal Grandfather     Diabetes Paternal Grandfather     Cancer Other          Medications Prior to Admission   Medication Sig Dispense Refill Last Dose    aspirin 81 MG EC tablet Take 1 tablet by mouth Every Evening. 90 tablet 2 8/27/2024    cholecalciferol (VITAMIN D3) 1.25 MG (05135 UT) capsule Take 1 capsule by mouth Every 7 (Seven) Days.   8/27/2024    dapagliflozin Propanediol (Farxiga) 10 MG tablet Take 10 mg by mouth Daily. 30 tablet 2 8/27/2024    insulin aspart (novoLOG FLEXPEN) 100 UNIT/ML solution pen-injector sc pen Inject 15 Units under the skin into the appropriate area as directed 2 (Two) Times a Day Before Meals.   8/27/2024    levothyroxine (SYNTHROID, LEVOTHROID) 50 MCG tablet Take 1 tablet by mouth Every Evening.   8/27/2024    lisinopril (PRINIVIL,ZESTRIL) 10 MG tablet Take 1 tablet by mouth Daily. 90 tablet 3 8/27/2024    lithium carbonate 300 MG capsule Take 1 capsule by mouth Every Evening. 90 capsule 0 8/27/2024    rosuvastatin (CRESTOR) 10 MG tablet Take 1 tablet by mouth Daily.   8/27/2024    traZODone (DESYREL) 50 MG tablet Take 1 tablet by mouth Every Night. 90 tablet 0 8/26/2024         ALLERGIES:  Zoloft [sertraline hcl] and Ciprofloxacin    Temp:  [96.4 °F (35.8 °C)-98.5 °F  (36.9 °C)] 96.8 °F (36 °C)  Heart Rate:  [63-76] 72  Resp:  [16-20] 18  BP: (114-151)/(60-77) 133/77    REVIEW OF SYSTEMS:  Review of Systems   Constitutional: Negative.    HENT: Negative.     Eyes: Negative.    Respiratory: Negative.     Cardiovascular: Negative.    Gastrointestinal: Negative.    Endocrine: Negative.    Genitourinary: Negative.    Musculoskeletal: Negative.    Skin: Negative.    Allergic/Immunologic: Negative.    Neurological: Negative.    Hematological: Negative.    Psychiatric/Behavioral:  Positive for dysphoric mood and suicidal ideas. The patient is nervous/anxious.         OBJECTIVE    PHYSICAL EXAM:  Physical Exam  Constitutional:  Appears well-developed and well-nourished.   HENT:   Head: Normocephalic and atraumatic.   Right Ear: External ear normal.   Left Ear: External ear normal.   Mouth/Throat: Oropharynx is clear and moist.   Eyes: Pupils are equal, round, and reactive to light. Conjunctivae and EOM are normal.   Neck: Normal range of motion. Neck supple.   Cardiovascular: Normal rate, regular rhythm and normal heart sounds.    Respiratory: Effort normal and breath sounds normal. No respiratory distress. No wheezes.   GI: Soft. Bowel sounds are normal.No distension. There is no tenderness.   Musculoskeletal: Normal range of motion. No edema or deformity.   Neurological:  Cranial Nerves: I. No anosmia. II: No visual disturbance. III, IV VI: EOMI, PERRLA. V: Corneal reflext intact, no abnormal sensations. VII: No facial palsy, or altered sensation. VIII: Hearing intact, balance intact. IX: Intact ah reflex. X: Normal phonation, swallowing. XI: Normal shrug and head movement. XII: Intact tongue movements  Coordination normal. No lateralizing signs.  Skin: Skin is warm and dry. No rash noted. No erythema.     MENTAL STATUS EXAM:   Hygiene:   fair  Cooperation:  Cooperative  Eye Contact:  Downcast  Psychomotor Behavior:  Slow  Affect:  Restricted  Hopelessness: 6  Speech:  Normal  Thought  Progress: Linear  Thought Content:  Normal  Suicidal:  Suicidal Ideation  Homicidal:  None  Hallucinations:  None  Delusion:  None  Memory:  Intact  Orientation:  Person, Place, Time, and Situation  Reliability:  fair  Insight:  Fair  Judgement:  Fair  Impulse Control:  Fair    Imaging Results (Last 24 Hours)       ** No results found for the last 24 hours. **             ECG/EMG Results (most recent)       Procedure Component Value Units Date/Time    ECG 12 Lead Other; Baseline Cardiac Status [[id number]] Collected: 08/27/24 1727     Updated: 08/28/24 0835     QT Interval 410 ms      QTC Interval 395 ms     Narrative:      Test Reason : Other~  Blood Pressure :   */*   mmHG  Vent. Rate :  56 BPM     Atrial Rate :  56 BPM     P-R Int : 140 ms          QRS Dur :  86 ms      QT Int : 410 ms       P-R-T Axes :  48  48  45 degrees     QTc Int : 395 ms    Sinus bradycardia  Otherwise normal ECG    Confirmed by Philomena Rubalcava ([id number]) on 8/28/2024 8:35:36 AM    Referred By: RANJITH           Confirmed By: Philomena Rubalcava             Lab Results   Component Value Date    GLUCOSE 266 (H) 08/27/2024    BUN 15 08/27/2024    CREATININE 0.88 08/27/2024    EGFRIFNONA 73 09/20/2021    BCR 17.0 08/27/2024    CO2 21.2 (L) 08/27/2024    CALCIUM 9.5 08/27/2024    ALBUMIN 4.3 08/27/2024    LABIL2 1.4 (L) 02/04/2016    AST 14 08/27/2024    ALT 11 08/27/2024       Lab Results   Component Value Date    WBC 12.39 (H) 08/27/2024    HGB 11.8 (L) 08/27/2024    HCT 37.0 08/27/2024    MCV 93.0 08/27/2024     08/27/2024       Last Urine Toxicity  More data exists         Latest Ref Rng & Units 8/27/2024 9/5/2019   LAST URINE TOXICITY RESULTS   Creatinine, Urine mg/dL - 49.7    Amphetamine, Urine Qual Negative Negative  -   Barbiturates Screen, Urine Negative Negative  -   Benzodiazepine Screen, Urine Negative Negative  -   Buprenorphine, Screen, Urine Negative Negative  -   Cocaine Screen, Urine Negative Negative  -   Fentanyl, Urine  Negative Negative  -   Methadone Screen , Urine Negative Negative  -   Methamphetamine, Ur Negative Negative  -      Details                   Brief Urine Lab Results  (Last result in the past 365 days)        Color   Clarity   Blood   Leuk Est   Nitrite   Protein   CREAT   Urine HCG        08/27/24 1506 Yellow   Cloudy   Negative   Large (3+)   Negative   Trace                   DATA  Labs reviewed. Glucose 266. WBC 12.39. Hep screen negative. UA shows glucose, ketones, blood, leukocyte esterase, trace protein, 3-5 RBC, 6-12 WBC, trace bacteria. UDS negative.   EKG reviewed. QTc 395.   TOSHA reviewed.   Record reviewed. The patient was last here in July 2014 and treated for Bipolar depression.     Strengths: Motivated for treatment    Weaknesses:Poor coping skills    Code status:  Full  Discussed code status with patient.    ASSESSMENT & PLAN:    Hospital bed: No    Suicidal ideation  -SP 3    Bipolar I disorder depressed  -Continue lithium carbonate 300 mg every evening  -Lithium level is 1.0    Hypothyroidism  -Levothyroxine    DM II  -Empagliflozin 25 mg daily  -Sliding scale coverage    HTN  -Lisinopril 10 mg daily    Hyperlipidemia  -Rosuvastatin 10 mg daily    UTI  -Cefdinir    The patient has been admitted for safety and stabilization.  Patient will be monitored for suicidality daily and maintained on Special Precautions Level 3 (q15 min checks) .  The patient will have individual and group therapy with a master's level therapist. A master treatment plan will be developed and agreed upon by the patient and his/her treatment team.  The patient's estimated length of stay in the hospital is 5-7 days.

## 2024-08-28 NOTE — PLAN OF CARE
Goal Outcome Evaluation:  Plan of Care Reviewed With: patient  Patient Agreement with Plan of Care: agrees     Progress: no change  Outcome Evaluation: Anxiety 7/10 and depression 6/10. Pt denies SI/HI/AVH. Pt reports poor sleep and good appetite.

## 2024-08-28 NOTE — PROGRESS NOTES
"Date of Service: August 27, 2024  Time In: 1:25 PM  Time Out: 2:05 PM    PROGRESS NOTE  Data:  Tammi Levine is a 54 y.o. female who met 1:1 with the undersigned for a regularly scheduled individual outpatient therapy session at [facility] for follow-up of bipolar disorder.  Patient and the undersigned wore masks throughout the session and maintained appropriate distancing.    Chief Complaint: Bipolar II disorder; anxiety; family dynamics problem, passive suicidal ideation     HPI:   Patient presents for first session with uncontrollable crying and states she began feeling severely depressed in the evening of August 24, 2024.  The patient states she began thinking of how big of a failure she has been and how lonely she is.  Patient states she simply became hopeless and began to think \"there is no point in me being here any longer.\"  The patient states she does not believe she would ever harm herself but states she is having feelings of not wanting to be here any longer and wishing she would die.  Patient rates current symptoms of depression and hopelessness at a 10 on a scale of 1-10 with 10 being most severe. Patient reports she is sleeping relatively well at this time.  The patient adamantly and convincingly denies suicidal ideation vehemently denies any substance use.      Clinical Maneuvering/Intervention:  Assisted patient in processing above session content; acknowledged and normalized patient’s thoughts, feelings, and concerns.  This session was primarily focused on addressing the immediate concerns of the patient's significant exacerbation of symptomology including depression and hopelessness with passive suicidal ideation.  The undersigned included the patient's mother in the session with the verbal agreement of the patient to have her mother present and to share freely.  It was determined the patient needed higher level of care for crisis stabilization and safety at this time.  " The patient agreed to have her mother transport her to [facility] ER for further assessment and appropriate treatment.  Provided unconditional positive regarding a safe, supportive environment.    Allowed patient to freely discuss issues without interruption or judgment. Provided safe, confidential environment to facilitate the development of positive therapeutic relationship and encourage open, honest communication. Assisted patient in identifying risk factors which would indicate the need for higher level of care including thoughts to harm self or others and/or self-harming behavior and encouraged patient to contact this office, call 911, or present to the nearest emergency room should any of these events occur. Discussed crisis intervention services and means to access.  Patient adamantly and convincingly denies current suicidal or homicidal ideation or perceptual disturbance.      Assessment    Patient appears to be struggling with significant exacerbation of depressive episode including significant hopelessness and suicidal ideation.  As a result, patient exhibits risk factors for imminent threat of harm and has agreed to be immediately transported following this session to the emergency department at [facility] for further evaluation and appropriate treatment.    Diagnoses and all orders for this visit:    1. Chronic bipolar II disorder, most recent episode major depressive (Primary)    2. Generalized anxiety disorder    3. Sleeping difficulties    4. Passive suicidal ideations               Mental Status Exam  Hygiene: Good  Dress: Casual  Attitude:  Cooperative  Motor Activity: Appropriate  Speech:  Normal  Mood: Within normal limits  Affect: Tearful  Thought Processes:  Linear  Thought Content:  normal  Suicidal Thoughts:  denies  Homicidal Thoughts:  denies  Crisis Safety Plan: yes, to come to the emergency room.  Hallucinations:  denies    Patient's Support Network Includes:  mother  and extended family    Progress toward goal: Not at goal    Functional Status: Mild impairment     Prognosis: Fair with Ongoing Treatment     Plan         Patient will be transported by her mother immediately following this session to the emergency department at [facility] for further evaluation and treatment and will follow all recommendations including follow-ups with the undersigned and MARISELA Valenzuela.       Return in about 4 weeks (around 9/24/2024) for Next scheduled follow up.      This document signed by Vidal Ortiz LCSW, [facility] August 28, 2024 06:59 EDT

## 2024-08-28 NOTE — PLAN OF CARE
Goal Outcome Evaluation:  Plan of Care Reviewed With: patient  Patient Agreement with Plan of Care: agrees     Progress: improving  Outcome Evaluation: Patient cooperaitve, interacting with staff and peer. Brett denies suicidal or homicidal ideation

## 2024-08-28 NOTE — PLAN OF CARE
Goal Outcome Evaluation:  Plan of Care Reviewed With: patient  Patient Agreement with Plan of Care: agrees  Consent Given to Review Plan with: mother  Progress: improving  Outcome Evaluation: Therapist met with Patient to review care plan, social history, and aftercare recommendations; Patient agreeable.        Problem: Adult Behavioral Health Plan of Care  Goal: Plan of Care Review  Outcome: Ongoing, Progressing  Flowsheets (Taken 8/28/2024 1022)  Consent Given to Review Plan with: mother  Progress: improving  Plan of Care Reviewed With: patient  Patient Agreement with Plan of Care: agrees  Outcome Evaluation:   Therapist met with Patient to review care plan, social history, and aftercare recommendations   Patient agreeable.  Goal: Patient-Specific Goal (Individualization)  Outcome: Ongoing, Progressing  Flowsheets  Taken 8/28/2024 1022 by Fernanda Razo MSW  Patient-Specific Goals (Include Timeframe): Identify 2-3 coping skills, address relapse prevention measures, complete aftercare plans, and deny SI/HI prior to discharge.  Individualized Care Needs: Therapist to off1-4 therapy sessions, aftercare planning, safety planning, family education, group therapy, and brief CBT/MI interventions.  Taken 8/28/2024 1017 by Fernanda Razo MSW  Patient Personal Strengths:   resilient   resourceful  Patient Vulnerabilities:   family/relationship conflict   lacks insight into illness  Taken 8/27/2024 1818 by Samina Valdivia RN  Anxieties, Fears or Concerns: None verbalized  Goal: Optimized Coping Skills in Response to Life Stressors  Outcome: Ongoing, Progressing  Flowsheets (Taken 8/28/2024 1022)  Optimized Coping Skills in Response to Life Stressors: making progress toward outcome  Intervention: Promote Effective Coping Strategies  Flowsheets (Taken 8/28/2024 1022)  Supportive Measures:   active listening utilized   counseling provided   goal-setting facilitated   verbalization of feelings encouraged  Goal:  Develops/Participates in Therapeutic [place] to Support Successful Transition  Outcome: Ongoing, Progressing  Flowsheets (Taken 8/28/2024 1022)  Develops/Participates in Therapeutic [place] to Support Successful Transition: making progress toward outcome  Intervention: Foster Therapeutic [place]  Flowsheets (Taken 8/28/2024 1022)  Trust Relationship/Rapport:   care explained   thoughts/feelings acknowledged   choices provided   emotional support provided   empathic listening provided   questions answered   questions encouraged   reassurance provided  Intervention: Mutually Develop Transition Plan  Flowsheets  Taken 8/28/2024 1022 by Fernanda Razo MSW  Outpatient/Agency/Support Group Needs:   outpatient counseling   outpatient medication management  Discharge Coordination/Progress:   Therapist met with Patient to complete discharge needs assessment   Patient agreeable.  Transition Support: community resources reviewed  Anticipated Discharge Disposition: home with family  Transportation Concerns: no car  Current Discharge Risk: psychiatric illness  Concerns to be Addressed: mental health  Readmission Within the Last 30 Days: no previous admission in last 30 days  [facility]  Patient/Family Anticipates Transition to: home with family  Offered/Gave Vendor List: no  Taken 8/27/2024 1818 by Samina Valdivia RN  Transportation Anticipated: family or friend will provide  Patient/Family Anticipated Services at Transition:   mental health services   outpatient care     DATA: Therapist met individually with patient this date to introduce role and to discuss hospitalization expectations. Patient agreeable.     Patient signed consent for her mother, Dora. This therapist called to try and speak with her today. However she did not answer.     Patient wants to continue follow up care with [facility].      Clinical Maneuvering/Intervention:     Therapist assisted  patient in processing session content; acknowledged and normalized patient’s thoughts, feelings, and concerns.  Discussed the therapist/patient relationship and explain the parameters and limitations of relative confidentiality.  Also discussed the importance of active participation, and honesty to the treatment process.  Encouraged the patient to discuss/vent their feelings, frustrations, and fears concerning their ongoing medical issues and validated their feelings.     Discussed the importance of finding enjoyable activities and coping skills that the patient can engage in a regular basis. Discussed healthy coping skills such as distraction, self love, grounding, thought challenges/reframing, etc.  Provided patient with list of healthy coping skills this date. Discussed the importance of medication compliance.  Praised the patient for seeking help and spent the majority of the session building rapport.       Allowed patient to freely discuss issues without interruption or judgment. Provided safe, confidential environment to facilitate the development of positive therapeutic relationship and encourage open, honest communication.      Therapist completed integrated summary, treatment plan, and initiated social history this date.  Therapist is strongly encouraging family involvement in treatment.       ASSESSMENT: Tammi Levine is a 54 year old  female living in [place] alone. Patient lives close to her family and has their support. She has a highschool education and receives [employer] benefits. Patient was admitted due to concern for SI. She reports that she has noticed worsening depression since this past weekend when she was made fun of by a neighbor. She reports that she has also had a lot of new stressors recently including a break up with her boyfriend, and two foot surgeries. She reports good support from her family and [employer]. She denies any substance use at this time. She reports that she has been  on the same medications for a long time, and that a medication change might be needed. Patient was polite and cooperative with assessment.      PLAN:       Patient to remain hospitalized this date.     Treatment team will focus efforts on stabilizing patient's acute symptoms while providing education on healthy coping and crisis management to reduce hospitalizations.   Patient requires daily psychiatrist evaluation and 24/7 nursing supervision to promote patient  safety.     Therapist will offer 1-4 individual sessions, 1 therapy group daily, family education, and appropriate referral.    Therapist recommends outpatient medication management and therapy.

## 2024-08-29 LAB
GLUCOSE BLDC GLUCOMTR-MCNC: 143 MG/DL (ref 70–130)
GLUCOSE BLDC GLUCOMTR-MCNC: 155 MG/DL (ref 70–130)
GLUCOSE BLDC GLUCOMTR-MCNC: 165 MG/DL (ref 70–130)
GLUCOSE BLDC GLUCOMTR-MCNC: 210 MG/DL (ref 70–130)
GLUCOSE BLDC GLUCOMTR-MCNC: 243 MG/DL (ref 70–130)

## 2024-08-29 PROCEDURE — 82948 REAGENT STRIP/BLOOD GLUCOSE: CPT

## 2024-08-29 PROCEDURE — 63710000001 INSULIN LISPRO (HUMAN) PER 5 UNITS: Performed by: PSYCHIATRY & NEUROLOGY

## 2024-08-29 PROCEDURE — 99232 SBSQ HOSP IP/OBS MODERATE 35: CPT | Performed by: PSYCHIATRY & NEUROLOGY

## 2024-08-29 RX ORDER — LAMOTRIGINE 100 MG/1
25 TABLET ORAL DAILY
Status: DISCONTINUED | OUTPATIENT
Start: 2024-08-29 | End: 2024-09-03 | Stop reason: HOSPADM

## 2024-08-29 RX ADMIN — ASPIRIN 81 MG: 81 TABLET, COATED ORAL at 17:14

## 2024-08-29 RX ADMIN — CEFDINIR 300 MG: 300 CAPSULE ORAL at 08:42

## 2024-08-29 RX ADMIN — INSULIN LISPRO 3 UNITS: 100 INJECTION, SOLUTION INTRAVENOUS; SUBCUTANEOUS at 08:42

## 2024-08-29 RX ADMIN — INSULIN LISPRO 5 UNITS: 100 INJECTION, SOLUTION INTRAVENOUS; SUBCUTANEOUS at 11:07

## 2024-08-29 RX ADMIN — LISINOPRIL 10 MG: 10 TABLET ORAL at 08:42

## 2024-08-29 RX ADMIN — ROSUVASTATIN CALCIUM 10 MG: 10 TABLET, FILM COATED ORAL at 08:42

## 2024-08-29 RX ADMIN — EMPAGLIFLOZIN 25 MG: 25 TABLET, FILM COATED ORAL at 08:42

## 2024-08-29 RX ADMIN — LEVOTHYROXINE SODIUM 50 MCG: 0.05 TABLET ORAL at 17:13

## 2024-08-29 RX ADMIN — CEFDINIR 300 MG: 300 CAPSULE ORAL at 20:18

## 2024-08-29 RX ADMIN — LAMOTRIGINE 25 MG: 100 TABLET ORAL at 12:24

## 2024-08-29 RX ADMIN — INSULIN LISPRO 5 UNITS: 100 INJECTION, SOLUTION INTRAVENOUS; SUBCUTANEOUS at 20:27

## 2024-08-29 RX ADMIN — LITHIUM CARBONATE 300 MG: 300 CAPSULE, GELATIN COATED ORAL at 17:13

## 2024-08-29 RX ADMIN — HYDROXYZINE HYDROCHLORIDE 50 MG: 25 TABLET ORAL at 20:20

## 2024-08-29 NOTE — PLAN OF CARE
Goal Outcome Evaluation:  Plan of Care Reviewed With: patient  Patient Agreement with Plan of Care: agrees        Outcome Evaluation: Denied Si, HI, AVH. Calm and cooperative.

## 2024-08-29 NOTE — NURSING NOTE
PATIENT RECEIVED AS TRANSFER FROM A&E 1.  PATIENT CAME WITH HER GLASSES, DENTURES UPPER & LOWER, CLOTHING AND A CANE.  PATIENT ORIENTED TO UNIT AND PLACED IN 23a BED.  ORIENTED, VS STABLE AND NO DISTRESS NOTED.

## 2024-08-29 NOTE — PLAN OF CARE
Goal Outcome Evaluation:  Plan of Care Reviewed With: patient, mother  Patient Agreement with Plan of Care: agrees  Consent Given to Review Plan with: mother  Progress: no change  Outcome Evaluation: Therapist met with Patient to review treatment progress; Patient agreeable.        Problem: Adult Behavioral Health Plan of Care  Goal: Plan of Care Review  Outcome: Ongoing, Progressing  Flowsheets  Taken 8/29/2024 1050  Progress: no change  Plan of Care Reviewed With:   patient   mother  Patient Agreement with Plan of Care: agrees  Outcome Evaluation:   Therapist met with Patient to review treatment progress   Patient agreeable.  Taken 8/28/2024 1022  Consent Given to Review Plan with: mother  Goal: Optimized Coping Skills in Response to Life Stressors  Outcome: Ongoing, Progressing  Flowsheets (Taken 8/29/2024 1050)  Optimized Coping Skills in Response to Life Stressors: making progress toward outcome  Intervention: Promote Effective Coping Strategies  Flowsheets (Taken 8/29/2024 1050)  Supportive Measures:   active listening utilized   counseling provided   goal-setting facilitated   verbalization of feelings encouraged  Goal: Develops/Participates in Therapeutic [place] to Support Successful Transition  Outcome: Ongoing, Progressing  Flowsheets (Taken 8/29/2024 1050)  Develops/Participates in Therapeutic [place] to Support Successful Transition: making progress toward outcome  Intervention: Foster Therapeutic [place]  Flowsheets (Taken 8/29/2024 1050)  Trust Relationship/Rapport:   care explained   thoughts/feelings acknowledged   emotional support provided   empathic listening provided   choices provided   questions answered   questions encouraged   reassurance provided     DATA: Therapist met with Patient individually this date. Patient agreeable to discuss current treatment progress and discharge concerns.     This therapist spoke with Patient's mother today. She appears supportive of Patient. She is  "agreeable for Patient to stay with her post discharge. She was advised to secure all weapons, harmful objects, and medications in the home. She stated understanding.     CLINICAL MANUVERING/INTERVENTIONS:  Assisted Patient in processing session content; acknowledged and normalized Patient’s thoughts, feelings, and concerns by utilizing a person-centered approach in efforts to build appropriate rapport and a positive therapeutic relationship with open and honest communication. Allowed Patient to ventilate regarding current stressors and triggers for negative emotions and thoughts in a safe nonjudgmental environment with unconditional positive regard, active listening skills, and empathy.     ASSESSMENT: Patient was seen for a follow up today along with social work student, Geraldine. Patient continues to receive treatment for SI. She reports ongoing depression and SI today. She states \"I think the world will just be better off without me\". She was tearful when expressing these feeling. This therapist assisted Patient in recognizing her value that she offers to the world. 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 She was able to recognize that if not for her efforts that people in the community may go without things. We spoke about those who care about her in her life, specifically, her mother. This therapist educated Patient about cognitive distortions and how to challenge them. She was receptive during the discussion.     PLAN:   Patient will continue stabilization. Patient will continue to receive services offered by Treatment Team.     Patient will follow-up with [facility].      "

## 2024-08-29 NOTE — PROGRESS NOTES
"INPATIENT PSYCHIATRIC PROGRESS NOTE    Name:  Tammi Levine  :  1970  MRN:  [id number]  Visit Number:  [id number]  Length of stay:  2    SUBJECTIVE    CC/Focus of Exam: Bipolar depression    INTERVAL HISTORY:  The patient reports she is feeling bad. She states she feels no one cares about her and she feels she would be better off dead sometimes. She states she feels in people's way and it would be calderon if she was not around. She states she is so depressed that no one likes to be around her. She states she tries to make friends but for no reason they turn around and don't want to do anything with her. She states her mother says that she tries to make people like her and gets upset if she feels the other person doesn't like her. She states this time she is upset with her ex-boyfriend who didn't want to work and she was tired of taking care of him. She set him on his way about a month ago, and is feeling bad about it.   Depression rating 8/10  Anxiety rating 8/10  Sleep: Not good  Withdrawal sx: None   Cravin/10    Review of Systems   Constitutional: Negative.    Respiratory: Negative.     Cardiovascular: Negative.    Gastrointestinal: Negative.        OBJECTIVE    Temp:  [96.5 °F (35.8 °C)-97.9 °F (36.6 °C)] 96.8 °F (36 °C)  Heart Rate:  [68-85] 82  Resp:  [17-18] 17  BP: (105-124)/(55-68) 111/55    MENTAL STATUS EXAM:  Appearance:Casually dressed, good hygeine.   Cooperation:Cooperative  Psychomotor: No psychomotor agitation/retardation, No EPS, No motor tics  Speech-normal rate, amount.  Mood \"depressed\"   Affect-congruent, appropriate, stable  Thought Content-goal directed, no delusional material present  Thought process-linear, organized.  Suicidality: No SI  Homicidality: No HI  Perception: No AH/VH  Insight-fair   Judgement-fair    Lab Results (last 24 hours)       Procedure Component Value Units Date/Time    POC Glucose Once [[id number]]  (Abnormal) Collected: 24 1104    Specimen: Blood " Updated: 08/29/24 1117     Glucose 243 mg/dL     POC Glucose Once [[id number]]  (Abnormal) Collected: 08/29/24 0613    Specimen: Blood Updated: 08/29/24 0625     Glucose 165 mg/dL     POC Glucose Once [[id number]]  (Abnormal) Collected: 08/28/24 1940    Specimen: Blood Updated: 08/28/24 1947     Glucose 219 mg/dL     POC Glucose Once [[id number]]  (Normal) Collected: 08/28/24 1705    Specimen: Blood Updated: 08/28/24 1711     Glucose 118 mg/dL                Imaging Results (Last 24 Hours)       ** No results found for the last 24 hours. **               ECG/EMG Results (most recent)       Procedure Component Value Units Date/Time    ECG 12 Lead Other; Baseline Cardiac Status [[id number]] Collected: 08/27/24 1727     Updated: 08/28/24 0835     QT Interval 410 ms      QTC Interval 395 ms     Narrative:      Test Reason : Other~  Blood Pressure :   */*   mmHG  Vent. Rate :  56 BPM     Atrial Rate :  56 BPM     P-R Int : 140 ms          QRS Dur :  86 ms      QT Int : 410 ms       P-R-T Axes :  48  48  45 degrees     QTc Int : 395 ms    Sinus bradycardia  Otherwise normal ECG    Confirmed by Philomena Rubalcava ([id number]) on 8/28/2024 8:35:36 AM    Referred By: RANJITH           Confirmed By: Philomena Rubalcava             ALLERGIES: Zoloft [sertraline hcl] and Ciprofloxacin    Medication Review:   Scheduled Medications:  aspirin, 81 mg, Oral, Q PM  cefdinir, 300 mg, Oral, BID  cholecalciferol, 50,000 Units, Oral, Q7 Days  empagliflozin, 25 mg, Oral, Daily  insulin lispro, 3-14 Units, Subcutaneous, 4x Daily AC & at Bedtime  levothyroxine, 50 mcg, Oral, Q PM  lisinopril, 10 mg, Oral, Daily  lithium carbonate, 300 mg, Oral, Q PM  nicotine, 1 patch, Transdermal, Q24H  rosuvastatin, 10 mg, Oral, Daily         PRN Medications:    acetaminophen    aluminum-magnesium hydroxide-simethicone    benzonatate    benztropine **OR** benztropine    dextrose    dextrose    famotidine    glucagon (human recombinant)    hydrOXYzine    ibuprofen     loperamide    magnesium hydroxide    ondansetron ODT    polyethylene glycol    sodium chloride    traZODone   All medications reviewed.    ASSESSMENT & PLAN:    Suicidal ideation  -SP 3     Bipolar I disorder depressed  -Continue lithium carbonate 300 mg every evening  -Lithium level is 1.0  -Start lamotrigine 25 mg daily     Hypothyroidism  -Levothyroxine     DM II  -Empagliflozin 25 mg daily  -Sliding scale coverage     HTN  -Lisinopril 10 mg daily     Hyperlipidemia  -Rosuvastatin 10 mg daily     UTI  -Cefdinir    Special precautions: Special Precautions Level 3 (q15 min checks) .    Behavioral Health Treatment Plan and Problem List: I have reviewed and approved the Behavioral Health Treatment Plan and Problem list.  The patient has had a chance to review and agrees with the treatment plan.    Copied text in portions of this note has been reviewed and is accurate as of 08/29/24         Clinician:  Virgen Lion MD  08/29/24  12:11 EDT

## 2024-08-29 NOTE — PLAN OF CARE
Goal Outcome Evaluation:  Plan of Care Reviewed With: patient  Patient Agreement with Plan of Care: agrees     Progress: no change  Outcome Evaluation: PATIENT TRANSFERRED FROM AE 1.  VERY COOPERATIVE, COLORING WITH OTHER STAFF, USING CANE TO WALK.  DENIED DYSURIA, EATING GOOD, VS STABLE.

## 2024-08-30 LAB
GLUCOSE BLDC GLUCOMTR-MCNC: 142 MG/DL (ref 70–130)
GLUCOSE BLDC GLUCOMTR-MCNC: 162 MG/DL (ref 70–130)
GLUCOSE BLDC GLUCOMTR-MCNC: 194 MG/DL (ref 70–130)
GLUCOSE BLDC GLUCOMTR-MCNC: 201 MG/DL (ref 70–130)

## 2024-08-30 PROCEDURE — 99232 SBSQ HOSP IP/OBS MODERATE 35: CPT | Performed by: PSYCHIATRY & NEUROLOGY

## 2024-08-30 PROCEDURE — 82948 REAGENT STRIP/BLOOD GLUCOSE: CPT

## 2024-08-30 PROCEDURE — 63710000001 INSULIN LISPRO (HUMAN) PER 5 UNITS: Performed by: PSYCHIATRY & NEUROLOGY

## 2024-08-30 RX ADMIN — ROSUVASTATIN CALCIUM 10 MG: 10 TABLET, FILM COATED ORAL at 08:44

## 2024-08-30 RX ADMIN — LEVOTHYROXINE SODIUM 50 MCG: 0.05 TABLET ORAL at 16:29

## 2024-08-30 RX ADMIN — CEFDINIR 300 MG: 300 CAPSULE ORAL at 08:44

## 2024-08-30 RX ADMIN — LISINOPRIL 10 MG: 10 TABLET ORAL at 08:45

## 2024-08-30 RX ADMIN — LITHIUM CARBONATE 300 MG: 300 CAPSULE, GELATIN COATED ORAL at 16:29

## 2024-08-30 RX ADMIN — INSULIN LISPRO 3 UNITS: 100 INJECTION, SOLUTION INTRAVENOUS; SUBCUTANEOUS at 12:48

## 2024-08-30 RX ADMIN — HYDROXYZINE HYDROCHLORIDE 50 MG: 25 TABLET ORAL at 20:18

## 2024-08-30 RX ADMIN — INSULIN LISPRO 3 UNITS: 100 INJECTION, SOLUTION INTRAVENOUS; SUBCUTANEOUS at 21:14

## 2024-08-30 RX ADMIN — CEFDINIR 300 MG: 300 CAPSULE ORAL at 20:18

## 2024-08-30 RX ADMIN — INSULIN LISPRO 5 UNITS: 100 INJECTION, SOLUTION INTRAVENOUS; SUBCUTANEOUS at 16:32

## 2024-08-30 RX ADMIN — EMPAGLIFLOZIN 25 MG: 25 TABLET, FILM COATED ORAL at 08:45

## 2024-08-30 RX ADMIN — LAMOTRIGINE 25 MG: 100 TABLET ORAL at 08:45

## 2024-08-30 RX ADMIN — TRAZODONE HYDROCHLORIDE 50 MG: 50 TABLET ORAL at 20:18

## 2024-08-30 RX ADMIN — ASPIRIN 81 MG: 81 TABLET, COATED ORAL at 16:29

## 2024-08-30 NOTE — PLAN OF CARE
Problem: Adult Behavioral Health Plan of Care  Goal: Patient-Specific Goal (Individualization)  Flowsheets  Taken 8/28/2024 1022 by Fernanda Razo MSW  Patient-Specific Goals (Include Timeframe): Identify 2-3 coping skills, address relapse prevention measures, complete aftercare plans, and deny SI/HI prior to discharge.  Individualized Care Needs: Therapist to off1-4 therapy sessions, aftercare planning, safety planning, family education, group therapy, and brief CBT/MI interventions.  Taken 8/28/2024 1017 by Fernanda Razo MSW  Patient Personal Strengths:   resilient   resourceful  Patient Vulnerabilities:   family/relationship conflict   lacks insight into illness  Taken 8/27/2024 1818 by Samina Valdivia RN  Anxieties, Fears or Concerns: None verbalized  Goal: Optimized Coping Skills in Response to Life Stressors  Outcome: Ongoing, Progressing  Goal: Develops/Participates in Therapeutic [place] to Support Successful Transition  Intervention: Mutually Develop Transition Plan  Recent Flowsheet Documentation  Taken 8/30/2024 1641 by Violeta Camarillo LCSW  Transportation Anticipated: family or friend will provide  Transportation Concerns: no car  Current Discharge Risk: psychiatric illness  Concerns to be Addressed: mental health  Readmission Within the Last 30 Days: no previous admission in last 30 days  Patient/Family Anticipated Services at Transition:   mental health services   outpatient care  Patient/Family Anticipates Transition to: home with family  Offered/Gave Vendor List: no       Therapist reviewed Dr. Ayala's assessment today and discussed patient with nursing staff.  Attempted to meet with patient today.  Left information on [facility] day program for patient to review and discuss on Monday.  Patient reports ongoing depression and anxiety.  Patient continues to require hospitalization causation of symptoms.

## 2024-08-30 NOTE — PLAN OF CARE
Goal Outcome Evaluation:  Plan of Care Reviewed With: patient  Patient Agreement with Plan of Care: agrees     Progress: no change  Outcome Evaluation: PATIENT OUT OF HER ROOM MOST OF THE SHIFT, TALKING WITH OTHER PATIENTS ON  UNIT, VERY COOPERATIVE, EATING GOOD AND DENIES ACTIVE SUICIDAL IDEATIONS.  SLIDING SCALE INSULIN AS ORDERED, USING QUAD CANE WHEN AMBULATES AND ON FALL PRECAUTIONS.

## 2024-08-30 NOTE — PROGRESS NOTES
"INPATIENT PSYCHIATRIC PROGRESS NOTE    Name:  Tammi Levine  :  1970  MRN:  [id number]  Visit Number:  [id number]  Length of stay:  3    Behavioral Health Treatment Plan and Problem List: I have reviewed and approved the Behavioral Health Treatment Plan and Problem list.    SUBJECTIVE    CC/ Focus of exam: Bipolar depressed    Patient's subjective status: \"I am doing some better\"    INTERVAL HISTORY: Chart reviewed and patient interviewed.    The patient reports that her depression has improved although still has intermittent thoughts that she might better off dead without active suicidal intent.  Able to describe some positives about her home situation particularly her work with her [employer] and prays of appreciation from the .  Patient also likes to walk in public park.  The negatives are the derogatory neighbors.     Might be helpful to identify a day program in [place] that the patient could access.    Depression rating 6 or 7/10  Anxiety rating 6/10  Hopelessness: 5/10  Sleep: 8 to 9 hours      We will obtain a lithium level tomorrow morning.  ROS: No cardiovascular, GI, or Neurological complaints.       OBJECTIVE    Vitals:    24 0752   BP: 122/69   Pulse: 72   Resp:    Temp:    SpO2:       Temp:  [96.7 °F (35.9 °C)-98.3 °F (36.8 °C)] 96.7 °F (35.9 °C)  Heart Rate:  [66-77] 72  Resp:  [16] 16  BP: (110-132)/(58-69) 122/69    MENTAL STATUS EXAM:       Psychomotor: No psychomotor agitation/retardation, No EPS, No motor tics  Speech-normal rate, amount.  Mood/Affect: Depressed  Thought Processes: coherent  Thought Content: mood congruent  Hallucination(s): none  Hopelessness: Intermittent  Optimistic: minimally  Suicidal Thoughts: No  Suicidal Plan/Intent: No  Homicidal Thoughts: absent  Orientation: oriented x 3  Memory: recent intact    Lab Results (last 24 hours)       Procedure Component Value Units Date/Time    POC Glucose Once [[id number]]  (Abnormal) Collected: 24 " 0552    Specimen: Blood Updated: 08/30/24 0558     Glucose 142 mg/dL     POC Glucose Once [[id number]]  (Abnormal) Collected: 08/29/24 2025    Specimen: Blood Updated: 08/29/24 2032     Glucose 210 mg/dL     POC Glucose Once [[id number]]  (Abnormal) Collected: 08/29/24 1635    Specimen: Blood Updated: 08/29/24 1641     Glucose 143 mg/dL     POC Glucose Once [[id number]]  (Abnormal) Collected: 08/29/24 1403    Specimen: Blood Updated: 08/29/24 1409     Glucose 155 mg/dL     POC Glucose Once [[id number]]  (Abnormal) Collected: 08/29/24 1104    Specimen: Blood Updated: 08/29/24 1117     Glucose 243 mg/dL              Imaging Results (Last 24 Hours)       ** No results found for the last 24 hours. **             Lab and x-ray studies reviewed.    ECG/EMG Results (most recent)       Procedure Component Value Units Date/Time    ECG 12 Lead Other; Baseline Cardiac Status [[id number]] Collected: 08/27/24 1727     Updated: 08/28/24 0835     QT Interval 410 ms      QTC Interval 395 ms     Narrative:      Test Reason : Other~  Blood Pressure :   */*   mmHG  Vent. Rate :  56 BPM     Atrial Rate :  56 BPM     P-R Int : 140 ms          QRS Dur :  86 ms      QT Int : 410 ms       P-R-T Axes :  48  48  45 degrees     QTc Int : 395 ms    Sinus bradycardia  Otherwise normal ECG    Confirmed by Philomena Rubalcava ([id number]) on 8/28/2024 8:35:36 AM    Referred By: RANJITH           Confirmed By: Philomena Rubalcava             ALLERGIES: Zoloft [sertraline hcl] and Ciprofloxacin    Medications:     aspirin, 81 mg, Oral, Q PM  cefdinir, 300 mg, Oral, BID  cholecalciferol, 50,000 Units, Oral, Q7 Days  empagliflozin, 25 mg, Oral, Daily  insulin lispro, 3-14 Units, Subcutaneous, 4x Daily AC & at Bedtime  lamoTRIgine, 25 mg, Oral, Daily  levothyroxine, 50 mcg, Oral, Q PM  lisinopril, 10 mg, Oral, Daily  lithium carbonate, 300 mg, Oral, Q PM  nicotine, 1 patch, Transdermal, Q24H  rosuvastatin, 10 mg, Oral, Daily       ASSESSMENT & PLAN      Bipolar I  disorder depressed  -Continue lithium carbonate 300 mg every evening  -Lithium level is 1.0 8/27, repeating 8/31    lamotrigine 25 mg daily     Hypothyroidism  -Levothyroxine     DM II  -Empagliflozin 25 mg daily  -Sliding scale coverage     HTN  -Lisinopril 10 mg daily     Hyperlipidemia  -Rosuvastatin 10 mg daily     UTI  -Cefdinir      Special precautions: Special Precautions Level 3 (q15 min checks)     Behavioral Health Treatment Plan and Problem List: I have reviewed and approved the Behavioral Health Treatment Plan and Problem list.    I spent a total of 26 minutes in direct patient care including  17 minutes face to face with the patient assessment, coordination of care, and counseling the patient on the current and follow-up treatment plans regarding her status and situation.  Patient had no additional questions.     LIDA Ayala MD    Clinician:  Karsten Ayala MD  08/30/24  10:13 EDT    Dictated utilizing Dragon dictation

## 2024-08-30 NOTE — PLAN OF CARE
Goal Outcome Evaluation:           Progress: improving  Outcome Evaluation: Pt reports fair sleep and good appetite. A/D 5. Denies SI/HI/AVH

## 2024-08-31 LAB
GLUCOSE BLDC GLUCOMTR-MCNC: 127 MG/DL (ref 70–130)
GLUCOSE BLDC GLUCOMTR-MCNC: 196 MG/DL (ref 70–130)
GLUCOSE BLDC GLUCOMTR-MCNC: 197 MG/DL (ref 70–130)
GLUCOSE BLDC GLUCOMTR-MCNC: 198 MG/DL (ref 70–130)
LITHIUM SERPL-SCNC: 1 MMOL/L (ref 0.6–1.2)

## 2024-08-31 PROCEDURE — 63710000001 INSULIN LISPRO (HUMAN) PER 5 UNITS: Performed by: PSYCHIATRY & NEUROLOGY

## 2024-08-31 PROCEDURE — 99232 SBSQ HOSP IP/OBS MODERATE 35: CPT | Performed by: PSYCHIATRY & NEUROLOGY

## 2024-08-31 PROCEDURE — 80178 ASSAY OF LITHIUM: CPT | Performed by: PSYCHIATRY & NEUROLOGY

## 2024-08-31 PROCEDURE — 82948 REAGENT STRIP/BLOOD GLUCOSE: CPT

## 2024-08-31 RX ADMIN — CEFDINIR 300 MG: 300 CAPSULE ORAL at 08:11

## 2024-08-31 RX ADMIN — INSULIN LISPRO 3 UNITS: 100 INJECTION, SOLUTION INTRAVENOUS; SUBCUTANEOUS at 20:37

## 2024-08-31 RX ADMIN — INSULIN LISPRO 3 UNITS: 100 INJECTION, SOLUTION INTRAVENOUS; SUBCUTANEOUS at 11:30

## 2024-08-31 RX ADMIN — ROSUVASTATIN CALCIUM 10 MG: 10 TABLET, FILM COATED ORAL at 08:11

## 2024-08-31 RX ADMIN — LITHIUM CARBONATE 300 MG: 300 CAPSULE, GELATIN COATED ORAL at 16:38

## 2024-08-31 RX ADMIN — ASPIRIN 81 MG: 81 TABLET, COATED ORAL at 16:38

## 2024-08-31 RX ADMIN — CEFDINIR 300 MG: 300 CAPSULE ORAL at 20:37

## 2024-08-31 RX ADMIN — INSULIN LISPRO 3 UNITS: 100 INJECTION, SOLUTION INTRAVENOUS; SUBCUTANEOUS at 16:42

## 2024-08-31 RX ADMIN — EMPAGLIFLOZIN 25 MG: 25 TABLET, FILM COATED ORAL at 08:12

## 2024-08-31 RX ADMIN — LISINOPRIL 10 MG: 10 TABLET ORAL at 08:12

## 2024-08-31 RX ADMIN — LAMOTRIGINE 25 MG: 100 TABLET ORAL at 08:12

## 2024-08-31 RX ADMIN — LEVOTHYROXINE SODIUM 50 MCG: 0.05 TABLET ORAL at 16:38

## 2024-08-31 NOTE — PROGRESS NOTES
"INPATIENT PSYCHIATRIC PROGRESS NOTE    Name:  Tammi Levine  :  1970  MRN:  [id number]  Visit Number:  [id number]  Length of stay:  4    Behavioral Health Treatment Plan and Problem List: I have reviewed and approved the Behavioral Health Treatment Plan and Problem list.    SUBJECTIVE    CC/ Focus of exam: Bipolar depressed    Patient's subjective status: \"Not too bad, kind of even\"    INTERVAL HISTORY: Chart reviewed and patient interviewed.    Patient reports today that she had nightmares overnight but otherwise feels that she is, \"not too bad.\"  She feels that her mood is starting to even out.  She is not having any medication side effects.  Appetite is stable.    Might be helpful to identify a day program in [place] that the patient could access.    Depression rating 210  Anxiety rating 3/10  Hopelessness: 2/10  Sleep: 7 hours      ROS: No cardiovascular, GI, or Neurological complaints.       OBJECTIVE    Vitals:    24 0721   BP: 115/65   Pulse: 68   Resp:    Temp:    SpO2:       Temp:  [96.8 °F (36 °C)-97.1 °F (36.2 °C)] 97.1 °F (36.2 °C)  Heart Rate:  [60-68] 68  Resp:  [18] 18  BP: (115-132)/(62-66) 115/65    MENTAL STATUS EXAM:       Psychomotor: No psychomotor agitation/retardation, No EPS, No motor tics  Speech-normal rate, amount.  Mood/Affect: Depressed, but improving  Thought Processes: coherent  Thought Content: mood congruent  Hallucination(s): none  Hopelessness: Intermittent  Optimistic: yes  Suicidal Thoughts: No  Suicidal Plan/Intent: No  Homicidal Thoughts: absent  Orientation: oriented x 3  Memory: recent intact    Lab Results (last 24 hours)       Procedure Component Value Units Date/Time    POC Glucose Once [[id number]]  (Abnormal) Collected: 24 1126    Specimen: Blood Updated: 24 1133     Glucose 197 mg/dL     POC Glucose Once [[id number]]  (Normal) Collected: 24 0620    Specimen: Blood Updated: 24 06     Glucose 127 mg/dL     Lithium Level " [[id number]]  (Normal) Collected: 08/31/24 0022    Specimen: Blood Updated: 08/31/24 0133     Lithium 1.0 mmol/L     POC Glucose Once [[id number]]  (Abnormal) Collected: 08/30/24 2111    Specimen: Blood Updated: 08/30/24 2121     Glucose 162 mg/dL     POC Glucose Once [[id number]]  (Abnormal) Collected: 08/30/24 1628    Specimen: Blood Updated: 08/30/24 1635     Glucose 201 mg/dL              Imaging Results (Last 24 Hours)       ** No results found for the last 24 hours. **             Lab and x-ray studies reviewed.    ECG/EMG Results (most recent)       Procedure Component Value Units Date/Time    ECG 12 Lead Other; Baseline Cardiac Status [[id number]] Collected: 08/27/24 1727     Updated: 08/28/24 0835     QT Interval 410 ms      QTC Interval 395 ms     Narrative:      Test Reason : Other~  Blood Pressure :   */*   mmHG  Vent. Rate :  56 BPM     Atrial Rate :  56 BPM     P-R Int : 140 ms          QRS Dur :  86 ms      QT Int : 410 ms       P-R-T Axes :  48  48  45 degrees     QTc Int : 395 ms    Sinus bradycardia  Otherwise normal ECG    Confirmed by Philomena Rubalcava ([id number]) on 8/28/2024 8:35:36 AM    Referred By: RANJITH           Confirmed By: Philomena Rubalcava             ALLERGIES: Zoloft [sertraline hcl] and Ciprofloxacin    Medications:     aspirin, 81 mg, Oral, Q PM  cefdinir, 300 mg, Oral, BID  cholecalciferol, 50,000 Units, Oral, Q7 Days  empagliflozin, 25 mg, Oral, Daily  insulin lispro, 3-14 Units, Subcutaneous, 4x Daily AC & at Bedtime  lamoTRIgine, 25 mg, Oral, Daily  levothyroxine, 50 mcg, Oral, Q PM  lisinopril, 10 mg, Oral, Daily  lithium carbonate, 300 mg, Oral, Q PM  nicotine, 1 patch, Transdermal, Q24H  rosuvastatin, 10 mg, Oral, Daily       ASSESSMENT & PLAN      Bipolar I disorder depressed  -Continue lithium carbonate 300 mg every evening  -Lithium level is 1.0 8/27, 1.0 today  -lamotrigine 25 mg daily     Hypothyroidism  -Levothyroxine     DM II  -Empagliflozin 25 mg daily  -Sliding scale  coverage     HTN  -Lisinopril 10 mg daily     Hyperlipidemia  -Rosuvastatin 10 mg daily     UTI  -Cefdinir      Special precautions: Special Precautions Level 3 (q15 min checks)     Behavioral Health Treatment Plan and Problem List: I have reviewed and approved the Behavioral Health Treatment Plan and Problem list.    Clinician:  Rosalino Dorsey MD  08/31/24  15:16 EDT    Dictated utilizing Dragon dictation

## 2024-08-31 NOTE — PLAN OF CARE
Goal Outcome Evaluation:  Plan of Care Reviewed With: patient  Patient Agreement with Plan of Care: agrees     Progress: no change  Outcome Evaluation: Patient reports sleeping and eating well. Rates A/D 3/2. Denies SI/HI or hallucinations. Pt calm and cooperative. Patient slept about 7 hours this shift.

## 2024-08-31 NOTE — PLAN OF CARE
Goal Outcome Evaluation:  Plan of Care Reviewed With: patient  Patient Agreement with Plan of Care: agrees     Progress: no change  Outcome Evaluation: PATIENT HAS BEEN UP ENTIRE SHIFT IN DAY ROOM, GOES TO DINING AREA, EATING GOOD.  PATIENT FEELS DEPRESSION IMPROVING AND GETTING BACK TO HER OLD SELF.  DENIES S/I.  VERY COOPERATIVE, GETS LONG WELL WITH OTHER PATIENTS ON UNIT.  USING QUAD CANE TO AMBULATE.

## 2024-09-01 LAB
GLUCOSE BLDC GLUCOMTR-MCNC: 146 MG/DL (ref 70–130)
GLUCOSE BLDC GLUCOMTR-MCNC: 179 MG/DL (ref 70–130)
GLUCOSE BLDC GLUCOMTR-MCNC: 211 MG/DL (ref 70–130)
GLUCOSE BLDC GLUCOMTR-MCNC: 268 MG/DL (ref 70–130)

## 2024-09-01 PROCEDURE — 63710000001 INSULIN LISPRO (HUMAN) PER 5 UNITS: Performed by: PSYCHIATRY & NEUROLOGY

## 2024-09-01 PROCEDURE — 99232 SBSQ HOSP IP/OBS MODERATE 35: CPT | Performed by: PSYCHIATRY & NEUROLOGY

## 2024-09-01 PROCEDURE — 82948 REAGENT STRIP/BLOOD GLUCOSE: CPT

## 2024-09-01 RX ADMIN — CEFDINIR 300 MG: 300 CAPSULE ORAL at 21:03

## 2024-09-01 RX ADMIN — LISINOPRIL 10 MG: 10 TABLET ORAL at 08:15

## 2024-09-01 RX ADMIN — LITHIUM CARBONATE 300 MG: 300 CAPSULE, GELATIN COATED ORAL at 16:13

## 2024-09-01 RX ADMIN — LEVOTHYROXINE SODIUM 50 MCG: 0.05 TABLET ORAL at 16:13

## 2024-09-01 RX ADMIN — CEFDINIR 300 MG: 300 CAPSULE ORAL at 08:14

## 2024-09-01 RX ADMIN — INSULIN LISPRO 8 UNITS: 100 INJECTION, SOLUTION INTRAVENOUS; SUBCUTANEOUS at 11:27

## 2024-09-01 RX ADMIN — ASPIRIN 81 MG: 81 TABLET, COATED ORAL at 16:13

## 2024-09-01 RX ADMIN — INSULIN LISPRO 5 UNITS: 100 INJECTION, SOLUTION INTRAVENOUS; SUBCUTANEOUS at 21:04

## 2024-09-01 RX ADMIN — LAMOTRIGINE 25 MG: 100 TABLET ORAL at 08:16

## 2024-09-01 RX ADMIN — INSULIN LISPRO 3 UNITS: 100 INJECTION, SOLUTION INTRAVENOUS; SUBCUTANEOUS at 16:43

## 2024-09-01 RX ADMIN — EMPAGLIFLOZIN 25 MG: 25 TABLET, FILM COATED ORAL at 08:15

## 2024-09-01 RX ADMIN — ROSUVASTATIN CALCIUM 10 MG: 10 TABLET, FILM COATED ORAL at 08:14

## 2024-09-01 NOTE — PLAN OF CARE
Goal Outcome Evaluation:  Plan of Care Reviewed With: patient  Patient Agreement with Plan of Care: agrees     Progress: improving     Pt reports poor sleep r/t nightmares. Pt reports good appetite. Pt rates anx 3/10 and dep 2/10. Pt denies SI/HI/AVH.

## 2024-09-01 NOTE — PLAN OF CARE
Problem: Adult Behavioral Health Plan of Care  Goal: Optimized Coping Skills in Response to Life Stressors  Intervention: Promote Effective Coping Strategies  Recent Flowsheet Documentation  Taken 9/1/2024 0746 by Romana Sears RN  Supportive Measures:   verbalization of feelings encouraged   active listening utilized   positive reinforcement provided   Goal Outcome Evaluation:  Plan of Care Reviewed With: patient  Patient Agreement with Plan of Care: agrees     Progress: improving

## 2024-09-01 NOTE — PROGRESS NOTES
"INPATIENT PSYCHIATRIC PROGRESS NOTE    Name:  Tammi Levine  :  1970  MRN:  [id number]  Visit Number:  [id number]  Length of stay:  5    Behavioral Health Treatment Plan and Problem List: I have reviewed and approved the Behavioral Health Treatment Plan and Problem list.    SUBJECTIVE    CC/ Focus of exam: Bipolar depressed    Patient's subjective status: \"Doing good\"    INTERVAL HISTORY: Chart reviewed and patient interviewed.    Patient again today states that she is doing relatively well.  She denies any major symptom burden or complaints.  She is in pleasant spirits.    Might be helpful to identify a day program in [place] that the patient could access.    Depression rating 2/10  Anxiety rating 3/10  Hopelessness: 2/10  Sleep: 6.5 hours      ROS: No cardiovascular, GI, or Neurological complaints.       OBJECTIVE    Vitals:    24 0802   BP: 106/53   Pulse: 71   Resp: 16   Temp:    SpO2: 99%      Temp:  [96.3 °F (35.7 °C)-96.9 °F (36.1 °C)] 96.9 °F (36.1 °C)  Heart Rate:  [65-71] 71  Resp:  [16-18] 16  BP: (106-132)/(53-69) 106/53    MENTAL STATUS EXAM:       Psychomotor: No psychomotor agitation/retardation, No EPS, No motor tics  Speech-normal rate, amount.  Mood/Affect: Appropriate, reports feeling rather well today  Thought Processes: coherent  Thought Content: mood congruent  Hallucination(s): none  Hopelessness: Intermittent  Optimistic: yes  Suicidal Thoughts: No  Suicidal Plan/Intent: No  Homicidal Thoughts: absent  Orientation: oriented x 3  Memory: recent intact    Lab Results (last 24 hours)       Procedure Component Value Units Date/Time    POC Glucose Once [[id number]]  (Abnormal) Collected: 24 1124    Specimen: Blood Updated: 24 1132     Glucose 268 mg/dL     POC Glucose Once [[id number]]  (Abnormal) Collected: 24 0646    Specimen: Blood Updated: 24 0703     Glucose 146 mg/dL     POC Glucose Once [[id number]]  (Abnormal) Collected: 244    " Specimen: Blood Updated: 08/31/24 2050     Glucose 198 mg/dL     POC Glucose Once [[id number]]  (Abnormal) Collected: 08/31/24 1641    Specimen: Blood Updated: 08/31/24 1647     Glucose 196 mg/dL              Imaging Results (Last 24 Hours)       ** No results found for the last 24 hours. **             Lab and x-ray studies reviewed.    ECG/EMG Results (most recent)       Procedure Component Value Units Date/Time    ECG 12 Lead Other; Baseline Cardiac Status [[id number]] Collected: 08/27/24 1727     Updated: 08/28/24 0835     QT Interval 410 ms      QTC Interval 395 ms     Narrative:      Test Reason : Other~  Blood Pressure :   */*   mmHG  Vent. Rate :  56 BPM     Atrial Rate :  56 BPM     P-R Int : 140 ms          QRS Dur :  86 ms      QT Int : 410 ms       P-R-T Axes :  48  48  45 degrees     QTc Int : 395 ms    Sinus bradycardia  Otherwise normal ECG    Confirmed by Philomena Rubalcava ([id number]) on 8/28/2024 8:35:36 AM    Referred By: RANJITH           Confirmed By: Philomena Rubalcava             ALLERGIES: Zoloft [sertraline hcl] and Ciprofloxacin    Medications:     aspirin, 81 mg, Oral, Q PM  cefdinir, 300 mg, Oral, BID  cholecalciferol, 50,000 Units, Oral, Q7 Days  empagliflozin, 25 mg, Oral, Daily  insulin lispro, 3-14 Units, Subcutaneous, 4x Daily AC & at Bedtime  lamoTRIgine, 25 mg, Oral, Daily  levothyroxine, 50 mcg, Oral, Q PM  lisinopril, 10 mg, Oral, Daily  lithium carbonate, 300 mg, Oral, Q PM  nicotine, 1 patch, Transdermal, Q24H  rosuvastatin, 10 mg, Oral, Daily       ASSESSMENT & PLAN      Bipolar I disorder depressed  -Continue lithium carbonate 300 mg every evening  -Lithium level is 1.0 8/27, 1.0 8/31  -lamotrigine 25 mg daily     Hypothyroidism  -Levothyroxine     DM II  -Empagliflozin 25 mg daily  -Sliding scale coverage     HTN  -Lisinopril 10 mg daily     Hyperlipidemia  -Rosuvastatin 10 mg daily     UTI  -Cefdinir      Special precautions: Special Precautions Level 3 (q15 min checks)     Behavioral  Health Treatment Plan and Problem List: I have reviewed and approved the Behavioral Health Treatment Plan and Problem list.    Clinician:  Rosalino Dorsey MD  09/01/24  14:12 EDT    Dictated utilizing Dragon dictation

## 2024-09-02 LAB
GLUCOSE BLDC GLUCOMTR-MCNC: 141 MG/DL (ref 70–130)
GLUCOSE BLDC GLUCOMTR-MCNC: 153 MG/DL (ref 70–130)
GLUCOSE BLDC GLUCOMTR-MCNC: 231 MG/DL (ref 70–130)
GLUCOSE BLDC GLUCOMTR-MCNC: 263 MG/DL (ref 70–130)

## 2024-09-02 PROCEDURE — 63710000001 INSULIN LISPRO (HUMAN) PER 5 UNITS: Performed by: PSYCHIATRY & NEUROLOGY

## 2024-09-02 PROCEDURE — 99232 SBSQ HOSP IP/OBS MODERATE 35: CPT | Performed by: PSYCHIATRY & NEUROLOGY

## 2024-09-02 PROCEDURE — 82948 REAGENT STRIP/BLOOD GLUCOSE: CPT

## 2024-09-02 RX ADMIN — EMPAGLIFLOZIN 25 MG: 25 TABLET, FILM COATED ORAL at 08:17

## 2024-09-02 RX ADMIN — LEVOTHYROXINE SODIUM 50 MCG: 0.05 TABLET ORAL at 16:37

## 2024-09-02 RX ADMIN — CEFDINIR 300 MG: 300 CAPSULE ORAL at 20:30

## 2024-09-02 RX ADMIN — ROSUVASTATIN CALCIUM 10 MG: 10 TABLET, FILM COATED ORAL at 08:17

## 2024-09-02 RX ADMIN — LISINOPRIL 10 MG: 10 TABLET ORAL at 08:18

## 2024-09-02 RX ADMIN — INSULIN LISPRO 3 UNITS: 100 INJECTION, SOLUTION INTRAVENOUS; SUBCUTANEOUS at 07:31

## 2024-09-02 RX ADMIN — INSULIN LISPRO 8 UNITS: 100 INJECTION, SOLUTION INTRAVENOUS; SUBCUTANEOUS at 16:36

## 2024-09-02 RX ADMIN — LITHIUM CARBONATE 300 MG: 300 CAPSULE, GELATIN COATED ORAL at 17:01

## 2024-09-02 RX ADMIN — ASPIRIN 81 MG: 81 TABLET, COATED ORAL at 16:37

## 2024-09-02 RX ADMIN — CEFDINIR 300 MG: 300 CAPSULE ORAL at 08:17

## 2024-09-02 RX ADMIN — LAMOTRIGINE 25 MG: 100 TABLET ORAL at 08:17

## 2024-09-02 RX ADMIN — INSULIN LISPRO 5 UNITS: 100 INJECTION, SOLUTION INTRAVENOUS; SUBCUTANEOUS at 20:36

## 2024-09-02 NOTE — PLAN OF CARE
Goal Outcome Evaluation:  Plan of Care Reviewed With: patient  Patient Agreement with Plan of Care: agrees     Progress: improving  Outcome Evaluation: Patient has been calm and cooperative this shift. Pt rates anxiety 3/10 and depression 2/10. Pt denies SI/HI/AVH. Pt reports sleep is better and good appetite. Pt has been up and going to groups this shift. Pt has had no complaints or s/s of distress. Pt uses can to ambulate with no difficulty.

## 2024-09-02 NOTE — PROGRESS NOTES
" Inpatient Psych Progress Note     Clinician: Zoran Ayala MD  Admission Date: 8/27/2024  11:51 EDT 09/02/24    Behavioral Health Treatment Plan and Problem List: I have reviewed and approved the Behavioral Health Treatment Plan and Problem list.    Allergies  Allergies   Allergen Reactions    Zoloft [Sertraline Hcl] Nausea And Vomiting    Ciprofloxacin Rash     Also caused chest pain       Hospital Day: 6 days      Assessment completed within view of staff    History  CC/clinical focus: Bipolar depression    Interval HPI: Patient seen and evaluated by me.  Chart reviewed.   Med Compliant.  Patient rates  level of depression (subjectively) at a   6/10.  Anxiety   5/10.  Patient tolerating meds okay.  Denies side effects.      Review of Systems   Constitutional: Negative.    HENT: Negative.     Eyes: Negative.    Respiratory: Negative.     Cardiovascular: Negative.    Gastrointestinal: Negative.    Endocrine: Negative.    Genitourinary: Negative.    Musculoskeletal: Negative.    Skin: Negative.    Allergic/Immunologic: Negative.    Neurological: Negative.    Hematological: Negative.         /73 (BP Location: Right arm, Patient Position: Standing)   Pulse 75   Temp 98 °F (36.7 °C) (Temporal)   Resp 16   Ht 149.9 cm (59\")   Wt 78.3 kg (172 lb 9.6 oz)   LMP  (LMP Unknown)   SpO2 98%   BMI 34.86 kg/m²     Mental Status Exam  Mood: depressed  Affect: mood-congruent   Thought Processes: linear  Thought Content: normal  Hallucinations: no  Suicidal Thoughts: denies  Suicidal Plan/Intent: denies  Hopelesness:Mild  Homicidal Thoughts:  denies      Medical Decision Making:   Labs:     Lab Results (last 24 hours)       Procedure Component Value Units Date/Time    POC Glucose Once [[id number]]  (Abnormal) Collected: 09/02/24 1121    Specimen: Blood Updated: 09/02/24 1128     Glucose 141 mg/dL     POC Glucose Once [[id number]]  (Abnormal) Collected: 09/02/24 0630    Specimen: Blood Updated: 09/02/24 0638     Glucose " 153 mg/dL     POC Glucose Once [[id number]]  (Abnormal) Collected: 09/01/24 2100    Specimen: Blood Updated: 09/01/24 2116     Glucose 211 mg/dL     POC Glucose Once [[id number]]  (Abnormal) Collected: 09/01/24 1620    Specimen: Blood Updated: 09/01/24 1626     Glucose 179 mg/dL               Radiology:     Imaging Results (Last 24 Hours)       ** No results found for the last 24 hours. **              EKG:     ECG/EMG Results (most recent)       Procedure Component Value Units Date/Time    ECG 12 Lead Other; Baseline Cardiac Status [[id number]] Collected: 08/27/24 1727     Updated: 08/28/24 0835     QT Interval 410 ms      QTC Interval 395 ms     Narrative:      Test Reason : Other~  Blood Pressure :   */*   mmHG  Vent. Rate :  56 BPM     Atrial Rate :  56 BPM     P-R Int : 140 ms          QRS Dur :  86 ms      QT Int : 410 ms       P-R-T Axes :  48  48  45 degrees     QTc Int : 395 ms    Sinus bradycardia  Otherwise normal ECG    Confirmed by Philomena Rubalcava ([id number]) on 8/28/2024 8:35:36 AM    Referred By: RANJITH           Confirmed By: Philomena Rubalcava             Medications:  aspirin, 81 mg, Oral, Q PM  cefdinir, 300 mg, Oral, BID  cholecalciferol, 50,000 Units, Oral, Q7 Days  empagliflozin, 25 mg, Oral, Daily  insulin lispro, 3-14 Units, Subcutaneous, 4x Daily AC & at Bedtime  lamoTRIgine, 25 mg, Oral, Daily  levothyroxine, 50 mcg, Oral, Q PM  lisinopril, 10 mg, Oral, Daily  lithium carbonate, 300 mg, Oral, Q PM  nicotine, 1 patch, Transdermal, Q24H  rosuvastatin, 10 mg, Oral, Daily           All medications reviewed.      Assessment and Plan:    Bipolar I disorder depressed  -Continue lithium carbonate 300 mg every evening  -Lithium level is 1.0 8/27, 1.0 8/31  -lamotrigine 25 mg daily     Hypothyroidism  -Levothyroxine     DM II  -Empagliflozin 25 mg daily  -Sliding scale coverage     HTN  -Lisinopril 10 mg daily     Hyperlipidemia  -Rosuvastatin 10 mg daily     UTI  -Cefdinir      Continue hospitalization for  safety and stabilization.  Continue current level of Special Precautions (q15 minute checks).    I, Zoran Ayala MD, I have completed an encounter with the patient today, provided ongoing monitoring and/or adjustments to the assessment and plan described and documented above, and believe this information to be both accurate and complete as of 9/2/2024

## 2024-09-02 NOTE — PLAN OF CARE
Goal Outcome Evaluation:  Plan of Care Reviewed With: patient  Patient Agreement with Plan of Care: agrees     Progress: improving     Pt reports poor sleep r/t nightmares and good appetite.Pt rates anx 5/10 and dep 6/10. Pt denies SI/HI/AVH.

## 2024-09-03 VITALS
WEIGHT: 172.6 LBS | DIASTOLIC BLOOD PRESSURE: 78 MMHG | HEIGHT: 59 IN | OXYGEN SATURATION: 99 % | SYSTOLIC BLOOD PRESSURE: 145 MMHG | TEMPERATURE: 97 F | HEART RATE: 88 BPM | BODY MASS INDEX: 34.8 KG/M2 | RESPIRATION RATE: 18 BRPM

## 2024-09-03 LAB
GLUCOSE BLDC GLUCOMTR-MCNC: 136 MG/DL (ref 70–130)
GLUCOSE BLDC GLUCOMTR-MCNC: 145 MG/DL (ref 70–130)
GLUCOSE BLDC GLUCOMTR-MCNC: 192 MG/DL (ref 70–130)

## 2024-09-03 PROCEDURE — 99239 HOSP IP/OBS DSCHRG MGMT >30: CPT | Performed by: PSYCHIATRY & NEUROLOGY

## 2024-09-03 PROCEDURE — 82948 REAGENT STRIP/BLOOD GLUCOSE: CPT

## 2024-09-03 PROCEDURE — 97162 PT EVAL MOD COMPLEX 30 MIN: CPT

## 2024-09-03 PROCEDURE — 63710000001 INSULIN LISPRO (HUMAN) PER 5 UNITS: Performed by: PSYCHIATRY & NEUROLOGY

## 2024-09-03 RX ORDER — LAMOTRIGINE 25 MG/1
25 TABLET ORAL DAILY
Qty: 30 TABLET | Refills: 0 | Status: SHIPPED | OUTPATIENT
Start: 2024-09-04

## 2024-09-03 RX ADMIN — LAMOTRIGINE 25 MG: 100 TABLET ORAL at 08:33

## 2024-09-03 RX ADMIN — EMPAGLIFLOZIN 25 MG: 25 TABLET, FILM COATED ORAL at 08:32

## 2024-09-03 RX ADMIN — INSULIN LISPRO 3 UNITS: 100 INJECTION, SOLUTION INTRAVENOUS; SUBCUTANEOUS at 11:21

## 2024-09-03 RX ADMIN — ROSUVASTATIN CALCIUM 10 MG: 10 TABLET, FILM COATED ORAL at 08:32

## 2024-09-03 RX ADMIN — CEFDINIR 300 MG: 300 CAPSULE ORAL at 08:32

## 2024-09-03 NOTE — DISCHARGE SUMMARY
":  1970  MRN:  [id number]  Visit Number:  [id number]      Date of Admission:2024   Date of Discharge:  9/3/2024    Discharge Diagnosis:  Principal Problem:    Suicidal ideation        Admission Diagnosis:  Suicidal ideation  Bipolar I disorder depressed  Hypothyroidism  DM II  HTN  Hyperlipidemia  UTI    HPI  Tammi Levine is a 54 y.o. female who was admitted on 2024 with complaints of worsening depression and suicidal ideations.   For details please see H&P dated 24.    Hospital Course  Patient is a 54 y.o. female presented with worsening depression and suicidal ideations. The patient was admitted to the [facility] inpatient psych unit for safety, further evaluation and treatment.  The patient was continued on her home medications including lithium and lamotrigine was added for bipolar depression.  The patient was also able to take part in individual and group counseling sessions and work on appropriate coping skills.  The patient made steady improvement in her mood and expressed feeling more positive and hopeful about future. Sleep and appetite were improved.  The day of discharge the patient was calm, cooperative and pleasant. Mood was reported to be good, and denied SI/HI/AVH. Also reported no medication side effects.        Mental Status Exam upon discharge:   Mood \"good\"   Affect-congruent, appropriate, stable  Thought Content-goal directed, no delusional material present  Thought process-linear, organized.  Suicidality: No SI  Homicidality: No HI  Perception: No AH/VH    Procedures Performed         Consults:   Consults       No orders found from 2024 to 2024.            Pertinent Test Results:   Admission on 2024   Component Date Value Ref Range Status    Hepatitis B Surface Ag 2024 Non-Reactive  Non-Reactive Final    Hep A IgM 2024 Non-Reactive  Non-Reactive Final    Hep B C IgM 2024 Non-Reactive  Non-Reactive Final    Hepatitis C Ab " 08/27/2024 Non-Reactive  Non-Reactive Final    QT Interval 08/27/2024 410  ms Final    QTC Interval 08/27/2024 395  ms Final    Glucose 08/27/2024 213 (H)  70 - 130 mg/dL Final    Glucose 08/27/2024 235 (H)  70 - 130 mg/dL Final    Glucose 08/28/2024 101  70 - 130 mg/dL Final    Glucose 08/28/2024 119  70 - 130 mg/dL Final    Glucose 08/28/2024 288 (H)  70 - 130 mg/dL Final    Glucose 08/28/2024 118  70 - 130 mg/dL Final    Glucose 08/28/2024 219 (H)  70 - 130 mg/dL Final    Glucose 08/29/2024 165 (H)  70 - 130 mg/dL Final    Glucose 08/29/2024 243 (H)  70 - 130 mg/dL Final    Glucose 08/29/2024 155 (H)  70 - 130 mg/dL Final    Glucose 08/29/2024 143 (H)  70 - 130 mg/dL Final    Glucose 08/29/2024 210 (H)  70 - 130 mg/dL Final    Glucose 08/30/2024 142 (H)  70 - 130 mg/dL Final    Glucose 08/30/2024 194 (H)  70 - 130 mg/dL Final    Glucose 08/30/2024 201 (H)  70 - 130 mg/dL Final    Glucose 08/30/2024 162 (H)  70 - 130 mg/dL Final    Lithium 08/31/2024 1.0  0.6 - 1.2 mmol/L Final    Glucose 08/31/2024 127  70 - 130 mg/dL Final    Glucose 08/31/2024 197 (H)  70 - 130 mg/dL Final    Glucose 08/31/2024 196 (H)  70 - 130 mg/dL Final    Glucose 08/31/2024 198 (H)  70 - 130 mg/dL Final    Glucose 09/01/2024 146 (H)  70 - 130 mg/dL Final    Glucose 09/01/2024 268 (H)  70 - 130 mg/dL Final    Glucose 09/01/2024 179 (H)  70 - 130 mg/dL Final    Glucose 09/01/2024 211 (H)  70 - 130 mg/dL Final    Glucose 09/02/2024 153 (H)  70 - 130 mg/dL Final    Glucose 09/02/2024 141 (H)  70 - 130 mg/dL Final    Glucose 09/02/2024 263 (H)  70 - 130 mg/dL Final    Glucose 09/02/2024 231 (H)  70 - 130 mg/dL Final    Glucose 09/03/2024 145 (H)  70 - 130 mg/dL Final    Glucose 09/03/2024 136 (H)  70 - 130 mg/dL Final    Glucose 09/03/2024 192 (H)  70 - 130 mg/dL Final   Admission on 08/27/2024, Discharged on 08/27/2024   Component Date Value Ref Range Status    Glucose 08/27/2024 266 (H)  65 - 99 mg/dL Final    BUN 08/27/2024 15  6 - 20 mg/dL  Final    Creatinine 08/27/2024 0.88  0.57 - 1.00 mg/dL Final    Sodium 08/27/2024 136  136 - 145 mmol/L Final    Potassium 08/27/2024 4.0  3.5 - 5.2 mmol/L Final    Chloride 08/27/2024 102  98 - 107 mmol/L Final    CO2 08/27/2024 21.2 (L)  22.0 - 29.0 mmol/L Final    Calcium 08/27/2024 9.5  8.6 - 10.5 mg/dL Final    Total Protein 08/27/2024 7.6  6.0 - 8.5 g/dL Final    Albumin 08/27/2024 4.3  3.5 - 5.2 g/dL Final    ALT (SGPT) 08/27/2024 11  1 - 33 U/L Final    AST (SGOT) 08/27/2024 14  1 - 32 U/L Final    Alkaline Phosphatase 08/27/2024 98  39 - 117 U/L Final    Total Bilirubin 08/27/2024 0.2  0.0 - 1.2 mg/dL Final    Globulin 08/27/2024 3.3  gm/dL Final    A/G Ratio 08/27/2024 1.3  g/dL Final    BUN/Creatinine Ratio 08/27/2024 17.0  7.0 - 25.0 Final    Anion Gap 08/27/2024 12.8  5.0 - 15.0 mmol/L Final    eGFR 08/27/2024 77.7  >60.0 mL/min/1.73 Final    Color, UA 08/27/2024 Yellow  Yellow, Straw Final    Appearance, UA 08/27/2024 Cloudy (A)  Clear Final    pH, UA 08/27/2024 6.0  5.0 - 8.0 Final    Specific Gravity, UA 08/27/2024 1.020  1.005 - 1.030 Final    Glucose, UA 08/27/2024 Negative  Negative Final    Ketones, UA 08/27/2024 Negative  Negative Final    Bilirubin, UA 08/27/2024 Negative  Negative Final    Blood, UA 08/27/2024 Negative  Negative Final    Protein, UA 08/27/2024 Trace (A)  Negative Final    Leuk Esterase, UA 08/27/2024 Large (3+) (A)  Negative Final    Nitrite, UA 08/27/2024 Negative  Negative Final    Urobilinogen, UA 08/27/2024 0.2 E.U./dL  0.2 - 1.0 E.U./dL Final    THC, Screen, Urine 08/27/2024 Negative  Negative Final    Phencyclidine (PCP), Urine 08/27/2024 Negative  Negative Final    Cocaine Screen, Urine 08/27/2024 Negative  Negative Final    Methamphetamine, Ur 08/27/2024 Negative  Negative Final    Opiate Screen 08/27/2024 Negative  Negative Final    Amphetamine Screen, Urine 08/27/2024 Negative  Negative Final    Benzodiazepine Screen, Urine 08/27/2024 Negative  Negative Final     Tricyclic Antidepressants Screen 08/27/2024 Negative  Negative Final    Methadone Screen, Urine 08/27/2024 Negative  Negative Final    Barbiturates Screen, Urine 08/27/2024 Negative  Negative Final    Oxycodone Screen, Urine 08/27/2024 Negative  Negative Final    Buprenorphine, Screen, Urine 08/27/2024 Negative  Negative Final    Magnesium 08/27/2024 2.1  1.6 - 2.6 mg/dL Final    Ethanol 08/27/2024 <10  0 - 10 mg/dL Final    Ethanol % 08/27/2024 <0.010  % Final    Lithium 08/27/2024 1.0  0.6 - 1.2 mmol/L Final    WBC 08/27/2024 12.39 (H)  3.40 - 10.80 10*3/mm3 Final    RBC 08/27/2024 3.98  3.77 - 5.28 10*6/mm3 Final    Hemoglobin 08/27/2024 11.8 (L)  12.0 - 15.9 g/dL Final    Hematocrit 08/27/2024 37.0  34.0 - 46.6 % Final    MCV 08/27/2024 93.0  79.0 - 97.0 fL Final    MCH 08/27/2024 29.6  26.6 - 33.0 pg Final    MCHC 08/27/2024 31.9  31.5 - 35.7 g/dL Final    RDW 08/27/2024 13.5  12.3 - 15.4 % Final    RDW-SD 08/27/2024 45.8  37.0 - 54.0 fl Final    MPV 08/27/2024 9.8  6.0 - 12.0 fL Final    Platelets 08/27/2024 289  140 - 450 10*3/mm3 Final    Neutrophil % 08/27/2024 75.4  42.7 - 76.0 % Final    Lymphocyte % 08/27/2024 17.4 (L)  19.6 - 45.3 % Final    Monocyte % 08/27/2024 5.6  5.0 - 12.0 % Final    Eosinophil % 08/27/2024 1.0  0.3 - 6.2 % Final    Basophil % 08/27/2024 0.2  0.0 - 1.5 % Final    Immature Grans % 08/27/2024 0.4  0.0 - 0.5 % Final    Neutrophils, Absolute 08/27/2024 9.33 (H)  1.70 - 7.00 10*3/mm3 Final    Lymphocytes, Absolute 08/27/2024 2.16  0.70 - 3.10 10*3/mm3 Final    Monocytes, Absolute 08/27/2024 0.70  0.10 - 0.90 10*3/mm3 Final    Eosinophils, Absolute 08/27/2024 0.12  0.00 - 0.40 10*3/mm3 Final    Basophils, Absolute 08/27/2024 0.03  0.00 - 0.20 10*3/mm3 Final    Immature Grans, Absolute 08/27/2024 0.05  0.00 - 0.05 10*3/mm3 Final    nRBC 08/27/2024 0.0  0.0 - 0.2 /100 WBC Final    Fentanyl, Urine 08/27/2024 Negative  Negative Final    RBC, UA 08/27/2024 0-2  None Seen, 0-2 /HPF Final     WBC, UA 08/27/2024 Too Numerous to Count (A)  None Seen, 0-2 /HPF Final    Bacteria, UA 08/27/2024 1+ (A)  None Seen /HPF Final    Squamous Epithelial Cells, UA 08/27/2024 3-6 (A)  None Seen, 0-2 /HPF Final    Hyaline Casts, UA 08/27/2024 0-2  None Seen /LPF Final    Methodology 08/27/2024 Automated Microscopy   Final    Extra Tube 08/27/2024 Hold for add-ons.   Final    Auto resulted.        Condition on Discharge:  improved    Vital Signs  Temp:  [97.1 °F (36.2 °C)-98 °F (36.7 °C)] 98 °F (36.7 °C)  Heart Rate:  [63-77] 75  Resp:  [16-18] 16  BP: (106-124)/(53-60) 107/56      Discharge Disposition:  Home or Self Care    Discharge Medications:     Discharge Medications        New Medications        Instructions Start Date   cefdinir 300 MG capsule  Commonly known as: OMNICEF   Take 1 capsule by mouth 2 Times a Day for 7 days.      lamoTRIgine 25 MG tablet  Commonly known as: LaMICtal   25 mg, Oral, Daily   Start Date: September 4, 2024            Continue These Medications        Instructions Start Date   aspirin 81 MG EC tablet   81 mg, Oral, Every Evening      cholecalciferol 1.25 MG (72635 UT) capsule  Commonly known as: VITAMIN D3   1 capsule, Oral, Every 7 Days      dapagliflozin Propanediol 10 MG tablet  Commonly known as: Farxiga   10 mg, Oral, Daily      insulin aspart 100 UNIT/ML solution pen-injector sc pen  Commonly known as: novoLOG FLEXPEN   Inject 15 Units under the skin into the appropriate area as directed 2 (Two) Times a Day Before Meals.      levothyroxine 50 MCG tablet  Commonly known as: SYNTHROID, LEVOTHROID   50 mcg, Oral, Every Evening      lisinopril 10 MG tablet  Commonly known as: PRINIVIL,ZESTRIL   10 mg, Oral, Daily      lithium carbonate 300 MG capsule   300 mg, Oral, Every Evening      rosuvastatin 10 MG tablet  Commonly known as: CRESTOR   10 mg, Oral, Daily      traZODone 50 MG tablet  Commonly known as: DESYREL   50 mg, Oral, Nightly               Discharge Diet: Consistent  carbohydrate     Activity at Discharge: As tolerated     Follow-up Appointments  Future Appointments   Date Time Provider Department Center   9/17/2024  4:15 PM Vidal Ortiz LCSW [facility]  [facility] None   9/18/2024  1:00 PM Grisel Yuossef APRN [facility]  [facility] None   10/8/2024  1:15 PM Vidal Ortiz LCSW [facility]  [facility] None   10/29/2024  2:45 PM Vidal Ortiz LCSW [facility]  [facility] None   11/11/2024  1:00 PM Oscar Lewis PA-C [facility]   11/19/2024 12:30 PM Vidal Ortiz LCSW [facility] None   12/10/2024  1:15 PM Vidal Ortiz LCSW [facility] None       Time: I spent  > 30  minutes on this discharge activity which included: face-to-face encounter with the patient, reviewing the data in the system, coordination of the care with the nursing staff as well as consultants, documentation, and entering orders.        Clinician:   Virgen Lion MD  09/03/24  14:15 EDT

## 2024-09-03 NOTE — PLAN OF CARE
Goal Outcome Evaluation:  Plan of Care Reviewed With: patient  Patient Agreement with Plan of Care: agrees     Progress: improving  Outcome Evaluation: Pt cooperative with staff. Pt denies problems with sleep or appetite. Pt rates anxiety 1/10 and depression 2/10. Pt denies SI, HI, and AVH.

## 2024-09-03 NOTE — PLAN OF CARE
Goal Outcome Evaluation:  Plan of Care Reviewed With: patient  Patient Agreement with Plan of Care: agrees     Progress: improving     Pt report poor sleep r/t nightmares. Pt reports good appetite. Pt rates anx 4/10 and dep 2/10. Pt denies SI/HI/AVH.

## 2024-09-03 NOTE — THERAPY EVALUATION
"Acute Care - Physical Therapy Initial Evaluation   [place]     Patient Name: Tammi Levine  : 1970  MRN: [id number]  Today's Date: 9/3/2024   Onset of Illness/Injury or Date of Surgery: 24  Visit Dx:   No diagnosis found.  Patient Active Problem List   Diagnosis    Arteriosclerotic cardiovascular disease (ASCVD), s/p MI in , clinically stable.     Type 2 diabetes mellitus with diabetic peripheral angiopathy without gangrene, with long-term current use of insulin    Essential hypertension    Dyslipidemia    Sleep apnea    Migraine headache    Abnormal CT scan    Disease of thyroid gland    Right-sided low back pain with right-sided sciatica    Slow transit constipation    Suicidal ideation     Past Medical History:   Diagnosis Date    Abnormal CT scan     ADRENAL GLAND FOCAL MASS LESION MULTIPLE, LEFT ONLY    Anxiety     ASCVD (arteriosclerotic cardiovascular disease)     \"status post MI in , clinically stable\"    Bipolar disorder     Depression     Dyslipidemia     Hypertension, well controlled     Hypothyroidism     Migraine headache     Myocardial infarction     Panic disorder     Right-sided low back pain with right-sided sciatica     Sleep apnea     Suicidal thoughts     Type 2 diabetes mellitus      Past Surgical History:   Procedure Laterality Date    CHOLECYSTECTOMY      CORONARY ANGIOPLASTY WITH STENT PLACEMENT      Dr. Denson    ENDOSCOPY  2021    HYSTERECTOMY  2014    NECK SURGERY      TOE SURGERY Left     TRANSESOPHAGEAL ECHOCARDIOGRAM (SARAN)  2015    EF 60-65%     PT Assessment (Last 12 Hours)       PT Evaluation and Treatment       Row Name 24 1145          Physical Therapy Time and Intention    Subjective Information no complaints  -CS     Document Type evaluation  -CS     Mode of Treatment physical therapy  -CS     Patient Effort adequate  -CS     Comment Pt seen on the unit for evaluation this AM. Pt reports baseline mobility using a quad cane. " Pt agreed to evaluation.  -       Row Name 09/03/24 1145          General Information    Patient Profile Reviewed yes  -CS     Onset of Illness/Injury or Date of Surgery 08/27/24  -     Referring Physician Lucy  -     Patient Observations alert;cooperative;agree to therapy  -CS     Risks Reviewed patient:;LOB;nausea/vomiting;dizziness;increased discomfort;change in vital signs;increased drainage  -CS     Benefits Reviewed patient:;improve function;increase independence;increase strength;increase balance;decrease pain;decrease risk of DVT;improve skin integrity;increase knowledge  -       Row Name 09/03/24 1145          Pain    Pre/Posttreatment Pain Comment no c/o  -CS       Row Name 09/03/24 1145          Cognition    Orientation Status (Cognition) oriented x 4  -       Row Name 09/03/24 1145          Range of Motion (ROM)    Range of Motion bilateral lower extremities;ROM is WFL  -[facility] Name 09/03/24 1145          Strength Comprehensive (MMT)    Comment, General Manual Muscle Testing (MMT) Assessment (B)LE WFL  -[facility] Name 09/03/24 1145          Bed Mobility    Bed Mobility bed mobility (all) activities  -     All Activities, [place] (Bed Mobility) independent;supervision  -CS     Assistive Device (Bed Mobility) bed rails;draw sheet;head of bed elevated  -       Row Name 09/03/24 1145          Transfers    Transfers sit-stand transfer;stand-sit transfer  -     Comment, (Transfers) supervision/mod(I) w/ quad cane  -       Row Name 09/03/24 1145          Sit-Stand Transfer    Sit-Stand [place] (Transfers) modified independence;supervision  -     Assistive Device (Sit-Stand Transfers) cane, quad  -       Row Name 09/03/24 1145          Stand-Sit Transfer    Stand-Sit [place] (Transfers) modified independence;supervision  -CS     Assistive Device (Stand-Sit Transfers) cane, quad  -CS       Row Name 09/03/24 1145          Gait/Stairs (Locomotion)    Gait/Stairs  Locomotion gait/ambulation assistive device;gait/ambulation independence;distance ambulated  -     [place] Level (Gait) modified independence;supervision  -CS     Assistive Device (Gait) cane, quad  -CS     Patient was able to Ambulate yes  -CS     Distance in Feet (Gait) 150  -CS     Pattern (Gait) step-to  -CS     Comment, (Gait/Stairs) Pt ambulated ad macarena on the unit w/ safe use of AD, no LOB.  -       Row Name 09/03/24 1145          Balance    Comment, Balance GOOD(-) - use of single UE support for ambulation.  -       Row Name 09/03/24 1145          Plan of Care Review    Plan of Care Reviewed With patient  -CS     Progress no change  -CS     Outcome Evaluation Pt presents w/ mobility at her functional baseline w/ use of quad cane. No skilled inpatient PT needs at this time. Please re-consult if clincially indicated.  -       Row Name 09/03/24 1145          Positioning and Restraints    Pre-Treatment Position standing in room  -CS     Post Treatment Position other  -CS     In Bed patient within staff view  -CS     Other Position return to room independently  -       Row Name 09/03/24 1145          Therapy Assessment/Plan (PT)    Functional Level at Time of Evaluation (PT) supervision/mod(I)  -CS     Criteria for Skilled Interventions Met (PT) no;no problems identified which require skilled intervention;does not meet criteria for skilled intervention  -CS     Therapy Frequency (PT) evaluation only  -CS     Predicted Duration of Therapy Intervention (PT) while in house  -CS     Comment, Therapy Assessment/Plan (PT) Pt presents w/ mobility at her functional baseline w/ use of quad cane. No skilled inpatient PT needs at this time. Please re-consult if clincially indicated.  -       Row Name 09/03/24 1145          PT Evaluation Complexity    History, PT Evaluation Complexity 3 or more personal factors and/or comorbidities  -CS     Examination of Body Systems (PT Eval Complexity) total of 4 or more  elements  -CS     Clinical Presentation (PT Evaluation Complexity) evolving  -CS     Clinical Decision Making (PT Evaluation Complexity) moderate complexity  -CS     Overall Complexity (PT Evaluation Complexity) moderate complexity  -CS       Row Name 09/03/24 1145          Therapy Plan Review/Discharge Plan (PT)    Therapy Plan Review (PT) evaluation/treatment results reviewed;care plan/treatment goals reviewed;risks/benefits reviewed;current/potential barriers reviewed;participants voiced agreement with care plan;participants included;patient  -CS       Row Name 09/03/24 1145          Physical Therapy Goals    Transfer Goal Selection (PT) transfer, PT goal 1  -CS     Gait Training Goal Selection (PT) gait training, PT goal 1  -CS       Row Name 09/03/24 1145          Transfer Goal 1 (PT)    Activity/Assistive Device (Transfer Goal 1, PT) transfers, all  -CS     [place] Level/Cues Needed (Transfer Goal 1, PT) standby assist  -CS     Time Frame (Transfer Goal 1, PT) long term goal (LTG);by discharge  -CS     Progress/Outcome (Transfer Goal 1, PT) goal met  -CS       Row Name 09/03/24 1145          Gait Training Goal 1 (PT)    Activity/Assistive Device (Gait Training Goal 1, PT) gait (walking locomotion);assistive device use  -CS     [place] Level (Gait Training Goal 1, PT) standby assist  -CS     Distance (Gait Training Goal 1, PT) 150  -CS     Time Frame (Gait Training Goal 1, PT) long term goal (LTG);by discharge  -CS     Progress/Outcome (Gait Training Goal 1, PT) goal met  -CS       Row Name 09/03/24 1145          Discharge Summary (PT)    Additional Documentation --  D/C acute PT.  -CS               User Key  (r) = Recorded By, (t) = Taken By, (c) = Cosigned By      Initials Name Provider Type    CS Eber Monk, PT Physical Therapist                    Physical Therapy Education       Title: PT OT SLP Therapies (Done)       Topic: Physical Therapy (Done)       Point: Mobility training (Done)        Learning Progress Summary             Patient Acceptance, E,TB, VU by  at 9/3/2024 1318                         Point: Home exercise program (Done)       Learning Progress Summary             Patient Acceptance, E,TB, VU by  at 9/3/2024 1318                         Point: Body mechanics (Done)       Learning Progress Summary             Patient Acceptance, E,TB, VU by  at 9/3/2024 1318                         Point: Precautions (Done)       Learning Progress Summary             Patient Acceptance, E,TB, VU by  at 9/3/2024 1318                                         User Key       Initials Effective Dates Name Provider Type Discipline     05/31/23 -  Eber Monk, PT Physical Therapist PT                  PT Recommendation and Plan  Anticipated Discharge Disposition (PT): home  Therapy Frequency (PT): evaluation only  Plan of Care Reviewed With: patient  Progress: no change  Outcome Evaluation: Pt presents w/ mobility at her functional baseline w/ use of quad cane. No skilled inpatient PT needs at this time. Please re-consult if clincially indicated.       Time Calculation:    PT Charges       Row Name 09/03/24 1318             Time Calculation    Start Time 1130  -CS      PT Received On 09/03/24  -                User Key  (r) = Recorded By, (t) = Taken By, (c) = Cosigned By      Initials Name Provider Type     Eber Monk, PT Physical Therapist                  Therapy Charges for Today       Code Description Service Date Service Provider Modifiers Qty    66044760910 HC PT EVAL MOD COMPLEXITY 4 9/3/2024 Eber Monk, PT GP 1            PT G-Codes  AM-PAC 6 Clicks Score (PT): 23    Eber Monk PT  9/3/2024

## 2024-09-03 NOTE — PLAN OF CARE
Problem: Adult Behavioral Health Plan of Care  Goal: Optimized Coping Skills in Response to Life Stressors  Outcome: Ongoing, Progressing  Flowsheets (Taken 9/3/2024 1134)  Optimized Coping Skills in Response to Life Stressors: making progress toward outcome  Intervention: Promote Effective Coping Strategies  Flowsheets (Taken 9/3/2024 1134)  Supportive Measures:   active listening utilized   counseling provided   decision-making supported   goal-setting facilitated   relaxation techniques promoted   problem-solving facilitated   positive reinforcement provided   self-care encouraged   self-reflection promoted   self-responsibility promoted   verbalization of feelings encouraged  Goal: Develops/Participates in Therapeutic [place] to Support Successful Transition  Outcome: Ongoing, Progressing  Flowsheets (Taken 9/3/2024 1134)  Develops/Participates in Therapeutic [place] to Support Successful Transition: making progress toward outcome  Intervention: Foster Therapeutic [place]  Flowsheets (Taken 9/3/2024 1134)  Trust Relationship/Rapport:   questions encouraged   care explained   choices provided   reassurance provided   emotional support provided   thoughts/feelings acknowledged   empathic listening provided   questions answered  Intervention: Mutually Develop Transition Plan  Flowsheets  Taken 9/3/2024 1134 by Wen Cooper  Transition Support:   follow-up care coordinated   community resources reviewed   follow-up care discussed   crisis management plan promoted   crisis management plan verbalized  Offered/Gave Vendor List: no  Taken 8/30/2024 1641 by Violeta Camarillo LCSW  Transportation Anticipated: family or friend will provide  Transportation Concerns: no car  Current Discharge Risk: psychiatric illness  Concerns to be Addressed: mental health  Readmission Within the Last 30 Days: no previous admission in last 30 days  Patient/Family Anticipated Services at Transition:   mental health services    outpatient care  Patient/Family Anticipates Transition to: home with family  Taken 8/28/2024 1022 by Fernanda Razo MSW  Outpatient/Agency/Support Group Needs:   outpatient counseling   outpatient medication management  Discharge Coordination/Progress:   Therapist met with Patient to complete discharge needs assessment   Patient agreeable.  Anticipated Discharge Disposition: home with family  Patient's Choice of Community Agency(s): [facility]      DATA:      Therapist discussed case with RN and met with patient today to review coping skills, review plan of care, and discuss discharge.    Therapist introduced myself as covering for patient's primary therapist, Stan. Patient is agreeable to meet with me.      Clinical Maneuvering/Intervention:     Therapist assisted patient in processing above session content; acknowledged and normalized patient’s thoughts, feelings, and concerns.  Discussed the therapist/patient relationship and explain the parameters and limitations of relative confidentiality.  Also discussed the importance of active participation, and honesty to the treatment process.  Encouraged the patient to discuss/vent their feelings, frustrations, and fears concerning their ongoing medical issues and validated their feelings.     Allowed patient to freely discuss issues without interruption or judgment. Provided safe, confidential environment to facilitate the development of positive therapeutic relationship and encourage open, honest communication.      Therapist addressed discharge safety planning this date. Assisted patient in identifying risk factors which would indicate the need for higher level of care after discharge;  including thoughts to harm self or others and/or self-harming behavior. Encouraged patient to call 911, or present to the nearest emergency room should any of these events occur. Discussed crisis intervention services and means to access.  Encouraged securing any objects of  harm.    Therapist completed integrated summary, treatment plan, and initiated social history this date.  Therapist is strongly encouraging family involvement in treatment.       Encouraged mask wearing, social distancing, and regular hand washing due to COVID19 risk.      ASSESSMENT:      Therapist met 1:1 with patient today. Patient denies suicidal ideation. Patient denies homicidal ideation. Patient denies AVH. Patient is calm and cooperative with session. Her affect is bright and she reports improvement in mood and symptoms. Patient denies any concerns today. She is hopeful for discharge soon.      PLAN:       Patient to remain hospitalized this date.      Treatment team will focus efforts on stabilizing patient's acute symptoms while providing education on healthy coping and crisis management to reduce hospitalizations.   Patient requires daily psychiatrist evaluation and 24/7 nursing supervision to promote patient  safety.     Therapist will offer 1-4 individual sessions, 1 therapy group daily, family education, and appropriate referral.

## 2024-09-03 NOTE — PLAN OF CARE
Goal Outcome Evaluation:  Plan of Care Reviewed With: patient        Progress: no change  Outcome Evaluation: Pt presents w/ mobility at her functional baseline w/ use of quad cane. No skilled inpatient PT needs at this time. Please re-consult if clincially indicated.      Anticipated Discharge Disposition (PT): home

## 2024-09-17 ENCOUNTER — OFFICE VISIT (OUTPATIENT)
Dept: PSYCHIATRY | Facility: CLINIC | Age: 54
End: 2024-09-17
Payer: MEDICAID

## 2024-09-17 DIAGNOSIS — F41.1 GENERALIZED ANXIETY DISORDER: ICD-10-CM

## 2024-09-17 DIAGNOSIS — F31.81 CHRONIC BIPOLAR II DISORDER, MOST RECENT EPISODE MAJOR DEPRESSIVE: Primary | ICD-10-CM

## 2024-09-17 DIAGNOSIS — G47.9 SLEEPING DIFFICULTIES: ICD-10-CM

## 2024-09-18 ENCOUNTER — OFFICE VISIT (OUTPATIENT)
Dept: PSYCHIATRY | Facility: CLINIC | Age: 54
End: 2024-09-18
Payer: MEDICAID

## 2024-09-18 VITALS
HEIGHT: 59 IN | WEIGHT: 176 LBS | OXYGEN SATURATION: 98 % | HEART RATE: 73 BPM | DIASTOLIC BLOOD PRESSURE: 72 MMHG | BODY MASS INDEX: 35.48 KG/M2 | SYSTOLIC BLOOD PRESSURE: 114 MMHG

## 2024-09-18 DIAGNOSIS — G47.9 SLEEPING DIFFICULTIES: ICD-10-CM

## 2024-09-18 DIAGNOSIS — F31.81 CHRONIC BIPOLAR II DISORDER, MOST RECENT EPISODE MAJOR DEPRESSIVE: Primary | ICD-10-CM

## 2024-09-18 DIAGNOSIS — F41.3 OTHER MIXED ANXIETY DISORDERS: ICD-10-CM

## 2024-09-18 DIAGNOSIS — Z79.899 MEDICATION MANAGEMENT: ICD-10-CM

## 2024-09-18 DIAGNOSIS — F41.1 GENERALIZED ANXIETY DISORDER: ICD-10-CM

## 2024-09-18 PROCEDURE — 3074F SYST BP LT 130 MM HG: CPT | Performed by: NURSE PRACTITIONER

## 2024-09-18 PROCEDURE — 1159F MED LIST DOCD IN RCRD: CPT | Performed by: NURSE PRACTITIONER

## 2024-09-18 PROCEDURE — 3078F DIAST BP <80 MM HG: CPT | Performed by: NURSE PRACTITIONER

## 2024-09-18 PROCEDURE — 99214 OFFICE O/P EST MOD 30 MIN: CPT | Performed by: NURSE PRACTITIONER

## 2024-09-18 PROCEDURE — 1160F RVW MEDS BY RX/DR IN RCRD: CPT | Performed by: NURSE PRACTITIONER

## 2024-09-18 RX ORDER — PREDNISONE 10 MG/1
1 TABLET ORAL DAILY
COMMUNITY
Start: 2024-07-29

## 2024-09-18 RX ORDER — LITHIUM CARBONATE 300 MG/1
300 CAPSULE ORAL EVERY EVENING
Qty: 90 CAPSULE | Refills: 0 | Status: SHIPPED | OUTPATIENT
Start: 2024-09-18

## 2024-09-18 RX ORDER — FLURBIPROFEN SODIUM 0.3 MG/ML
SOLUTION/ DROPS OPHTHALMIC
COMMUNITY
Start: 2024-09-06

## 2024-09-18 RX ORDER — ONDANSETRON 4 MG/1
4 TABLET, ORALLY DISINTEGRATING ORAL
COMMUNITY
Start: 2024-07-29

## 2024-09-18 RX ORDER — LAMOTRIGINE 25 MG/1
25 TABLET ORAL 2 TIMES DAILY
Qty: 60 TABLET | Refills: 1 | Status: SHIPPED | OUTPATIENT
Start: 2024-09-18

## 2024-09-18 RX ORDER — BLOOD SUGAR DIAGNOSTIC
STRIP MISCELLANEOUS
COMMUNITY
Start: 2024-09-06

## 2024-10-04 NOTE — PROGRESS NOTES
Date of Service: May 14, 2024  Time In: 2:00 PM  Time Out: 2:40 PM    PROGRESS NOTE  Data:  Tammi Levine is a 54 y.o. female who met 1:1 with the undersigned for a regularly scheduled individual outpatient therapy session at [facility] for follow-up of bipolar disorder.  Patient and the undersigned wore masks throughout the session and maintained appropriate distancing.    Chief Complaint: Bipolar II disorder; anxiety; family dynamics problem     HPI:   Patient reports she continues to struggle with mood instability including periods of depressed mood, anhedonia, anergia, and feeling hopeless.  Patient also reports she struggles with periods of hypomania including feeling on edge, irritability, increased energy, impulsive behavior, and decreased need for sleep.  However, the patient states she primarily struggles with depression and anxiety.  Patient rates current symptoms at a 4 on a scale of 1-10 with 10 being most severe. Patient reports she is sleeping relatively well at this time.  The patient adamantly and convincingly denies suicidal ideation vehemently denies any substance use.      Clinical Maneuvering/Intervention:  Assisted patient in processing above session content; acknowledged and normalized patient’s thoughts, feelings, and concerns.    Utilized cognitive behavioral therapy to assist the patient in developing appropriate coping mechanisms to decrease the severity frequency of symptoms.  Continue to focus on healthy skills of daily living and behavioral activation.  Provided unconditional positive regarding a safe, supportive environment.    Allowed patient to freely discuss issues without interruption or judgment. Provided safe, confidential environment to facilitate the development of positive therapeutic relationship and encourage open, honest communication. Assisted patient in identifying risk factors which would indicate the need for higher level of care including  thoughts to harm self or others and/or self-harming behavior and encouraged patient to contact this office, call 911, or present to the nearest emergency room should any of these events occur. Discussed crisis intervention services and means to access.  Patient adamantly and convincingly denies current suicidal or homicidal ideation or perceptual disturbance.      Assessment    Patient appears to maintain relative stability as compared to her baseline.  However, she continues to be susceptible to an external locus of control and continues to be significantly affected by ongoing strained family dynamics.  The patient's symptomology continue to cause impairment in important areas of functioning.  Patient can be reasonably expected to continue to benefit treatment and would likely be at increased risk for decompensation.    Diagnoses and all orders for this visit:    1. Bipolar II disorder (HCC) -unchanged (Primary)    2. Generalized anxiety disorder -unchanged    3. Family dynamics problem    4. Sleeping difficulties               Mental Status Exam  Hygiene: Good  Dress: Casual  Attitude:  Cooperative  Motor Activity: Appropriate  Speech:  Normal  Mood: Within normal limits  Affect: Tearful  Thought Processes:  Linear  Thought Content:  normal  Suicidal Thoughts:  denies  Homicidal Thoughts:  denies  Crisis Safety Plan: yes, to come to the emergency room.  Hallucinations:  denies    Patient's Support Network Includes:  mother and extended family    Progress toward goal: Not at goal    Functional Status: Mild impairment     Prognosis: Fair with Ongoing Treatment     Plan         Patient will continue in individual outpatient therapy session [facility] every 3 weeks and will continue and pharmacotherapy as scheduled with MARISELA Dixon.  Patient will adhere to medication regimen as prescribed and report any side effects. Patient will contact this office, call 911 or present to the  nearest emergency room should suicidal or homicidal ideations occur. Provide Cognitive Behavioral Therapy and Integrative Therapy to improve functioning, maintain stability, and avoid decompensation and the need for higher level of care.          Return in about 3 weeks (around 6/4/2024) for Next scheduled follow up.      This document signed by Vidal Ortiz LCSW, [facility] [place] October 4, 2024 05:38 EDT

## 2024-10-29 ENCOUNTER — OFFICE VISIT (OUTPATIENT)
Dept: PSYCHIATRY | Facility: CLINIC | Age: 54
End: 2024-10-29
Payer: MEDICAID

## 2024-10-29 DIAGNOSIS — F31.81 BIPOLAR II DISORDER: Primary | ICD-10-CM

## 2024-10-29 DIAGNOSIS — Z63.9 FAMILY DYNAMICS PROBLEM: ICD-10-CM

## 2024-10-29 DIAGNOSIS — F41.1 GENERALIZED ANXIETY DISORDER: ICD-10-CM

## 2024-10-29 DIAGNOSIS — G47.9 SLEEPING DIFFICULTIES: ICD-10-CM

## 2024-10-29 SDOH — SOCIAL STABILITY - SOCIAL INSECURITY: PROBLEM RELATED TO PRIMARY SUPPORT GROUP, UNSPECIFIED: Z63.9

## 2024-10-30 ENCOUNTER — OFFICE VISIT (OUTPATIENT)
Dept: PSYCHIATRY | Facility: CLINIC | Age: 54
End: 2024-10-30
Payer: MEDICAID

## 2024-10-30 VITALS
BODY MASS INDEX: 36.29 KG/M2 | DIASTOLIC BLOOD PRESSURE: 70 MMHG | WEIGHT: 180 LBS | SYSTOLIC BLOOD PRESSURE: 124 MMHG | HEART RATE: 60 BPM | HEIGHT: 59 IN | OXYGEN SATURATION: 100 %

## 2024-10-30 DIAGNOSIS — F41.1 GENERALIZED ANXIETY DISORDER: ICD-10-CM

## 2024-10-30 DIAGNOSIS — F31.30 BIPOLAR I DISORDER, MOST RECENT EPISODE DEPRESSED: Primary | ICD-10-CM

## 2024-10-30 DIAGNOSIS — Z79.899 MEDICATION MANAGEMENT: ICD-10-CM

## 2024-10-30 PROCEDURE — 3074F SYST BP LT 130 MM HG: CPT

## 2024-10-30 PROCEDURE — 99214 OFFICE O/P EST MOD 30 MIN: CPT

## 2024-10-30 PROCEDURE — 80178 ASSAY OF LITHIUM: CPT

## 2024-10-30 PROCEDURE — 1160F RVW MEDS BY RX/DR IN RCRD: CPT

## 2024-10-30 PROCEDURE — 1159F MED LIST DOCD IN RCRD: CPT

## 2024-10-30 PROCEDURE — 3078F DIAST BP <80 MM HG: CPT

## 2024-10-30 RX ORDER — LAMOTRIGINE 25 MG/1
50 TABLET ORAL NIGHTLY
Qty: 60 TABLET | Refills: 1 | Status: SHIPPED | OUTPATIENT
Start: 2024-10-30

## 2024-10-30 RX ORDER — LITHIUM CARBONATE 300 MG/1
300 CAPSULE ORAL EVERY EVENING
Qty: 30 CAPSULE | Refills: 1 | Status: SHIPPED | OUTPATIENT
Start: 2024-10-30 | End: 2024-12-29

## 2024-10-30 NOTE — PROGRESS NOTES
"  Subjective     Tammi Levine is a 54 y.o. female who presents today for follow up    Chief Complaint:  management of bipolar disorder and anxiety     History of Present Illness:    Today is a follow-up visit.    Medication compliance: patient is compliant with medications, denies SE. She reports when taking the am dose of Lamictal she feels drowsy- will have patient take dose at HS>     Since getting out of the hospital she is doing well staying busy and keeping active. She talks to her mother and her aunt. She has been driving around her neighborhood and looking at the scenery. She has not been to see her father at the [place]. She feels guilty for not going and not staying long.     Details:  Depression rated 2/10, with 10 being the worst. PHQ-9 score: 8  She denies having a depressed mood or anhedonia. She reports some trouble with sleep, feeling tried and decreased appetite. She reports feeling a little hopeless yesterday because her landlord will be coming to check her apartment. She feels better today. These symptoms have caused impairment in important areas of functioning but have improved with medication.    Anxiety rated 1/10, with 10 being the worst. SWAPNA-7 score: 11  She reports hx of excessive anxiety, excessive worry, and difficulty controlling worry. She reports improvement in anxiety she feels less overwhelmed and less \"what if\" thoughts. She reports having some improvement in restless, feeling on edge and irritability., fatigue, muscle tension, sleep disturbance, and decreased concentration. These symptoms have caused impairment in important areas of functioning but have improved with medication.    She reports no recent anxiety attacks. It has been 4-5 years ago.   She was dx with bipolar disorder 30 years ago.  The patient endorses significant symptoms of bipolar disorder including: elevated mood alternating with irritable mood for at least a week, inflated self esteem, decreased need for " sleep-up for 2-3 days at a time, increased talkativeness, flight of ideas or racing thoughts, distractibility, increase in goal directed activity or psychomotor agitation, but denies risky behavior. These symptoms have caused impairment in important areas of daily functioning but are much improved with medication.  The patient has had symptoms of bipolar disorder for 30 years.  The patient rates their symptoms at a 1/10 on a 0-10 scale, with 10 being the worst.     Sleep is fair,. She is getting at least 6 hours per night. She gets up during the nights and will accomplish a few tasks then returns to bed.  Nightmares: Denies    Appetite is good. She is eating 2 meals per day,snacking daily on little alex cakes. She is drinking two diet Mt. Dews a day, gartor aid and water.     Patient denies SI/HI, A/V hallucinations, or delusions.    Alcohol use: no  Drug use: no  Marijuana use: no  Tobacco: no    Chronic health issues, no acute physical or medical issues today.      The following portions of the patient's history were reviewed and updated as appropriate: allergies, current medications, past family history, past medical history, past social history, past surgical history and problem list.    Previous psychiatric medications include:   Antidepressants: Effexor and Prozac- nightmares, Current: trazodone  Antianxiety: None  Antipsychotics: None  Mood stabilizers: tried: Depakote (divalproex)- did not help-sedated. Current:  Lamotrigine and lithium  ADHD: None     Past Psychiatric History:  She reports having anxiety and depression for as long as she can remember. She reports first being hospitalized 30 years ago in [place]. This August was the first time in 10 years that she was hospitalized.   Reports she was recently admitted to the [facility] for worsening depression and SI (8/27/24 to 9/2/24). States she felt overwhelmed after her surgery with life stressors. While inpatient she was also treated for UTI.  "She was started on lamotrigine, states she has felt a small improvement with mood. States she does have moments of depression in the evenings.     Past Medical History:  Past Medical History:   Diagnosis Date    Abnormal CT scan     ADRENAL GLAND FOCAL MASS LESION MULTIPLE, LEFT ONLY    Anxiety     ASCVD (arteriosclerotic cardiovascular disease)     \"status post MI in 2012, clinically stable\"    Bipolar disorder     Depression     Dyslipidemia     Hypertension, well controlled     Hypothyroidism     Migraine headache     Myocardial infarction 2012    Panic disorder     Right-sided low back pain with right-sided sciatica     Sleep apnea     Suicidal thoughts     Type 2 diabetes mellitus        Social History:  Social History     Socioeconomic History    Marital status: Single    Number of children: 0    Highest education level: High school graduate   Tobacco Use    Smoking status: Never    Smokeless tobacco: Never   Vaping Use    Vaping status: Never Used   Substance and Sexual Activity    Alcohol use: No    Drug use: No    Sexual activity: Defer       Family History:  Family History   Problem Relation Age of Onset    Diabetes Father     Diabetes Brother     Cancer Maternal Grandmother     Heart disease Maternal Grandfather     Diabetes Paternal Grandfather     Cancer Other        Past Surgical History:  Past Surgical History:   Procedure Laterality Date    CHOLECYSTECTOMY      CORONARY ANGIOPLASTY WITH STENT PLACEMENT  2012    Dr. Denson    ENDOSCOPY  03/2021    HYSTERECTOMY  12/2014    NECK SURGERY  2010    TOE SURGERY Left     TRANSESOPHAGEAL ECHOCARDIOGRAM (SARAN)  07/13/2015    EF 60-65%       Problem List:  Patient Active Problem List   Diagnosis    Arteriosclerotic cardiovascular disease (ASCVD), s/p MI in 2012, clinically stable.     Type 2 diabetes mellitus with diabetic peripheral angiopathy without gangrene, with long-term current use of insulin    Essential hypertension    Dyslipidemia    Sleep apnea    " Migraine headache    Abnormal CT scan    Disease of thyroid gland    Right-sided low back pain with right-sided sciatica    Slow transit constipation    Suicidal ideation       Allergy:   Allergies   Allergen Reactions    Zoloft [Sertraline Hcl] Nausea And Vomiting    Ciprofloxacin Rash     Also caused chest pain        Current Medications:   Current Outpatient Medications   Medication Sig Dispense Refill    aspirin 81 MG EC tablet Take 1 tablet by mouth Every Evening. 90 tablet 2    dapagliflozin Propanediol (Farxiga) 10 MG tablet Take 10 mg by mouth Daily. 30 tablet 2    insulin aspart (novoLOG FLEXPEN) 100 UNIT/ML solution pen-injector sc pen Inject 15 Units under the skin into the appropriate area as directed 2 (Two) Times a Day Before Meals. Indications: Type 2 Diabetes      lamoTRIgine (LaMICtal) 25 MG tablet Take 2 tablets by mouth Every Night. Indications: Manic-Depression 60 tablet 1    levothyroxine (SYNTHROID, LEVOTHROID) 50 MCG tablet Take 1 tablet by mouth Every Evening. Indications: Underactive Thyroid      lisinopril (PRINIVIL,ZESTRIL) 10 MG tablet Take 1 tablet by mouth Daily. 90 tablet 3    lithium carbonate 300 MG capsule Take 1 capsule by mouth Every Evening for 60 days. Indications: Manic-Depression 30 capsule 1    omeprazole (priLOSEC) 20 MG capsule Daily.      ondansetron ODT (ZOFRAN-ODT) 4 MG disintegrating tablet 1 tablet.      OneTouch Verio test strip TEST BLOOD SUGAR 3 TIMES A DAY      rosuvastatin (CRESTOR) 10 MG tablet Take 1 tablet by mouth Daily. Indications: High Amount of Fats in the Blood      B-D UF III MINI PEN NEEDLES 31G X 5 MM misc USE TWICE A DAY WITH INSULIN INJECTIONS       No current facility-administered medications for this visit.       Review of Symptoms:    Review of Systems   Constitutional: Negative.    HENT: Negative.     Eyes: Negative.    Respiratory: Negative.     Cardiovascular: Negative.    Gastrointestinal: Negative.    Endocrine: Negative.    Genitourinary:  "Negative.    Musculoskeletal: Negative.    Skin: Negative.    Allergic/Immunologic: Negative.    Neurological: Negative.    Hematological: Negative.    Psychiatric/Behavioral:  Positive for depressed mood. The patient is nervous/anxious.        Objective     Physical Exam:   Physical Exam  Constitutional:       Appearance: Normal appearance.   Eyes:      Pupils: Pupils are equal, round, and reactive to light.   Cardiovascular:      Rate and Rhythm: Normal rate.   Pulmonary:      Effort: Pulmonary effort is normal.   Musculoskeletal:         General: Normal range of motion.      Cervical back: Normal range of motion.   Skin:     General: Skin is warm and dry.   Neurological:      General: No focal deficit present.      Mental Status: She is alert and oriented to person, place, and time.   Psychiatric:         Attention and Perception: Attention and perception normal.         Mood and Affect: Affect normal. Mood is anxious and depressed.         Speech: Speech normal.         Behavior: Behavior normal. Behavior is cooperative.         Thought Content: Thought content normal.         Cognition and Memory: Cognition and memory normal.         Judgment: Judgment normal.       Vitals:  Blood pressure 124/70, pulse 60, height 149.9 cm (59\"), weight 81.6 kg (180 lb), SpO2 100%.   Body mass index is 36.36 kg/m².    Last 3 Blood Pressure and Pulse Readings:  BP Readings from Last 3 Encounters:   10/30/24 124/70   09/18/24 114/72   09/03/24 145/78     Pulse Readings from Last 3 Encounters:   10/30/24 60   09/18/24 73   09/03/24 88       PHQ-9 Score: October 30, 2024  PHQ-9 Depression Screening  Little interest or pleasure in doing things? Not at all   Feeling down, depressed, or hopeless? Not at all   PHQ-2 Total Score 0   Trouble falling or staying asleep, or sleeping too much? Several days   Feeling tired or having little energy? Several days   Poor appetite or overeating? Several days   Feeling bad about yourself - or that " you are a failure or have let yourself or your family down? Almost all   Trouble concentrating on things, such as reading the newspaper or watching television? Several days   Moving or speaking so slowly that other people could have noticed? Or the opposite - being so fidgety or restless that you have been moving around a lot more than usual? Several days   Thoughts that you would be better off dead, or of hurting yourself in some way? Not at all   PHQ-9 Total Score 8   If you checked off any problems, how difficult have these problems made it for you to do your work, take care of things at home, or get along with other people? Not difficult at all      PHQ-9 Total Score: 8    SWAPNA-7 Score: October 30, 2024  Feeling nervous, anxious or on edge: Several days  Not being able to stop or control worrying: More than half the days  Worrying too much about different things: Several days  Trouble Relaxing: Several days  Being so restless that it is hard to sit still: More than half the days  Feeling afraid as if something awful might happen: More than half the days  Becoming easily annoyed or irritable: More than half the days  SWAPNA 7 Total Score: 11  If you checked any problems, how difficult have these problems made it for you to do your work, take care of things at home, or get along with other people: Somewhat difficult     Appearance: Patient is a 54-year-old  female.  She is casually dressed in gray sweat pants, a short-sleeved graphic T-shirt, and tennis shoes.  Her thinning yusuf hair is short and neatly styled. she is wearing glasses.  She is appropriately dressed for her age and the weather.  Gait, Station, Strength: patient is using a cane during ambulation and a brace to her left ankle for stabilization during ambulation.     Mental Status Exam:   Hygiene:   good  Cooperation:  Cooperative  Eye Contact:  Good  Psychomotor Behavior:  Appropriate  Affect: Appropriate   Mood: depressed and  anxious  Hopelessness: Denies  Speech:  Normal  Thought Process:  Goal directed  Thought Content:  Mood congruent  Suicidal:  None  Homicidal:  None  Hallucinations:  None  Delusion:  None  Memory:  Intact  Orientation:  Person, Place, Time, and Situation  Reliability:  good  Insight:  Good  Judgement:  Good  Impulse Control:  Good  Physical/Medical Issues:  Yes , see chart        Lab Results:   Admission on 08/27/2024, Discharged on 09/03/2024   Component Date Value Ref Range Status    Hepatitis B Surface Ag 08/27/2024 Non-Reactive  Non-Reactive Final    Hep A IgM 08/27/2024 Non-Reactive  Non-Reactive Final    Hep B C IgM 08/27/2024 Non-Reactive  Non-Reactive Final    Hepatitis C Ab 08/27/2024 Non-Reactive  Non-Reactive Final    QT Interval 08/27/2024 410  ms Final    QTC Interval 08/27/2024 395  ms Final    Glucose 08/27/2024 213 (H)  70 - 130 mg/dL Final    Glucose 08/27/2024 235 (H)  70 - 130 mg/dL Final    Glucose 08/28/2024 101  70 - 130 mg/dL Final    Glucose 08/28/2024 119  70 - 130 mg/dL Final    Glucose 08/28/2024 288 (H)  70 - 130 mg/dL Final    Glucose 08/28/2024 118  70 - 130 mg/dL Final    Glucose 08/28/2024 219 (H)  70 - 130 mg/dL Final    Glucose 08/29/2024 165 (H)  70 - 130 mg/dL Final    Glucose 08/29/2024 243 (H)  70 - 130 mg/dL Final    Glucose 08/29/2024 155 (H)  70 - 130 mg/dL Final    Glucose 08/29/2024 143 (H)  70 - 130 mg/dL Final    Glucose 08/29/2024 210 (H)  70 - 130 mg/dL Final    Glucose 08/30/2024 142 (H)  70 - 130 mg/dL Final    Glucose 08/30/2024 194 (H)  70 - 130 mg/dL Final    Glucose 08/30/2024 201 (H)  70 - 130 mg/dL Final    Glucose 08/30/2024 162 (H)  70 - 130 mg/dL Final    Lithium 08/31/2024 1.0  0.6 - 1.2 mmol/L Final    Glucose 08/31/2024 127  70 - 130 mg/dL Final    Glucose 08/31/2024 197 (H)  70 - 130 mg/dL Final    Glucose 08/31/2024 196 (H)  70 - 130 mg/dL Final    Glucose 08/31/2024 198 (H)  70 - 130 mg/dL Final    Glucose 09/01/2024 146 (H)  70 - 130 mg/dL Final     Glucose 09/01/2024 268 (H)  70 - 130 mg/dL Final    Glucose 09/01/2024 179 (H)  70 - 130 mg/dL Final    Glucose 09/01/2024 211 (H)  70 - 130 mg/dL Final    Glucose 09/02/2024 153 (H)  70 - 130 mg/dL Final    Glucose 09/02/2024 141 (H)  70 - 130 mg/dL Final    Glucose 09/02/2024 263 (H)  70 - 130 mg/dL Final    Glucose 09/02/2024 231 (H)  70 - 130 mg/dL Final    Glucose 09/03/2024 145 (H)  70 - 130 mg/dL Final    Glucose 09/03/2024 136 (H)  70 - 130 mg/dL Final    Glucose 09/03/2024 192 (H)  70 - 130 mg/dL Final   Admission on 08/27/2024, Discharged on 08/27/2024   Component Date Value Ref Range Status    Glucose 08/27/2024 266 (H)  65 - 99 mg/dL Final    BUN 08/27/2024 15  6 - 20 mg/dL Final    Creatinine 08/27/2024 0.88  0.57 - 1.00 mg/dL Final    Sodium 08/27/2024 136  136 - 145 mmol/L Final    Potassium 08/27/2024 4.0  3.5 - 5.2 mmol/L Final    Chloride 08/27/2024 102  98 - 107 mmol/L Final    CO2 08/27/2024 21.2 (L)  22.0 - 29.0 mmol/L Final    Calcium 08/27/2024 9.5  8.6 - 10.5 mg/dL Final    Total Protein 08/27/2024 7.6  6.0 - 8.5 g/dL Final    Albumin 08/27/2024 4.3  3.5 - 5.2 g/dL Final    ALT (SGPT) 08/27/2024 11  1 - 33 U/L Final    AST (SGOT) 08/27/2024 14  1 - 32 U/L Final    Alkaline Phosphatase 08/27/2024 98  39 - 117 U/L Final    Total Bilirubin 08/27/2024 0.2  0.0 - 1.2 mg/dL Final    Globulin 08/27/2024 3.3  gm/dL Final    A/G Ratio 08/27/2024 1.3  g/dL Final    BUN/Creatinine Ratio 08/27/2024 17.0  7.0 - 25.0 Final    Anion Gap 08/27/2024 12.8  5.0 - 15.0 mmol/L Final    eGFR 08/27/2024 77.7  >60.0 mL/min/1.73 Final    Color, UA 08/27/2024 Yellow  Yellow, Straw Final    Appearance, UA 08/27/2024 Cloudy (A)  Clear Final    pH, UA 08/27/2024 6.0  5.0 - 8.0 Final    Specific Gravity, UA 08/27/2024 1.020  1.005 - 1.030 Final    Glucose, UA 08/27/2024 Negative  Negative Final    Ketones, UA 08/27/2024 Negative  Negative Final    Bilirubin, UA 08/27/2024 Negative  Negative Final    Blood, UA 08/27/2024  Negative  Negative Final    Protein, UA 08/27/2024 Trace (A)  Negative Final    Leuk Esterase, UA 08/27/2024 Large (3+) (A)  Negative Final    Nitrite, UA 08/27/2024 Negative  Negative Final    Urobilinogen, UA 08/27/2024 0.2 E.U./dL  0.2 - 1.0 E.U./dL Final    THC, Screen, Urine 08/27/2024 Negative  Negative Final    Phencyclidine (PCP), Urine 08/27/2024 Negative  Negative Final    Cocaine Screen, Urine 08/27/2024 Negative  Negative Final    Methamphetamine, Ur 08/27/2024 Negative  Negative Final    Opiate Screen 08/27/2024 Negative  Negative Final    Amphetamine Screen, Urine 08/27/2024 Negative  Negative Final    Benzodiazepine Screen, Urine 08/27/2024 Negative  Negative Final    Tricyclic Antidepressants Screen 08/27/2024 Negative  Negative Final    Methadone Screen, Urine 08/27/2024 Negative  Negative Final    Barbiturates Screen, Urine 08/27/2024 Negative  Negative Final    Oxycodone Screen, Urine 08/27/2024 Negative  Negative Final    Buprenorphine, Screen, Urine 08/27/2024 Negative  Negative Final    Magnesium 08/27/2024 2.1  1.6 - 2.6 mg/dL Final    Ethanol 08/27/2024 <10  0 - 10 mg/dL Final    Ethanol % 08/27/2024 <0.010  % Final    Lithium 08/27/2024 1.0  0.6 - 1.2 mmol/L Final    WBC 08/27/2024 12.39 (H)  3.40 - 10.80 10*3/mm3 Final    RBC 08/27/2024 3.98  3.77 - 5.28 10*6/mm3 Final    Hemoglobin 08/27/2024 11.8 (L)  12.0 - 15.9 g/dL Final    Hematocrit 08/27/2024 37.0  34.0 - 46.6 % Final    MCV 08/27/2024 93.0  79.0 - 97.0 fL Final    MCH 08/27/2024 29.6  26.6 - 33.0 pg Final    MCHC 08/27/2024 31.9  31.5 - 35.7 g/dL Final    RDW 08/27/2024 13.5  12.3 - 15.4 % Final    RDW-SD 08/27/2024 45.8  37.0 - 54.0 fl Final    MPV 08/27/2024 9.8  6.0 - 12.0 fL Final    Platelets 08/27/2024 289  140 - 450 10*3/mm3 Final    Neutrophil % 08/27/2024 75.4  42.7 - 76.0 % Final    Lymphocyte % 08/27/2024 17.4 (L)  19.6 - 45.3 % Final    Monocyte % 08/27/2024 5.6  5.0 - 12.0 % Final    Eosinophil % 08/27/2024 1.0  0.3 -  6.2 % Final    Basophil % 08/27/2024 0.2  0.0 - 1.5 % Final    Immature Grans % 08/27/2024 0.4  0.0 - 0.5 % Final    Neutrophils, Absolute 08/27/2024 9.33 (H)  1.70 - 7.00 10*3/mm3 Final    Lymphocytes, Absolute 08/27/2024 2.16  0.70 - 3.10 10*3/mm3 Final    Monocytes, Absolute 08/27/2024 0.70  0.10 - 0.90 10*3/mm3 Final    Eosinophils, Absolute 08/27/2024 0.12  0.00 - 0.40 10*3/mm3 Final    Basophils, Absolute 08/27/2024 0.03  0.00 - 0.20 10*3/mm3 Final    Immature Grans, Absolute 08/27/2024 0.05  0.00 - 0.05 10*3/mm3 Final    nRBC 08/27/2024 0.0  0.0 - 0.2 /100 WBC Final    Fentanyl, Urine 08/27/2024 Negative  Negative Final    RBC, UA 08/27/2024 0-2  None Seen, 0-2 /HPF Final    WBC, UA 08/27/2024 Too Numerous to Count (A)  None Seen, 0-2 /HPF Final    Bacteria, UA 08/27/2024 1+ (A)  None Seen /HPF Final    Squamous Epithelial Cells, UA 08/27/2024 3-6 (A)  None Seen, 0-2 /HPF Final    Hyaline Casts, UA 08/27/2024 0-2  None Seen /LPF Final    Methodology 08/27/2024 Automated Microscopy   Final    Extra Tube 08/27/2024 Hold for add-ons.   Final    Auto resulted.         Assessment & Plan   Diagnoses and all orders for this visit:    1. Bipolar I disorder, most recent episode depressed (Primary)  -     lithium carbonate 300 MG capsule; Take 1 capsule by mouth Every Evening for 60 days. Indications: Manic-Depression  Dispense: 30 capsule; Refill: 1  -     lamoTRIgine (LaMICtal) 25 MG tablet; Take 2 tablets by mouth Every Night. Indications: Manic-Depression  Dispense: 60 tablet; Refill: 1    2. Generalized anxiety disorder  -     lithium carbonate 300 MG capsule; Take 1 capsule by mouth Every Evening for 60 days. Indications: Manic-Depression  Dispense: 30 capsule; Refill: 1    3. Medication management  -     Lithium Level; Future        Visit Diagnoses:    ICD-10-CM ICD-9-CM   1. Bipolar I disorder, most recent episode depressed  F31.30 296.50   2. Generalized anxiety disorder  F41.1 300.02   3. Medication  management  Z79.899 V58.69       GOALS:  Short Term Goals: Patient will be compliant with medication, and patient will have no significant medication related side effects.  Patient will be engaged in psychotherapy as indicated.  Patient will report subjective improvement of symptoms.  Long term goals: To stabilize mood and treat/improve subjective symptoms, the patient will stay out of the hospital, the patient will be at an optimal level of functioning, and the patient will take all medications as prescribed.  The patient/guardian verbalized understanding and agreement with goals that were mutually set.      TREATMENT PLAN:   -Continue sounds lithium 300 mg p.o. at bedtime for bipolar disorder anxiety.  -Continue lamotrigine (Lamictal) change from 25 mg p.o. twice a day to 50 mg p.o. at bedtime for mood and depression.  -Discussed supportive psychotherapy efforts to develop coping skills to manage stress and emotions. Patient is seeing [facility]  -Pharmacological and Non-Pharmacological treatment options discussed during today's visit.   -The benefits of a healthy diet and exercise were discussed with patient, especially the positive effects they have on mental health. Patient encouraged to consider lifestyle modification regarding  diet and exercise patterns to maximize results of mental health treatment.   -We discussed sleep hygiene including going to bed at the same time and getting up at the same time every day, decreased caffeine consumption, going to bed early enough to get 7 or 8 hours in bed, reading and relaxing before bedtime, and avoiding stimulating activities close to bedtime.   -Patient acknowledged and verbally consented with current treatment plan and was educated on the importance of compliance with treatment and follow-up appointments.    -Return to clinic in 7 weeks for follow up.  -Reviewed previous available documentation  -Reviewed most recent available labs  -TOSHA  reviewed      MEDICATION ISSUES:  -Discussed medication options and treatment plan of prescribed medication as well as the risks, benefits, any black box warnings, and side effects.   -This APRN has reviewed Lithium Toxicity symptoms with the patient including but not limited to: nausea, fine hand tremors, increased urination and thirst, weight gain, impaired thyroid functioning, fatigue, mental cloudiness, headaches, coarse hand tremors with toxicity, nystagmus, maculopapular rash, pruritis, acne, GI or stomach upset, diarrhea, vomiting, cramps, anorexia, diabetes insipidus, edema, microscopic tubular changes, t-wave inversion or abnormal cardiac rhythm, dysrhythmias, and leukocytosis.  The patient is advised if they experience any of these symptoms they are to contact this APRN/this office immediately or go to the Emergency Department immediately.  The patient verbalized understanding and agreement in their own words.  The patient is advised that taking Lithium with NSAID's, diuretics, ACE inhibitors, metronidazole, acetazolamide, methyldopa, carbamazepine, phenytoin, calcium channel blockers, and haloperidol may cause serious medication reactions including potentially fatal lithium toxicity.  The use of an SSRI with Lithium may raise the risk of dizziness, confusion, diarrhea, agitation, and tremor.  The patient is advised to avoid these medications, or if they are taking or plan to start any of these medications, this APRN/or a Provider at this office, and the patient's PCP/or the Prescriber of the medication should be notified/aware so further testing and monitoring can be performed.  The patient is advised of the need for hydration during treatment with Lithium, and the risks of not staying hydrated - drinking water.  The patient verbalizes understanding and agreement of instructions in their own words.  Also, the patient is instructed to complete the ordered lithium level after taking the Lithium  for at  least 14 days with no missed doses.  The patient is instructed not to take the lithium on the morning of lab draw (as levels should be drawn about 12 hours after the last dose taken), but to take the Lithium after the blood work/lab draw has been completed as taking it before having the blood/lab drawn will interfere with the results.  The patient verbalized understanding and agreement in their own words.   -This APRN has discussed that a very slow dose titration when starting or changing doses of lamotrigine. Explanation provided to patient regarding the potential for Benny Nadeem syndrome that can occur with the medication. Patient was encouraged to monitor skin for correlating rash, lesions in the mucous membranes, or flu-like symptoms. Should any of these symptoms occur, patient was advised to stop medication immediatly and notify provider and go to the emergency department immediately. The dosage should not be titrated upwards or increased faster than recommended due to the possibility of the discussed side effects and risk of development of a skin rash (which can become life threatening). This APRN has also discussed that if the patient stops taking the lamotrigine for 5 days or longer, it will be necessary to restart the drug with an initial dose titration, as rashes have been reported on reexposure.  If the patient/guardian and Provider decide to stop the lamotrigine, the patient/guardian will follow the directions of this APRN/this office as a guided taper over about two weeks is appropriate due to the risk of relapse in bipolar disorder with those with bipolar disorder, the risk of seizures in those with epilepsy, and discontinuation symptoms upon rapid discontinuation of lamotrigine. The patient/guardian verbalizes understanding of benefits and risks as discussed, the patient/guardian feels the benefits outweigh the risks and is agreeable to continue/take lamotrigine as discussed.    -Patient is  agreeable to call the office with any worsening of symptoms or onset of side effects, or if any concerns or questions arise.    -The contact information for the office is made available to the patient. Patient is agreeable to call 911 or go to the nearest ER should they begin having any SI/HI, or if any urgent concerns arise. No medication side effects or related complaints today.     MEDS ORDERED DURING VISIT:  New Medications Ordered This Visit   Medications    lithium carbonate 300 MG capsule     Sig: Take 1 capsule by mouth Every Evening for 60 days. Indications: Manic-Depression     Dispense:  30 capsule     Refill:  1    lamoTRIgine (LaMICtal) 25 MG tablet     Sig: Take 2 tablets by mouth Every Night. Indications: Manic-Depression     Dispense:  60 tablet     Refill:  1       MEDS DISCONTINUED DURING VISIT:   Medications Discontinued During This Encounter   Medication Reason    lamoTRIgine (LaMICtal) 25 MG tablet Reorder    lithium carbonate 300 MG capsule Reorder    predniSONE (DELTASONE) 10 MG tablet *Therapy completed        Follow Up Appointment:   Return in about 7 weeks (around 12/18/2024) for Recheck.           This document has been electronically signed by MARISELA Calvillo  October 30, 2024 13:16 EDT    Dictated Utilizing Dragon Dictation: Part of this note may be an electronic transcription/translation of spoken language to printed text using the Dragon Dictation System. Errors in dictation may reflect use of voice recognition software and not all errors in transcription may have been detected prior to signing.

## 2024-10-30 NOTE — PROGRESS NOTES
"Date of Service: October 29, 2024  Time In: 2:35 PM  Time Out: 3:15 PM    PROGRESS NOTE  Data:  Tammi Levine is a 54 y.o. female who met 1:1 with the undersigned for a regularly scheduled individual outpatient therapy session at [facility] for follow-up of bipolar disorder.      Chief Complaint: Bipolar II disorder; anxiety; family dynamics problem     HPI: Patient immediately states she feels as though she will be \"kicked out of her apartment shortly\" and states this is due to the fact she believes the manager does not like her.  She reports she filed a complaint on the neighbors for being too noisy at night and states she was told she would simply have to live with it.  Patient states she is taking steps to prepare for this and states she has applied at a different apartment complex in her town.  Patient also states she has been upset and worried about the current political climate and states she continues to be told by her mother that the \"democrats\" are doing various things to destroy this country.  Patient reports she continues to struggle with mood instability including periods of depressed mood, anhedonia, anergia, and feeling hopeless.  Patient also reports she struggles with periods of hypomania including feeling on edge, irritability, increased energy, impulsive behavior, and decreased need for sleep.  However, the patient states she primarily struggles with depression and anxiety.  Patient rates current symptoms at a 5 on a scale of 1-10 with 10 being most severe. Patient reports she is sleeping relatively well at this time.  The patient adamantly and convincingly denies suicidal ideation vehemently denies any substance use.      Clinical Maneuvering/Intervention:  Assisted patient in processing above session content; acknowledged and normalized patient’s thoughts, feelings, and concerns.  Allowed the patient to discuss/process her feelings and fears concerning her concern " about the current political climate and validated her feelings.  Also encouraged her to consider we cannot control these things and encouraged her to attempt to utilize thought blocking techniques to avoid thinking about them obsessively.  Also allowed the patient to discuss/process her feelings concerning ongoing difficulties at her apartment complex and praised her for being able to begin to take appropriate steps to mitigate the impact of potentially being evicted.  Also reminded the patient and eviction can take up to 30 days or more.  Utilized cognitive behavioral therapy to assist the patient in developing appropriate coping mechanisms to decrease the severity frequency of symptoms.  Continue to focus on healthy skills of daily living and behavioral activation.  Provided unconditional positive regarding a safe, supportive environment.    Allowed patient to freely discuss issues without interruption or judgment. Provided safe, confidential environment to facilitate the development of positive therapeutic relationship and encourage open, honest communication. Assisted patient in identifying risk factors which would indicate the need for higher level of care including thoughts to harm self or others and/or self-harming behavior and encouraged patient to contact this office, call 911, or present to the nearest emergency room should any of these events occur. Discussed crisis intervention services and means to access.  Patient adamantly and convincingly denies current suicidal or homicidal ideation or perceptual disturbance.      Assessment    Patient appears to maintain relative stability as compared to her baseline.  However, she continues to be susceptible to an external locus of control and continues to be significantly affected by ongoing strained family dynamics.  The patient's symptomology continue to cause impairment in important areas of functioning.  Patient can be reasonably expected to continue to  benefit treatment and would likely be at increased risk for decompensation.    Diagnoses and all orders for this visit:    1. Bipolar II disorder (HCC) -unchanged (Primary)    2. Generalized anxiety disorder -unchanged    3. Sleeping difficulties    4. Family dynamics problem               Mental Status Exam  Hygiene: Good  Dress: Casual  Attitude:  Cooperative  Motor Activity: Appropriate  Speech:  Normal  Mood: Within normal limits  Affect: Tearful  Thought Processes:  Linear  Thought Content:  normal  Suicidal Thoughts:  denies  Homicidal Thoughts:  denies  Crisis Safety Plan: yes, to come to the emergency room.  Hallucinations:  denies    Patient's Support Network Includes:  mother and extended family    Progress toward goal: Not at goal    Functional Status: Mild impairment     Prognosis: Fair with Ongoing Treatment     Plan         Patient will continue in individual outpatient therapy session [facility] every 3 weeks and will continue and pharmacotherapy as scheduled with MARISELA Dixon.  Patient will adhere to medication regimen as prescribed and report any side effects. Patient will contact this office, call 911 or present to the nearest emergency room should suicidal or homicidal ideations occur. Provide Cognitive Behavioral Therapy and Integrative Therapy to improve functioning, maintain stability, and avoid decompensation and the need for higher level of care.          Return in about 4 weeks (around 11/26/2024) for Next scheduled follow up.      This document signed by Vidal Ortiz LCSW, SATISH October 30, 2024 06:47 EDT

## 2024-10-31 LAB — LITHIUM SERPL-SCNC: 0.9 MMOL/L (ref 0.6–1.2)

## 2024-11-11 ENCOUNTER — OFFICE VISIT (OUTPATIENT)
Dept: CARDIOLOGY | Facility: CLINIC | Age: 54
End: 2024-11-11
Payer: MEDICAID

## 2024-11-11 VITALS
HEART RATE: 77 BPM | HEIGHT: 59 IN | BODY MASS INDEX: 36.37 KG/M2 | WEIGHT: 180.4 LBS | SYSTOLIC BLOOD PRESSURE: 156 MMHG | DIASTOLIC BLOOD PRESSURE: 76 MMHG | OXYGEN SATURATION: 98 %

## 2024-11-11 DIAGNOSIS — I10 ESSENTIAL HYPERTENSION: ICD-10-CM

## 2024-11-11 DIAGNOSIS — E78.5 DYSLIPIDEMIA: ICD-10-CM

## 2024-11-11 DIAGNOSIS — I25.10 ARTERIOSCLEROTIC CARDIOVASCULAR DISEASE (ASCVD): Primary | ICD-10-CM

## 2024-11-11 PROCEDURE — 99214 OFFICE O/P EST MOD 30 MIN: CPT | Performed by: PHYSICIAN ASSISTANT

## 2024-11-11 PROCEDURE — 3077F SYST BP >= 140 MM HG: CPT | Performed by: PHYSICIAN ASSISTANT

## 2024-11-11 PROCEDURE — 3078F DIAST BP <80 MM HG: CPT | Performed by: PHYSICIAN ASSISTANT

## 2024-11-11 RX ORDER — LISINOPRIL 20 MG/1
20 TABLET ORAL DAILY
Qty: 90 TABLET | Refills: 2 | Status: SHIPPED | OUTPATIENT
Start: 2024-11-11

## 2024-11-11 NOTE — PROGRESS NOTES
Diane Rios  Tammi Levine  1970  11/11/2024    Patient Active Problem List   Diagnosis    Arteriosclerotic cardiovascular disease (ASCVD), s/p MI in 2012, clinically stable.     Type 2 diabetes mellitus with diabetic peripheral angiopathy without gangrene, with long-term current use of insulin    Essential hypertension    Dyslipidemia    Sleep apnea    Migraine headache    Abnormal CT scan    Disease of thyroid gland    Right-sided low back pain with right-sided sciatica    Slow transit constipation    Suicidal ideation       Dear Diane Rios:    Subjective     History of Present Illness:    Chief Complaint   Patient presents with    Follow-up       Tammi Levine is a pleasant 54 y.o. female with a past medical history significant for ASCVD with history of myocardial infarction in 2012, essential hypertension, diabetes mellitus.  She comes in today for cardiology follow-up.      Rosario has no cardiac complaints today from symptom standpoint.  She denies any chest pains, shortness of breath, dizziness, or syncope.  Blood pressure is elevated today and she admits it has been elevated at home.    Allergies   Allergen Reactions    Zoloft [Sertraline Hcl] Nausea And Vomiting    Ciprofloxacin Rash     Also caused chest pain   :      Current Outpatient Medications:     aspirin 81 MG EC tablet, Take 1 tablet by mouth Every Evening., Disp: 90 tablet, Rfl: 2    B-D UF III MINI PEN NEEDLES 31G X 5 MM misc, USE TWICE A DAY WITH INSULIN INJECTIONS, Disp: , Rfl:     dapagliflozin Propanediol (Farxiga) 10 MG tablet, Take 10 mg by mouth Daily., Disp: 30 tablet, Rfl: 2    insulin aspart (novoLOG FLEXPEN) 100 UNIT/ML solution pen-injector sc pen, Inject 15 Units under the skin into the appropriate area as directed 2 (Two) Times a Day Before Meals. Indications: Type 2 Diabetes, Disp: , Rfl:     lamoTRIgine (LaMICtal) 25 MG tablet, Take 2 tablets by mouth Every Night. Indications: Manic-Depression, Disp:  "60 tablet, Rfl: 1    levothyroxine (SYNTHROID, LEVOTHROID) 50 MCG tablet, Take 1 tablet by mouth Every Evening. Indications: Underactive Thyroid, Disp: , Rfl:     lisinopril (PRINIVIL,ZESTRIL) 20 MG tablet, Take 1 tablet by mouth Daily. Indications: High Blood Pressure, Disp: 90 tablet, Rfl: 2    lithium carbonate 300 MG capsule, Take 1 capsule by mouth Every Evening for 60 days. Indications: Manic-Depression, Disp: 30 capsule, Rfl: 1    omeprazole (priLOSEC) 20 MG capsule, Daily., Disp: , Rfl:     ondansetron ODT (ZOFRAN-ODT) 4 MG disintegrating tablet, 1 tablet., Disp: , Rfl:     OneTouch Verio test strip, TEST BLOOD SUGAR 3 TIMES A DAY, Disp: , Rfl:     rosuvastatin (CRESTOR) 10 MG tablet, Take 1 tablet by mouth Daily. Indications: High Amount of Fats in the Blood, Disp: , Rfl:     The following portions of the patient's history were reviewed and updated as appropriate: allergies, current medications, past family history, past medical history, past social history, past surgical history and problem list.    Social History     Tobacco Use    Smoking status: Never    Smokeless tobacco: Never   Vaping Use    Vaping status: Never Used   Substance Use Topics    Alcohol use: No    Drug use: No         Objective   Vitals:    11/11/24 1300   BP: 156/76   BP Location: Left arm   Patient Position: Sitting   Cuff Size: Adult   Pulse: 77   SpO2: 98%   Weight: 81.8 kg (180 lb 6.4 oz)   Height: 149.9 cm (59.02\")     Body mass index is 36.42 kg/m².    ROS    Constitutional:       General: Not in acute distress.     Appearance: Healthy appearance. Well-developed and not in distress. Not diaphoretic.   Eyes:      Conjunctiva/sclera: Conjunctivae normal.      Pupils: Pupils are equal, round, and reactive to light.   HENT:      Head: Normocephalic and atraumatic.   Neck:      Vascular: No carotid bruit or JVD.   Pulmonary:      Effort: Pulmonary effort is normal. No respiratory distress.      Breath sounds: Normal breath sounds. "   Cardiovascular:      Normal rate. Regular rhythm.   Edema:     Peripheral edema absent.   Skin:     General: Skin is cool.   Neurological:      Mental Status: Alert, oriented to person, place, and time and oriented to person, place and time.         Lab Results   Component Value Date     08/27/2024    K 4.0 08/27/2024     08/27/2024    CO2 21.2 (L) 08/27/2024    BUN 15 08/27/2024    CREATININE 0.88 08/27/2024    GLUCOSE 266 (H) 08/27/2024    CALCIUM 9.5 08/27/2024    AST 14 08/27/2024    ALT 11 08/27/2024    ALKPHOS 98 08/27/2024    LABIL2 1.4 (L) 02/04/2016     Lab Results   Component Value Date    CKTOTAL 124 08/14/2020     Lab Results   Component Value Date    WBC 12.39 (H) 08/27/2024    HGB 11.8 (L) 08/27/2024    HCT 37.0 08/27/2024     08/27/2024     Lab Results   Component Value Date    INR 0.91 01/12/2020     Lab Results   Component Value Date    MG 2.1 08/27/2024     Lab Results   Component Value Date    TSH 3.700 12/27/2023    TRIG 124 12/27/2023    HDL 51 12/27/2023     (H) 12/27/2023      Lab Results   Component Value Date    BNP 36 09/16/2015       During this visit the following were done:  Labs Reviewed []    Labs Ordered []    Radiology Reports Reviewed []    Radiology Ordered []    PCP Records Reviewed []    Referring Provider Records Reviewed []    ER Records Reviewed []    Hospital Records Reviewed []    History Obtained From Family []    Radiology Images Reviewed []    Other Reviewed []    Records Requested []       Procedures    Assessment & Plan    Diagnosis Plan   1. Arteriosclerotic cardiovascular disease (ASCVD), s/p MI in 2012, clinically stable.         2. Essential hypertension        3. Dyslipidemia                 Recommendations:  Essential hypertension  Elevated will increase lisinopril to 20 mg.  Encouraged her to check blood pressure regularly at home  ASCVD  Denies any anginal symptoms continue aspirin and Crestor    No follow-ups on file.    As always, I  appreciate very much the opportunity to participate in the cardiovascular care of your patients.      With Best Regards,    Oscar Lewis PA-C

## 2024-11-19 ENCOUNTER — OFFICE VISIT (OUTPATIENT)
Dept: PSYCHIATRY | Facility: CLINIC | Age: 54
End: 2024-11-19
Payer: MEDICAID

## 2024-11-19 DIAGNOSIS — G47.9 SLEEPING DIFFICULTIES: ICD-10-CM

## 2024-11-19 DIAGNOSIS — F31.81 CHRONIC BIPOLAR II DISORDER, MOST RECENT EPISODE MAJOR DEPRESSIVE: Primary | ICD-10-CM

## 2024-11-19 DIAGNOSIS — F41.1 GENERALIZED ANXIETY DISORDER: ICD-10-CM

## 2024-11-19 DIAGNOSIS — Z63.9 FAMILY DYNAMICS PROBLEM: ICD-10-CM

## 2024-11-19 SDOH — SOCIAL STABILITY - SOCIAL INSECURITY: PROBLEM RELATED TO PRIMARY SUPPORT GROUP, UNSPECIFIED: Z63.9

## 2024-11-20 NOTE — PROGRESS NOTES
"Date of Service: November 19, 2024  Time In: 11:05 AM  Time Out: 11:45 AM    PROGRESS NOTE  Data:  Tammi Levine is a 54 y.o. female who met 1:1 with the undersigned for a regularly scheduled individual outpatient therapy session at [facility] for follow-up of bipolar disorder.      Chief Complaint: Bipolar II disorder; anxiety; family dynamics problem     HPI: Patient immediately states she is not sure how to feel about the previous election and states she is fearful she will be \"kicked off of her disability.\"  Patient reports she continues to struggle with mood instability including periods of depressed mood, anhedonia, anergia, and feeling hopeless.  Patient also reports she struggles with periods of hypomania including feeling on edge, irritability, increased energy, impulsive behavior, and decreased need for sleep.  However, the patient states she primarily struggles with depression and anxiety.  Patient rates current symptoms at a 5 on a scale of 1-10 with 10 being most severe. Patient reports she is sleeping relatively well at this time.  The patient adamantly and convincingly denies suicidal ideation vehemently denies any substance use.      Clinical Maneuvering/Intervention:  Assisted patient in processing above session content; acknowledged and normalized patient’s thoughts, feelings, and concerns.  Allowed the patient to discuss/process her feelings and fears of the incoming administration and what could happen to go on disability including her.  Also encouraged her to consider we cannot control these things and encouraged her to attempt to utilize thought blocking techniques to avoid thinking about them obsessively.    Also reminded the patient and eviction can take up to 30 days or more.  Utilized cognitive behavioral therapy to assist the patient in developing appropriate coping mechanisms to decrease the severity frequency of symptoms.  Continue to focus on healthy skills " of daily living and behavioral activation.  Provided unconditional positive regarding a safe, supportive environment.    Allowed patient to freely discuss issues without interruption or judgment. Provided safe, confidential environment to facilitate the development of positive therapeutic relationship and encourage open, honest communication. Assisted patient in identifying risk factors which would indicate the need for higher level of care including thoughts to harm self or others and/or self-harming behavior and encouraged patient to contact this office, call 911, or present to the nearest emergency room should any of these events occur. Discussed crisis intervention services and means to access.  Patient adamantly and convincingly denies current suicidal or homicidal ideation or perceptual disturbance.      Assessment    Patient appears to maintain relative stability as compared to her baseline.  However, she continues to be susceptible to an external locus of control and continues to be significantly affected by ongoing strained family dynamics.  The patient's symptomology continue to cause impairment in important areas of functioning.  Patient can be reasonably expected to continue to benefit treatment and would likely be at increased risk for decompensation.    Diagnoses and all orders for this visit:    1. Chronic bipolar II disorder, most recent episode major depressive (Primary)    2. Generalized anxiety disorder    3. Sleeping difficulties    4. Family dynamics problem               Mental Status Exam  Hygiene: Good  Dress: Casual  Attitude:  Cooperative  Motor Activity: Appropriate  Speech:  Normal  Mood: Within normal limits  Affect: Tearful  Thought Processes:  Linear  Thought Content:  normal  Suicidal Thoughts:  denies  Homicidal Thoughts:  denies  Crisis Safety Plan: yes, to come to the emergency room.  Hallucinations:  denies    Patient's Support Network Includes:  mother and extended  family    Progress toward goal: Not at goal    Functional Status: Mild impairment     Prognosis: Fair with Ongoing Treatment     Plan         Patient will continue in individual outpatient therapy session [facility] every 3 weeks and will continue and pharmacotherapy as scheduled with MARISELA Dixon.  Patient will adhere to medication regimen as prescribed and report any side effects. Patient will contact this office, call 911 or present to the nearest emergency room should suicidal or homicidal ideations occur. Provide Cognitive Behavioral Therapy and Integrative Therapy to improve functioning, maintain stability, and avoid decompensation and the need for higher level of care.          Return in about 4 weeks (around 12/17/2024) for Next scheduled follow up.      This document signed by Vidal Ortiz LCSW, SATISH November 20, 2024 06:51 EST

## 2024-12-18 ENCOUNTER — OFFICE VISIT (OUTPATIENT)
Dept: PSYCHIATRY | Facility: CLINIC | Age: 54
End: 2024-12-18
Payer: MEDICAID

## 2024-12-18 VITALS
OXYGEN SATURATION: 100 % | BODY MASS INDEX: 36.16 KG/M2 | HEIGHT: 59 IN | HEART RATE: 72 BPM | WEIGHT: 179.4 LBS | DIASTOLIC BLOOD PRESSURE: 74 MMHG | SYSTOLIC BLOOD PRESSURE: 132 MMHG

## 2024-12-18 DIAGNOSIS — F41.1 GENERALIZED ANXIETY DISORDER: ICD-10-CM

## 2024-12-18 DIAGNOSIS — F31.30 BIPOLAR I DISORDER, MOST RECENT EPISODE DEPRESSED: Primary | ICD-10-CM

## 2024-12-18 RX ORDER — LAMOTRIGINE 25 MG/1
50 TABLET ORAL NIGHTLY
Qty: 60 TABLET | Refills: 2 | Status: SHIPPED | OUTPATIENT
Start: 2024-12-18 | End: 2025-03-18

## 2024-12-18 RX ORDER — LITHIUM CARBONATE 300 MG/1
300 CAPSULE ORAL EVERY EVENING
Qty: 30 CAPSULE | Refills: 2 | Status: SHIPPED | OUTPATIENT
Start: 2024-12-18 | End: 2025-03-18

## 2024-12-18 NOTE — PROGRESS NOTES
"  Subjective     Tammi Levine is a 54 y.o. female who presents today for follow up    Chief Complaint:  management of bipolar disorder and anxiety     History of Present Illness:    Today is a follow-up visit.    Medication compliance: patient is compliant with medications, denies SE. Patient reports even though her depression is a little more she feels that the medication has helped overall symptoms. She tells me that she is not having thoughts of wanting to be dead and would never harm herself. She tells me that she could not do that because it is against her Worship and wants to be with her dad and god in heaven.     She reports when taking the am dose of Lamictal she feels drowsy- will have patient take dose at HS.     Since getting out of the hospital she is doing well staying busy and keeping active. Her last day of working at the [employer] was 12/7/24. Having to stay at home she has been feeling a down. She reports that her car has been messed up and making a noise. She is not able to drive it right now. Her mother has been coming over and taking her places. It broke down last Tuesday.    She reports that the rainy weather also contributes to the \"feeling of being down\"     Details:  Depression rated 7/10, with 10 being the worst. PHQ-9 score: 18 (prior 8) Patient reports feeling a little depressed today because her cat kept her awake. The cat was scratching at the wall. She reports that having car trouble has been keeping her at home as well.  Her home health aid has been taking her places. She reports feeling a little helpless but denies feeling hopeless.  These symptoms have caused impairment in important areas of functioning but have improved with medication.     Anxiety rated 2/10, with 10 being the worst. SWAPNA-7 score: 14 (prior 11)   She reports hx of excessive anxiety, excessive worry, and difficulty controlling worry. She reports improvement in anxiety she feels less overwhelmed and less " "\"what if\" thoughts. She reports having some improvement in restless, feeling on edge and irritability., fatigue, muscle tension, sleep disturbance, and decreased concentration. These symptoms have caused impairment in important areas of functioning but have improved with medication.     She reports no recent anxiety attacks. It has been 4-5 years ago.   She was dx with bipolar disorder 30 years ago.    The patient endorses significant symptoms of bipolar disorder including: elevated mood alternating with irritable mood for at least a week, inflated self esteem, decreased need for sleep-up for 2-3 days at a time, increased talkativeness, flight of ideas or racing thoughts, distractibility, increase in goal directed activity or psychomotor agitation, but denies risky behavior. These symptoms have caused impairment in important areas of daily functioning but are much improved with medication.  The patient has had symptoms of bipolar disorder for 30 years.  The patient rates their symptoms at a 5/10 on a 0-10 scale, with 10 being the worst.      Sleep is fair. She reports not sleeping well last night but usually she sleeps at least 6 hours per night. She gets up during the nights and will accomplish a few tasks then returns to bed.  Nightmares: Denies     Appetite is good. She is eating 2 meals per day,snacking daily on chips and little alex cakes. She is drinking two diet Mt. Dews a day, gartor aid and water.      Patient denies SI/HI, A/V hallucinations, or delusions.    Alcohol use: no  Drug use: no  Marijuana use: no  Tobacco: no    Chronic health issues, no acute physical or medical issues today.    The following portions of the patient's history were reviewed and updated as appropriate: allergies, current medications, past family history, past medical history, past social history, past surgical history and problem list.    Previous psychiatric medications include:   Antidepressants: Effexor and Prozac- " "nightmares,  trazodone  Antianxiety: None  Antipsychotics: None  Mood stabilizers: tried: Depakote (divalproex)- did not help-sedated. Current:  Lamotrigine and lithium  ADHD: None      Past Psychiatric History:  She reports having anxiety and depression for as long as she can remember. She reports first being hospitalized 30 years ago in [place]. This August was the first time in 10 years that she was hospitalized.   Reports she was recently admitted to the [facility] for worsening depression and SI (8/27/24 to 9/2/24). States she felt overwhelmed after her surgery with life stressors. While inpatient she was also treated for UTI. She was started on lamotrigine, states she has felt a small improvement with mood. States she does have moments of depression in the evenings.   Past Psychiatric History:    Past Medical History:  Past Medical History:   Diagnosis Date    Abnormal CT scan     ADRENAL GLAND FOCAL MASS LESION MULTIPLE, LEFT ONLY    Anxiety     ASCVD (arteriosclerotic cardiovascular disease)     \"status post MI in 2012, clinically stable\"    Bipolar disorder     Depression     Dyslipidemia     Hypertension, well controlled     Hypothyroidism     Migraine headache     Myocardial infarction 2012    Panic disorder     Right-sided low back pain with right-sided sciatica     Sleep apnea     Suicidal thoughts     Type 2 diabetes mellitus        Social History:  Social History     Socioeconomic History    Marital status: Single    Number of children: 0    Highest education level: High school graduate   Tobacco Use    Smoking status: Never    Smokeless tobacco: Never   Vaping Use    Vaping status: Never Used   Substance and Sexual Activity    Alcohol use: No    Drug use: No    Sexual activity: Defer       Family History:  Family History   Problem Relation Age of Onset    Diabetes Father     Diabetes Brother     Cancer Maternal Grandmother     Heart disease Maternal Grandfather     Diabetes Paternal Grandfather "     Cancer Other        Past Surgical History:  Past Surgical History:   Procedure Laterality Date    CHOLECYSTECTOMY      CORONARY ANGIOPLASTY WITH STENT PLACEMENT  2012    Dr. Denson    ENDOSCOPY  03/2021    HYSTERECTOMY  12/2014    NECK SURGERY  2010    TOE SURGERY Left     TRANSESOPHAGEAL ECHOCARDIOGRAM (SARAN)  07/13/2015    EF 60-65%       Problem List:  Patient Active Problem List   Diagnosis    Arteriosclerotic cardiovascular disease (ASCVD), s/p MI in 2012, clinically stable.     Type 2 diabetes mellitus with diabetic peripheral angiopathy without gangrene, with long-term current use of insulin    Essential hypertension    Dyslipidemia    Sleep apnea    Migraine headache    Abnormal CT scan    Disease of thyroid gland    Right-sided low back pain with right-sided sciatica    Slow transit constipation    Suicidal ideation       Allergy:   Allergies   Allergen Reactions    Zoloft [Sertraline Hcl] Nausea And Vomiting    Ciprofloxacin Rash     Also caused chest pain        Current Medications:   Current Outpatient Medications   Medication Sig Dispense Refill    aspirin 81 MG EC tablet Take 1 tablet by mouth Every Evening. 90 tablet 2    B-D UF III MINI PEN NEEDLES 31G X 5 MM misc USE TWICE A DAY WITH INSULIN INJECTIONS      dapagliflozin Propanediol (Farxiga) 10 MG tablet Take 10 mg by mouth Daily. 30 tablet 2    insulin aspart (novoLOG FLEXPEN) 100 UNIT/ML solution pen-injector sc pen Inject 15 Units under the skin into the appropriate area as directed 2 (Two) Times a Day Before Meals. Indications: Type 2 Diabetes      lamoTRIgine (LaMICtal) 25 MG tablet Take 2 tablets by mouth Every Night for 90 days. Indications: Manic-Depression 60 tablet 2    levothyroxine (SYNTHROID, LEVOTHROID) 50 MCG tablet Take 1 tablet by mouth Every Evening. Indications: Underactive Thyroid      lisinopril (PRINIVIL,ZESTRIL) 20 MG tablet Take 1 tablet by mouth Daily. Indications: High Blood Pressure 90 tablet 2    lithium carbonate 300  "MG capsule Take 1 capsule by mouth Every Evening for 90 days. Indications: Manic-Depression 30 capsule 2    omeprazole (priLOSEC) 20 MG capsule Daily.      ondansetron ODT (ZOFRAN-ODT) 4 MG disintegrating tablet 1 tablet.      OneTouch Verio test strip TEST BLOOD SUGAR 3 TIMES A DAY      rosuvastatin (CRESTOR) 10 MG tablet Take 1 tablet by mouth Daily. Indications: High Amount of Fats in the Blood       No current facility-administered medications for this visit.       Review of Symptoms:    Review of Systems   Constitutional:  Positive for fatigue.   HENT: Negative.     Eyes: Negative.    Respiratory: Negative.     Cardiovascular: Negative.    Gastrointestinal: Negative.    Endocrine: Negative.    Genitourinary: Negative.    Allergic/Immunologic: Negative.    Neurological:  Positive for numbness.        Numbness in feet   Hematological: Negative.    Psychiatric/Behavioral:  Positive for decreased concentration, sleep disturbance, depressed mood and stress. The patient is nervous/anxious.        Objective     Physical Exam:   Physical Exam  Constitutional:       Appearance: Normal appearance.   Eyes:      Pupils: Pupils are equal, round, and reactive to light.   Cardiovascular:      Rate and Rhythm: Normal rate.   Pulmonary:      Effort: Pulmonary effort is normal.   Musculoskeletal:      Cervical back: Normal range of motion.      Comments: Using a cane for stability and mobility   Skin:     General: Skin is warm and dry.   Neurological:      General: No focal deficit present.      Mental Status: She is alert and oriented to person, place, and time.         Vitals:  Blood pressure 132/74, pulse 72, height 149.9 cm (59.02\"), weight 81.4 kg (179 lb 6.4 oz), SpO2 100%.   Body mass index is 36.21 kg/m².    Last 3 Blood Pressure and Pulse Readings:  BP Readings from Last 3 Encounters:   12/18/24 132/74   11/11/24 156/76   10/30/24 124/70     Pulse Readings from Last 3 Encounters:   12/18/24 72   11/11/24 77   10/30/24 " 60       PHQ-9 Score: December 18, 2024  Little interest or pleasure in doing things? Several days   Feeling down, depressed, or hopeless? Over half   PHQ-2 Total Score 3   Trouble falling or staying asleep, or sleeping too much? Several days   Feeling tired or having little energy? Almost all   Poor appetite or overeating? Over half   Feeling bad about yourself - or that you are a failure or have let yourself or your family down? Almost all   Trouble concentrating on things, such as reading the newspaper or watching television? Over half   Moving or speaking so slowly that other people could have noticed? Or the opposite - being so fidgety or restless that you have been moving around a lot more than usual? Over half   Thoughts that you would be better off dead, or of hurting yourself in some way? Over half   PHQ-9 Total Score 18   If you checked off any problems, how difficult have these problems made it for you to do your work, take care of things at home, or get along with other people? Somewhat difficult        SWAPNA-7 Score: December 18, 2024  Feeling nervous, anxious or on edge: More than half the days  Not being able to stop or control worrying: More than half the days  Worrying too much about different things: More than half the days  Trouble Relaxing: More than half the days  Being so restless that it is hard to sit still: More than half the days  Feeling afraid as if something awful might happen: Nearly every day  Becoming easily annoyed or irritable: Several days  SWAPNA 7 Total Score: 14  If you checked any problems, how difficult have these problems made it for you to do your work, take care of things at home, or get along with other people: Somewhat difficult     Appearance: Patient is a 54-year-old  female.  She is casually dressed in jeans, a graphic T-shirt, rain coat, and tennis shoes.  Her thinning yusuf hair is short and neatly styled. she is wearing glasses.  She is appropriately dressed for  her age and the weather.  Gait, Station, Strength: patient is using a cane during ambulation and a brace to her left ankle for stabilization during ambulation.       Mental Status Exam:   Hygiene:   good  Cooperation:  Cooperative  Eye Contact:  Good  Psychomotor Behavior:  Appropriate  Affect: Appropriate   Mood: depressed and anxious  Hopelessness: Denies  Speech:  Normal  Thought Process:  Goal directed  Thought Content:  Mood congruent  Suicidal:  None  Homicidal:  None  Hallucinations:  None  Delusion:  None  Memory:  Intact  Orientation:  Person, Place, Time, and Situation  Reliability:  good  Insight:  Fair  Judgement:  Fair  Impulse Control:  Good  Physical/Medical Issues:  Yes , see chart        Lab Results:   Office Visit on 10/30/2024   Component Date Value Ref Range Status    Lithium 10/30/2024 0.9  0.6 - 1.2 mmol/L Final     Assessment & Plan   Diagnoses and all orders for this visit:    1. Bipolar I disorder, most recent episode depressed (Primary)  -     lamoTRIgine (LaMICtal) 25 MG tablet; Take 2 tablets by mouth Every Night for 90 days. Indications: Manic-Depression  Dispense: 60 tablet; Refill: 2  -     lithium carbonate 300 MG capsule; Take 1 capsule by mouth Every Evening for 90 days. Indications: Manic-Depression  Dispense: 30 capsule; Refill: 2    2. Generalized anxiety disorder  -     lithium carbonate 300 MG capsule; Take 1 capsule by mouth Every Evening for 90 days. Indications: Manic-Depression  Dispense: 30 capsule; Refill: 2        Visit Diagnoses:    ICD-10-CM ICD-9-CM   1. Bipolar I disorder, most recent episode depressed  F31.30 296.50   2. Generalized anxiety disorder  F41.1 300.02       TREATMENT PLAN:   -Continue lithium 300 mg p.o. at bedtime for bipolar disorder anxiety.  -Continue lamotrigine (Lamictal) change from 25 mg p.o. twice a day to 50 mg p.o. at bedtime for mood and depression.  -Discussed supportive psychotherapy efforts to develop coping skills to manage stress and  emotions. Patient is seeing Vidal Ortiz  -Pharmacological and Non-Pharmacological treatment options discussed during today's visit.   -The benefits of a healthy diet and exercise were discussed with patient, especially the positive effects they have on mental health. Patient encouraged to consider lifestyle modification regarding  diet and exercise patterns to maximize results of mental health treatment.   -We discussed sleep hygiene including going to bed at the same time and getting up at the same time every day, decreased caffeine consumption, going to bed early enough to get 7 or 8 hours in bed, reading and relaxing before bedtime, and avoiding stimulating activities close to bedtime.   -Patient acknowledged and verbally consented with current treatment plan and was educated on the importance of compliance with treatment and follow-up appointments.    -Return to clinic in  weeks for follow up.  -Reviewed previous available documentation  -Reviewed most recent available labs, lithium level,  0.9  -TOSHA reviewed        MEDICATION ISSUES:  -Discussed medication options and treatment plan of prescribed medication as well as the risks, benefits, any black box warnings, and side effects.   -This APRN has reviewed Lithium Toxicity symptoms with the patient including but not limited to: nausea, fine hand tremors, increased urination and thirst, weight gain, impaired thyroid functioning, fatigue, mental cloudiness, headaches, coarse hand tremors with toxicity, nystagmus, maculopapular rash, pruritis, acne, GI or stomach upset, diarrhea, vomiting, cramps, anorexia, diabetes insipidus, edema, microscopic tubular changes, t-wave inversion or abnormal cardiac rhythm, dysrhythmias, and leukocytosis.  The patient is advised if they experience any of these symptoms they are to contact this APRN/this office immediately or go to the Emergency Department immediately.  The patient verbalized understanding and agreement in their  own words.  The patient is advised that taking Lithium with NSAID's, diuretics, ACE inhibitors, metronidazole, acetazolamide, methyldopa, carbamazepine, phenytoin, calcium channel blockers, and haloperidol may cause serious medication reactions including potentially fatal lithium toxicity.  The use of an SSRI with Lithium may raise the risk of dizziness, confusion, diarrhea, agitation, and tremor.  The patient is advised to avoid these medications, or if they are taking or plan to start any of these medications, this APRN/or a Provider at this office, and the patient's PCP/or the Prescriber of the medication should be notified/aware so further testing and monitoring can be performed.  The patient is advised of the need for hydration during treatment with Lithium, and the risks of not staying hydrated - drinking water.  The patient verbalizes understanding and agreement of instructions in their own words.  Also, the patient is instructed to complete the ordered lithium level after taking the Lithium  for at least 14 days with no missed doses.  The patient is instructed not to take the lithium on the morning of lab draw (as levels should be drawn about 12 hours after the last dose taken), but to take the Lithium after the blood work/lab draw has been completed as taking it before having the blood/lab drawn will interfere with the results.  The patient verbalized understanding and agreement in their own words.   -This APRN has discussed that a very slow dose titration when starting or changing doses of lamotrigine. Explanation provided to patient regarding the potential for Benny Nadeem syndrome that can occur with the medication. Patient was encouraged to monitor skin for correlating rash, lesions in the mucous membranes, or flu-like symptoms. Should any of these symptoms occur, patient was advised to stop medication immediatly and notify provider and go to the emergency department immediately. The dosage should not  be titrated upwards or increased faster than recommended due to the possibility of the discussed side effects and risk of development of a skin rash (which can become life threatening). This APRN has also discussed that if the patient stops taking the lamotrigine for 5 days or longer, it will be necessary to restart the drug with an initial dose titration, as rashes have been reported on reexposure.  If the patient/guardian and Provider decide to stop the lamotrigine, the patient/guardian will follow the directions of this APRN/this office as a guided taper over about two weeks is appropriate due to the risk of relapse in bipolar disorder with those with bipolar disorder, the risk of seizures in those with epilepsy, and discontinuation symptoms upon rapid discontinuation of lamotrigine. The patient/guardian verbalizes understanding of benefits and risks as discussed, the patient/guardian feels the benefits outweigh the risks and is agreeable to continue/take lamotrigine as discussed.    -Patient is agreeable to call the office with any worsening of symptoms or onset of side effects, or if any concerns or questions arise.    -The contact information for the office is made available to the patient. Patient is agreeable to call 911 or go to the nearest ER should they begin having any SI/HI, or if any urgent concerns arise. No medication side effects or related complaints today.      GOALS:  Short Term Goals: Patient will be compliant with medication, and patient will have no significant medication related side effects.  Patient will be engaged in psychotherapy as indicated.  Patient will report subjective improvement of symptoms.  Long term goals: To stabilize mood and treat/improve subjective symptoms, the patient will stay out of the hospital, the patient will be at an optimal level of functioning, and the patient will take all medications as prescribed.  The patient/guardian verbalized understanding and agreement  with goals that were mutually set.      TREATMENT PLAN:   -  MEDS ORDERED DURING VISIT:  New Medications Ordered This Visit   Medications    lamoTRIgine (LaMICtal) 25 MG tablet     Sig: Take 2 tablets by mouth Every Night for 90 days. Indications: Manic-Depression     Dispense:  60 tablet     Refill:  2    lithium carbonate 300 MG capsule     Sig: Take 1 capsule by mouth Every Evening for 90 days. Indications: Manic-Depression     Dispense:  30 capsule     Refill:  2       MEDS DISCONTINUED DURING VISIT:   Medications Discontinued During This Encounter   Medication Reason    lithium carbonate 300 MG capsule Reorder    lamoTRIgine (LaMICtal) 25 MG tablet Reorder        Follow Up Appointment:   Return in about 12 weeks (around 3/12/2025) for Recheck.           This document has been electronically signed by MARISELA Calvillo  December 18, 2024 14:09 EST    Dictated Utilizing Dragon Dictation: Part of this note may be an electronic transcription/translation of spoken language to printed text using the Dragon Dictation System. Errors in dictation may reflect use of voice recognition software and not all errors in transcription may have been detected prior to signing.

## 2024-12-31 ENCOUNTER — OFFICE VISIT (OUTPATIENT)
Dept: PSYCHIATRY | Facility: CLINIC | Age: 54
End: 2024-12-31
Payer: MEDICAID

## 2024-12-31 DIAGNOSIS — F41.1 GENERALIZED ANXIETY DISORDER: ICD-10-CM

## 2024-12-31 DIAGNOSIS — Z63.9 FAMILY DYNAMICS PROBLEM: ICD-10-CM

## 2024-12-31 DIAGNOSIS — G47.9 SLEEPING DIFFICULTIES: ICD-10-CM

## 2024-12-31 DIAGNOSIS — F31.81 CHRONIC BIPOLAR II DISORDER, MOST RECENT EPISODE MAJOR DEPRESSIVE: Primary | ICD-10-CM

## 2024-12-31 SDOH — SOCIAL STABILITY - SOCIAL INSECURITY: PROBLEM RELATED TO PRIMARY SUPPORT GROUP, UNSPECIFIED: Z63.9

## 2024-12-31 NOTE — PLAN OF CARE
Patient is agreeable to all goals and objectives.   Vidal Ortiz, KAYLEIGH, [facility]

## 2024-12-31 NOTE — TREATMENT PLAN
Multi-Disciplinary Problems (from Behavioral Health Treatment Plan)      Active Problems       Problem: Mood Instability  Start Date: 12/31/24      Problem Details: The patient self-scales this problem as a 4 with 10 being the worst.          Goal Priority Start Date Expected End Date End Date    Patient will achieve mood stability as evidenced by controlled behavior and a more deliberate thought process -- 12/31/24 07/01/25 --    Goal Details: Progress toward goal:  The patient self-scales their progress related to this goal as a 4 with 10 being the worst.          Goal Intervention Frequency Start Date End Date    Provide structure and focus to patient's thoughts and actions by establishing plans and routine. Weekly 12/31/24 --    Intervention Details: Duration of treatment until remission of symptoms.          Goal Intervention Frequency Start Date End Date    Assist patient in setting responsible goals and limits in behavior. Weekly 12/31/24 --    Intervention Details: Duration of treatment until remission of symptoms.                                 I have discussed and reviewed this treatment plan with the patient.  It has been printed for signatures.

## 2025-01-07 NOTE — PROGRESS NOTES
"Date of Service: December 31, 2024  Time In: 2:00 PM  Time Out: 2:45 PM    PROGRESS NOTE  Data:  Tammi Levine is a 54 y.o. female who met 1:1 with the undersigned for a regularly scheduled individual outpatient therapy session at [facility] for follow-up of bipolar disorder.      Chief Complaint: Bipolar II disorder; anxiety; family dynamics problem     HPI: Patient reports things are going \"okay\" and states last Christmas holiday went relatively well.  She reports she continues to struggle with her mother as her mother continues to believe in various conspiracy theories as it relates to politics.  Patient reports she continues to struggle with mood instability including periods of depressed mood, anhedonia, anergia, and feeling hopeless.  Patient also reports she struggles with periods of hypomania including feeling on edge, irritability, increased energy, impulsive behavior, and decreased need for sleep.  However, the patient states she primarily struggles with depression and anxiety.  Patient rates current symptoms at a 4 on a scale of 1-10 with 10 being most severe. Patient reports she is sleeping relatively well at this time.  The patient adamantly and convincingly denies suicidal ideation vehemently denies any substance use.      Clinical Maneuvering/Intervention:  Assisted patient in processing above session content; acknowledged and normalized patient’s thoughts, feelings, and concerns.   utilize motivational Michel techniques including complex reflections to assist the patient in recognizing the holidays went well and her ability to interact with family including her mother.  Utilized cognitive behavioral therapy to assist the patient in developing appropriate coping mechanisms to decrease the severity frequency of symptoms.  Continue to focus on healthy skills of daily living and behavioral activation.  Provided unconditional positive regarding a safe, supportive " environment.    Treatment plan was completed on this date.  Patient participated in the formulation of treatment plan and is agreeable to all goals and objectives.    Allowed patient to freely discuss issues without interruption or judgment. Provided safe, confidential environment to facilitate the development of positive therapeutic relationship and encourage open, honest communication. Assisted patient in identifying risk factors which would indicate the need for higher level of care including thoughts to harm self or others and/or self-harming behavior and encouraged patient to contact this office, call 911, or present to the nearest emergency room should any of these events occur. Discussed crisis intervention services and means to access.  Patient adamantly and convincingly denies current suicidal or homicidal ideation or perceptual disturbance.      Assessment    Patient appears to maintain relative stability as compared to her baseline.  However, she continues to be susceptible to an external locus of control and continues to be significantly affected by ongoing strained family dynamics.  The patient's symptomology continue to cause impairment in important areas of functioning.  Patient can be reasonably expected to continue to benefit treatment and would likely be at increased risk for decompensation.    Diagnoses and all orders for this visit:    1. Chronic bipolar II disorder, most recent episode major depressive (Primary)    2. Sleeping difficulties    3. Generalized anxiety disorder    4. Family dynamics problem               Mental Status Exam  Hygiene: Good  Dress: Casual  Attitude:  Cooperative  Motor Activity: Appropriate  Speech:  Normal  Mood: Within normal limits  Affect: Tearful  Thought Processes:  Linear  Thought Content:  normal  Suicidal Thoughts:  denies  Homicidal Thoughts:  denies  Crisis Safety Plan: yes, to come to the emergency room.  Hallucinations:  denies    Patient's Support Network  Includes:  mother and extended family    Progress toward goal: Not at goal    Functional Status: Mild impairment     Prognosis: Fair with Ongoing Treatment     Plan         Patient will continue in individual outpatient therapy session [facility] every 3 weeks and will continue and pharmacotherapy as scheduled with MARISELA Dixon.  Patient will adhere to medication regimen as prescribed and report any side effects. Patient will contact this office, call 911 or present to the nearest emergency room should suicidal or homicidal ideations occur. Provide Cognitive Behavioral Therapy and Integrative Therapy to improve functioning, maintain stability, and avoid decompensation and the need for higher level of care.          Return in about 3 weeks (around 1/21/2025) for Next scheduled follow up.      This document signed by Vidal Ortiz LCSW, [facility] January 7, 2025 07:13 EST

## 2025-01-14 ENCOUNTER — OFFICE VISIT (OUTPATIENT)
Dept: CARDIOLOGY | Facility: CLINIC | Age: 55
End: 2025-01-14
Payer: MEDICAID

## 2025-01-14 VITALS
DIASTOLIC BLOOD PRESSURE: 69 MMHG | BODY MASS INDEX: 36.23 KG/M2 | HEART RATE: 78 BPM | OXYGEN SATURATION: 98 % | SYSTOLIC BLOOD PRESSURE: 120 MMHG | HEIGHT: 59 IN

## 2025-01-14 DIAGNOSIS — E78.5 DYSLIPIDEMIA: ICD-10-CM

## 2025-01-14 DIAGNOSIS — I25.10 ARTERIOSCLEROTIC CARDIOVASCULAR DISEASE (ASCVD): Primary | ICD-10-CM

## 2025-01-14 DIAGNOSIS — I10 ESSENTIAL HYPERTENSION: ICD-10-CM

## 2025-01-14 PROCEDURE — 99214 OFFICE O/P EST MOD 30 MIN: CPT | Performed by: PHYSICIAN ASSISTANT

## 2025-01-14 PROCEDURE — 1160F RVW MEDS BY RX/DR IN RCRD: CPT | Performed by: PHYSICIAN ASSISTANT

## 2025-01-14 PROCEDURE — 3078F DIAST BP <80 MM HG: CPT | Performed by: PHYSICIAN ASSISTANT

## 2025-01-14 PROCEDURE — 3074F SYST BP LT 130 MM HG: CPT | Performed by: PHYSICIAN ASSISTANT

## 2025-01-14 PROCEDURE — 1159F MED LIST DOCD IN RCRD: CPT | Performed by: PHYSICIAN ASSISTANT

## 2025-01-14 NOTE — PROGRESS NOTES
Diane Rios  Tammi Levine  1970  01/14/2025    Patient Active Problem List   Diagnosis    Arteriosclerotic cardiovascular disease (ASCVD), s/p MI in 2012, clinically stable.     Type 2 diabetes mellitus with diabetic peripheral angiopathy without gangrene, with long-term current use of insulin    Essential hypertension    Dyslipidemia    Sleep apnea    Migraine headache    Abnormal CT scan    Disease of thyroid gland    Right-sided low back pain with right-sided sciatica    Slow transit constipation    Suicidal ideation       Dear Diane Rios:    Subjective     History of Present Illness:    Chief Complaint   Patient presents with    Follow-up     ROUTINE       Tammi Levine is a pleasant 54 y.o. female with a past medical history significant for ASCVD with previous MI in 2012, dyslipidemia, essential hypertension, diabetes mellitus.  She comes in today for cardiology follow-up.  History of Present Illness  The patient presents for the evaluation of essential hypertension.    The patient was last evaluated on 11/11/2024 for hypertension management. The patient reports a positive response to an increased dosage of lisinopril, with home blood pressure monitoring indicating values within normal limits. The patient denies any cardiac symptoms, including chest pain, dyspnea, palpitations, or syncope. No laboratory tests have been conducted in the past 3 months.    MEDICATIONS  Lisinopril.       Allergies   Allergen Reactions    Zoloft [Sertraline Hcl] Nausea And Vomiting    Ciprofloxacin Rash     Also caused chest pain   :      Current Outpatient Medications:     aspirin 81 MG EC tablet, Take 1 tablet by mouth Every Evening., Disp: 90 tablet, Rfl: 2    B-D UF III MINI PEN NEEDLES 31G X 5 MM misc, USE TWICE A DAY WITH INSULIN INJECTIONS, Disp: , Rfl:     dapagliflozin Propanediol (Farxiga) 10 MG tablet, Take 10 mg by mouth Daily., Disp: 30 tablet, Rfl: 2    insulin aspart (novoLOG FLEXPEN) 100  "UNIT/ML solution pen-injector sc pen, Inject 15 Units under the skin into the appropriate area as directed 2 (Two) Times a Day Before Meals. Indications: Type 2 Diabetes, Disp: , Rfl:     lamoTRIgine (LaMICtal) 25 MG tablet, Take 2 tablets by mouth Every Night for 90 days. Indications: Manic-Depression, Disp: 60 tablet, Rfl: 2    levothyroxine (SYNTHROID, LEVOTHROID) 50 MCG tablet, Take 1 tablet by mouth Every Evening. Indications: Underactive Thyroid, Disp: , Rfl:     lisinopril (PRINIVIL,ZESTRIL) 20 MG tablet, Take 1 tablet by mouth Daily. Indications: High Blood Pressure, Disp: 90 tablet, Rfl: 2    lithium carbonate 300 MG capsule, Take 1 capsule by mouth Every Evening for 90 days. Indications: Manic-Depression, Disp: 30 capsule, Rfl: 2    omeprazole (priLOSEC) 20 MG capsule, Daily., Disp: , Rfl:     ondansetron ODT (ZOFRAN-ODT) 4 MG disintegrating tablet, 1 tablet., Disp: , Rfl:     OneTouch Verio test strip, TEST BLOOD SUGAR 3 TIMES A DAY, Disp: , Rfl:     rosuvastatin (CRESTOR) 10 MG tablet, Take 1 tablet by mouth Daily. Indications: High Amount of Fats in the Blood, Disp: , Rfl:     The following portions of the patient's history were reviewed and updated as appropriate: allergies, current medications, past family history, past medical history, past social history, past surgical history and problem list.    Social History     Tobacco Use    Smoking status: Never    Smokeless tobacco: Never   Vaping Use    Vaping status: Never Used   Substance Use Topics    Alcohol use: No    Drug use: No         Objective   Vitals:    01/14/25 1311   BP: 120/69   Pulse: 78   SpO2: 98%   Height: 149.9 cm (59\")     Body mass index is 36.23 kg/m².    ROS    Constitutional:       General: Not in acute distress.     Appearance: Healthy appearance. Well-developed and not in distress. Not diaphoretic.   Eyes:      Conjunctiva/sclera: Conjunctivae normal.      Pupils: Pupils are equal, round, and reactive to light.   HENT:      Head: " Normocephalic and atraumatic.   Neck:      Vascular: No carotid bruit or JVD.   Pulmonary:      Effort: Pulmonary effort is normal. No respiratory distress.      Breath sounds: Normal breath sounds.   Cardiovascular:      Normal rate. Regular rhythm.   Edema:     Peripheral edema absent.   Skin:     General: Skin is cool.   Neurological:      Mental Status: Alert, oriented to person, place, and time and oriented to person, place and time.         Lab Results   Component Value Date     08/27/2024    K 4.0 08/27/2024     08/27/2024    CO2 21.2 (L) 08/27/2024    BUN 15 08/27/2024    CREATININE 0.88 08/27/2024    GLUCOSE 266 (H) 08/27/2024    CALCIUM 9.5 08/27/2024    AST 14 08/27/2024    ALT 11 08/27/2024    ALKPHOS 98 08/27/2024    LABIL2 1.4 (L) 02/04/2016     Lab Results   Component Value Date    CKTOTAL 124 08/14/2020     Lab Results   Component Value Date    WBC 12.39 (H) 08/27/2024    HGB 11.8 (L) 08/27/2024    HCT 37.0 08/27/2024     08/27/2024     Lab Results   Component Value Date    INR 0.91 01/12/2020     Lab Results   Component Value Date    MG 2.1 08/27/2024     Lab Results   Component Value Date    TSH 3.700 12/27/2023    TRIG 124 12/27/2023    HDL 51 12/27/2023     (H) 12/27/2023      Lab Results   Component Value Date    BNP 36 09/16/2015       During this visit the following were done:  Labs Reviewed []    Labs Ordered []    Radiology Reports Reviewed []    Radiology Ordered []    PCP Records Reviewed []    Referring Provider Records Reviewed []    ER Records Reviewed []    Hospital Records Reviewed []    History Obtained From Family []    Radiology Images Reviewed []    Other Reviewed []    Records Requested []       Procedures    Assessment & Plan    Diagnosis Plan   1. Arteriosclerotic cardiovascular disease (ASCVD), s/p MI in 2012, clinically stable.   Basic Metabolic Panel    Lipid Panel      2. Essential hypertension        3. Dyslipidemia                 Assessment &  Plan  1. Essential hypertension  - Blood pressure well-controlled at 120/69 mmHg today  - No chest pain, shortness of breath, palpitations, or syncope  - Ordered BMP and lipid panel to monitor cholesterol, kidney function, and electrolytes  - Tests to be completed within 4 weeks, fasting required    2.  CAD   Denies any recent anginal symptoms continue with current regimen    3.  Dyslipidemia  Checking lipid panel will tailor therapy accordingly for now continue Crestor  Return in about 6 months (around 7/14/2025).    As always, I appreciate very much the opportunity to participate in the cardiovascular care of your patients.      With Best Regards,    Oscar Lewis PA-C    Patient or patient representative verbalized consent for the use of Ambient Listening during the visit with  Oscar Lewis PA-C for chart documentation. 1/14/2025  13:57 EST

## 2025-02-11 ENCOUNTER — OFFICE VISIT (OUTPATIENT)
Dept: PSYCHIATRY | Facility: CLINIC | Age: 55
End: 2025-02-11
Payer: MEDICAID

## 2025-02-11 DIAGNOSIS — F41.1 GENERALIZED ANXIETY DISORDER: ICD-10-CM

## 2025-02-11 DIAGNOSIS — Z63.9 FAMILY DYNAMICS PROBLEM: ICD-10-CM

## 2025-02-11 DIAGNOSIS — F31.81 CHRONIC BIPOLAR II DISORDER, MOST RECENT EPISODE MAJOR DEPRESSIVE: Primary | ICD-10-CM

## 2025-02-11 DIAGNOSIS — G47.9 SLEEPING DIFFICULTIES: ICD-10-CM

## 2025-02-11 SDOH — SOCIAL STABILITY - SOCIAL INSECURITY: PROBLEM RELATED TO PRIMARY SUPPORT GROUP, UNSPECIFIED: Z63.9

## 2025-02-12 NOTE — PROGRESS NOTES
"Date of Service:  February 11, 2025  Time In: 1:30 PM  Time Out: 2:00 PM    PROGRESS NOTE  Data:  Tammi Levine is a 54 y.o. female who met 1:1 with the undersigned for a regularly scheduled individual outpatient therapy session at [facility] for follow-up of bipolar disorder.      Chief Complaint: Bipolar II disorder; anxiety; family dynamics problem     HPI: Patient reports she has been worried as of late as she feels as though the new administration has been and will target poor people.  She also reports her mother has \"changed her to\" and is now telling the patient she better get ready for a \"hard time.\"  Patient reports she continues to struggle with mood instability including periods of depressed mood, anhedonia, anergia, and feeling hopeless.  Patient also reports she struggles with periods of hypomania including feeling on edge, irritability, increased energy, impulsive behavior, and decreased need for sleep.  However, the patient states she primarily struggles with depression and anxiety.  Patient rates current symptoms at a 4 on a scale of 1-10 with 10 being most severe. Patient reports she is sleeping relatively well at this time.  The patient adamantly and convincingly denies suicidal ideation vehemently denies any substance use.      Clinical Maneuvering/Intervention:  Assisted patient in processing above session content; acknowledged and normalized patient’s thoughts, feelings, and concerns.  Allowed the patient to discuss/process her feelings and fears concerning the current political climate and validated her feelings.  However, also utilize motivation interviewing techniques including complex reflections to discussed the concept of things we can control things we cannot control and strongly urged the patient to utilize thought blocking techniques and to simply live her life and do the best she can on a daily basis.  Utilized cognitive behavioral therapy to assist the " patient in developing appropriate coping mechanisms to decrease the severity frequency of symptoms.  Continue to focus on healthy skills of daily living and behavioral activation.  Provided unconditional positive regarding a safe, supportive environment.    Allowed patient to freely discuss issues without interruption or judgment. Provided safe, confidential environment to facilitate the development of positive therapeutic relationship and encourage open, honest communication. Assisted patient in identifying risk factors which would indicate the need for higher level of care including thoughts to harm self or others and/or self-harming behavior and encouraged patient to contact this office, call 911, or present to the nearest emergency room should any of these events occur. Discussed crisis intervention services and means to access.  Patient adamantly and convincingly denies current suicidal or homicidal ideation or perceptual disturbance.      Assessment    Patient appears to maintain relative stability as compared to her baseline.  However, she continues to be susceptible to an external locus of control and continues to be significantly affected by ongoing strained family dynamics.  The patient's symptomology continue to cause impairment in important areas of functioning.  Patient can be reasonably expected to continue to benefit treatment and would likely be at increased risk for decompensation.    Diagnoses and all orders for this visit:    1. Chronic bipolar II disorder, most recent episode major depressive (Primary)    2. Sleeping difficulties    3. Generalized anxiety disorder    4. Family dynamics problem               Mental Status Exam  Hygiene: Good  Dress: Casual  Attitude:  Cooperative  Motor Activity: Appropriate  Speech:  Normal  Mood: Within normal limits  Affect: Tearful  Thought Processes:  Linear  Thought Content:  normal  Suicidal Thoughts:  denies  Homicidal Thoughts:  denies  Crisis Safety  Plan: yes, to come to the emergency room.  Hallucinations:  denies    Patient's Support Network Includes:  mother and extended family    Progress toward goal: Not at goal    Functional Status: Mild impairment     Prognosis: Fair with Ongoing Treatment     Plan         Patient will continue in individual outpatient therapy session [facility] every 3 weeks and will continue and pharmacotherapy as scheduled with MARISELA Dixon.  Patient will adhere to medication regimen as prescribed and report any side effects. Patient will contact this office, call 911 or present to the nearest emergency room should suicidal or homicidal ideations occur. Provide Cognitive Behavioral Therapy and Integrative Therapy to improve functioning, maintain stability, and avoid decompensation and the need for higher level of care.          Return in about 4 weeks (around 3/11/2025) for Next scheduled follow up.      This document signed by Vidal Ortiz LCSW, [facility] February 12, 2025 06:43 EST

## 2025-03-12 ENCOUNTER — OFFICE VISIT (OUTPATIENT)
Dept: PSYCHIATRY | Facility: CLINIC | Age: 55
End: 2025-03-12
Payer: MEDICAID

## 2025-03-12 VITALS
SYSTOLIC BLOOD PRESSURE: 120 MMHG | BODY MASS INDEX: 35.76 KG/M2 | HEART RATE: 72 BPM | WEIGHT: 177.4 LBS | DIASTOLIC BLOOD PRESSURE: 68 MMHG | HEIGHT: 59 IN | OXYGEN SATURATION: 99 %

## 2025-03-12 DIAGNOSIS — F41.1 GENERALIZED ANXIETY DISORDER: ICD-10-CM

## 2025-03-12 DIAGNOSIS — Z79.899 MEDICATION MANAGEMENT: ICD-10-CM

## 2025-03-12 DIAGNOSIS — F31.30 BIPOLAR I DISORDER, MOST RECENT EPISODE DEPRESSED: Primary | ICD-10-CM

## 2025-03-12 DIAGNOSIS — G47.9 SLEEPING DIFFICULTIES: ICD-10-CM

## 2025-03-12 RX ORDER — LAMOTRIGINE 25 MG/1
50 TABLET ORAL NIGHTLY
Qty: 60 TABLET | Refills: 2 | Status: SHIPPED | OUTPATIENT
Start: 2025-03-12 | End: 2025-06-10

## 2025-03-12 RX ORDER — KETOCONAZOLE 20 MG/ML
SHAMPOO, SUSPENSION TOPICAL
COMMUNITY
Start: 2025-01-31

## 2025-03-12 RX ORDER — LITHIUM CARBONATE 300 MG/1
300 CAPSULE ORAL EVERY EVENING
Qty: 30 CAPSULE | Refills: 2 | Status: SHIPPED | OUTPATIENT
Start: 2025-03-12 | End: 2025-06-10

## 2025-03-12 NOTE — PROGRESS NOTES
"  Subjective     Tammi Levine is a 54 y.o. female who presents today for follow up    Chief Complaint:  management of bipolar disorder and anxiety     History of Present Illness:    Today is a follow-up visit.    Medication compliance: patient is compliant with medications, denies SE. Patient reports her medication has helped her overall symptoms of depression, mood and anxiety. She denies suicidal thoughts or wishing she were dead. She states that she could never harm herself because it is against her [employer] and wants to be with her dad and god in heaven.     She continues to take the Lamictal at night due taking in the morning caused drowsiness.       S\he reports feeling good as far as mood and anxiety goes. She reports some worry about her mother due having a respiratory infection.      Details:  Depression rated 2/10, with 10 being the worst. PHQ-9 score: 2  (prior 18)   She denies having a depressed mood or anhedonia. She denies feeling hopeless or helpless. These symptoms have caused impairment in important areas of functioning but have improved with medication.     Anxiety rated 2/10, with 10 being the worst. SWAPNA-7 score:1 (prior 14)   She reports hx of excessive anxiety, excessive worry, and difficulty controlling worry. She reports improvement in anxiety she feels less overwhelmed and less \"what if\" thoughts. These symptoms have caused impairment in important areas of functioning but have improved with medication.     She reports no recent anxiety attacks. It has been 4-5 years ago.     She was dx with bipolar disorder 30 years ago.  The patient endorses significant symptoms of bipolar disorder including: elevated mood alternating with irritable mood for at least a week, (she was admitted in July 2014 with euphoric feelings, decreased need for sleep, suicidal thoughts, and psychotic symptoms) inflated self esteem, decreased need for sleep-up for 2-3 days at a time, increased talkativeness, flight of " ideas or racing thoughts, distractibility, increase in goal directed activity or psychomotor agitation, but denies risky behavior. These symptoms have caused impairment in important areas of daily functioning but are much improved with medication. The patient has had symptoms of bipolar disorder for 30 years.  The patient rates their symptoms at a 5/10 on a 0-10 scale, with 10 being the worst. She reports      Sleep has been fair. She reports that her cat has been waking her up. She sometimes hears noises in her apartment complex. She reports using a sound machine, using headphones with music, and butterfly light. She is sleeping 4-5 hours per night. She has taken trazodone but it cause her to have nightmares. We discussed using melatonin to see if this will help her to sleep. She gets up during the nights and will accomplish a few tasks then returns to bed.  Nightmares: Denies     Appetite is good. She is eating 2 meals per day,snacking daily on chips and little alex cakes. She is drinking two diet Mt. Dews a day, gartor aid and water.      Patient denies SI/HI, A/V hallucinations, or delusions.    Alcohol use: no  Drug use: no  Marijuana use: no  Tobacco: no    Chronic health issues, no acute physical or medical issues today.    The following portions of the patient's history were reviewed and updated as appropriate: allergies, current medications, past family history, past medical history, past social history, past surgical history and problem list.    Previous psychiatric medications include:   Antidepressants: Effexor and Prozac- nightmares,  trazodone  Antianxiety: None  Antipsychotics: None  Mood stabilizers: tried: Depakote (divalproex)- did not help-sedated.   Current:  Lamotrigine and lithium  ADHD: None      Past Psychiatric History:  She reports having anxiety and depression for as long as she can remember. She reports first being hospitalized 30 years ago in [place]. This August was the first time in  "10 years that she was hospitalized.   Reports she was recently admitted to the [facility] for worsening depression and SI (8/27/24 to 9/2/24). States she felt overwhelmed after her surgery with life stressors. While inpatient she was also treated for UTI. She was started on lamotrigine, states she has felt a small improvement with mood. States she does have moments of depression in the evenings.       Past Medical History:  Past Medical History:   Diagnosis Date    Abnormal CT scan     ADRENAL GLAND FOCAL MASS LESION MULTIPLE, LEFT ONLY    Anxiety     ASCVD (arteriosclerotic cardiovascular disease)     \"status post MI in 2012, clinically stable\"    Bipolar disorder     Depression     Dyslipidemia     Hypertension, well controlled     Hypothyroidism     Migraine headache     Myocardial infarction 2012    Panic disorder     Right-sided low back pain with right-sided sciatica     Sleep apnea     Suicidal thoughts     Type 2 diabetes mellitus        Social History:  Social History     Socioeconomic History    Marital status: Single    Number of children: 0    Highest education level: High school graduate   Tobacco Use    Smoking status: Never    Smokeless tobacco: Never   Vaping Use    Vaping status: Never Used   Substance and Sexual Activity    Alcohol use: No    Drug use: No    Sexual activity: Defer       Family History:  Family History   Problem Relation Age of Onset    Diabetes Father     Diabetes Brother     Cancer Maternal Grandmother     Heart disease Maternal Grandfather     Diabetes Paternal Grandfather     Cancer Other        Past Surgical History:  Past Surgical History:   Procedure Laterality Date    CHOLECYSTECTOMY      CORONARY ANGIOPLASTY WITH STENT PLACEMENT  2012    Dr. Denson    ENDOSCOPY  03/2021    HYSTERECTOMY  12/2014    NECK SURGERY  2010    TOE SURGERY Left     TRANSESOPHAGEAL ECHOCARDIOGRAM (SARAN)  07/13/2015    EF 60-65%       Problem List:  Patient Active Problem List   Diagnosis    " Arteriosclerotic cardiovascular disease (ASCVD), s/p MI in 2012, clinically stable.     Type 2 diabetes mellitus with diabetic peripheral angiopathy without gangrene, with long-term current use of insulin    Essential hypertension    Dyslipidemia    Sleep apnea    Migraine headache    Abnormal CT scan    Disease of thyroid gland    Right-sided low back pain with right-sided sciatica    Slow transit constipation    Suicidal ideation       Allergy:   Allergies   Allergen Reactions    Zoloft [Sertraline Hcl] Nausea And Vomiting    Ciprofloxacin Rash     Also caused chest pain        Current Medications:   Current Outpatient Medications   Medication Sig Dispense Refill    aspirin 81 MG EC tablet Take 1 tablet by mouth Every Evening. 90 tablet 2    B-D UF III MINI PEN NEEDLES 31G X 5 MM misc USE TWICE A DAY WITH INSULIN INJECTIONS      dapagliflozin Propanediol (Farxiga) 10 MG tablet Take 10 mg by mouth Daily. 30 tablet 2    insulin aspart (novoLOG FLEXPEN) 100 UNIT/ML solution pen-injector sc pen Inject 15 Units under the skin into the appropriate area as directed 2 (Two) Times a Day Before Meals. Indications: Type 2 Diabetes      ketoconazole (NIZORAL) 2 % shampoo APPLY 5-10 ML TO WET SCALP, LATHER AND LEAVE 3 MINUTES THEN RINSE - DO THIS TWICE WEEKLY      lamoTRIgine (LaMICtal) 25 MG tablet Take 2 tablets by mouth Every Night for 90 days. Indications: Manic-Depression 60 tablet 2    levothyroxine (SYNTHROID, LEVOTHROID) 50 MCG tablet Take 1 tablet by mouth Every Evening. Indications: Underactive Thyroid      lisinopril (PRINIVIL,ZESTRIL) 20 MG tablet Take 1 tablet by mouth Daily. Indications: High Blood Pressure 90 tablet 2    lithium carbonate 300 MG capsule Take 1 capsule by mouth Every Evening for 90 days. Indications: Manic-Depression 30 capsule 2    omeprazole (priLOSEC) 20 MG capsule Daily.      ondansetron ODT (ZOFRAN-ODT) 4 MG disintegrating tablet 1 tablet.      OneTouch Verio test strip TEST BLOOD SUGAR 3  "TIMES A DAY      rosuvastatin (CRESTOR) 10 MG tablet Take 1 tablet by mouth Daily. Indications: High Amount of Fats in the Blood       No current facility-administered medications for this visit.       Review of Symptoms:    Review of Systems   Constitutional:  Positive for fatigue.   HENT: Negative.     Eyes: Negative.    Respiratory: Negative.     Cardiovascular: Negative.    Gastrointestinal: Negative.    Endocrine: Negative.    Genitourinary: Negative.    Allergic/Immunologic: Negative.    Neurological:  Positive for numbness.        Numbness in feet   Hematological: Negative.    Psychiatric/Behavioral:  Positive for decreased concentration, sleep disturbance, depressed mood and stress. The patient is nervous/anxious.        Objective     Physical Exam:   Physical Exam  Constitutional:       Appearance: Normal appearance.   Eyes:      Pupils: Pupils are equal, round, and reactive to light.   Cardiovascular:      Rate and Rhythm: Normal rate.   Pulmonary:      Effort: Pulmonary effort is normal.   Musculoskeletal:      Cervical back: Normal range of motion.      Comments: Using a cane for stability and mobility   Skin:     General: Skin is warm and dry.   Neurological:      General: No focal deficit present.      Mental Status: She is alert and oriented to person, place, and time.   Psychiatric:         Attention and Perception: Attention and perception normal.         Mood and Affect: Affect normal. Mood is anxious and depressed.         Speech: Speech normal.         Behavior: Behavior normal. Behavior is cooperative.         Thought Content: Thought content normal.         Cognition and Memory: Cognition and memory normal.         Judgment: Judgment normal.         Vitals:  Blood pressure 120/68, pulse 72, height 149.9 cm (59.02\"), weight 80.5 kg (177 lb 6.4 oz), SpO2 99%.   Body mass index is 35.81 kg/m².    Last 3 Blood Pressure and Pulse Readings:  BP Readings from Last 3 Encounters:   03/12/25 120/68 "   01/14/25 120/69   12/18/24 132/74     Pulse Readings from Last 3 Encounters:   03/12/25 72   01/14/25 78   12/18/24 72       PHQ-9 Score: March 12, 2025  Little interest or pleasure in doing things? Not at all   Feeling down, depressed, or hopeless? Not at all   PHQ-2 Total Score 0   Trouble falling or staying asleep, or sleeping too much? Several days   Feeling tired or having little energy? Several days   Poor appetite or overeating? Not at all   Feeling bad about yourself - or that you are a failure or have let yourself or your family down? Not at all   Trouble concentrating on things, such as reading the newspaper or watching television? Not at all   Moving or speaking so slowly that other people could have noticed? Or the opposite - being so fidgety or restless that you have been moving around a lot more than usual? Not at all     Thoughts that you would be better off dead, or of hurting yourself in some way? Not at all   PHQ-9 Total Score 2   If you checked off any problems, how difficult have these problems made it for you to do your work, take care of things at home, or get along with other people? Not difficult at all          SWAPNA-7 Score: March 12, 2025  Feeling nervous, anxious or on edge: Not at all  Not being able to stop or control worrying: Not at all  Worrying too much about different things: Several days  Trouble Relaxing: Not at all  Being so restless that it is hard to sit still: Not at all  Feeling afraid as if something awful might happen: Not at all  Becoming easily annoyed or irritable: Not at all  SWAPNA 7 Total Score: 1  If you checked any problems, how difficult have these problems made it for you to do your work, take care of things at home, or get along with other people: Not difficult at all     Appearance: Patient is a 54-year-old  female.  She is casually dressed in cropped pants, a graphic T-shirt, and tennis shoes.  Her thinning yusuf hair is short and neatly styled. she is  wearing glasses.  She is appropriately dressed for her age and the weather.  Gait, Station, Strength: patient is using a cane during ambulation and a brace to her left ankle for stabilization during ambulation. She has not had any falls.       Mental Status Exam:   Hygiene:   good  Cooperation:  Cooperative  Eye Contact:  Good  Psychomotor Behavior:  Appropriate  Affect: Appropriate   Mood: depressed and anxious  Hopelessness: Denies  Speech:  Normal  Thought Process:  Goal directed  Thought Content:  Mood congruent  Suicidal:  None  Homicidal:  None  Hallucinations:  None  Delusion:  None  Memory:  Intact  Orientation:  Person, Place, Time, and Situation  Reliability:  good  Insight:  Fair  Judgement:  Fair  Impulse Control:  Good  Physical/Medical Issues:  Yes , see chart        Lab Results:   No visits with results within 3 Month(s) from this visit.   Latest known visit with results is:   Office Visit on 10/30/2024   Component Date Value Ref Range Status    Lithium 10/30/2024 0.9  0.6 - 1.2 mmol/L Final     Assessment & Plan   Diagnoses and all orders for this visit:    1. Bipolar I disorder, most recent episode depressed (Primary)  -     lamoTRIgine (LaMICtal) 25 MG tablet; Take 2 tablets by mouth Every Night for 90 days. Indications: Manic-Depression  Dispense: 60 tablet; Refill: 2  -     lithium carbonate 300 MG capsule; Take 1 capsule by mouth Every Evening for 90 days. Indications: Manic-Depression  Dispense: 30 capsule; Refill: 2    2. Generalized anxiety disorder  -     lithium carbonate 300 MG capsule; Take 1 capsule by mouth Every Evening for 90 days. Indications: Manic-Depression  Dispense: 30 capsule; Refill: 2    3. Sleeping difficulties    4. Medication management  -     Lithium Level; Future          Visit Diagnoses:    ICD-10-CM ICD-9-CM   1. Bipolar I disorder, most recent episode depressed  F31.30 296.50   2. Generalized anxiety disorder  F41.1 300.02   3. Sleeping difficulties  G47.9 780.50   4.  Medication management  Z79.899 V58.69         TREATMENT PLAN:   -Continue lithium 300 mg p.o. at bedtime for bipolar disorder anxiety.  -Continue lamotrigine (Lamictal) 50 mg p.o. at bedtime for mood and depression.  -Discussed supportive psychotherapy efforts to develop coping skills to manage stress and emotions. Patient is seeing [facility]  -Pharmacological and Non-Pharmacological treatment options discussed during today's visit.   -The benefits of a healthy diet and exercise were discussed with patient, especially the positive effects they have on mental health. Patient encouraged to consider lifestyle modification regarding  diet and exercise patterns to maximize results of mental health treatment.   -We discussed sleep hygiene including going to bed at the same time and getting up at the same time every day, decreased caffeine consumption, going to bed early enough to get 7 or 8 hours in bed, reading and relaxing before bedtime, and avoiding stimulating activities close to bedtime.   -Patient acknowledged and verbally consented with current treatment plan and was educated on the importance of compliance with treatment and follow-up appointments.    -Return to clinic in 12  weeks for follow up.  -Reviewed previous available documentation  -Reviewed most recent available labs, lithium level, 10/30/24: 0.9  -TOSHA reviewed        MEDICATION ISSUES:  -Discussed medication options and treatment plan of prescribed medication as well as the risks, benefits, any black box warnings, and side effects.   -This APRN has reviewed Lithium Toxicity symptoms with the patient including but not limited to: nausea, fine hand tremors, increased urination and thirst, weight gain, impaired thyroid functioning, fatigue, mental cloudiness, headaches, coarse hand tremors with toxicity, nystagmus, maculopapular rash, pruritis, acne, GI or stomach upset, diarrhea, vomiting, cramps, anorexia, diabetes insipidus, edema, microscopic  tubular changes, t-wave inversion or abnormal cardiac rhythm, dysrhythmias, and leukocytosis.  The patient is advised if they experience any of these symptoms they are to contact this APRN/this office immediately or go to the Emergency Department immediately.  The patient verbalized understanding and agreement in their own words.  The patient is advised that taking Lithium with NSAID's, diuretics, ACE inhibitors, metronidazole, acetazolamide, methyldopa, carbamazepine, phenytoin, calcium channel blockers, and haloperidol may cause serious medication reactions including potentially fatal lithium toxicity.  The use of an SSRI with Lithium may raise the risk of dizziness, confusion, diarrhea, agitation, and tremor.  The patient is advised to avoid these medications, or if they are taking or plan to start any of these medications, this APRN/or a Provider at this office, and the patient's PCP/or the Prescriber of the medication should be notified/aware so further testing and monitoring can be performed.  The patient is advised of the need for hydration during treatment with Lithium, and the risks of not staying hydrated - drinking water.  The patient verbalizes understanding and agreement of instructions in their own words.  Also, the patient is instructed to complete the ordered lithium level after taking the Lithium  for at least 14 days with no missed doses.  The patient is instructed not to take the lithium on the morning of lab draw (as levels should be drawn about 12 hours after the last dose taken), but to take the Lithium after the blood work/lab draw has been completed as taking it before having the blood/lab drawn will interfere with the results.  The patient verbalized understanding and agreement in their own words.   -This APRN has discussed that a very slow dose titration when starting or changing doses of lamotrigine. Explanation provided to patient regarding the potential for Benny Nadeem syndrome that  can occur with the medication. Patient was encouraged to monitor skin for correlating rash, lesions in the mucous membranes, or flu-like symptoms. Should any of these symptoms occur, patient was advised to stop medication immediatly and notify provider and go to the emergency department immediately. The dosage should not be titrated upwards or increased faster than recommended due to the possibility of the discussed side effects and risk of development of a skin rash (which can become life threatening). This APRN has also discussed that if the patient stops taking the lamotrigine for 5 days or longer, it will be necessary to restart the drug with an initial dose titration, as rashes have been reported on reexposure.  If the patient/guardian and Provider decide to stop the lamotrigine, the patient/guardian will follow the directions of this APRN/this office as a guided taper over about two weeks is appropriate due to the risk of relapse in bipolar disorder with those with bipolar disorder, the risk of seizures in those with epilepsy, and discontinuation symptoms upon rapid discontinuation of lamotrigine. The patient/guardian verbalizes understanding of benefits and risks as discussed, the patient/guardian feels the benefits outweigh the risks and is agreeable to continue/take lamotrigine as discussed.    -Patient is agreeable to call the office with any worsening of symptoms or onset of side effects, or if any concerns or questions arise.    -The contact information for the office is made available to the patient. Patient is agreeable to call 911 or go to the nearest ER should they begin having any SI/HI, or if any urgent concerns arise. No medication side effects or related complaints today.      GOALS:  Short Term Goals: Patient will be compliant with medication, and patient will have no significant medication related side effects.  Patient will be engaged in psychotherapy as indicated.  Patient will report  subjective improvement of symptoms.  Long term goals: To stabilize mood and treat/improve subjective symptoms, the patient will stay out of the hospital, the patient will be at an optimal level of functioning, and the patient will take all medications as prescribed.  The patient/guardian verbalized understanding and agreement with goals that were mutually set.      TREATMENT PLAN:   -  MEDS ORDERED DURING VISIT:  New Medications Ordered This Visit   Medications    lamoTRIgine (LaMICtal) 25 MG tablet     Sig: Take 2 tablets by mouth Every Night for 90 days. Indications: Manic-Depression     Dispense:  60 tablet     Refill:  2    lithium carbonate 300 MG capsule     Sig: Take 1 capsule by mouth Every Evening for 90 days. Indications: Manic-Depression     Dispense:  30 capsule     Refill:  2       MEDS DISCONTINUED DURING VISIT:   Medications Discontinued During This Encounter   Medication Reason    lamoTRIgine (LaMICtal) 25 MG tablet Reorder    lithium carbonate 300 MG capsule Reorder          Follow Up Appointment:   Return in about 12 weeks (around 6/4/2025) for Recheck.           This document has been electronically signed by MARISELA Calvillo  March 12, 2025 14:37 EDT    Dictated Utilizing Dragon Dictation: Part of this note may be an electronic transcription/translation of spoken language to printed text using the Dragon Dictation System. Errors in dictation may reflect use of voice recognition software and not all errors in transcription may have been detected prior to signing.

## 2025-04-15 ENCOUNTER — CLINICAL SUPPORT (OUTPATIENT)
Dept: FAMILY MEDICINE CLINIC | Facility: CLINIC | Age: 55
End: 2025-04-15
Payer: MEDICAID

## 2025-04-15 ENCOUNTER — OFFICE VISIT (OUTPATIENT)
Dept: PSYCHIATRY | Facility: CLINIC | Age: 55
End: 2025-04-15
Payer: MEDICAID

## 2025-04-15 DIAGNOSIS — Z79.899 MEDICATION MANAGEMENT: ICD-10-CM

## 2025-04-15 DIAGNOSIS — F41.1 GENERALIZED ANXIETY DISORDER: ICD-10-CM

## 2025-04-15 DIAGNOSIS — F31.81 CHRONIC BIPOLAR II DISORDER, MOST RECENT EPISODE MAJOR DEPRESSIVE: Primary | ICD-10-CM

## 2025-04-15 DIAGNOSIS — G47.9 SLEEPING DIFFICULTIES: ICD-10-CM

## 2025-04-15 PROCEDURE — 80178 ASSAY OF LITHIUM: CPT

## 2025-04-15 NOTE — PROGRESS NOTES
Venipuncture Blood Specimen Collection  Venipuncture performed in left arm by Tammy Jay MA with good hemostasis. Patient tolerated the procedure well without complications.   04/15/25   Tammy Jay MA

## 2025-04-15 NOTE — TREATMENT PLAN
Multi-Disciplinary Problems (from Behavioral Health Treatment Plan)      Active Problems       Problem:  Patient Care Overview (Adult)  Start Date: 04/15/25      Problem Details: PT continues in psychotherapy every 3 to 4 weeks and pharmacotherapy as scheduled. Patient's symptomology continues to cause impairment in important in areas of functioning. As a result, she can be expected to benefit from ongoing treatment and would likely be at increased risk for decompensation otherwise.                                I have discussed and reviewed this treatment plan with the patient.

## 2025-04-16 LAB — LITHIUM SERPL-SCNC: 1 MMOL/L (ref 0.6–1.2)

## 2025-04-16 NOTE — PROGRESS NOTES
"Date of Service: April 15, 2025  Time In: 1:20 PM  Time Out: 2:00 PM    PROGRESS NOTE  Data:  Tammi Levine is a 55 y.o. female who met 1:1 with the undersigned for a regularly scheduled individual outpatient therapy session at [facility] for follow-up of bipolar disorder.      Chief Complaint: Bipolar II disorder; anxiety; family dynamics problem     HPI: Patient continues to verbalize her concern about policies that will harm poor people and her fear of pending difficult economic times.    Patient reports she continues to struggle with mood instability including periods of depressed mood, anhedonia, anergia, and feeling hopeless.  Patient also reports she struggles with periods of hypomania including feeling on edge, irritability, increased energy, impulsive behavior, and decreased need for sleep.  However, the patient states she primarily struggles with depression and anxiety.  Patient states she has been struggling as of late with feeling more on edge and states she simply has difficulty interacting with people.  The patient states \"I just do not like dealing with people anymore.\"  Patient rates current symptoms at a 4/5 on a scale of 1-10 with 10 being most severe. Patient reports she is sleeping relatively well at this time.  The patient adamantly and convincingly denies suicidal ideation vehemently denies any substance use.      Clinical Maneuvering/Intervention:  Assisted patient in processing above session content; acknowledged and normalized patient’s thoughts, feelings, and concerns.  Allowed the patient to discuss/process her feelings and fears concerning the current political climate and validated her feelings.  However, also utilize motivation interviewing techniques including complex reflections to discussed the concept of things we can control things we cannot control and strongly urged the patient to utilize thought blocking techniques and to simply live her life and do " the best she can on a daily basis.  Utilized cognitive behavioral therapy to assist the patient in developing appropriate coping mechanisms to decrease the severity frequency of symptoms.  Continue to focus on healthy skills of daily living and behavioral activation.  Provided unconditional positive regarding a safe, supportive environment.    Completed quarterly treatment review on this date.    Allowed patient to freely discuss issues without interruption or judgment. Provided safe, confidential environment to facilitate the development of positive therapeutic relationship and encourage open, honest communication. Assisted patient in identifying risk factors which would indicate the need for higher level of care including thoughts to harm self or others and/or self-harming behavior and encouraged patient to contact this office, call 911, or present to the nearest emergency room should any of these events occur. Discussed crisis intervention services and means to access.  Patient adamantly and convincingly denies current suicidal or homicidal ideation or perceptual disturbance.      Assessment    Patient appears to maintain relative stability as compared to her baseline.  However, she continues to be susceptible to an external locus of control and continues to be significantly affected by ongoing strained family dynamics.  The patient's symptomology continue to cause impairment in important areas of functioning.  Patient can be reasonably expected to continue to benefit treatment and would likely be at increased risk for decompensation.    Diagnoses and all orders for this visit:    1. Chronic bipolar II disorder, most recent episode major depressive (Primary)    2. Generalized anxiety disorder    3. Sleeping difficulties               Mental Status Exam  Hygiene: Good  Dress: Casual  Attitude:  Cooperative  Motor Activity: Appropriate  Speech:  Normal  Mood: Within normal limits  Affect: Tearful  Thought  Processes:  Linear  Thought Content:  normal  Suicidal Thoughts:  denies  Homicidal Thoughts:  denies  Crisis Safety Plan: yes, to come to the emergency room.  Hallucinations:  denies    Patient's Support Network Includes:  mother and extended family    Progress toward goal: Not at goal    Functional Status: Mild impairment     Prognosis: Fair with Ongoing Treatment     Plan         Patient will continue in individual outpatient therapy session [facility] [place] every 3 weeks and will continue and pharmacotherapy as scheduled with MARISELA Dixon.  Patient will adhere to medication regimen as prescribed and report any side effects. Patient will contact this office, call 911 or present to the nearest emergency room should suicidal or homicidal ideations occur. Provide Cognitive Behavioral Therapy and Integrative Therapy to improve functioning, maintain stability, and avoid decompensation and the need for higher level of care.          Return in about 3 weeks (around 5/6/2025) for Next scheduled follow up.      This document signed by Vidal Ortiz LCSW, [facility] April 16, 2025 07:56 EDT

## 2025-05-06 ENCOUNTER — OFFICE VISIT (OUTPATIENT)
Dept: PSYCHIATRY | Facility: CLINIC | Age: 55
End: 2025-05-06
Payer: MEDICAID

## 2025-05-06 DIAGNOSIS — G47.9 SLEEPING DIFFICULTIES: ICD-10-CM

## 2025-05-06 DIAGNOSIS — F31.81 CHRONIC BIPOLAR II DISORDER, MOST RECENT EPISODE MAJOR DEPRESSIVE: Primary | ICD-10-CM

## 2025-05-06 DIAGNOSIS — F41.1 GENERALIZED ANXIETY DISORDER: ICD-10-CM

## 2025-05-07 NOTE — PROGRESS NOTES
"Date of Service: May 6, 2025  Time In: 2:00 PM  Time Out: 2:45 PM    PROGRESS NOTE  Data:  Tammi Levine is a 55 y.o. female who met 1:1 with the undersigned for a regularly scheduled individual outpatient therapy session at [facility] for follow-up of bipolar disorder.      Chief Complaint: Bipolar II disorder; anxiety; family dynamics problem     HPI: Patient reports things have been going \"okay\" as of late and states she is looking forward to going to the beach in late June with her mother and some other family members.  Patient reports she does continue to with interpersonal relationships and states \"people just get on my nerves.\"   Patient reports she continues to struggle with mood instability including periods of depressed mood, anhedonia, anergia, and feeling hopeless.  Patient also reports she struggles with periods of hypomania including feeling on edge, irritability, increased energy, impulsive behavior, and decreased need for sleep.  However, the patient states she primarily struggles with depression and anxiety.  Patient states she has been struggling as of late with feeling more on edge and states she simply has difficulty interacting with people.   Patient rates current symptoms at a 4 on a scale of 1-10 with 10 being most severe. Patient reports she is sleeping relatively well at this time.  The patient adamantly and convincingly denies suicidal ideation vehemently denies any substance use.      Clinical Maneuvering/Intervention:  Assisted patient in processing above session content; acknowledged and normalized patient’s thoughts, feelings, and concerns.  Validated the patient looking forward to going to the beach and assisted her with developing a strategy to avoid interpersonal conflicts with family members and encouraged her to remind herself she cannot control the behavior or decisions of others and also does not have to agree with them or have the same viewpoint.  " Utilized cognitive behavioral therapy to assist the patient in developing appropriate coping mechanisms to decrease the severity frequency of symptoms.  Continue to focus on healthy skills of daily living and behavioral activation.  Provided unconditional positive regarding a safe, supportive environment.    Allowed patient to freely discuss issues without interruption or judgment. Provided safe, confidential environment to facilitate the development of positive therapeutic relationship and encourage open, honest communication. Assisted patient in identifying risk factors which would indicate the need for higher level of care including thoughts to harm self or others and/or self-harming behavior and encouraged patient to contact this office, call 911, or present to the nearest emergency room should any of these events occur. Discussed crisis intervention services and means to access.  Patient adamantly and convincingly denies current suicidal or homicidal ideation or perceptual disturbance.      Assessment    Patient appears to maintain relative stability as compared to her baseline.  However, she continues to be susceptible to an external locus of control and continues to be significantly affected by ongoing strained family dynamics.  The patient's symptomology continue to cause impairment in important areas of functioning.  Patient can be reasonably expected to continue to benefit treatment and would likely be at increased risk for decompensation.    Diagnoses and all orders for this visit:    1. Chronic bipolar II disorder, most recent episode major depressive (Primary)    2. Generalized anxiety disorder    3. Sleeping difficulties               Mental Status Exam  Hygiene: Good  Dress: Casual  Attitude:  Cooperative  Motor Activity: Appropriate  Speech:  Normal  Mood: Within normal limits  Affect: Tearful  Thought Processes:  Linear  Thought Content:  normal  Suicidal Thoughts:  denies  Homicidal Thoughts:   denies  Crisis Safety Plan: yes, to come to the emergency room.  Hallucinations:  denies    Patient's Support Network Includes:  mother and extended family    Progress toward goal: Not at goal    Functional Status: Mild impairment     Prognosis: Fair with Ongoing Treatment     Plan         Patient will continue in individual outpatient therapy session [facility] [place] every 3 weeks and will continue and pharmacotherapy as scheduled with MARISELA Dixon.  Patient will adhere to medication regimen as prescribed and report any side effects. Patient will contact this office, call 911 or present to the nearest emergency room should suicidal or homicidal ideations occur. Provide Cognitive Behavioral Therapy and Integrative Therapy to improve functioning, maintain stability, and avoid decompensation and the need for higher level of care.          Return in about 4 weeks (around 6/3/2025) for Next scheduled follow up.      This document signed by Vidal Ortiz LCSW, [facility] May 7, 2025 07:29 EDT

## 2025-06-04 ENCOUNTER — OFFICE VISIT (OUTPATIENT)
Dept: PSYCHIATRY | Facility: CLINIC | Age: 55
End: 2025-06-04
Payer: MEDICAID

## 2025-06-04 VITALS
SYSTOLIC BLOOD PRESSURE: 134 MMHG | BODY MASS INDEX: 36.04 KG/M2 | HEIGHT: 59 IN | WEIGHT: 178.8 LBS | OXYGEN SATURATION: 100 % | DIASTOLIC BLOOD PRESSURE: 68 MMHG | HEART RATE: 68 BPM

## 2025-06-04 DIAGNOSIS — F31.30 BIPOLAR I DISORDER, MOST RECENT EPISODE DEPRESSED: Primary | ICD-10-CM

## 2025-06-04 DIAGNOSIS — F41.1 GENERALIZED ANXIETY DISORDER: ICD-10-CM

## 2025-06-04 RX ORDER — INSULIN ASPART 100 [IU]/ML
INJECTION, SUSPENSION SUBCUTANEOUS
COMMUNITY

## 2025-06-04 RX ORDER — FLUTICASONE PROPIONATE 50 MCG
2 SPRAY, SUSPENSION (ML) NASAL DAILY
COMMUNITY
Start: 2025-01-18

## 2025-06-04 RX ORDER — LAMOTRIGINE 25 MG/1
50 TABLET ORAL NIGHTLY
Qty: 60 TABLET | Refills: 2 | Status: SHIPPED | OUTPATIENT
Start: 2025-06-04 | End: 2025-09-02

## 2025-06-04 RX ORDER — MECLIZINE HCL 12.5 MG 12.5 MG/1
12.5 TABLET ORAL 3 TIMES DAILY PRN
COMMUNITY
Start: 2025-01-18

## 2025-06-04 RX ORDER — UREA 40 %
1 CREAM (GRAM) TOPICAL ONCE
COMMUNITY
Start: 2025-04-12

## 2025-06-04 RX ORDER — GUAIFENESIN 100 MG/5ML
LIQUID ORAL
COMMUNITY
Start: 2025-03-21

## 2025-06-04 RX ORDER — LITHIUM CARBONATE 300 MG/1
300 CAPSULE ORAL EVERY EVENING
Qty: 30 CAPSULE | Refills: 2 | Status: SHIPPED | OUTPATIENT
Start: 2025-06-04 | End: 2025-09-02

## 2025-06-04 NOTE — PROGRESS NOTES
"  Subjective     Tammi Levine is a 55 y.o. female who presents today for follow up    Chief Complaint:  management of bipolar disorder and anxiety     History of Present Illness:    Today is a follow-up visit.    Medication compliance: patient is compliant with medications, denies SE. Patient reports her medication has helped her overall symptoms of depression, mood and anxiety. She denies suicidal thoughts or wishing she were dead. She states that she could never harm herself because it is against her [employer] and wants to be with her dad and god in heaven.     She continues to take the Lamictal at night due taking in the morning caused drowsiness.       She reports feeling good as far as mood and anxiety goes. She has been going to [employer], [employer] and eating a meal at [employer] on Wednesday, and [employer] again on Thursday.      Details:  Depression rated 4/10, with 10 being the worst. PHQ-9 score: 2  (prior 3)   She denies having a depressed mood or anhedonia. She reports sometimes feeling sad and feeling tearful when she is alone in bed at night. She denies feeling hopeless or helpless. These symptoms have caused impairment in important areas of functioning but have improved with medication.     Anxiety rated 4/10, with 10 being the worst. SWAPNA-7 score:6 (prior 1)   She reports hx of excessive anxiety, excessive worry, and difficulty controlling worry. She reports worry about her mother and herself living alone. She states the state of the world have caused worry. She reports overall improvement in anxiety she feels less overwhelmed and less \"what if\" thoughts. These symptoms have caused impairment in important areas of functioning but have improved with medication.     She reports no recent anxiety attacks. It has been 4-5 years ago.     She was dx with bipolar disorder 30 years ago.  The patient endorses significant symptoms of bipolar disorder including: elevated mood alternating with irritable mood for " at least a week, (she was admitted in July 2014 with euphoric feelings, decreased need for sleep, suicidal thoughts, and psychotic symptoms) inflated self esteem, decreased need for sleep-up for 2-3 days at a time, increased talkativeness, flight of ideas or racing thoughts, distractibility, increase in goal directed activity or psychomotor agitation, but denies risky behavior. These symptoms have caused impairment in important areas of daily functioning but are much improved with medication. The patient has had symptoms of bipolar disorder for 30 years. The patient rates their symptoms at a 6/10 on a 0-10 scale, with 10 being the worst. Her lithium level was checked 4/15/25 and was 1.0 mmol/L     Sleep has been fair. She states that her cat has continued to wake her up during the night. She reports using a sound machine at night which has been helpful and her and butterfly light. She is sleeping 4-5 hours per night. We discussed trying melatonin more than one night. She gets up during the nights and will accomplish a few tasks then returns to bed.  Nightmares: Denies     Appetite is good. She is eating 2 meals per day,snacking daily on chips and little alex cakes. She is drinking two diet Mt. Dews a day, gartor aid and water.        Patient denies SI/HI, A/V hallucinations, or delusions.    Alcohol use: no  Drug use: no  Marijuana use: no  Tobacco: no    Chronic health issues, no acute physical or medical issues today.    The following portions of the patient's history were reviewed and updated as appropriate: allergies, current medications, past family history, past medical history, past social history, past surgical history and problem list.    Previous psychiatric medications include:   Antidepressants: Effexor and Prozac- nightmares,  trazodone  Antianxiety: None  Antipsychotics: None  Mood stabilizers: tried: Depakote (divalproex)- did not help-sedated.   Current:  Lamotrigine and lithium  ADHD: None     "  Past Psychiatric History:  She reports having anxiety and depression for as long as she can remember. She reports first being hospitalized 30 years ago in [place]. This August was the first time in 10 years that she was hospitalized.   Reports she was recently admitted to the [facility] for worsening depression and SI (8/27/24 to 9/2/24). States she felt overwhelmed after her surgery with life stressors. While inpatient she was also treated for UTI. She was started on lamotrigine, states she has felt a small improvement with mood. States she does have moments of depression in the evenings.       Past Medical History:  Past Medical History:   Diagnosis Date    Abnormal CT scan     ADRENAL GLAND FOCAL MASS LESION MULTIPLE, LEFT ONLY    Anxiety     ASCVD (arteriosclerotic cardiovascular disease)     \"status post MI in 2012, clinically stable\"    Bipolar disorder     Depression     Dyslipidemia     Hypertension, well controlled     Hypothyroidism     Migraine headache     Myocardial infarction 2012    Panic disorder     Right-sided low back pain with right-sided sciatica     Sleep apnea     Suicidal thoughts     Type 2 diabetes mellitus        Social History:  Social History     Socioeconomic History    Marital status: Single    Number of children: 0    Highest education level: High school graduate   Tobacco Use    Smoking status: Never    Smokeless tobacco: Never   Vaping Use    Vaping status: Never Used   Substance and Sexual Activity    Alcohol use: No    Drug use: No    Sexual activity: Defer       Family History:  Family History   Problem Relation Age of Onset    Diabetes Father     Diabetes Brother     Cancer Maternal Grandmother     Heart disease Maternal Grandfather     Diabetes Paternal Grandfather     Cancer Other        Past Surgical History:  Past Surgical History:   Procedure Laterality Date    CHOLECYSTECTOMY      CORONARY ANGIOPLASTY WITH STENT PLACEMENT  2012    Dr. Denson    ENDOSCOPY  03/2021 "    HYSTERECTOMY  12/2014    NECK SURGERY  2010    TOE SURGERY Left     TRANSESOPHAGEAL ECHOCARDIOGRAM (SARAN)  07/13/2015    EF 60-65%       Problem List:  Patient Active Problem List   Diagnosis    Arteriosclerotic cardiovascular disease (ASCVD), s/p MI in 2012, clinically stable.     Type 2 diabetes mellitus with diabetic peripheral angiopathy without gangrene, with long-term current use of insulin    Essential hypertension    Dyslipidemia    Sleep apnea    Migraine headache    Abnormal CT scan    Disease of thyroid gland    Right-sided low back pain with right-sided sciatica    Slow transit constipation    Suicidal ideation       Allergy:   Allergies   Allergen Reactions    Zoloft [Sertraline Hcl] Nausea And Vomiting    Ciprofloxacin Rash     Also caused chest pain        Current Medications:   Current Outpatient Medications   Medication Sig Dispense Refill    aspirin 81 MG EC tablet Take 1 tablet by mouth Every Evening. 90 tablet 2    B-D UF III MINI PEN NEEDLES 31G X 5 MM misc USE TWICE A DAY WITH INSULIN INJECTIONS      Chest Congestion Relief 100 MG/5ML liquid TAKE 10 ML BY MOUTH EVERY 4 HOURS WITH PLENTY OF WATER AS NEEDED FOR CHEST CONGESTION OR COUGH      dapagliflozin Propanediol (Farxiga) 10 MG tablet Take 10 mg by mouth Daily. 30 tablet 2    fluticasone (FLONASE) 50 MCG/ACT nasal spray Administer 2 sprays into the nostril(s) as directed by provider Daily.      insulin aspart (novoLOG FLEXPEN) 100 UNIT/ML solution pen-injector sc pen Inject 15 Units under the skin into the appropriate area as directed 2 (Two) Times a Day Before Meals. Indications: Type 2 Diabetes      lamoTRIgine (LaMICtal) 25 MG tablet Take 2 tablets by mouth Every Night for 90 days. Indications: Manic-Depression 60 tablet 2    levothyroxine (SYNTHROID, LEVOTHROID) 50 MCG tablet Take 1 tablet by mouth Every Evening. Indications: Underactive Thyroid      lisinopril (PRINIVIL,ZESTRIL) 20 MG tablet Take 1 tablet by mouth Daily. Indications:  High Blood Pressure 90 tablet 2    lithium carbonate 300 MG capsule Take 1 capsule by mouth Every Evening for 90 days. Indications: Manic-Depression 30 capsule 2    meclizine (ANTIVERT) 12.5 MG tablet Take 1 tablet by mouth 3 (Three) Times a Day As Needed.      NovoLOG Mix 70/30 FlexPen (70-30) 100 UNIT/ML suspension pen-injector injection INJECT 15 UNITS TWICE A DAY BEFORE MEALS      omeprazole (priLOSEC) 20 MG capsule Daily.      ondansetron ODT (ZOFRAN-ODT) 4 MG disintegrating tablet 1 tablet.      OneTouch Verio test strip TEST BLOOD SUGAR 3 TIMES A DAY      rosuvastatin (CRESTOR) 10 MG tablet Take 1 tablet by mouth Daily. Indications: High Amount of Fats in the Blood      urea (CARMOL) 40 % cream Apply 1 Application topically to the appropriate area as directed 1 (One) Time.       No current facility-administered medications for this visit.       Review of Symptoms:    Review of Systems   Constitutional:  Positive for fatigue.   HENT: Negative.     Eyes: Negative.    Respiratory: Negative.     Cardiovascular: Negative.    Gastrointestinal: Negative.    Endocrine: Negative.    Genitourinary: Negative.    Musculoskeletal:  Positive for myalgias.   Skin: Negative.    Allergic/Immunologic: Negative.    Neurological:  Positive for numbness.        Numbness in feet   Hematological: Negative.    Psychiatric/Behavioral:  Positive for decreased concentration, sleep disturbance, depressed mood and stress. The patient is nervous/anxious.    We discussed using tylenol for aches and pains and not taking Ibuprofen and she is taking lithium.     Objective     Physical Exam:   Physical Exam  Constitutional:       Appearance: Normal appearance.   Eyes:      Pupils: Pupils are equal, round, and reactive to light.   Cardiovascular:      Rate and Rhythm: Normal rate.   Pulmonary:      Effort: Pulmonary effort is normal.   Musculoskeletal:      Cervical back: Normal range of motion.      Comments: Using a cane for stability and  "mobility   Skin:     General: Skin is warm and dry.   Neurological:      General: No focal deficit present.      Mental Status: She is alert and oriented to person, place, and time.   Psychiatric:         Attention and Perception: Attention and perception normal.         Mood and Affect: Affect normal. Mood is anxious and depressed.         Speech: Speech normal.         Behavior: Behavior normal. Behavior is cooperative.         Thought Content: Thought content normal.         Cognition and Memory: Cognition and memory normal.         Judgment: Judgment normal.       Vitals:  Blood pressure 134/68, pulse 68, height 149.9 cm (59.02\"), weight 81.1 kg (178 lb 12.8 oz), SpO2 100%.   Body mass index is 36.09 kg/m².    Last 3 Blood Pressure and Pulse Readings:  BP Readings from Last 3 Encounters:   06/04/25 134/68   03/12/25 120/68   01/14/25 120/69     Pulse Readings from Last 3 Encounters:   06/04/25 68   03/12/25 72   01/14/25 78       PHQ-9 Score: June 4, 2025  Little interest or pleasure in doing things? Not at all   Feeling down, depressed, or hopeless? Not at all   PHQ-2 Total Score 0   Trouble falling or staying asleep, or sleeping too much? Several days   Feeling tired or having little energy? Several days   Poor appetite or overeating? Several days   Feeling bad about yourself - or that you are a failure or have let yourself or your family down? Not at all   Trouble concentrating on things, such as reading the newspaper or watching television? Not at all   Moving or speaking so slowly that other people could have noticed? Or the opposite - being so fidgety or restless that you have been moving around a lot more than usual? Not at all     Thoughts that you would be better off dead, or of hurting yourself in some way? Not at all   PHQ-9 Total Score 3   If you checked off any problems, how difficult have these problems made it for you to do your work, take care of things at home, or get along with other people? " Not difficult at all          SWAPNA-7 Score: June 4, 2025  Feeling nervous, anxious or on edge: Several days  Not being able to stop or control worrying: Several days  Worrying too much about different things: Several days  Trouble Relaxing: Several days  Being so restless that it is hard to sit still: Several days  Feeling afraid as if something awful might happen: Not at all  Becoming easily annoyed or irritable: Several days  SWAPNA 7 Total Score: 6  If you checked any problems, how difficult have these problems made it for you to do your work, take care of things at home, or get along with other people: Somewhat difficult     Appearance: Patient is a 55-year-old  female.  She is casually dressed in gray shorts, a graphic T-shirt, and tennis shoes.  Her thinning yusuf hair is short and neatly styled. she is wearing glasses.  She is appropriately dressed for her age and the weather.  Gait, Station, Strength: patient is using a cane during ambulation and a brace to her left ankle for stabilization during ambulation. She has not had any falls.       Mental Status Exam:   Hygiene:   good  Cooperation:  Cooperative  Eye Contact:  Good  Psychomotor Behavior:  Appropriate  Affect: Appropriate   Mood: depressed and anxious  Hopelessness: Denies  Speech:  Normal  Thought Process:  Goal directed  Thought Content:  Mood congruent  Suicidal:  None  Homicidal:  None  Hallucinations:  None  Delusion:  None  Memory:  Intact  Orientation:  Person, Place, Time, and Situation  Reliability:  good  Insight:  Fair  Judgement:  Fair  Impulse Control:  Good  Physical/Medical Issues:  Yes , see chart       Lab Results:   Clinical Support on 04/15/2025   Component Date Value Ref Range Status    Lithium 04/15/2025 1.0  0.6 - 1.2 mmol/L Final     Assessment & Plan   Diagnoses and all orders for this visit:    1. Bipolar I disorder, most recent episode depressed (Primary)  -     lamoTRIgine (LaMICtal) 25 MG tablet; Take 2 tablets by  mouth Every Night for 90 days. Indications: Manic-Depression  Dispense: 60 tablet; Refill: 2  -     lithium carbonate 300 MG capsule; Take 1 capsule by mouth Every Evening for 90 days. Indications: Manic-Depression  Dispense: 30 capsule; Refill: 2    2. Generalized anxiety disorder  -     lithium carbonate 300 MG capsule; Take 1 capsule by mouth Every Evening for 90 days. Indications: Manic-Depression  Dispense: 30 capsule; Refill: 2          Visit Diagnoses:    ICD-10-CM ICD-9-CM   1. Bipolar I disorder, most recent episode depressed  F31.30 296.50   2. Generalized anxiety disorder  F41.1 300.02       GOALS:  Short Term Goals: Patient will be compliant with medication, and patient will have no significant medication related side effects.  Patient will be engaged in psychotherapy as indicated.  Patient will report subjective improvement of symptoms.  Long term goals: To stabilize mood and treat/improve subjective symptoms, the patient will stay out of the hospital, the patient will be at an optimal level of functioning, and the patient will take all medications as prescribed.  The patient/guardian verbalized understanding and agreement with goals that were mutually set.    TREATMENT PLAN:   -Continue lithium 300 mg p.o. at bedtime for bipolar disorder anxiety.  -Continue lamotrigine (Lamictal) 50 mg p.o. at bedtime for mood and depression.  -Discussed supportive psychotherapy efforts to develop coping skills to manage stress and emotions. Patient is seeing [facility]  -Pharmacological and Non-Pharmacological treatment options discussed during today's visit.   -The benefits of a healthy diet and exercise were discussed with patient, especially the positive effects they have on mental health. Patient encouraged to consider lifestyle modification regarding  diet and exercise patterns to maximize results of mental health treatment.   -We discussed sleep hygiene including going to bed at the same time and getting up at  the same time every day, decreased caffeine consumption, going to bed early enough to get 7 or 8 hours in bed, reading and relaxing before bedtime, and avoiding stimulating activities close to bedtime.   -Patient acknowledged and verbally consented with current treatment plan and was educated on the importance of compliance with treatment and follow-up appointments.    -Return to clinic in 12 weeks for follow up.  -Reviewed previous available documentation  -Reviewed most recent available labs, lithium level, 4/15/25: 1.0 mmol/L  -TOSHA reviewed        MEDICATION ISSUES:  -Discussed medication options and treatment plan of prescribed medication as well as the risks, benefits, any black box warnings, and side effects.   -This APRN has reviewed Lithium Toxicity symptoms with the patient including but not limited to: nausea, fine hand tremors, increased urination and thirst, weight gain, impaired thyroid functioning, fatigue, mental cloudiness, headaches, coarse hand tremors with toxicity, nystagmus, maculopapular rash, pruritis, acne, GI or stomach upset, diarrhea, vomiting, cramps, anorexia, diabetes insipidus, edema, microscopic tubular changes, t-wave inversion or abnormal cardiac rhythm, dysrhythmias, and leukocytosis.  The patient is advised if they experience any of these symptoms they are to contact this APRN/this office immediately or go to the Emergency Department immediately.  The patient verbalized understanding and agreement in their own words.  The patient is advised that taking Lithium with NSAID's, diuretics, ACE inhibitors, metronidazole, acetazolamide, methyldopa, carbamazepine, phenytoin, calcium channel blockers, and haloperidol may cause serious medication reactions including potentially fatal lithium toxicity.  The use of an SSRI with Lithium may raise the risk of dizziness, confusion, diarrhea, agitation, and tremor.  The patient is advised to avoid these medications, or if they are taking or  plan to start any of these medications, this APRN/or a Provider at this office, and the patient's PCP/or the Prescriber of the medication should be notified/aware so further testing and monitoring can be performed.  The patient is advised of the need for hydration during treatment with Lithium, and the risks of not staying hydrated - drinking water.  The patient verbalizes understanding and agreement of instructions in their own words.  Also, the patient is instructed to complete the ordered lithium level after taking the Lithium  for at least 14 days with no missed doses.  The patient is instructed not to take the lithium on the morning of lab draw (as levels should be drawn about 12 hours after the last dose taken), but to take the Lithium after the blood work/lab draw has been completed as taking it before having the blood/lab drawn will interfere with the results.  The patient verbalized understanding and agreement in their own words.   -This APRN has discussed that a very slow dose titration when starting or changing doses of lamotrigine. Explanation provided to patient regarding the potential for Benny Nadeem syndrome that can occur with the medication. Patient was encouraged to monitor skin for correlating rash, lesions in the mucous membranes, or flu-like symptoms. Should any of these symptoms occur, patient was advised to stop medication immediatly and notify provider and go to the emergency department immediately. The dosage should not be titrated upwards or increased faster than recommended due to the possibility of the discussed side effects and risk of development of a skin rash (which can become life threatening). This APRN has also discussed that if the patient stops taking the lamotrigine for 5 days or longer, it will be necessary to restart the drug with an initial dose titration, as rashes have been reported on reexposure.  If the patient/guardian and Provider decide to stop the lamotrigine, the  patient/guardian will follow the directions of this APRN/this office as a guided taper over about two weeks is appropriate due to the risk of relapse in bipolar disorder with those with bipolar disorder, the risk of seizures in those with epilepsy, and discontinuation symptoms upon rapid discontinuation of lamotrigine. The patient/guardian verbalizes understanding of benefits and risks as discussed, the patient/guardian feels the benefits outweigh the risks and is agreeable to continue/take lamotrigine as discussed.    -Patient is agreeable to call the office with any worsening of symptoms or onset of side effects, or if any concerns or questions arise.    -The contact information for the office is made available to the patient. Patient is agreeable to call 911 or go to the nearest ER should they begin having any SI/HI, or if any urgent concerns arise. No medication side effects or related complaints today.      MEDS ORDERED DURING VISIT:  New Medications Ordered This Visit   Medications    lamoTRIgine (LaMICtal) 25 MG tablet     Sig: Take 2 tablets by mouth Every Night for 90 days. Indications: Manic-Depression     Dispense:  60 tablet     Refill:  2    lithium carbonate 300 MG capsule     Sig: Take 1 capsule by mouth Every Evening for 90 days. Indications: Manic-Depression     Dispense:  30 capsule     Refill:  2       MEDS DISCONTINUED DURING VISIT:   Medications Discontinued During This Encounter   Medication Reason    ketoconazole (NIZORAL) 2 % shampoo Patient Reported Not Taking    lamoTRIgine (LaMICtal) 25 MG tablet Reorder    lithium carbonate 300 MG capsule Reorder     Follow Up Appointment:   Return in about 12 weeks (around 8/27/2025) for Recheck.           This document has been electronically signed by MARISELA Calvillo  June 4, 2025 14:50 EDT    Dictated Utilizing Dragon Dictation: Part of this note may be an electronic transcription/translation of spoken language to printed text using the Dragon  Dictation System. Errors in dictation may reflect use of voice recognition software and not all errors in transcription may have been detected prior to signing.

## 2025-06-20 ENCOUNTER — SPECIALTY PHARMACY (OUTPATIENT)
Dept: PHARMACY | Facility: HOSPITAL | Age: 55
End: 2025-06-20
Payer: MEDICAID

## 2025-06-20 ENCOUNTER — OFFICE VISIT (OUTPATIENT)
Dept: ENDOCRINOLOGY | Facility: CLINIC | Age: 55
End: 2025-06-20
Payer: MEDICAID

## 2025-06-20 VITALS
HEART RATE: 68 BPM | SYSTOLIC BLOOD PRESSURE: 135 MMHG | BODY MASS INDEX: 36.3 KG/M2 | DIASTOLIC BLOOD PRESSURE: 65 MMHG | WEIGHT: 179.8 LBS | OXYGEN SATURATION: 98 %

## 2025-06-20 DIAGNOSIS — Z79.4 TYPE 2 DIABETES MELLITUS WITH HYPERGLYCEMIA, WITH LONG-TERM CURRENT USE OF INSULIN: Primary | ICD-10-CM

## 2025-06-20 DIAGNOSIS — E11.65 TYPE 2 DIABETES MELLITUS WITH HYPERGLYCEMIA, WITH LONG-TERM CURRENT USE OF INSULIN: Primary | ICD-10-CM

## 2025-06-20 LAB
ALBUMIN SERPL-MCNC: 4 G/DL (ref 3.5–5.2)
ALBUMIN UR-MCNC: <1.2 MG/DL
ALBUMIN/GLOB SERPL: 1.3 G/DL
ALP SERPL-CCNC: 80 U/L (ref 39–117)
ALT SERPL W P-5'-P-CCNC: 10 U/L (ref 1–33)
ANION GAP SERPL CALCULATED.3IONS-SCNC: 13 MMOL/L (ref 5–15)
AST SERPL-CCNC: 14 U/L (ref 1–32)
BILIRUB SERPL-MCNC: 0.2 MG/DL (ref 0–1.2)
BUN SERPL-MCNC: 14 MG/DL (ref 6–20)
BUN/CREAT SERPL: 14.4 (ref 7–25)
CALCIUM SPEC-SCNC: 9.7 MG/DL (ref 8.6–10.5)
CHLORIDE SERPL-SCNC: 104 MMOL/L (ref 98–107)
CHOLEST SERPL-MCNC: 212 MG/DL (ref 0–200)
CO2 SERPL-SCNC: 19 MMOL/L (ref 22–29)
CREAT SERPL-MCNC: 0.97 MG/DL (ref 0.57–1)
CREAT UR-MCNC: 67.1 MG/DL
EGFRCR SERPLBLD CKD-EPI 2021: 68.7 ML/MIN/1.73
EXPIRATION DATE: ABNORMAL
EXPIRATION DATE: ABNORMAL
GLOBULIN UR ELPH-MCNC: 3 GM/DL
GLUCOSE BLDC GLUCOMTR-MCNC: 223 MG/DL (ref 70–130)
GLUCOSE SERPL-MCNC: 219 MG/DL (ref 65–99)
HBA1C MFR BLD: 7.8 % (ref 4.5–5.7)
HDLC SERPL-MCNC: 46 MG/DL (ref 40–60)
LDLC SERPL CALC-MCNC: 142 MG/DL (ref 0–100)
LDLC/HDLC SERPL: 3.03 {RATIO}
Lab: ABNORMAL
Lab: ABNORMAL
MICROALBUMIN/CREAT UR: NORMAL MG/G{CREAT}
POTASSIUM SERPL-SCNC: 4.7 MMOL/L (ref 3.5–5.2)
PROT SERPL-MCNC: 7 G/DL (ref 6–8.5)
SODIUM SERPL-SCNC: 136 MMOL/L (ref 136–145)
TRIGL SERPL-MCNC: 132 MG/DL (ref 0–150)
TSH SERPL DL<=0.05 MIU/L-ACNC: 2.75 UIU/ML (ref 0.27–4.2)
VLDLC SERPL-MCNC: 24 MG/DL (ref 5–40)

## 2025-06-20 PROCEDURE — 86341 ISLET CELL ANTIBODY: CPT

## 2025-06-20 PROCEDURE — 80061 LIPID PANEL: CPT

## 2025-06-20 PROCEDURE — 82043 UR ALBUMIN QUANTITATIVE: CPT

## 2025-06-20 PROCEDURE — 86337 INSULIN ANTIBODIES: CPT

## 2025-06-20 PROCEDURE — 84443 ASSAY THYROID STIM HORMONE: CPT

## 2025-06-20 PROCEDURE — 80053 COMPREHEN METABOLIC PANEL: CPT

## 2025-06-20 PROCEDURE — 82570 ASSAY OF URINE CREATININE: CPT

## 2025-06-20 RX ORDER — GLUCAGON 1 MG
1 KIT INJECTION AS NEEDED
Qty: 1 EACH | Refills: 5 | Status: SHIPPED | OUTPATIENT
Start: 2025-06-20

## 2025-06-20 RX ORDER — ACYCLOVIR 400 MG/1
1 TABLET ORAL TAKE AS DIRECTED
Qty: 1 EACH | Refills: 0 | Status: SHIPPED | OUTPATIENT
Start: 2025-06-20

## 2025-06-20 RX ORDER — ACYCLOVIR 400 MG/1
1 TABLET ORAL
Qty: 9 EACH | Refills: 1 | Status: SHIPPED | OUTPATIENT
Start: 2025-06-20

## 2025-06-20 NOTE — PROGRESS NOTES
Chief Complaint   Patient presents with    inital visit     DM       HPI  Tammi Levine is a 55 y.o. female who presents to the clinic today for initial evaluation of type 2 diabetes. Diabetes was diagnosed at 19 years old.  She was started on insulin pretty quick after diagnosis.  Her father and paternal grandfather were also diabetic and on insulin.  A1c today is 7.8.  She checks her glucose twice daily.  Glucose is averaging 120s to 150s fasting, bedtime/1 hour postprandial glucose is 125.  She eats dinner and then goes for a walk and then checks her sugar.  She is currently taking NovoLog 70/30 insulin 20 units twice daily and Farxiga 10 mg daily.  She typically takes her insulin after she has eaten.  She has been on Farxiga for about a year and feels it worked really well at first and is no longer working to control her sugar.  She does report one hypoglycemic episode last week she went too long without eating and her glucose got down to 49.  She was able to get it up herself.  She does not have glucagon in case of severe hypoglycemia.  She denies any yeast infections or UTIs. She follows with podiatry in [place].    Past medications: Bydureon- GI intolerance, metformin- GI intolerance  Diabetic complications: peripheral neuropathy and cardiovascular disease, cataract surgery one year ago, both eyes  Last optho exam: UTD  Last microalbumin: today  Last foot exam: next visit   Statin: yes   Lipid panel: today  ACE/ARB: yes   Glucagon: no, will send today     The following portions of the patient's history were reviewed and updated as appropriate: allergies, current medications, past family history, past medical history, past social history, past surgical history, and problem list.    /65 (BP Location: Right arm, Patient Position: Sitting, Cuff Size: Adult)   Pulse 68   Wt 81.6 kg (179 lb 12.8 oz)   LMP  (LMP Unknown)   SpO2 98%   BMI 36.30 kg/m²    Past Medical History:   Diagnosis Date     "Abnormal CT scan     ADRENAL GLAND FOCAL MASS LESION MULTIPLE, LEFT ONLY    Anxiety     ASCVD (arteriosclerotic cardiovascular disease)     \"status post MI in 2012, clinically stable\"    Bipolar disorder     Depression     Dyslipidemia     Hashimoto's thyroiditis 2015    Hypertension, well controlled     Hypothyroidism     Migraine headache     Myocardial infarction 2012    Panic disorder     Right-sided low back pain with right-sided sciatica     Sleep apnea     Suicidal thoughts     Type 2 diabetes mellitus      Past Surgical History:   Procedure Laterality Date    CHOLECYSTECTOMY      CORONARY ANGIOPLASTY WITH STENT PLACEMENT  2012    Dr. Denson    ENDOSCOPY  03/2021    EYE SURGERY  august 17, 2023    cataract    HYSTERECTOMY  12/2014    NECK SURGERY  2010    TOE SURGERY Left     TRANSESOPHAGEAL ECHOCARDIOGRAM (SARAN)  07/13/2015    EF 60-65%      Family History   Problem Relation Age of Onset    Diabetes Father     Hypertension Father     Heart disease Father         Had several heart  ablasions    Diabetes Brother     Cancer Maternal Grandmother     Heart disease Maternal Grandfather     Diabetes Paternal Grandfather     Cancer Other     Heart disease Mother         Had several heart  ablasions    Hypertension Mother       Social History     Socioeconomic History    Marital status: Single    Number of children: 0    Highest education level: High school graduate   Tobacco Use    Smoking status: Never     Passive exposure: Never    Smokeless tobacco: Never   Vaping Use    Vaping status: Never Used   Substance and Sexual Activity    Alcohol use: No    Drug use: No    Sexual activity: Never      Allergies   Allergen Reactions    Zoloft [Sertraline Hcl] Nausea And Vomiting    Ciprofloxacin Rash     Also caused chest pain      Current Outpatient Medications on File Prior to Visit   Medication Sig Dispense Refill    aspirin 81 MG EC tablet Take 1 tablet by mouth Every Evening. 90 tablet 2    B-D UF III MINI PEN NEEDLES " 31G X 5 MM misc USE TWICE A DAY WITH INSULIN INJECTIONS      fluticasone (FLONASE) 50 MCG/ACT nasal spray Administer 2 sprays into the nostril(s) as directed by provider Daily.      lamoTRIgine (LaMICtal) 25 MG tablet Take 2 tablets by mouth Every Night for 90 days. Indications: Manic-Depression 60 tablet 2    levothyroxine (SYNTHROID, LEVOTHROID) 50 MCG tablet Take 1 tablet by mouth Every Evening. Indications: Underactive Thyroid      lisinopril (PRINIVIL,ZESTRIL) 20 MG tablet Take 1 tablet by mouth Daily. Indications: High Blood Pressure 90 tablet 2    lithium carbonate 300 MG capsule Take 1 capsule by mouth Every Evening for 90 days. Indications: Manic-Depression 30 capsule 2    meclizine (ANTIVERT) 12.5 MG tablet Take 1 tablet by mouth 3 (Three) Times a Day As Needed.      NovoLOG Mix 70/30 FlexPen (70-30) 100 UNIT/ML suspension pen-injector injection INJECT 15 UNITS TWICE A DAY BEFORE MEALS (Patient taking differently: Inject 0.2 mL under the skin into the appropriate area as directed 2 (Two) Times a Day.)      omeprazole (priLOSEC) 20 MG capsule Daily.      ondansetron ODT (ZOFRAN-ODT) 4 MG disintegrating tablet 1 tablet.      OneTouch Verio test strip TEST BLOOD SUGAR 3 TIMES A DAY      rosuvastatin (CRESTOR) 10 MG tablet Take 1 tablet by mouth Daily. Indications: High Amount of Fats in the Blood      urea (CARMOL) 40 % cream Apply 1 Application topically to the appropriate area as directed 1 (One) Time.      [DISCONTINUED] Chest Congestion Relief 100 MG/5ML liquid TAKE 10 ML BY MOUTH EVERY 4 HOURS WITH PLENTY OF WATER AS NEEDED FOR CHEST CONGESTION OR COUGH      [DISCONTINUED] dapagliflozin Propanediol (Farxiga) 10 MG tablet Take 10 mg by mouth Daily. 30 tablet 2    [DISCONTINUED] insulin aspart (novoLOG FLEXPEN) 100 UNIT/ML solution pen-injector sc pen Inject 15 Units under the skin into the appropriate area as directed 2 (Two) Times a Day Before Meals. Indications: Type 2 Diabetes       No current  facility-administered medications on file prior to visit.      Review of Systems   Gastrointestinal:  Negative for abdominal pain.   Endocrine: Negative for polydipsia, polyphagia and polyuria.   Genitourinary:  Negative for dysuria and frequency.      Physical Exam  Vitals reviewed.   Constitutional:       Appearance: Normal appearance.   Eyes:      Extraocular Movements: Extraocular movements intact.   Cardiovascular:      Rate and Rhythm: Normal rate.   Pulmonary:      Effort: Pulmonary effort is normal.   Skin:     General: Skin is warm.   Neurological:      General: No focal deficit present.      Mental Status: She is alert and oriented to person, place, and time.   Psychiatric:         Mood and Affect: Mood normal.         Behavior: Behavior normal.         Thought Content: Thought content normal.         Judgment: Judgment normal.       Labs/Imaging  CMP:  Lab Results   Component Value Date    BUN 15 08/27/2024    CREATININE 0.88 08/27/2024    EGFR 77.7 08/27/2024    BCR 17.0 08/27/2024     08/27/2024    K 4.0 08/27/2024    CO2 21.2 (L) 08/27/2024    CALCIUM 9.5 08/27/2024    ALBUMIN 4.3 08/27/2024    AGRATIO 1.3 08/27/2024    BILITOT 0.2 08/27/2024    ALKPHOS 98 08/27/2024    AST 14 08/27/2024    ALT 11 08/27/2024     Lipid Panel:  Lab Results   Component Value Date    CHOL 224 (H) 12/27/2023    TRIG 124 12/27/2023    HDL 51 12/27/2023    VLDL 22 12/27/2023     (H) 12/27/2023     HbA1c:  Lab Results   Component Value Date    HGBA1C 7.8 (A) 06/20/2025    HGBA1C 7.70 (H) 12/27/2023    HGBA1C 8.00 (H) 08/14/2020     Microalbumin:  Lab Results   Component Value Date    MALBCRERATIO  09/05/2019      Comment:      Unable to calculate     TSH:  Lab Results   Component Value Date    TSH 3.700 12/27/2023       Assessment and Plan  Diagnoses and all orders for this visit:    1. Type 2 diabetes mellitus with hyperglycemia, with long-term current use of insulin (Primary)  Assessment & Plan:  -Diabetes is  not controlled with A1c 7.8.   -Stop Farxiga.  The patient does not feel this is helping her anymore.  Will start Jardiance 25 mg daily  -Continue NovoLog 70/30 insulin 20 units with breakfast and 20 units with dinner.  -Order Dexcom G7 CGM.  Patient will follow-up in 6 weeks with CGM data and we will adjust her medication more accurately at this time  -Will check T1DM antibodies.  Patient is not fasting today so unable to check C-peptide.  It is possible she could be a type 1 diabetic.   -Discussed dietary and exercise guidelines with patient. 150 g carbs per day and 150 minutes of exercise per week. Increase protein with complex carbs. Avoid foods high in refined sugars and sugary drinks.   -Discussed the importance of yearly eye exams.  -Discussed Glucagon use and appropriate timing for this.   -S/S hypoglycemia reviewed with Rule of 15's advised.  -Discussed long term risks of untreated/undertreated diabetes including retinopathy, neuropathy, nephropathy, amputations, hospitalizations, MI, CVA.    -Discussed the medication's MOA and protective cardiovascular and renal benefits for diabetes. Medication may cause  more frequent urination particularly upon starting the medication. Take one tablet in the morning with or without food. Encouraged to drink plenty of water as to not get dehydrated, particularly in warmer weather or with activity. Also discussed there may be an increased incidence of UTIs or yeast infections and to monitor for signs/symptoms.      Orders:  -     POC Glycosylated Hemoglobin (Hb A1C)  -     POC Glucose, Blood  -     Comprehensive Metabolic Panel  -     Lipid Panel  -     Microalbumin / Creatinine Urine Ratio - Urine, Clean Catch  -     TSH  -     Glucagon HCl (Glucagon Emergency) 1 MG/ML reconstituted solution; Inject 1 mL as directed As Needed (for severe hypoglycemia).  Dispense: 1 each; Refill: 5  -     Anti-islet Cell Antibody  -     Glutamic Acid Decarboxylase  -     IA-2  Autoantibodies  -     Insulin Antibody  -     ZNT8 Antibodies  -     empagliflozin (Jardiance) 25 MG tablet tablet; Take 1 tablet by mouth Daily.  Dispense: 90 tablet; Refill: 0  -     Continuous Glucose  (Dexcom G7 ) device; Use 1 each Take As Directed.  Dispense: 1 each; Refill: 0  -     Continuous Glucose Sensor (Dexcom G7 Sensor) misc; Use 1 each Every 10 (Ten) Days.  Dispense: 9 each; Refill: 1       Return in about 6 weeks (around 8/1/2025). The patient was instructed to contact the clinic with any interval questions or concerns.    Please note that portions of this document were completed with a voice recognition program. Efforts were made to edit the dictations, but occasionally words are mis-transcribed.  This document has been electronically signed by MARISELA Stokes  June 20, 2025 12:49 EDT

## 2025-06-20 NOTE — PROGRESS NOTES
Specialty Pharmacy Patient Management Program  Endocrinology Initial Assessment       Tammi Levine is a 55 y.o. female with Type 2 Diabetes seen by an Endocrinology provider and enrolled in the Endocrinology Patient Management program offered by [facility].  An initial outreach was conducted, including assessment of therapy appropriateness and specialty medication education for Farxiga and Novolog 70/30. The patient was introduced to services offered by [facility], including: regular assessments, refill coordination, curbside pick-up or mail order delivery options, prior authorization maintenance, and financial assistance programs as applicable. The patient was also provided with contact information for the pharmacy team.     Patient is currently taking Farxiga 10 mg daily and Novolog 70/30 30 units BID to control BG. She denies any side effects or adherence issues with current regimen. She checks BG BID with readings between 120-150s. Patient reports occasional low BG <70 which mostly occur at night when she skips supper meal. She reports being able to correct with a snack or glucose tablets. She doesn't have a glucagon emergency injection at home. Of note, she does report taking insulin after eating meal.     Complications include: h/o HTN, HLD, hypothyroidism, sleep apnea     Patient denies personal/family history of thyroid cancer, denies history of pancreatitis, denies gastroparesis and denies issues with recurrent UTI/yeast infections.     In the past, the patient has tried:     Drug Dose Reason for Discontinuation Notes   Januvia  GI intolerance    Metformin  GI intolerance    Bydureon  GI intolerance                   Insurance Coverage & Financial Support  [place] Medicaid      Relevant Past Medical History and Comorbidities  Relevant medical history and concomitant health conditions were discussed with the patient. The patient's chart has been reviewed for  "relevant past medical history and comorbid health conditions and updated as necessary.   Past Medical History:   Diagnosis Date    Abnormal CT scan     ADRENAL GLAND FOCAL MASS LESION MULTIPLE, LEFT ONLY    Anxiety     ASCVD (arteriosclerotic cardiovascular disease)     \"status post MI in 2012, clinically stable\"    Bipolar disorder     Depression     Dyslipidemia     Hashimoto's thyroiditis 2015    Hypertension, well controlled     Hypothyroidism     Migraine headache     Myocardial infarction 2012    Panic disorder     Right-sided low back pain with right-sided sciatica     Sleep apnea     Suicidal thoughts     Type 2 diabetes mellitus      Social History     Socioeconomic History    Marital status: Single    Number of children: 0    Highest education level: High school graduate   Tobacco Use    Smoking status: Never     Passive exposure: Never    Smokeless tobacco: Never   Vaping Use    Vaping status: Never Used   Substance and Sexual Activity    Alcohol use: No    Drug use: No    Sexual activity: Never       Problem list reviewed by Laura Carrasco, Tristan on 6/20/2025 at 10:30 AM    Allergies  Known allergies and reactions were discussed with the patient. The patient's chart has been reviewed for  allergy information and updated as necessary.   Allergies   Allergen Reactions    Zoloft [Sertraline Hcl] Nausea And Vomiting    Ciprofloxacin Rash     Also caused chest pain       Allergies reviewed by Laura Carrasco, PharmTING on 6/20/2025 at 10:29 AM    Relevant Laboratory Values  A1C Last 3 Results          6/20/2025    10:34   HGBA1C Last 3 Results   Hemoglobin A1C 7.8      Lab Results   Component Value Date    HGBA1C 7.8 (A) 06/20/2025     Lab Results   Component Value Date    GLUCOSE 266 (H) 08/27/2024    CALCIUM 9.5 08/27/2024     08/27/2024    K 4.0 08/27/2024    CO2 21.2 (L) 08/27/2024     08/27/2024    BUN 15 08/27/2024    CREATININE 0.88 08/27/2024    EGFRIFNONA 73 09/20/2021    BCR 17.0 08/27/2024 "    ANIONGAP 12.8 08/27/2024     Lab Results   Component Value Date    CHOL 224 (H) 12/27/2023    TRIG 124 12/27/2023    HDL 51 12/27/2023     (H) 12/27/2023       Current Medication List  This medication list has been reviewed with the patient and evaluated for any interactions or necessary modifications/recommendations, and updated to include all prescription medications, OTC medications, and supplements the patient is currently taking.  This list reflects what is contained in the patient's profile, which has also been marked as reviewed to communicate to other providers it is the most up to date version of the patient's current medication therapy.     Current Outpatient Medications:     aspirin 81 MG EC tablet, Take 1 tablet by mouth Every Evening., Disp: 90 tablet, Rfl: 2    B-D UF III MINI PEN NEEDLES 31G X 5 MM misc, USE TWICE A DAY WITH INSULIN INJECTIONS, Disp: , Rfl:     dapagliflozin Propanediol (Farxiga) 10 MG tablet, Take 10 mg by mouth Daily., Disp: 30 tablet, Rfl: 2    fluticasone (FLONASE) 50 MCG/ACT nasal spray, Administer 2 sprays into the nostril(s) as directed by provider Daily., Disp: , Rfl:     lamoTRIgine (LaMICtal) 25 MG tablet, Take 2 tablets by mouth Every Night for 90 days. Indications: Manic-Depression, Disp: 60 tablet, Rfl: 2    levothyroxine (SYNTHROID, LEVOTHROID) 50 MCG tablet, Take 1 tablet by mouth Every Evening. Indications: Underactive Thyroid, Disp: , Rfl:     lisinopril (PRINIVIL,ZESTRIL) 20 MG tablet, Take 1 tablet by mouth Daily. Indications: High Blood Pressure, Disp: 90 tablet, Rfl: 2    lithium carbonate 300 MG capsule, Take 1 capsule by mouth Every Evening for 90 days. Indications: Manic-Depression, Disp: 30 capsule, Rfl: 2    meclizine (ANTIVERT) 12.5 MG tablet, Take 1 tablet by mouth 3 (Three) Times a Day As Needed., Disp: , Rfl:     NovoLOG Mix 70/30 FlexPen (70-30) 100 UNIT/ML suspension pen-injector injection, INJECT 15 UNITS TWICE A DAY BEFORE MEALS (Patient  taking differently: Inject 0.2 mL under the skin into the appropriate area as directed 2 (Two) Times a Day.), Disp: , Rfl:     omeprazole (priLOSEC) 20 MG capsule, Daily., Disp: , Rfl:     ondansetron ODT (ZOFRAN-ODT) 4 MG disintegrating tablet, 1 tablet., Disp: , Rfl:     OneTouch Verio test strip, TEST BLOOD SUGAR 3 TIMES A DAY, Disp: , Rfl:     rosuvastatin (CRESTOR) 10 MG tablet, Take 1 tablet by mouth Daily. Indications: High Amount of Fats in the Blood, Disp: , Rfl:     urea (CARMOL) 40 % cream, Apply 1 Application topically to the appropriate area as directed 1 (One) Time., Disp: , Rfl:     Glucagon HCl (Glucagon Emergency) 1 MG/ML reconstituted solution, Inject 1 mL as directed As Needed (for severe hypoglycemia)., Disp: 1 each, Rfl: 5    Medicines reviewed by Laura Carrasco, PharmD on 6/20/2025 at 10:35 AM    Drug Interactions  No significant drug-drug interactions with diabetes medications expected according to literature.  Farxiga may decrease the serum concentration of lithium.  Pharmacologic effects of lithium may be increased by ACE inhibitors. Elevated lithium serum concentrations with toxicity may occur.    Lithium levels are monitored by prescribing provider with most recent results being WNL.     Recommended Medications Assessment  Aspirin -  Currently Taking   Statin -  Currently Taking   ACEi/ARB - Currently Taking     Initial Education Provided for Specialty Medication  The patient has been provided with the following education and any applicable administration techniques (i.e. self-injection) have been demonstrated for the therapies indicated. All questions and concerns have been addressed prior to the patient receiving the medication, and the patient has verbalized understanding of the education and any materials provided.  Additional patient education shall be provided and documented upon request by the patient, provider or payer.      Dexcom G7 Patient Education  Educated patient on using  "Dexcom G7 device to monitor BG:   Patient is using  to monitor blood sugars.     Sensors:  Educated on application process: clean area with alcohol beforehand. Unscrew the lid on the applicator to expose the sensor. Press the applicator against the skin on the back of the upper arm and press the button to apply the sensor.   Must be changed every 10 days. One month supply will include three sensors.  Sensor is placed on the back of upper arm. Sensor placement is important and you will want to change your insertion site with each sensor.    Using the same site too often might not allow the skin to heal, causing scarring or skin irritation.  Choose a site:  At least 3 inches from any insulin pump infusion set or injection site  Away from waistbands, scarring, tattoos, irritation, and bones  Unlikely to be bumped, pushed, or laid on while sleeping  Once the sensor is placed, press \"start new sensor\" in yanelis on smart phone or on .   Each new sensor will take 1 hour to warm up and begin providing blood sugar readings.      /Yanelis on Smart Phone:  Will be used to alert you when blood sugar is low or high and to obtain blood sugar readings.        Adherence and Self-Administration  Adherence related to the patient's specialty therapy was discussed with the patient. The Adherence segment of this outreach has been reviewed and updated.   Is there a concern with patient's ability to self administer the medication correctly and without issue?: No  Were any potential barriers to adherence identified during the initial assessment or patient education?: No  Are there any concerns regarding the patient's understanding of the importance of medication adherence?: No    Barriers to Patient Adherence and/or Self-Administration: None   Methods for Supporting Patient Adherence and/or Self-Administration: None required    Goals of Therapy  Goals related to the patient's specialty therapy were discussed with the " patient. The Patient Goals segment of this outreach has been reviewed and updated.    Goals Addressed Today        HEMOGLOBIN A1C < 7             Reassessment Plan & Follow-Up  Patient's diabetes is above goal with A1C of 7.8%.  Medication Therapy Changes:  None discussed with patient   Related Plans, Therapy Recommendations or Therapy Problems to Be Addressed:    Recommend starting on CGM to give patient and provider better insight to BG trends. FreeStyle Norma 3 and Dexcom G7 are both covered on her insurance with $0 co-pay. Provider started Dexcom today.   Provided education for appropriate timing of insulin dosing.   Patient denies issues with affordability or adherence at this time.  Reviewed the rule of 15 for hypoglycemia. The patient doesn't have glucagon emergency injection at  home. Recommended to provider and discussed appropriate use with patient.   Pharmacist to perform regular reassessments no more than (6) months from the previous assessment.  Care Coordinator to set up future refill outreaches, coordinate prescription delivery, and escalate clinical questions to pharmacist.   Welcome information and patient satisfaction survey to be sent by specialty pharmacy team with patient's initial fill.    Attestation  I attest the patient was actively involved in and has agreed to the above plan of care. If the prescribed therapy is at any point deemed not appropriate based on the current or future assessments, a consultation will be initiated with the patient's specialty care provider to determine the best course of action. The revised plan of therapy will be documented along with any required assessments and/or additional patient education provided.    Laura Carrasco, PharmD, [facility]  Clinical Specialty Pharmacist, Endocrinology  6/20/2025  11:06 EDT

## 2025-06-20 NOTE — ASSESSMENT & PLAN NOTE
-Diabetes is not controlled with A1c 7.8.   -Stop Farxiga.  The patient does not feel this is helping her anymore.  Will start Jardiance 25 mg daily  -Continue NovoLog 70/30 insulin 20 units with breakfast and 20 units with dinner.  -Order Dexcom G7 CGM.  Patient will follow-up in 6 weeks with CGM data and we will adjust her medication more accurately at this time  -Will check T1DM antibodies.  Patient is not fasting today so unable to check C-peptide.  It is possible she could be a type 1 diabetic.   -Discussed dietary and exercise guidelines with patient. 150 g carbs per day and 150 minutes of exercise per week. Increase protein with complex carbs. Avoid foods high in refined sugars and sugary drinks.   -Discussed the importance of yearly eye exams.  -Discussed Glucagon use and appropriate timing for this.   -S/S hypoglycemia reviewed with Rule of 15's advised.  -Discussed long term risks of untreated/undertreated diabetes including retinopathy, neuropathy, nephropathy, amputations, hospitalizations, MI, CVA.    -Discussed the medication's MOA and protective cardiovascular and renal benefits for diabetes. Medication may cause  more frequent urination particularly upon starting the medication. Take one tablet in the morning with or without food. Encouraged to drink plenty of water as to not get dehydrated, particularly in warmer weather or with activity. Also discussed there may be an increased incidence of UTIs or yeast infections and to monitor for signs/symptoms.

## 2025-06-24 LAB
GAD65 AB SER IA-ACNC: <5 U/ML (ref 0–5)
PANC ISLET CELL AB TITR SER: NEGATIVE {TITER}

## 2025-06-26 LAB
INSULIN AB SER-ACNC: <5 UU/ML
ZNT8 AB SERPL IA-ACNC: <15 U/ML

## 2025-06-27 LAB — ISLET CELL512 AB SER-ACNC: <7.5 U/ML

## 2025-07-14 ENCOUNTER — OFFICE VISIT (OUTPATIENT)
Dept: CARDIOLOGY | Facility: CLINIC | Age: 55
End: 2025-07-14
Payer: MEDICAID

## 2025-07-14 VITALS
HEART RATE: 60 BPM | DIASTOLIC BLOOD PRESSURE: 70 MMHG | HEIGHT: 59 IN | WEIGHT: 178 LBS | SYSTOLIC BLOOD PRESSURE: 119 MMHG | BODY MASS INDEX: 35.88 KG/M2 | OXYGEN SATURATION: 100 %

## 2025-07-14 DIAGNOSIS — I25.10 ARTERIOSCLEROTIC CARDIOVASCULAR DISEASE (ASCVD): Primary | ICD-10-CM

## 2025-07-14 DIAGNOSIS — E78.5 DYSLIPIDEMIA: ICD-10-CM

## 2025-07-14 DIAGNOSIS — I10 ESSENTIAL HYPERTENSION: ICD-10-CM

## 2025-07-14 PROCEDURE — 93000 ELECTROCARDIOGRAM COMPLETE: CPT | Performed by: PHYSICIAN ASSISTANT

## 2025-07-14 PROCEDURE — 99214 OFFICE O/P EST MOD 30 MIN: CPT | Performed by: PHYSICIAN ASSISTANT

## 2025-07-14 PROCEDURE — 1159F MED LIST DOCD IN RCRD: CPT | Performed by: PHYSICIAN ASSISTANT

## 2025-07-14 PROCEDURE — 3078F DIAST BP <80 MM HG: CPT | Performed by: PHYSICIAN ASSISTANT

## 2025-07-14 PROCEDURE — 1160F RVW MEDS BY RX/DR IN RCRD: CPT | Performed by: PHYSICIAN ASSISTANT

## 2025-07-14 PROCEDURE — 3074F SYST BP LT 130 MM HG: CPT | Performed by: PHYSICIAN ASSISTANT

## 2025-07-14 NOTE — PROGRESS NOTES
Diane Rios  Tammi Levine  1970  07/14/2025    Patient Active Problem List   Diagnosis    Arteriosclerotic cardiovascular disease (ASCVD), s/p MI in 2012, clinically stable.     Type 2 diabetes mellitus with diabetic peripheral angiopathy without gangrene, with long-term current use of insulin    Essential hypertension    Dyslipidemia    Sleep apnea    Migraine headache    Abnormal CT scan    Disease of thyroid gland    Right-sided low back pain with right-sided sciatica    Slow transit constipation    Suicidal ideation    Type 2 diabetes mellitus with hyperglycemia, with long-term current use of insulin       Dear Diane Rios:    Subjective     History of Present Illness:    Chief Complaint   Patient presents with    Follow-up     ROUTINE       Tammi Levine is a pleasant 55 y.o. female with a past medical history significant for  History of Present Illness  The patient presents for follow-up of arteriosclerotic cardiovascular disease (ASCVD) with a history of myocardial infarction (MI) in 2012, hypertension, and dyslipidemia.    The patient reports no significant changes in health status since the last visit. There are no major cardiac symptoms such as angina, dyspnea, palpitations, syncope, or falls. Anxiety levels have decreased. Blood pressure is well controlled with lisinopril.    Recent laboratory results from June 2025 indicate stable renal function, a hemoglobin A1c of 7.8%, and normal thyroid function. Electrocardiogram (EKG) results were within normal limits.    The patient is adhering to the prescribed Crestor regimen. The dosage of Crestor was increased one week ago. The patient is not on Jardiance due to cost considerations and is instead taking Farxiga 10 mg.       Allergies   Allergen Reactions    Zoloft [Sertraline Hcl] Nausea And Vomiting    Ciprofloxacin Rash     Also caused chest pain   :      Current Outpatient Medications:     aspirin 81 MG EC tablet, Take 1 tablet  by mouth Every Evening., Disp: 90 tablet, Rfl: 2    B-D UF III MINI PEN NEEDLES 31G X 5 MM misc, USE TWICE A DAY WITH INSULIN INJECTIONS, Disp: , Rfl:     Continuous Glucose  (Dexcom G7 ) device, Use 1 each Take As Directed., Disp: 1 each, Rfl: 0    Continuous Glucose Sensor (Dexcom G7 Sensor) misc, Use 1 each Every 10 (Ten) Days., Disp: 9 each, Rfl: 1    fluticasone (FLONASE) 50 MCG/ACT nasal spray, Administer 2 sprays into the nostril(s) as directed by provider Daily., Disp: , Rfl:     Glucagon HCl (Glucagon Emergency) 1 MG/ML reconstituted solution, Inject 1 mL as directed As Needed (for severe hypoglycemia)., Disp: 1 each, Rfl: 5    lamoTRIgine (LaMICtal) 25 MG tablet, Take 2 tablets by mouth Every Night for 90 days. Indications: Manic-Depression, Disp: 60 tablet, Rfl: 2    levothyroxine (SYNTHROID, LEVOTHROID) 50 MCG tablet, Take 1 tablet by mouth Every Evening. Indications: Underactive Thyroid, Disp: , Rfl:     lisinopril (PRINIVIL,ZESTRIL) 20 MG tablet, Take 1 tablet by mouth Daily. Indications: High Blood Pressure, Disp: 90 tablet, Rfl: 2    lithium carbonate 300 MG capsule, Take 1 capsule by mouth Every Evening for 90 days. Indications: Manic-Depression, Disp: 30 capsule, Rfl: 2    meclizine (ANTIVERT) 12.5 MG tablet, Take 1 tablet by mouth 3 (Three) Times a Day As Needed., Disp: , Rfl:     NovoLOG Mix 70/30 FlexPen (70-30) 100 UNIT/ML suspension pen-injector injection, INJECT 15 UNITS TWICE A DAY BEFORE MEALS (Patient taking differently: Inject 0.2 mL under the skin into the appropriate area as directed 2 (Two) Times a Day.), Disp: , Rfl:     omeprazole (priLOSEC) 20 MG capsule, Daily., Disp: , Rfl:     ondansetron ODT (ZOFRAN-ODT) 4 MG disintegrating tablet, 1 tablet., Disp: , Rfl:     OneTouch Verio test strip, TEST BLOOD SUGAR 3 TIMES A DAY, Disp: , Rfl:     rosuvastatin (CRESTOR) 20 MG tablet, Take 1 tablet by mouth Daily. Indications: High Amount of Fats in the Blood, Disp: 90 tablet,  "Rfl: 1    urea (CARMOL) 40 % cream, Apply 1 Application topically to the appropriate area as directed 1 (One) Time., Disp: , Rfl:     The following portions of the patient's history were reviewed and updated as appropriate: allergies, current medications, past family history, past medical history, past social history, past surgical history and problem list.    Social History     Tobacco Use    Smoking status: Never     Passive exposure: Never    Smokeless tobacco: Never   Vaping Use    Vaping status: Never Used   Substance Use Topics    Alcohol use: No    Drug use: No         Objective   Vitals:    07/14/25 1355   BP: 119/70   Pulse: 60   SpO2: 100%   Weight: 80.7 kg (178 lb)   Height: 149.9 cm (59\")     Body mass index is 35.95 kg/m².    ROS    Constitutional:       General: Not in acute distress.     Appearance: Healthy appearance. Well-developed and not in distress. Not diaphoretic.   Eyes:      Conjunctiva/sclera: Conjunctivae normal.      Pupils: Pupils are equal, round, and reactive to light.   HENT:      Head: Normocephalic and atraumatic.   Neck:      Vascular: No carotid bruit or JVD.   Pulmonary:      Effort: Pulmonary effort is normal. No respiratory distress.      Breath sounds: Normal breath sounds.   Cardiovascular:      Normal rate. Regular rhythm.   Edema:     Peripheral edema absent.   Skin:     General: Skin is cool.   Neurological:      Mental Status: Alert, oriented to person, place, and time and oriented to person, place and time.         Lab Results   Component Value Date     06/20/2025    K 4.7 06/20/2025     06/20/2025    CO2 19.0 (L) 06/20/2025    BUN 14.0 06/20/2025    CREATININE 0.97 06/20/2025    GLUCOSE 219 (H) 06/20/2025    CALCIUM 9.7 06/20/2025    AST 14 06/20/2025    ALT 10 06/20/2025    ALKPHOS 80 06/20/2025     Lab Results   Component Value Date    CKTOTAL 124 08/14/2020     Lab Results   Component Value Date    WBC 12.39 (H) 08/27/2024    HGB 11.8 (L) 08/27/2024    HCT " 37.0 08/27/2024     08/27/2024     Lab Results   Component Value Date    INR 0.91 01/12/2020     Lab Results   Component Value Date    MG 2.1 08/27/2024     Lab Results   Component Value Date    TSH 2.750 06/20/2025    TRIG 132 06/20/2025    HDL 46 06/20/2025     (H) 06/20/2025      Lab Results   Component Value Date    BNP 36 09/16/2015       During this visit the following were done:  Labs Reviewed []    Labs Ordered []    Radiology Reports Reviewed []    Radiology Ordered []    PCP Records Reviewed []    Referring Provider Records Reviewed []    ER Records Reviewed []    Hospital Records Reviewed []    History Obtained From Family []    Radiology Images Reviewed []    Other Reviewed []    Records Requested []         ECG 12 Lead    Date/Time: 7/14/2025 2:14 PM  Performed by: Oscar Lewis PA-C    Authorized by: Oscar Lewis PA-C  Comparison: compared with previous ECG   Similar to previous ECG  Rhythm: sinus rhythm  Conduction: conduction normal    Clinical impression: normal ECG          Assessment & Plan    Diagnosis Plan   1. Arteriosclerotic cardiovascular disease (ASCVD), s/p MI in 2012, clinically stable.         2. Dyslipidemia        3. Essential hypertension                 Assessment & Plan  1. ASCVD with previous MI:  - No anginal symptoms  - Continue GDMT: aspirin, lisinopril. And crestor  - Farxiga 10 mg instead of Jardiance due to cost    2. Hypertension:  - Well controlled  - Continue current regimen: lisinopril    3. Dyslipidemia:  - , not at goal  - Crestor dose increased last week  - Continue current regimen but if LDL is still above goal <55 recommend increasing crestor 40      Return in about 6 months (around 1/14/2026).    As always, I appreciate very much the opportunity to participate in the cardiovascular care of your patients.      With Best Regards,    Oscar Lewis PA-C    Patient or patient representative verbalized consent for the use of Ambient  Listening during the visit with  Oscar Lewis PA-C for chart documentation. 7/14/2025  14:13 EDT

## 2025-07-15 ENCOUNTER — OFFICE VISIT (OUTPATIENT)
Dept: PSYCHIATRY | Facility: CLINIC | Age: 55
End: 2025-07-15
Payer: MEDICAID

## 2025-07-15 DIAGNOSIS — F31.81 CHRONIC BIPOLAR II DISORDER, MOST RECENT EPISODE MAJOR DEPRESSIVE: Primary | ICD-10-CM

## 2025-07-15 DIAGNOSIS — G47.9 SLEEPING DIFFICULTIES: ICD-10-CM

## 2025-07-15 DIAGNOSIS — F41.1 GENERALIZED ANXIETY DISORDER: ICD-10-CM

## 2025-07-22 NOTE — PROGRESS NOTES
"Date of Service: July 15, 2025  Time In:  1:30 PM  Time Out: 2:00 PM    PROGRESS NOTE  Data:  Tammi Levine is a 55 y.o. female who met 1:1 with the undersigned for a regularly scheduled individual outpatient therapy session at [facility] for follow-up of bipolar disorder.      Chief Complaint: Bipolar II disorder; anxiety; family dynamics problem     HPI: Patient reports a recent trip to the beach with her mother and other family members was enjoyable.  She does reports she has a friendship with a male at this time which is difficult and feels as though she is being \"used\".  She states she gives them rides and he becomes upset and \"mean\" with her when she cannot do exactly as he wants.  Patient states she has decided to distance herself and has blocked him from all calls and social media.   Patient reports she continues to struggle with mood instability including periods of depressed mood, anhedonia, anergia, and feeling hopeless.  Patient also reports she struggles with periods of hypomania including feeling on edge, irritability, increased energy, impulsive behavior, and decreased need for sleep.  However, the patient states she primarily struggles with depression and anxiety.   Patient rates current symptoms at a 4 on a scale of 1-10 with 10 being most severe. Patient reports she is sleeping relatively well at this time.  The patient adamantly and convincingly denies suicidal ideation vehemently denies any substance use.      Clinical Maneuvering/Intervention:  Assisted patient in processing above session content; acknowledged and normalized patient’s thoughts, feelings, and concerns.  Validated the patient's enjoyment at a recent trip to the beach and praised her efforts to be more socially active and to interact appropriately with others.  Also assisted the patient in processing her feelings concerning difficulty with one of her friendships and validated her right to choose to " distance herself from stressful relationship.  Utilized cognitive behavioral therapy to assist the patient in developing appropriate coping mechanisms to decrease the severity frequency of symptoms.  Continue to focus on healthy skills of daily living and behavioral activation.  Provided unconditional positive regarding a safe, supportive environment.    Allowed patient to freely discuss issues without interruption or judgment. Provided safe, confidential environment to facilitate the development of positive therapeutic relationship and encourage open, honest communication. Assisted patient in identifying risk factors which would indicate the need for higher level of care including thoughts to harm self or others and/or self-harming behavior and encouraged patient to contact this office, call 911, or present to the nearest emergency room should any of these events occur. Discussed crisis intervention services and means to access.  Patient adamantly and convincingly denies current suicidal or homicidal ideation or perceptual disturbance.      Assessment    Patient appears to maintain relative stability as compared to her baseline.  However, she continues to be susceptible to an external locus of control and continues to be significantly affected by ongoing strained family dynamics.  The patient's symptomology continue to cause impairment in important areas of functioning.  Patient can be reasonably expected to continue to benefit treatment and would likely be at increased risk for decompensation.    Diagnoses and all orders for this visit:    1. Chronic bipolar II disorder, most recent episode major depressive (Primary)    2. Generalized anxiety disorder    3. Sleeping difficulties               Mental Status Exam  Hygiene: Good  Dress: Casual  Attitude:  Cooperative  Motor Activity: Appropriate  Speech:  Normal  Mood: Within normal limits  Affect: Tearful  Thought Processes:  Linear  Thought Content:   normal  Suicidal Thoughts:  denies  Homicidal Thoughts:  denies  Crisis Safety Plan: yes, to come to the emergency room.  Hallucinations:  denies    Patient's Support Network Includes:  mother and extended family    Progress toward goal: Not at goal    Functional Status: Mild impairment     Prognosis: Fair with Ongoing Treatment     Plan         Patient will continue in individual outpatient therapy session [facility] every 3 weeks and will continue and pharmacotherapy as scheduled with MARISELA Dixon.  Patient will adhere to medication regimen as prescribed and report any side effects. Patient will contact this office, call 911 or present to the nearest emergency room should suicidal or homicidal ideations occur. Provide Cognitive Behavioral Therapy and Integrative Therapy to improve functioning, maintain stability, and avoid decompensation and the need for higher level of care.          Return in about 3 weeks (around 8/5/2025) for Next scheduled follow up.      This document signed by Vidal Ortiz LCSW, [facility] CenterSURY July 22, 2025 07:34 EDT

## 2025-08-11 ENCOUNTER — OFFICE VISIT (OUTPATIENT)
Dept: ENDOCRINOLOGY | Facility: CLINIC | Age: 55
End: 2025-08-11
Payer: MEDICAID

## 2025-08-11 VITALS
BODY MASS INDEX: 35.95 KG/M2 | SYSTOLIC BLOOD PRESSURE: 126 MMHG | HEART RATE: 65 BPM | WEIGHT: 178 LBS | DIASTOLIC BLOOD PRESSURE: 48 MMHG | OXYGEN SATURATION: 99 %

## 2025-08-11 DIAGNOSIS — E11.65 TYPE 2 DIABETES MELLITUS WITH HYPERGLYCEMIA, WITH LONG-TERM CURRENT USE OF INSULIN: Primary | ICD-10-CM

## 2025-08-11 DIAGNOSIS — Z79.4 TYPE 2 DIABETES MELLITUS WITH HYPERGLYCEMIA, WITH LONG-TERM CURRENT USE OF INSULIN: Primary | ICD-10-CM

## 2025-08-11 LAB
EXPIRATION DATE: ABNORMAL
GLUCOSE BLDC GLUCOMTR-MCNC: 138 MG/DL (ref 70–130)
Lab: ABNORMAL

## 2025-08-11 RX ORDER — INSULIN ASPART 100 [IU]/ML
20 INJECTION, SUSPENSION SUBCUTANEOUS
Qty: 45 ML | Refills: 0 | Status: SHIPPED | OUTPATIENT
Start: 2025-08-11

## 2025-08-12 ENCOUNTER — TELEPHONE (OUTPATIENT)
Dept: ENDOCRINOLOGY | Facility: CLINIC | Age: 55
End: 2025-08-12
Payer: MEDICAID

## 2025-08-12 ENCOUNTER — OFFICE VISIT (OUTPATIENT)
Dept: PSYCHIATRY | Facility: CLINIC | Age: 55
End: 2025-08-12
Payer: MEDICAID

## 2025-08-12 DIAGNOSIS — F31.81 CHRONIC BIPOLAR II DISORDER, MOST RECENT EPISODE MAJOR DEPRESSIVE: Primary | ICD-10-CM

## 2025-08-12 DIAGNOSIS — R45.89 LONELINESS: ICD-10-CM

## 2025-08-12 DIAGNOSIS — G47.9 SLEEPING DIFFICULTIES: ICD-10-CM

## 2025-08-12 DIAGNOSIS — Z60.4 SOCIAL ISOLATION: ICD-10-CM

## 2025-08-12 DIAGNOSIS — F41.1 GENERALIZED ANXIETY DISORDER: ICD-10-CM

## 2025-08-12 SDOH — SOCIAL STABILITY - SOCIAL INSECURITY: SOCIAL EXCLUSION AND REJECTION: Z60.4

## 2025-08-27 ENCOUNTER — OFFICE VISIT (OUTPATIENT)
Dept: PSYCHIATRY | Facility: CLINIC | Age: 55
End: 2025-08-27
Payer: MEDICAID

## 2025-08-27 VITALS
HEART RATE: 67 BPM | HEIGHT: 59 IN | OXYGEN SATURATION: 100 % | BODY MASS INDEX: 35.08 KG/M2 | WEIGHT: 174 LBS | SYSTOLIC BLOOD PRESSURE: 126 MMHG | DIASTOLIC BLOOD PRESSURE: 68 MMHG

## 2025-08-27 DIAGNOSIS — F31.30 BIPOLAR I DISORDER, MOST RECENT EPISODE DEPRESSED: Primary | ICD-10-CM

## 2025-08-27 DIAGNOSIS — F41.1 GENERALIZED ANXIETY DISORDER: ICD-10-CM

## 2025-08-27 DIAGNOSIS — G47.9 SLEEPING DIFFICULTIES: ICD-10-CM

## 2025-08-27 RX ORDER — BLOOD-GLUCOSE METER
KIT MISCELLANEOUS
COMMUNITY
Start: 2025-07-09

## 2025-08-27 RX ORDER — LITHIUM CARBONATE 300 MG/1
300 CAPSULE ORAL EVERY EVENING
Qty: 30 CAPSULE | Refills: 2 | Status: SHIPPED | OUTPATIENT
Start: 2025-08-27 | End: 2025-11-25

## 2025-08-27 RX ORDER — LAMOTRIGINE 25 MG/1
50 TABLET ORAL NIGHTLY
Qty: 60 TABLET | Refills: 2 | Status: SHIPPED | OUTPATIENT
Start: 2025-08-27 | End: 2025-11-25